# Patient Record
Sex: FEMALE | Race: BLACK OR AFRICAN AMERICAN | NOT HISPANIC OR LATINO | URBAN - METROPOLITAN AREA
[De-identification: names, ages, dates, MRNs, and addresses within clinical notes are randomized per-mention and may not be internally consistent; named-entity substitution may affect disease eponyms.]

---

## 2018-09-01 ENCOUNTER — OUTPATIENT (OUTPATIENT)
Dept: OUTPATIENT SERVICES | Facility: HOSPITAL | Age: 83
LOS: 1 days | End: 2018-09-01
Payer: MEDICARE

## 2018-09-01 DIAGNOSIS — S82.899A OTHER FRACTURE OF UNSPECIFIED LOWER LEG, INITIAL ENCOUNTER FOR CLOSED FRACTURE: Chronic | ICD-10-CM

## 2018-09-01 DIAGNOSIS — Z98.89 OTHER SPECIFIED POSTPROCEDURAL STATES: Chronic | ICD-10-CM

## 2018-09-01 PROCEDURE — G9001: CPT

## 2018-09-04 ENCOUNTER — EMERGENCY (EMERGENCY)
Facility: HOSPITAL | Age: 83
LOS: 1 days | Discharge: HOME | End: 2018-09-04
Attending: EMERGENCY MEDICINE
Payer: MEDICARE

## 2018-09-04 VITALS — WEIGHT: 179.9 LBS | HEIGHT: 62 IN

## 2018-09-04 VITALS
HEART RATE: 80 BPM | OXYGEN SATURATION: 98 % | RESPIRATION RATE: 20 BRPM | SYSTOLIC BLOOD PRESSURE: 114 MMHG | DIASTOLIC BLOOD PRESSURE: 70 MMHG

## 2018-09-04 DIAGNOSIS — S82.899A OTHER FRACTURE OF UNSPECIFIED LOWER LEG, INITIAL ENCOUNTER FOR CLOSED FRACTURE: Chronic | ICD-10-CM

## 2018-09-04 DIAGNOSIS — Z98.89 OTHER SPECIFIED POSTPROCEDURAL STATES: Chronic | ICD-10-CM

## 2018-09-04 LAB
APPEARANCE UR: CLEAR — SIGNIFICANT CHANGE UP
BACTERIA # UR AUTO: ABNORMAL
BILIRUB UR-MCNC: NEGATIVE — SIGNIFICANT CHANGE UP
COLOR SPEC: YELLOW — SIGNIFICANT CHANGE UP
DIFF PNL FLD: ABNORMAL
EPI CELLS # UR: ABNORMAL
GLUCOSE UR QL: NEGATIVE MG/DL — SIGNIFICANT CHANGE UP
KETONES UR-MCNC: NEGATIVE — SIGNIFICANT CHANGE UP
LEUKOCYTE ESTERASE UR-ACNC: ABNORMAL
NITRITE UR-MCNC: POSITIVE
PH UR: 6 — SIGNIFICANT CHANGE UP (ref 5–8)
PROT UR-MCNC: 30 MG/DL
RBC CASTS # UR COMP ASSIST: ABNORMAL /HPF (ref 0–4)
SP GR SPEC: 1.01 — SIGNIFICANT CHANGE UP (ref 1.01–1.02)
UROBILINOGEN FLD QL: 1 MG/DL
WBC UR QL: >50

## 2018-09-04 PROCEDURE — 87086 URINE CULTURE/COLONY COUNT: CPT

## 2018-09-04 PROCEDURE — 81001 URINALYSIS AUTO W/SCOPE: CPT

## 2018-09-04 PROCEDURE — 99283 EMERGENCY DEPT VISIT LOW MDM: CPT

## 2018-09-04 PROCEDURE — 87186 SC STD MICRODIL/AGAR DIL: CPT

## 2018-09-04 RX ORDER — NITROFURANTOIN MACROCRYSTAL 50 MG
1 CAPSULE ORAL
Qty: 10 | Refills: 0 | OUTPATIENT
Start: 2018-09-04 | End: 2018-09-08

## 2018-09-04 NOTE — ED ADULT NURSE NOTE - NSIMPLEMENTINTERV_GEN_ALL_ED
Implemented All Universal Safety Interventions:  [place] to call system. Call bell, personal items and telephone within reach. Instruct patient to call for assistance. Room bathroom lighting operational. Non-slip footwear when patient is off stretcher. Physically safe environment: no spills, clutter or unnecessary equipment. Stretcher in lowest position, wheels locked, appropriate side rails in place.

## 2018-09-04 NOTE — ED PROVIDER NOTE - OBJECTIVE STATEMENT
84  w/ PMH of CAD s/p stent, HTN, HLD, DM2, anemia & bladder prolapse presents w/ chronic urinary incontinence. Patient states that she had developed urinary prolase years ago. She was previously by an urologist (Dr. Mckinney) & had a procedure done in the past but was unable to provide any further information. 84  w/ PMH of CAD s/p stent, HTN, HLD, DM2, anemia & bladder prolapse presents w/ chronic urinary incontinence. Patient states that she had developed urinary prolase years ago. She was previously by an urologist (Dr. Mckinney) & had a procedure done in the past but was unable to provide any further information. Patient c/o of stress incontinence associated w/ dysuria but denies of any fever, chills, abdominal pain, hematuria, suprapubic pain, CVA tenderness.

## 2018-09-04 NOTE — ED ADULT TRIAGE NOTE - CHIEF COMPLAINT QUOTE
Pt brought in by family for possible prolapsed bladder pt c/o pain with urination and difficulty as per family members

## 2018-09-04 NOTE — ED PROVIDER NOTE - CARE PLAN
Principal Discharge DX:	Bladder prolapse, female, acquired Principal Discharge DX:	Bladder prolapse, female, acquired  Secondary Diagnosis:	UTI (urinary tract infection)

## 2018-09-04 NOTE — ED PROVIDER NOTE - ATTENDING CONTRIBUTION TO CARE
I personally saw the patient with theresident, and completed the key components of the history and physical exam. I then discussed the management plan with the resident. In brief Hx (urinary symptoms and fullnesds in the vagina)  Exam (bladder prolapse seen on pelvic examination) Plan (ot to get antibiotics for a uti and follow up with gyn/urology)

## 2018-09-05 DIAGNOSIS — Z71.89 OTHER SPECIFIED COUNSELING: ICD-10-CM

## 2018-09-12 ENCOUNTER — FORM ENCOUNTER (OUTPATIENT)
Age: 83
End: 2018-09-12

## 2018-09-12 ENCOUNTER — NON-APPOINTMENT (OUTPATIENT)
Age: 83
End: 2018-09-12

## 2018-09-12 ENCOUNTER — APPOINTMENT (OUTPATIENT)
Dept: FAMILY MEDICINE | Facility: CLINIC | Age: 83
End: 2018-09-12
Payer: MEDICARE

## 2018-09-12 VITALS
OXYGEN SATURATION: 90 % | HEIGHT: 60 IN | WEIGHT: 181 LBS | SYSTOLIC BLOOD PRESSURE: 128 MMHG | DIASTOLIC BLOOD PRESSURE: 76 MMHG | BODY MASS INDEX: 35.53 KG/M2 | HEART RATE: 77 BPM

## 2018-09-12 VITALS — OXYGEN SATURATION: 93 %

## 2018-09-12 DIAGNOSIS — Z87.891 PERSONAL HISTORY OF NICOTINE DEPENDENCE: ICD-10-CM

## 2018-09-12 DIAGNOSIS — Z13.21 ENCOUNTER FOR SCREENING FOR NUTRITIONAL DISORDER: ICD-10-CM

## 2018-09-12 DIAGNOSIS — Z13.820 ENCOUNTER FOR SCREENING FOR OSTEOPOROSIS: ICD-10-CM

## 2018-09-12 DIAGNOSIS — Z80.42 FAMILY HISTORY OF MALIGNANT NEOPLASM OF PROSTATE: ICD-10-CM

## 2018-09-12 DIAGNOSIS — Z13.220 ENCOUNTER FOR SCREENING FOR LIPOID DISORDERS: ICD-10-CM

## 2018-09-12 DIAGNOSIS — Z13.29 ENCOUNTER FOR SCREENING FOR OTHER SUSPECTED ENDOCRINE DISORDER: ICD-10-CM

## 2018-09-12 DIAGNOSIS — Z83.3 FAMILY HISTORY OF DIABETES MELLITUS: ICD-10-CM

## 2018-09-12 DIAGNOSIS — Z80.49 FAMILY HISTORY OF MALIGNANT NEOPLASM OF OTHER GENITAL ORGANS: ICD-10-CM

## 2018-09-12 DIAGNOSIS — Z00.00 ENCOUNTER FOR GENERAL ADULT MEDICAL EXAMINATION W/OUT ABNORMAL FINDINGS: ICD-10-CM

## 2018-09-12 DIAGNOSIS — Z13.1 ENCOUNTER FOR SCREENING FOR DIABETES MELLITUS: ICD-10-CM

## 2018-09-12 DIAGNOSIS — Z80.3 FAMILY HISTORY OF MALIGNANT NEOPLASM OF BREAST: ICD-10-CM

## 2018-09-12 DIAGNOSIS — Z82.49 FAMILY HISTORY OF ISCHEMIC HEART DISEASE AND OTHER DISEASES OF THE CIRCULATORY SYSTEM: ICD-10-CM

## 2018-09-12 DIAGNOSIS — Z80.0 FAMILY HISTORY OF MALIGNANT NEOPLASM OF DIGESTIVE ORGANS: ICD-10-CM

## 2018-09-12 PROCEDURE — 82270 OCCULT BLOOD FECES: CPT

## 2018-09-12 PROCEDURE — 36415 COLL VENOUS BLD VENIPUNCTURE: CPT

## 2018-09-12 PROCEDURE — 93000 ELECTROCARDIOGRAM COMPLETE: CPT

## 2018-09-12 PROCEDURE — G0439: CPT

## 2018-09-12 PROCEDURE — 99214 OFFICE O/P EST MOD 30 MIN: CPT | Mod: 25

## 2018-09-13 ENCOUNTER — APPOINTMENT (OUTPATIENT)
Dept: GASTROENTEROLOGY | Facility: CLINIC | Age: 83
End: 2018-09-13
Payer: MEDICARE

## 2018-09-13 ENCOUNTER — APPOINTMENT (OUTPATIENT)
Dept: RADIOLOGY | Facility: CLINIC | Age: 83
End: 2018-09-13

## 2018-09-13 ENCOUNTER — OUTPATIENT (OUTPATIENT)
Dept: OUTPATIENT SERVICES | Facility: HOSPITAL | Age: 83
LOS: 1 days | End: 2018-09-13
Payer: MEDICARE

## 2018-09-13 VITALS
HEIGHT: 60 IN | SYSTOLIC BLOOD PRESSURE: 139 MMHG | DIASTOLIC BLOOD PRESSURE: 75 MMHG | HEART RATE: 79 BPM | RESPIRATION RATE: 19 BRPM | WEIGHT: 188 LBS | OXYGEN SATURATION: 96 % | BODY MASS INDEX: 36.91 KG/M2

## 2018-09-13 DIAGNOSIS — R06.02 SHORTNESS OF BREATH: ICD-10-CM

## 2018-09-13 DIAGNOSIS — Z98.89 OTHER SPECIFIED POSTPROCEDURAL STATES: Chronic | ICD-10-CM

## 2018-09-13 DIAGNOSIS — Z13.820 ENCOUNTER FOR SCREENING FOR OSTEOPOROSIS: ICD-10-CM

## 2018-09-13 DIAGNOSIS — S82.899A OTHER FRACTURE OF UNSPECIFIED LOWER LEG, INITIAL ENCOUNTER FOR CLOSED FRACTURE: Chronic | ICD-10-CM

## 2018-09-13 LAB
25(OH)D3 SERPL-MCNC: 15.9 NG/ML
ALBUMIN SERPL ELPH-MCNC: 3.9 G/DL
ALP BLD-CCNC: 122 U/L
ALT SERPL-CCNC: 22 U/L
ANION GAP SERPL CALC-SCNC: 12 MMOL/L
APPEARANCE: CLEAR
AST SERPL-CCNC: 16 U/L
BACTERIA: NEGATIVE
BASOPHILS # BLD AUTO: 0.03 K/UL
BASOPHILS NFR BLD AUTO: 0.3 %
BILIRUB SERPL-MCNC: 0.2 MG/DL
BILIRUBIN URINE: NEGATIVE
BLOOD URINE: NEGATIVE
BUN SERPL-MCNC: 18 MG/DL
CALCIUM SERPL-MCNC: 9.4 MG/DL
CHLORIDE SERPL-SCNC: 104 MMOL/L
CHOLEST SERPL-MCNC: 167 MG/DL
CHOLEST/HDLC SERPL: 3.8 RATIO
CO2 SERPL-SCNC: 26 MMOL/L
COLOR: YELLOW
CREAT SERPL-MCNC: 0.84 MG/DL
EOSINOPHIL # BLD AUTO: 0.37 K/UL
EOSINOPHIL NFR BLD AUTO: 3.6 %
FERRITIN SERPL-MCNC: 41 NG/ML
FOLATE SERPL-MCNC: 11.5 NG/ML
GLUCOSE QUALITATIVE U: NEGATIVE MG/DL
GLUCOSE SERPL-MCNC: 80 MG/DL
HBA1C MFR BLD HPLC: 7.2 %
HCT VFR BLD CALC: 47.1 %
HDLC SERPL-MCNC: 44 MG/DL
HGB BLD-MCNC: 14.8 G/DL
HYALINE CASTS: 1 /LPF
IMM GRANULOCYTES NFR BLD AUTO: 0.1 %
IRON SATN MFR SERPL: 15 %
IRON SERPL-MCNC: 52 UG/DL
KETONES URINE: NEGATIVE
LDLC SERPL CALC-MCNC: 99 MG/DL
LEUKOCYTE ESTERASE URINE: NEGATIVE
LYMPHOCYTES # BLD AUTO: 3.28 K/UL
LYMPHOCYTES NFR BLD AUTO: 32.3 %
MAN DIFF?: NORMAL
MCHC RBC-ENTMCNC: 31 PG
MCHC RBC-ENTMCNC: 31.4 GM/DL
MCV RBC AUTO: 98.5 FL
MICROSCOPIC-UA: NORMAL
MONOCYTES # BLD AUTO: 0.69 K/UL
MONOCYTES NFR BLD AUTO: 6.8 %
NEUTROPHILS # BLD AUTO: 5.77 K/UL
NEUTROPHILS NFR BLD AUTO: 56.9 %
NITRITE URINE: NEGATIVE
PH URINE: 6.5
PLATELET # BLD AUTO: 381 K/UL
POTASSIUM SERPL-SCNC: 5.3 MMOL/L
PROT SERPL-MCNC: 7.6 G/DL
PROTEIN URINE: NEGATIVE MG/DL
RBC # BLD: 4.77 M/UL
RBC # BLD: 4.78 M/UL
RBC # FLD: 16 %
RED BLOOD CELLS URINE: 2 /HPF
RETICS # AUTO: 2.5 %
RETICS AGGREG/RBC NFR: 120.2 K/UL
SODIUM SERPL-SCNC: 143 MMOL/L
SPECIFIC GRAVITY URINE: 1.02
SQUAMOUS EPITHELIAL CELLS: 2 /HPF
TIBC SERPL-MCNC: 338 UG/DL
TRANSFERRIN SERPL-MCNC: 263 MG/DL
TRIGL SERPL-MCNC: 119 MG/DL
TSH SERPL-ACNC: 1.96 UIU/ML
UIBC SERPL-MCNC: 286 UG/DL
UROBILINOGEN URINE: NEGATIVE MG/DL
VIT B12 SERPL-MCNC: 371 PG/ML
WBC # FLD AUTO: 10.15 K/UL
WHITE BLOOD CELLS URINE: 24 /HPF

## 2018-09-13 PROCEDURE — 77080 DXA BONE DENSITY AXIAL: CPT

## 2018-09-13 PROCEDURE — 71046 X-RAY EXAM CHEST 2 VIEWS: CPT

## 2018-09-13 PROCEDURE — 71046 X-RAY EXAM CHEST 2 VIEWS: CPT | Mod: 26

## 2018-09-13 PROCEDURE — 77080 DXA BONE DENSITY AXIAL: CPT | Mod: 26

## 2018-09-13 PROCEDURE — 99204 OFFICE O/P NEW MOD 45 MIN: CPT

## 2018-09-13 PROCEDURE — 82272 OCCULT BLD FECES 1-3 TESTS: CPT

## 2018-09-14 ENCOUNTER — APPOINTMENT (OUTPATIENT)
Dept: FAMILY MEDICINE | Facility: CLINIC | Age: 83
End: 2018-09-14
Payer: MEDICARE

## 2018-09-14 VITALS
SYSTOLIC BLOOD PRESSURE: 128 MMHG | WEIGHT: 188 LBS | BODY MASS INDEX: 36.91 KG/M2 | DIASTOLIC BLOOD PRESSURE: 76 MMHG | HEIGHT: 60 IN | HEART RATE: 82 BPM

## 2018-09-14 VITALS — OXYGEN SATURATION: 93 %

## 2018-09-14 DIAGNOSIS — K62.5 HEMORRHAGE OF ANUS AND RECTUM: ICD-10-CM

## 2018-09-14 PROCEDURE — 99214 OFFICE O/P EST MOD 30 MIN: CPT

## 2018-09-17 ENCOUNTER — RX RENEWAL (OUTPATIENT)
Age: 83
End: 2018-09-17

## 2018-09-17 ENCOUNTER — APPOINTMENT (OUTPATIENT)
Dept: CARDIOLOGY | Facility: CLINIC | Age: 83
End: 2018-09-17
Payer: MEDICARE

## 2018-09-17 ENCOUNTER — OTHER (OUTPATIENT)
Age: 83
End: 2018-09-17

## 2018-09-17 VITALS
SYSTOLIC BLOOD PRESSURE: 136 MMHG | BODY MASS INDEX: 36.91 KG/M2 | HEART RATE: 82 BPM | WEIGHT: 188 LBS | HEIGHT: 60 IN | OXYGEN SATURATION: 93 % | DIASTOLIC BLOOD PRESSURE: 79 MMHG

## 2018-09-17 DIAGNOSIS — R94.31 ABNORMAL ELECTROCARDIOGRAM [ECG] [EKG]: ICD-10-CM

## 2018-09-17 LAB — BACTERIA UR CULT: ABNORMAL

## 2018-09-17 PROCEDURE — 99204 OFFICE O/P NEW MOD 45 MIN: CPT

## 2018-09-17 RX ORDER — ALBUTEROL SULFATE 90 UG/1
108 (90 BASE) AEROSOL, METERED RESPIRATORY (INHALATION) EVERY 6 HOURS
Qty: 1 | Refills: 0 | Status: DISCONTINUED | COMMUNITY
Start: 2018-09-12 | End: 2018-09-17

## 2018-09-18 ENCOUNTER — APPOINTMENT (OUTPATIENT)
Dept: PULMONOLOGY | Facility: CLINIC | Age: 83
End: 2018-09-18
Payer: MEDICARE

## 2018-09-18 ENCOUNTER — NON-APPOINTMENT (OUTPATIENT)
Age: 83
End: 2018-09-18

## 2018-09-18 VITALS
HEART RATE: 87 BPM | WEIGHT: 188 LBS | SYSTOLIC BLOOD PRESSURE: 122 MMHG | TEMPERATURE: 98.6 F | OXYGEN SATURATION: 95 % | RESPIRATION RATE: 20 BRPM | DIASTOLIC BLOOD PRESSURE: 70 MMHG | BODY MASS INDEX: 36.91 KG/M2 | HEIGHT: 60 IN

## 2018-09-18 PROCEDURE — 99205 OFFICE O/P NEW HI 60 MIN: CPT | Mod: 25

## 2018-09-18 PROCEDURE — 94010 BREATHING CAPACITY TEST: CPT

## 2018-09-19 ENCOUNTER — APPOINTMENT (OUTPATIENT)
Dept: GASTROENTEROLOGY | Facility: GI CENTER | Age: 83
End: 2018-09-19
Payer: MEDICARE

## 2018-09-19 ENCOUNTER — OUTPATIENT (OUTPATIENT)
Dept: OUTPATIENT SERVICES | Facility: HOSPITAL | Age: 83
LOS: 1 days | End: 2018-09-19
Payer: MEDICARE

## 2018-09-19 VITALS
TEMPERATURE: 98.6 F | HEIGHT: 60 IN | SYSTOLIC BLOOD PRESSURE: 124 MMHG | BODY MASS INDEX: 36.91 KG/M2 | DIASTOLIC BLOOD PRESSURE: 74 MMHG | RESPIRATION RATE: 12 BRPM | HEART RATE: 90 BPM | WEIGHT: 188 LBS

## 2018-09-19 DIAGNOSIS — K62.5 HEMORRHAGE OF ANUS AND RECTUM: ICD-10-CM

## 2018-09-19 DIAGNOSIS — F41.9 ANXIETY DISORDER, UNSPECIFIED: ICD-10-CM

## 2018-09-19 DIAGNOSIS — R06.2 WHEEZING: ICD-10-CM

## 2018-09-19 DIAGNOSIS — K64.8 OTHER HEMORRHOIDS: ICD-10-CM

## 2018-09-19 DIAGNOSIS — S82.899A OTHER FRACTURE OF UNSPECIFIED LOWER LEG, INITIAL ENCOUNTER FOR CLOSED FRACTURE: Chronic | ICD-10-CM

## 2018-09-19 DIAGNOSIS — Z98.89 OTHER SPECIFIED POSTPROCEDURAL STATES: Chronic | ICD-10-CM

## 2018-09-19 DIAGNOSIS — K57.30 DIVERTICULOSIS OF LARGE INTESTINE W/OUT PERFORATION OR ABSCESS W/OUT BLEEDING: ICD-10-CM

## 2018-09-19 DIAGNOSIS — K92.1 MELENA: ICD-10-CM

## 2018-09-19 DIAGNOSIS — M54.9 DORSALGIA, UNSPECIFIED: ICD-10-CM

## 2018-09-19 DIAGNOSIS — R12 HEARTBURN: ICD-10-CM

## 2018-09-19 DIAGNOSIS — F43.9 REACTION TO SEVERE STRESS, UNSPECIFIED: ICD-10-CM

## 2018-09-19 LAB — GLUCOSE BLDC GLUCOMTR-MCNC: 111 MG/DL — HIGH (ref 70–99)

## 2018-09-19 PROCEDURE — 82962 GLUCOSE BLOOD TEST: CPT

## 2018-09-19 PROCEDURE — 45378 DIAGNOSTIC COLONOSCOPY: CPT

## 2018-09-20 PROBLEM — R94.31 ABNORMAL EKG: Status: ACTIVE | Noted: 2018-09-12

## 2018-09-21 ENCOUNTER — APPOINTMENT (OUTPATIENT)
Dept: PULMONOLOGY | Facility: CLINIC | Age: 83
End: 2018-09-21
Payer: MEDICARE

## 2018-09-21 PROCEDURE — 85018 HEMOGLOBIN: CPT | Mod: QW

## 2018-09-21 PROCEDURE — 94664 DEMO&/EVAL PT USE INHALER: CPT | Mod: 59

## 2018-09-21 PROCEDURE — 94727 GAS DIL/WSHOT DETER LNG VOL: CPT

## 2018-09-21 PROCEDURE — 94729 DIFFUSING CAPACITY: CPT

## 2018-09-21 PROCEDURE — 94060 EVALUATION OF WHEEZING: CPT

## 2018-09-24 ENCOUNTER — APPOINTMENT (OUTPATIENT)
Dept: CARDIOLOGY | Facility: CLINIC | Age: 83
End: 2018-09-24
Payer: MEDICARE

## 2018-09-24 PROCEDURE — 93306 TTE W/DOPPLER COMPLETE: CPT

## 2018-09-25 ENCOUNTER — FORM ENCOUNTER (OUTPATIENT)
Age: 83
End: 2018-09-25

## 2018-09-26 ENCOUNTER — APPOINTMENT (OUTPATIENT)
Dept: CT IMAGING | Facility: CLINIC | Age: 83
End: 2018-09-26
Payer: MEDICARE

## 2018-09-26 ENCOUNTER — APPOINTMENT (OUTPATIENT)
Dept: CARDIOLOGY | Facility: CLINIC | Age: 83
End: 2018-09-26
Payer: MEDICARE

## 2018-09-26 ENCOUNTER — OUTPATIENT (OUTPATIENT)
Dept: OUTPATIENT SERVICES | Facility: HOSPITAL | Age: 83
LOS: 1 days | End: 2018-09-26
Payer: MEDICARE

## 2018-09-26 DIAGNOSIS — J44.9 CHRONIC OBSTRUCTIVE PULMONARY DISEASE, UNSPECIFIED: ICD-10-CM

## 2018-09-26 DIAGNOSIS — S82.899A OTHER FRACTURE OF UNSPECIFIED LOWER LEG, INITIAL ENCOUNTER FOR CLOSED FRACTURE: Chronic | ICD-10-CM

## 2018-09-26 DIAGNOSIS — Z98.89 OTHER SPECIFIED POSTPROCEDURAL STATES: Chronic | ICD-10-CM

## 2018-09-26 PROCEDURE — 71275 CT ANGIOGRAPHY CHEST: CPT | Mod: 26

## 2018-09-26 PROCEDURE — 71275 CT ANGIOGRAPHY CHEST: CPT

## 2018-09-28 ENCOUNTER — APPOINTMENT (OUTPATIENT)
Dept: FAMILY MEDICINE | Facility: CLINIC | Age: 83
End: 2018-09-28
Payer: MEDICARE

## 2018-09-28 VITALS
WEIGHT: 185 LBS | DIASTOLIC BLOOD PRESSURE: 66 MMHG | SYSTOLIC BLOOD PRESSURE: 130 MMHG | OXYGEN SATURATION: 88 % | HEART RATE: 73 BPM | HEIGHT: 60 IN | BODY MASS INDEX: 36.32 KG/M2

## 2018-09-28 VITALS — OXYGEN SATURATION: 93 %

## 2018-09-28 DIAGNOSIS — R06.02 SHORTNESS OF BREATH: ICD-10-CM

## 2018-09-28 DIAGNOSIS — R10.30 LOWER ABDOMINAL PAIN, UNSPECIFIED: ICD-10-CM

## 2018-09-28 LAB
BILIRUB UR QL STRIP: NEGATIVE
CLARITY UR: CLEAR
COLLECTION METHOD: NORMAL
GLUCOSE UR-MCNC: NEGATIVE
HCG UR QL: 0.2 EU/DL
HGB UR QL STRIP.AUTO: NORMAL
KETONES UR-MCNC: NEGATIVE
LEUKOCYTE ESTERASE UR QL STRIP: NORMAL
NITRITE UR QL STRIP: NORMAL
PH UR STRIP: 6
PROT UR STRIP-MCNC: NORMAL
SP GR UR STRIP: 1.02

## 2018-09-28 PROCEDURE — 99214 OFFICE O/P EST MOD 30 MIN: CPT | Mod: 25

## 2018-09-28 PROCEDURE — 36415 COLL VENOUS BLD VENIPUNCTURE: CPT

## 2018-09-28 PROCEDURE — 81003 URINALYSIS AUTO W/O SCOPE: CPT | Mod: QW

## 2018-10-01 ENCOUNTER — EMERGENCY (EMERGENCY)
Facility: HOSPITAL | Age: 83
LOS: 1 days | Discharge: HOME | End: 2018-10-01
Attending: EMERGENCY MEDICINE
Payer: MEDICARE

## 2018-10-01 VITALS
DIASTOLIC BLOOD PRESSURE: 66 MMHG | HEART RATE: 74 BPM | OXYGEN SATURATION: 90 % | RESPIRATION RATE: 20 BRPM | TEMPERATURE: 98 F | SYSTOLIC BLOOD PRESSURE: 106 MMHG

## 2018-10-01 VITALS — HEIGHT: 60 IN | WEIGHT: 184.97 LBS

## 2018-10-01 DIAGNOSIS — Z98.89 OTHER SPECIFIED POSTPROCEDURAL STATES: Chronic | ICD-10-CM

## 2018-10-01 DIAGNOSIS — S82.899A OTHER FRACTURE OF UNSPECIFIED LOWER LEG, INITIAL ENCOUNTER FOR CLOSED FRACTURE: Chronic | ICD-10-CM

## 2018-10-01 LAB
ANION GAP SERPL CALC-SCNC: 14 MMOL/L — SIGNIFICANT CHANGE UP (ref 5–17)
APPEARANCE UR: SIGNIFICANT CHANGE UP
APPEARANCE: CLEAR
BACTERIA UR CULT: NORMAL
BACTERIA: NEGATIVE
BASOPHILS # BLD AUTO: 0 K/UL — SIGNIFICANT CHANGE UP (ref 0–0.2)
BASOPHILS NFR BLD AUTO: 0.3 % — SIGNIFICANT CHANGE UP (ref 0–2)
BILIRUB UR-MCNC: NEGATIVE — SIGNIFICANT CHANGE UP
BILIRUBIN URINE: NEGATIVE
BLOOD URINE: ABNORMAL
BUN SERPL-MCNC: 11 MG/DL — SIGNIFICANT CHANGE UP (ref 8–20)
CALCIUM SERPL-MCNC: 9.3 MG/DL — SIGNIFICANT CHANGE UP (ref 8.6–10.2)
CHLORIDE SERPL-SCNC: 105 MMOL/L — SIGNIFICANT CHANGE UP (ref 98–107)
CO2 SERPL-SCNC: 26 MMOL/L — SIGNIFICANT CHANGE UP (ref 22–29)
COLOR SPEC: ABNORMAL
COLOR: YELLOW
CREAT SERPL-MCNC: 0.79 MG/DL — SIGNIFICANT CHANGE UP (ref 0.5–1.3)
DIFF PNL FLD: ABNORMAL
EOSINOPHIL # BLD AUTO: 0.3 K/UL — SIGNIFICANT CHANGE UP (ref 0–0.5)
EOSINOPHIL NFR BLD AUTO: 3.1 % — SIGNIFICANT CHANGE UP (ref 0–6)
GLUCOSE QUALITATIVE U: NEGATIVE MG/DL
GLUCOSE SERPL-MCNC: 93 MG/DL — SIGNIFICANT CHANGE UP (ref 70–115)
GLUCOSE UR QL: NEGATIVE — SIGNIFICANT CHANGE UP
HCT VFR BLD CALC: 48 % — HIGH (ref 37–47)
HGB BLD-MCNC: 15 G/DL — SIGNIFICANT CHANGE UP (ref 12–16)
HYALINE CASTS: 11 /LPF
KETONES UR-MCNC: ABNORMAL
KETONES URINE: NEGATIVE
LEUKOCYTE ESTERASE UR-ACNC: ABNORMAL
LEUKOCYTE ESTERASE URINE: ABNORMAL
LYMPHOCYTES # BLD AUTO: 2.6 K/UL — SIGNIFICANT CHANGE UP (ref 1–4.8)
LYMPHOCYTES # BLD AUTO: 23.1 % — SIGNIFICANT CHANGE UP (ref 20–55)
MCHC RBC-ENTMCNC: 30.5 PG — SIGNIFICANT CHANGE UP (ref 27–31)
MCHC RBC-ENTMCNC: 31.3 G/DL — LOW (ref 32–36)
MCV RBC AUTO: 97.6 FL — SIGNIFICANT CHANGE UP (ref 81–99)
MICROSCOPIC-UA: NORMAL
MONOCYTES # BLD AUTO: 0.7 K/UL — SIGNIFICANT CHANGE UP (ref 0–0.8)
MONOCYTES NFR BLD AUTO: 6.2 % — SIGNIFICANT CHANGE UP (ref 3–10)
NEUTROPHILS # BLD AUTO: 7.5 K/UL — SIGNIFICANT CHANGE UP (ref 1.8–8)
NEUTROPHILS NFR BLD AUTO: 67.1 % — SIGNIFICANT CHANGE UP (ref 37–73)
NITRITE UR-MCNC: NEGATIVE — SIGNIFICANT CHANGE UP
NITRITE URINE: NEGATIVE
PH UR: 6.5 — SIGNIFICANT CHANGE UP (ref 5–8)
PH URINE: 6
PLATELET # BLD AUTO: 306 K/UL — SIGNIFICANT CHANGE UP (ref 150–400)
POTASSIUM SERPL-MCNC: 4.3 MMOL/L — SIGNIFICANT CHANGE UP (ref 3.5–5.3)
POTASSIUM SERPL-SCNC: 4.3 MMOL/L — SIGNIFICANT CHANGE UP (ref 3.5–5.3)
PROT UR-MCNC: 500 MG/DL
PROTEIN URINE: ABNORMAL MG/DL
RBC # BLD: 4.92 M/UL — SIGNIFICANT CHANGE UP (ref 4.4–5.2)
RBC # FLD: 15.6 % — SIGNIFICANT CHANGE UP (ref 11–15.6)
RED BLOOD CELLS URINE: 14 /HPF
SODIUM SERPL-SCNC: 145 MMOL/L — SIGNIFICANT CHANGE UP (ref 135–145)
SP GR SPEC: 1.01 — SIGNIFICANT CHANGE UP (ref 1.01–1.02)
SPECIFIC GRAVITY URINE: 1.02
SQUAMOUS EPITHELIAL CELLS: 2 /HPF
UROBILINOGEN FLD QL: NEGATIVE — SIGNIFICANT CHANGE UP
UROBILINOGEN URINE: NEGATIVE MG/DL
WBC # BLD: 11.1 K/UL — HIGH (ref 4.8–10.8)
WBC # FLD AUTO: 11.1 K/UL — HIGH (ref 4.8–10.8)
WHITE BLOOD CELLS URINE: 60 /HPF

## 2018-10-01 PROCEDURE — 81001 URINALYSIS AUTO W/SCOPE: CPT

## 2018-10-01 PROCEDURE — 74176 CT ABD & PELVIS W/O CONTRAST: CPT | Mod: 26

## 2018-10-01 PROCEDURE — 80048 BASIC METABOLIC PNL TOTAL CA: CPT

## 2018-10-01 PROCEDURE — 74176 CT ABD & PELVIS W/O CONTRAST: CPT

## 2018-10-01 PROCEDURE — 36415 COLL VENOUS BLD VENIPUNCTURE: CPT

## 2018-10-01 PROCEDURE — 99284 EMERGENCY DEPT VISIT MOD MDM: CPT

## 2018-10-01 PROCEDURE — 87086 URINE CULTURE/COLONY COUNT: CPT

## 2018-10-01 PROCEDURE — 99284 EMERGENCY DEPT VISIT MOD MDM: CPT | Mod: 25

## 2018-10-01 PROCEDURE — 85027 COMPLETE CBC AUTOMATED: CPT

## 2018-10-01 NOTE — ED PROVIDER NOTE - OBJECTIVE STATEMENT
84 y.o F with PMH of CAD s/p stent, HTN, HLD, DM2, anemia & bladder prolapse (diagnosed 1 month ago) presents to [facility] ED for eval of hematuria. As per pt and family, pt was seen by PMD for c/o dysuria and post void lower abd pain last week in which she was started on cipro for UTI. Pt was called by PMD today and was told pt does not have a UTI and that pt should be sent to ED for rule out kidney stones with CT. Pt was recently seen in [facility] ED for proloapsed bladder 1 month ago which at the time was experiencing hematuria which later stopped.. on that ED visit pt was referred to uro gyn and has an appointment with them this thursday. Pt is with no active complaints in the ED.. Pt denies n/v, fever, chills, headache, dizziness, abdominal pain, back pain. VSS.

## 2018-10-01 NOTE — ED STATDOCS - OBJECTIVE STATEMENT
Telemedicine assessment was conducted (using real time 2 way audio-video technology) by Dr. ROBI Brown located at [address] [place] [address]  ++++++++++++++++++++++++  Pertinent patient history and initial plan: Pt presents with hematuria. on Cipro currently, which was started based on symptoms of dysuria with hematuria from the office. Once the office received the urinalysis that was neg for UTI the patient/family D/W Dr Caceres, who sent her for CT because she also is complaining of lower back pain. No fever or chills. They would like to r/o nephrolithiasis. Pt denies lower abd pain as indicated by the triage note. She said she has no post void pain or current dysuria. Her only complaint is hematuria.

## 2018-10-01 NOTE — ED PROVIDER NOTE - PLAN OF CARE
likely causing hematuria  follow up with uro gyn scheduled for thursday   asymptomatic at this time
(2) assistive person

## 2018-10-01 NOTE — ED PROVIDER NOTE - MEDICAL DECISION MAKING DETAILS
will get basic labs, cbc, UA/UCx to rule out UTI and renal CT to rule out nephrolithiasis.. Pt however is without severe abdominal pain, no unilateral flank pain/CVA tenderness or lower abdominal pain also no n/v or fever... nephrolithiasis does not seem likely. If all negative pt to follow up with uro gyn thursday.

## 2018-10-01 NOTE — ED ADULT NURSE NOTE - NSIMPLEMENTINTERV_GEN_ALL_ED
Implemented All Universal Safety Interventions:  [place] to call system. Call bell, personal items and telephone within reach. Instruct patient to call for assistance. Room bathroom lighting operational. Non-slip footwear when patient is off stretcher. Physically safe environment: no spills, clutter or unnecessary equipment. Stretcher in lowest position, wheels locked, appropriate side rails in place.

## 2018-10-01 NOTE — ED ADULT NURSE NOTE - OBJECTIVE STATEMENT
Assumed care at 1426.  A+Ox4, in no apparent distress. c/o hematuria x 1 day, and back pain.  at pmd last wk tested neg for uti.

## 2018-10-01 NOTE — ED PROVIDER NOTE - ATTENDING CONTRIBUTION TO CARE
I, Lily Butt, performed a face to face bedside interview with this patient regarding history of present illness, review of symptoms and relevant past medical, social and family history.  I completed an independent physical examination.  Follow-up on ordered tests (ie labs, radiologic studies) and re-evaluation of the patient's status has been communicated to the ACP.  Disposition of the patient will be based on test outcome and response to ED interventions.     Pt with recurrent gross hematuria, no clots, intermittent retention due to prolapsed bladder, when stands able to pee just fine. no fever/chills. sent in by PCP for r/o kidney stone. has urogyn apt thurs     NAD  mild suprapubic ttp no rebound/guarding  +gross hematuria    Pt with gross hematuria- will r/o nephrolithiasis. patient had recent Urine culture negative for infection and took cipro. no systemic symptoms of infection. will bladder scan if needed ceron

## 2018-10-01 NOTE — ED PROVIDER NOTE - CARE PLAN
Principal Discharge DX:	Bladder mass  Assessment and plan of treatment:	likely causing hematuria  follow up with uro gyn scheduled for thursday   asymptomatic at this time

## 2018-10-01 NOTE — ED PROVIDER NOTE - PHYSICAL EXAMINATION
PE: General: NAD, sitting up in bed. Eyes: PERRLA, EOM intact. Neck: Supple and symmetrical. CV: RRR, no m/r/g. Lungs: CTA b/l, no use of accessory muscles, no wheezes, rales, rhonchi. Skin: warm, dry, no rashes. Psych: normal mood/affect. Abdomen: Normal BS, soft, NT/ND. Extremities: no edema, cyanosis. Musculoskeletal: Good strength b/l UE/LE, normal ROM, no joint swelling, no CVA tenderness. Neuro: A & O X 3, CN 2-12 grossly intact, no sensory or motor deficit.

## 2018-10-01 NOTE — ED PROVIDER NOTE - PROGRESS NOTE DETAILS
labs reviewed, wnl. CT reviewed and findings discussed with pt and pts family. [facility]. Pt states has appointment with uro gyn for this Thursday. labs reviewed, wnl. CT reviewed and findings discussed with pt and pts family. Pt instructed to follow up with outpatient urologist. Pt states has appointment with uro gyn for this Thursday. Prior to discharge pt was scanned with ultrasound and found to be retaining roughly 200cc of urine. Ceron was recommended to be placed prior to discharge however pt refuses ceron as she states she urinates fine at home. Pt is ano x 3 and mentally able to refuse ceron. Pt instructed to return to ED if she notices increased abdominal/bladder distention and or notices she is not passing urine appropriately, pt verbalizes understanding and states she will be seeing a urologist this thursday.  Pts UA somewhat suggestive of UTI though concern for contamination as family reports pt urinated into basin and then poured the urine from the basin into urine cup.. also pt and family state she completed 3 days of cipro and was called today by her PMD and was told that her urine culture was negative. Will defer antibiotics for now as pt without dysuria / additional symptoms. To follow up with urology on thursday. Also pt will be notified if urine cultures come back positive that were collected here. patient with 150-200cc on bedside US bladder scan, explained need for ceron for retention. patient just post-void. patient refusing ceron. states able to urinate while standing and apt for GYN thursday. will return for worsening symptoms -Daquan DO

## 2018-10-01 NOTE — ED ADULT TRIAGE NOTE - CHIEF COMPLAINT QUOTE
Pt presents to ED for eval of hematuria this am. Pt seen by PMD for c/o dysuria and post void lower abd pain. Pt sent by PMD for CT. Is currently awaiting auth for transvaginal sono and bladder sono. Pt recently seen in ED for prolapsed bladder. Seen by GYN. Has appt on Thursday for Urology but seen for a more urgent eval. Currently on Cipro, denies fever or chills.

## 2018-10-02 LAB
CULTURE RESULTS: NO GROWTH — SIGNIFICANT CHANGE UP
SPECIMEN SOURCE: SIGNIFICANT CHANGE UP

## 2018-10-03 ENCOUNTER — APPOINTMENT (OUTPATIENT)
Dept: CARDIOLOGY | Facility: CLINIC | Age: 83
End: 2018-10-03
Payer: MEDICARE

## 2018-10-03 PROCEDURE — 93017 CV STRESS TEST TRACING ONLY: CPT

## 2018-10-03 PROCEDURE — A9500: CPT

## 2018-10-03 PROCEDURE — 78452 HT MUSCLE IMAGE SPECT MULT: CPT

## 2018-10-03 PROCEDURE — 93018 CV STRESS TEST I&R ONLY: CPT

## 2018-10-04 ENCOUNTER — APPOINTMENT (OUTPATIENT)
Dept: UROGYNECOLOGY | Facility: CLINIC | Age: 83
End: 2018-10-04
Payer: MEDICARE

## 2018-10-04 ENCOUNTER — RESULT CHARGE (OUTPATIENT)
Age: 83
End: 2018-10-04

## 2018-10-04 ENCOUNTER — APPOINTMENT (OUTPATIENT)
Dept: UROLOGY | Facility: CLINIC | Age: 83
End: 2018-10-04
Payer: MEDICARE

## 2018-10-04 VITALS
DIASTOLIC BLOOD PRESSURE: 64 MMHG | HEIGHT: 60 IN | SYSTOLIC BLOOD PRESSURE: 120 MMHG | WEIGHT: 185 LBS | BODY MASS INDEX: 36.32 KG/M2

## 2018-10-04 DIAGNOSIS — R35.0 FREQUENCY OF MICTURITION: ICD-10-CM

## 2018-10-04 DIAGNOSIS — I51.9 HEART DISEASE, UNSPECIFIED: ICD-10-CM

## 2018-10-04 DIAGNOSIS — J45.909 UNSPECIFIED ASTHMA, UNCOMPLICATED: ICD-10-CM

## 2018-10-04 DIAGNOSIS — F32.9 MAJOR DEPRESSIVE DISORDER, SINGLE EPISODE, UNSPECIFIED: ICD-10-CM

## 2018-10-04 DIAGNOSIS — I25.10 ATHEROSCLEROTIC HEART DISEASE OF NATIVE CORONARY ARTERY W/OUT ANGINA PECTORIS: ICD-10-CM

## 2018-10-04 DIAGNOSIS — Z60.2 PROBLEMS RELATED TO LIVING ALONE: ICD-10-CM

## 2018-10-04 DIAGNOSIS — K57.90 DIVERTICULOSIS OF INTESTINE, PART UNSPECIFIED, W/OUT PERFORATION OR ABSCESS W/OUT BLEEDING: ICD-10-CM

## 2018-10-04 DIAGNOSIS — N83.209 UNSPECIFIED OVARIAN CYST, UNSPECIFIED SIDE: ICD-10-CM

## 2018-10-04 DIAGNOSIS — Z63.4 DISAPPEARANCE AND DEATH OF FAMILY MEMBER: ICD-10-CM

## 2018-10-04 DIAGNOSIS — N39.3 STRESS INCONTINENCE (FEMALE) (MALE): ICD-10-CM

## 2018-10-04 DIAGNOSIS — N81.11 CYSTOCELE, MIDLINE: ICD-10-CM

## 2018-10-04 DIAGNOSIS — R31.0 GROSS HEMATURIA: ICD-10-CM

## 2018-10-04 DIAGNOSIS — R32 UNSPECIFIED URINARY INCONTINENCE: ICD-10-CM

## 2018-10-04 DIAGNOSIS — M53.9 DORSOPATHY, UNSPECIFIED: ICD-10-CM

## 2018-10-04 DIAGNOSIS — R35.1 NOCTURIA: ICD-10-CM

## 2018-10-04 LAB
BILIRUB UR QL STRIP: NORMAL
CLARITY UR: CLEAR
COLLECTION METHOD: NORMAL
GLUCOSE UR-MCNC: NORMAL
HCG UR QL: 0.2 EU/DL
HGB UR QL STRIP.AUTO: NORMAL
KETONES UR-MCNC: NORMAL
LEUKOCYTE ESTERASE UR QL STRIP: NORMAL
NITRITE UR QL STRIP: NORMAL
PH UR STRIP: 6
PROT UR STRIP-MCNC: NORMAL
SP GR UR STRIP: 1.01

## 2018-10-04 PROCEDURE — 99204 OFFICE O/P NEW MOD 45 MIN: CPT

## 2018-10-04 RX ORDER — CEPHALEXIN 500 MG/1
500 CAPSULE ORAL
Refills: 0 | Status: DISCONTINUED | COMMUNITY
End: 2018-10-04

## 2018-10-04 RX ORDER — CIPROFLOXACIN HYDROCHLORIDE 500 MG/1
500 TABLET, FILM COATED ORAL
Qty: 10 | Refills: 0 | Status: DISCONTINUED | COMMUNITY
Start: 2018-09-28 | End: 2018-10-04

## 2018-10-04 SDOH — SOCIAL STABILITY - SOCIAL INSECURITY: DISSAPEARANCE AND DEATH OF FAMILY MEMBER: Z63.4

## 2018-10-04 SDOH — SOCIAL STABILITY - SOCIAL INSECURITY: PROBLEMS RELATED TO LIVING ALONE: Z60.2

## 2018-10-04 NOTE — LETTER BODY
[Dear  ___] : Dear  [unfilled], [Consult Letter:] : I had the pleasure of evaluating your patient, [unfilled]. [Please see my note below.] : Please see my note below. [Sincerely,] : Sincerely, [DrAna  ___] : Dr. CHAMBERS [FreeTextEntry3] : Ed\par \par Ike Sanders MD\par [facility]\par  of Urology\par [employer] [facility]\par

## 2018-10-04 NOTE — HISTORY OF PRESENT ILLNESS
[FreeTextEntry1] : patient was like she was "passing stones" when she was urinating. She went to the ER and was told she had an infection. She was given an antibiotic. She was noted to have a prolapsed bladder. was seen by GYN Onc today. She was having intermittent hematuria as well.

## 2018-10-04 NOTE — REVIEW OF SYSTEMS
[Feeling Tired] : feeling tired [Shortness Of Breath] : shortness of breath [Wheezing] : wheezing [Abdominal Pain] : abdominal pain [Heartburn] : heartburn [Genital bacterial infection] : genital bacterial infection [Urine Infection (bladder/kidney)] : bladder/kidney infection [Pain during urination] : pain during urination [Blood in urine that you can see] : blood visible in urine [Told you have blood in urine on a urine test] : told blood was present in a urine test [Urine retention] : urine retention [Wake up at night to urinate  How many times?  ___] : wakes up to urinate [unfilled] times during the night [Strong urge to urinate] : strong urge to urinate [Bladder pressure] : experiences bladder pressure [Slow urine stream] : slow urine stream [Bladder fullness after urinating] : bladder fullness after urinating [Leakage of urine with urgency] : leakage of urine with urgency [Leakage of urine with straining, coughing, laughing] : leakage of urine with straining, coughing, laughing [Anxiety] : anxiety [Depression] : depression [Negative] : Heme/Lymph

## 2018-10-04 NOTE — ASSESSMENT
[FreeTextEntry1] : Impression\par \par CT reviewed. 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  \par \par Plan:\par office cysto\par office cytology

## 2018-10-04 NOTE — PHYSICAL EXAM

## 2018-10-05 ENCOUNTER — OTHER (OUTPATIENT)
Age: 83
End: 2018-10-05

## 2018-10-08 ENCOUNTER — APPOINTMENT (OUTPATIENT)
Dept: UROLOGY | Facility: CLINIC | Age: 83
End: 2018-10-08
Payer: MEDICARE

## 2018-10-08 VITALS
HEART RATE: 75 BPM | SYSTOLIC BLOOD PRESSURE: 126 MMHG | HEIGHT: 60 IN | TEMPERATURE: 98.4 F | BODY MASS INDEX: 36.32 KG/M2 | DIASTOLIC BLOOD PRESSURE: 72 MMHG | WEIGHT: 185 LBS

## 2018-10-08 LAB — CORE LAB FLUID CYTOLOGY: NORMAL

## 2018-10-08 PROCEDURE — 52000 CYSTOURETHROSCOPY: CPT

## 2018-10-10 ENCOUNTER — APPOINTMENT (OUTPATIENT)
Dept: PULMONOLOGY | Facility: CLINIC | Age: 83
End: 2018-10-10
Payer: MEDICARE

## 2018-10-10 VITALS
HEIGHT: 60 IN | SYSTOLIC BLOOD PRESSURE: 130 MMHG | RESPIRATION RATE: 18 BRPM | HEART RATE: 84 BPM | DIASTOLIC BLOOD PRESSURE: 60 MMHG | BODY MASS INDEX: 36.12 KG/M2 | OXYGEN SATURATION: 82 % | WEIGHT: 184 LBS

## 2018-10-10 PROCEDURE — 99215 OFFICE O/P EST HI 40 MIN: CPT

## 2018-10-10 RX ORDER — POLYETHYLENE GLYOCOL 3350, SODIUM CHLORIDE, SODIUM BICARBONATE AND POTASSIUM CHLORIDE 420; 11.2; 5.72; 1.48 G/4L; G/4L; G/4L; G/4L
420 POWDER, FOR SOLUTION NASOGASTRIC; ORAL
Qty: 1 | Refills: 0 | Status: DISCONTINUED | COMMUNITY
Start: 2018-09-13 | End: 2018-10-10

## 2018-10-16 ENCOUNTER — APPOINTMENT (OUTPATIENT)
Dept: FAMILY MEDICINE | Facility: CLINIC | Age: 83
End: 2018-10-16

## 2018-10-24 ENCOUNTER — INPATIENT (INPATIENT)
Facility: HOSPITAL | Age: 83
LOS: 4 days | Discharge: HOME | DRG: 669 | End: 2018-10-29
Attending: INTERNAL MEDICINE | Admitting: INTERNAL MEDICINE
Payer: MEDICARE

## 2018-10-24 VITALS — WEIGHT: 184.97 LBS

## 2018-10-24 DIAGNOSIS — Z98.89 OTHER SPECIFIED POSTPROCEDURAL STATES: Chronic | ICD-10-CM

## 2018-10-24 DIAGNOSIS — N81.10 CYSTOCELE, UNSPECIFIED: ICD-10-CM

## 2018-10-24 DIAGNOSIS — S82.899A OTHER FRACTURE OF UNSPECIFIED LOWER LEG, INITIAL ENCOUNTER FOR CLOSED FRACTURE: Chronic | ICD-10-CM

## 2018-10-24 LAB
ALBUMIN SERPL ELPH-MCNC: 3.7 G/DL — SIGNIFICANT CHANGE UP (ref 3.3–5.2)
ALP SERPL-CCNC: 100 U/L — SIGNIFICANT CHANGE UP (ref 40–120)
ALT FLD-CCNC: 11 U/L — SIGNIFICANT CHANGE UP
ANION GAP SERPL CALC-SCNC: 14 MMOL/L — SIGNIFICANT CHANGE UP (ref 5–17)
APPEARANCE UR: SIGNIFICANT CHANGE UP
APTT BLD: 32 SEC — SIGNIFICANT CHANGE UP (ref 27.5–37.4)
AST SERPL-CCNC: 19 U/L — SIGNIFICANT CHANGE UP
BACTERIA # UR AUTO: ABNORMAL
BASOPHILS # BLD AUTO: 0 K/UL — SIGNIFICANT CHANGE UP (ref 0–0.2)
BASOPHILS NFR BLD AUTO: 0.1 % — SIGNIFICANT CHANGE UP (ref 0–2)
BILIRUB SERPL-MCNC: 0.5 MG/DL — SIGNIFICANT CHANGE UP (ref 0.4–2)
BILIRUB UR-MCNC: NEGATIVE — SIGNIFICANT CHANGE UP
BUN SERPL-MCNC: 17 MG/DL — SIGNIFICANT CHANGE UP (ref 8–20)
CALCIUM SERPL-MCNC: 9.2 MG/DL — SIGNIFICANT CHANGE UP (ref 8.6–10.2)
CHLORIDE SERPL-SCNC: 104 MMOL/L — SIGNIFICANT CHANGE UP (ref 98–107)
CO2 SERPL-SCNC: 22 MMOL/L — SIGNIFICANT CHANGE UP (ref 22–29)
COLOR SPEC: ABNORMAL
CREAT SERPL-MCNC: 0.63 MG/DL — SIGNIFICANT CHANGE UP (ref 0.5–1.3)
DIFF PNL FLD: ABNORMAL
EOSINOPHIL # BLD AUTO: 0 K/UL — SIGNIFICANT CHANGE UP (ref 0–0.5)
EOSINOPHIL NFR BLD AUTO: 0.1 % — SIGNIFICANT CHANGE UP (ref 0–6)
GLUCOSE BLDC GLUCOMTR-MCNC: 112 MG/DL — HIGH (ref 70–99)
GLUCOSE BLDC GLUCOMTR-MCNC: 148 MG/DL — HIGH (ref 70–99)
GLUCOSE SERPL-MCNC: 157 MG/DL — HIGH (ref 70–115)
GLUCOSE UR QL: NEGATIVE — SIGNIFICANT CHANGE UP
HCT VFR BLD CALC: 43 % — SIGNIFICANT CHANGE UP (ref 37–47)
HGB BLD-MCNC: 13.9 G/DL — SIGNIFICANT CHANGE UP (ref 12–16)
INR BLD: 1.1 RATIO — SIGNIFICANT CHANGE UP (ref 0.88–1.16)
KETONES UR-MCNC: ABNORMAL
LEUKOCYTE ESTERASE UR-ACNC: NEGATIVE — SIGNIFICANT CHANGE UP
LIDOCAIN IGE QN: 23 U/L — SIGNIFICANT CHANGE UP (ref 22–51)
LYMPHOCYTES # BLD AUTO: 1.4 K/UL — SIGNIFICANT CHANGE UP (ref 1–4.8)
LYMPHOCYTES # BLD AUTO: 9.3 % — LOW (ref 20–55)
MCHC RBC-ENTMCNC: 30.3 PG — SIGNIFICANT CHANGE UP (ref 27–31)
MCHC RBC-ENTMCNC: 32.3 G/DL — SIGNIFICANT CHANGE UP (ref 32–36)
MCV RBC AUTO: 93.7 FL — SIGNIFICANT CHANGE UP (ref 81–99)
MONOCYTES # BLD AUTO: 0.4 K/UL — SIGNIFICANT CHANGE UP (ref 0–0.8)
MONOCYTES NFR BLD AUTO: 2.7 % — LOW (ref 3–10)
NEUTROPHILS # BLD AUTO: 13.3 K/UL — HIGH (ref 1.8–8)
NEUTROPHILS NFR BLD AUTO: 87.6 % — HIGH (ref 37–73)
NITRITE UR-MCNC: NEGATIVE — SIGNIFICANT CHANGE UP
PH UR: 7 — SIGNIFICANT CHANGE UP (ref 5–8)
PLATELET # BLD AUTO: 306 K/UL — SIGNIFICANT CHANGE UP (ref 150–400)
POTASSIUM SERPL-MCNC: 4.6 MMOL/L — SIGNIFICANT CHANGE UP (ref 3.5–5.3)
POTASSIUM SERPL-SCNC: 4.6 MMOL/L — SIGNIFICANT CHANGE UP (ref 3.5–5.3)
PROT SERPL-MCNC: 7.9 G/DL — SIGNIFICANT CHANGE UP (ref 6.6–8.7)
PROT UR-MCNC: 500 MG/DL
PROTHROM AB SERPL-ACNC: 12.1 SEC — SIGNIFICANT CHANGE UP (ref 9.8–12.7)
RBC # BLD: 4.59 M/UL — SIGNIFICANT CHANGE UP (ref 4.4–5.2)
RBC # FLD: 15.9 % — HIGH (ref 11–15.6)
RBC CASTS # UR COMP ASSIST: >50 /HPF (ref 0–4)
SODIUM SERPL-SCNC: 140 MMOL/L — SIGNIFICANT CHANGE UP (ref 135–145)
SP GR SPEC: 1 — LOW (ref 1.01–1.02)
TROPONIN T SERPL-MCNC: <0.01 NG/ML — SIGNIFICANT CHANGE UP (ref 0–0.06)
UROBILINOGEN FLD QL: NEGATIVE — SIGNIFICANT CHANGE UP
WBC # BLD: 15.2 K/UL — HIGH (ref 4.8–10.8)
WBC # FLD AUTO: 15.2 K/UL — HIGH (ref 4.8–10.8)
WBC UR QL: SIGNIFICANT CHANGE UP

## 2018-10-24 PROCEDURE — 99223 1ST HOSP IP/OBS HIGH 75: CPT

## 2018-10-24 PROCEDURE — 99222 1ST HOSP IP/OBS MODERATE 55: CPT | Mod: 25

## 2018-10-24 PROCEDURE — 71045 X-RAY EXAM CHEST 1 VIEW: CPT | Mod: 26

## 2018-10-24 PROCEDURE — 51700 IRRIGATION OF BLADDER: CPT

## 2018-10-24 PROCEDURE — 99285 EMERGENCY DEPT VISIT HI MDM: CPT

## 2018-10-24 RX ORDER — OXYCODONE HYDROCHLORIDE 5 MG/1
5 TABLET ORAL EVERY 4 HOURS
Qty: 0 | Refills: 0 | Status: DISCONTINUED | OUTPATIENT
Start: 2018-10-24 | End: 2018-10-29

## 2018-10-24 RX ORDER — GLUCAGON INJECTION, SOLUTION 0.5 MG/.1ML
1 INJECTION, SOLUTION SUBCUTANEOUS ONCE
Qty: 0 | Refills: 0 | Status: DISCONTINUED | OUTPATIENT
Start: 2018-10-24 | End: 2018-10-29

## 2018-10-24 RX ORDER — ATORVASTATIN CALCIUM 80 MG/1
0 TABLET, FILM COATED ORAL
Qty: 0 | Refills: 0 | COMMUNITY

## 2018-10-24 RX ORDER — SODIUM CHLORIDE 9 MG/ML
1000 INJECTION, SOLUTION INTRAVENOUS
Qty: 0 | Refills: 0 | Status: DISCONTINUED | OUTPATIENT
Start: 2018-10-24 | End: 2018-10-29

## 2018-10-24 RX ORDER — DEXTROSE 50 % IN WATER 50 %
12.5 SYRINGE (ML) INTRAVENOUS ONCE
Qty: 0 | Refills: 0 | Status: DISCONTINUED | OUTPATIENT
Start: 2018-10-24 | End: 2018-10-29

## 2018-10-24 RX ORDER — ONDANSETRON 8 MG/1
4 TABLET, FILM COATED ORAL ONCE
Qty: 0 | Refills: 0 | Status: COMPLETED | OUTPATIENT
Start: 2018-10-24 | End: 2018-10-24

## 2018-10-24 RX ORDER — PANTOPRAZOLE SODIUM 20 MG/1
40 TABLET, DELAYED RELEASE ORAL
Qty: 0 | Refills: 0 | Status: DISCONTINUED | OUTPATIENT
Start: 2018-10-24 | End: 2018-10-29

## 2018-10-24 RX ORDER — ATORVASTATIN CALCIUM 80 MG/1
40 TABLET, FILM COATED ORAL AT BEDTIME
Qty: 0 | Refills: 0 | Status: DISCONTINUED | OUTPATIENT
Start: 2018-10-24 | End: 2018-10-29

## 2018-10-24 RX ORDER — DONEPEZIL HYDROCHLORIDE 10 MG/1
1 TABLET, FILM COATED ORAL
Qty: 0 | Refills: 0 | COMMUNITY

## 2018-10-24 RX ORDER — METOPROLOL TARTRATE 50 MG
12.5 TABLET ORAL EVERY 12 HOURS
Qty: 0 | Refills: 0 | Status: DISCONTINUED | OUTPATIENT
Start: 2018-10-24 | End: 2018-10-29

## 2018-10-24 RX ORDER — INSULIN LISPRO 100/ML
VIAL (ML) SUBCUTANEOUS
Qty: 0 | Refills: 0 | Status: DISCONTINUED | OUTPATIENT
Start: 2018-10-24 | End: 2018-10-29

## 2018-10-24 RX ORDER — BUDESONIDE AND FORMOTEROL FUMARATE DIHYDRATE 160; 4.5 UG/1; UG/1
2 AEROSOL RESPIRATORY (INHALATION)
Qty: 0 | Refills: 0 | Status: DISCONTINUED | OUTPATIENT
Start: 2018-10-24 | End: 2018-10-29

## 2018-10-24 RX ORDER — SODIUM CHLORIDE 9 MG/ML
3 INJECTION INTRAMUSCULAR; INTRAVENOUS; SUBCUTANEOUS ONCE
Qty: 0 | Refills: 0 | Status: COMPLETED | OUTPATIENT
Start: 2018-10-24 | End: 2018-10-24

## 2018-10-24 RX ORDER — INSULIN LISPRO 100/ML
VIAL (ML) SUBCUTANEOUS AT BEDTIME
Qty: 0 | Refills: 0 | Status: DISCONTINUED | OUTPATIENT
Start: 2018-10-24 | End: 2018-10-29

## 2018-10-24 RX ORDER — MORPHINE SULFATE 50 MG/1
4 CAPSULE, EXTENDED RELEASE ORAL ONCE
Qty: 0 | Refills: 0 | Status: DISCONTINUED | OUTPATIENT
Start: 2018-10-24 | End: 2018-10-24

## 2018-10-24 RX ORDER — DEXTROSE 50 % IN WATER 50 %
25 SYRINGE (ML) INTRAVENOUS ONCE
Qty: 0 | Refills: 0 | Status: DISCONTINUED | OUTPATIENT
Start: 2018-10-24 | End: 2018-10-29

## 2018-10-24 RX ORDER — METFORMIN HYDROCHLORIDE 850 MG/1
500 TABLET ORAL
Qty: 0 | Refills: 0 | Status: DISCONTINUED | OUTPATIENT
Start: 2018-10-24 | End: 2018-10-25

## 2018-10-24 RX ORDER — CEFTRIAXONE 500 MG/1
1 INJECTION, POWDER, FOR SOLUTION INTRAMUSCULAR; INTRAVENOUS ONCE
Qty: 0 | Refills: 0 | Status: COMPLETED | OUTPATIENT
Start: 2018-10-24 | End: 2018-10-24

## 2018-10-24 RX ORDER — OXYCODONE HYDROCHLORIDE 5 MG/1
10 TABLET ORAL EVERY 4 HOURS
Qty: 0 | Refills: 0 | Status: DISCONTINUED | OUTPATIENT
Start: 2018-10-24 | End: 2018-10-29

## 2018-10-24 RX ORDER — DEXTROSE 50 % IN WATER 50 %
15 SYRINGE (ML) INTRAVENOUS ONCE
Qty: 0 | Refills: 0 | Status: DISCONTINUED | OUTPATIENT
Start: 2018-10-24 | End: 2018-10-29

## 2018-10-24 RX ORDER — TIOTROPIUM BROMIDE 18 UG/1
1 CAPSULE ORAL; RESPIRATORY (INHALATION) DAILY
Qty: 0 | Refills: 0 | Status: DISCONTINUED | OUTPATIENT
Start: 2018-10-24 | End: 2018-10-29

## 2018-10-24 RX ADMIN — ATORVASTATIN CALCIUM 40 MILLIGRAM(S): 80 TABLET, FILM COATED ORAL at 22:17

## 2018-10-24 RX ADMIN — MORPHINE SULFATE 4 MILLIGRAM(S): 50 CAPSULE, EXTENDED RELEASE ORAL at 13:47

## 2018-10-24 RX ADMIN — SODIUM CHLORIDE 3 MILLILITER(S): 9 INJECTION INTRAMUSCULAR; INTRAVENOUS; SUBCUTANEOUS at 13:47

## 2018-10-24 RX ADMIN — BUDESONIDE AND FORMOTEROL FUMARATE DIHYDRATE 2 PUFF(S): 160; 4.5 AEROSOL RESPIRATORY (INHALATION) at 19:57

## 2018-10-24 RX ADMIN — CEFTRIAXONE 100 GRAM(S): 500 INJECTION, POWDER, FOR SOLUTION INTRAMUSCULAR; INTRAVENOUS at 15:29

## 2018-10-24 RX ADMIN — ONDANSETRON 4 MILLIGRAM(S): 8 TABLET, FILM COATED ORAL at 12:50

## 2018-10-24 RX ADMIN — METFORMIN HYDROCHLORIDE 500 MILLIGRAM(S): 850 TABLET ORAL at 17:59

## 2018-10-24 RX ADMIN — Medication 12.5 MILLIGRAM(S): at 17:58

## 2018-10-24 RX ADMIN — PANTOPRAZOLE SODIUM 40 MILLIGRAM(S): 20 TABLET, DELAYED RELEASE ORAL at 20:45

## 2018-10-24 RX ADMIN — MORPHINE SULFATE 4 MILLIGRAM(S): 50 CAPSULE, EXTENDED RELEASE ORAL at 12:50

## 2018-10-24 NOTE — ED PROVIDER NOTE - CONSTITUTIONAL, MLM
normal... Well appearing, well nourished, awake, alert, oriented to person, place, time/situation and in acute distress

## 2018-10-24 NOTE — CONSULT NOTE ADULT - SUBJECTIVE AND OBJECTIVE BOX
Subjective:      STATUS POST:      POST OPERATIVE DAY #:     MEDICATIONS  (STANDING):  morphine  - Injectable 4 milliGRAM(s) IV Push Once  ondansetron Injectable 4 milliGRAM(s) IV Push once  sodium chloride 0.9% lock flush 3 milliLiter(s) IV Push once    MEDICATIONS  (PRN):      Vital Signs Last 24 Hrs  T(C): 36.7 (24 Oct 2018 10:42), Max: 36.7 (24 Oct 2018 10:42)  T(F): 98.1 (24 Oct 2018 10:42), Max: 98.1 (24 Oct 2018 10:42)  HR: 107 (24 Oct 2018 10:42) (107 - 107)  BP: 156/83 (24 Oct 2018 10:42) (156/83 - 156/83)  BP(mean): --  RR: 20 (24 Oct 2018 10:42) (20 - 20)  SpO2: 90% (24 Oct 2018 10:42) (90% - 90%)    Physical Exam:    Constitutional: NAD  HEENT: PERRL, EOMI  Neck: No JVD, FROM without pain  Respiratory: Breath Sounds equal & clear to auscultation, no accessory muscle use  Cardiovascular: Regular rate & rhythm, S1, S2  Gastrointestinal: Soft, non-tender  Extremities: No peripheral edema, No cyanosis  Neurological: A&O x 3; without gross deficit, GCS: 15  Musculoskeletal: No joint pain, swelling, deformity, or point tenderness; no limitation of movement      LABS: Subjective:  84 y.o F with PMH of CAD s/p stent, HTN, HLD, DM2, anemia & bladder prolapse, and bladder mass as seen on CT dx oct 01/18 presents to [facility] ED for eval of hematuria. As per pt and family, pt was seen by PMD for c/o dysuria and post void lower abd pain a couple weeks a go, pt was supposed to go for surgery w/ Dr. Sanders on Nov 9 for tumor resection. Pt was recently seen in [facility] ED for prolapsed bladder 1-2 months ago which at the time was experiencing hematuria which later stopped. Pt complaining of severe 10/10 pain in the suprapubic and groin region. States she has been unable to void.     Urology called for gross hematuria with possible bladder prolapse and abd pain. As per patient and family patient has not voided since yesterday. C/o pain to abd region due to retained urine       MEDICATIONS  (STANDING):  morphine  - Injectable 4 milliGRAM(s) IV Push Once  ondansetron Injectable 4 milliGRAM(s) IV Push once  sodium chloride 0.9% lock flush 3 milliLiter(s) IV Push once    MEDICATIONS  (PRN):      Vital Signs Last 24 Hrs  T(C): 36.7 (24 Oct 2018 10:42), Max: 36.7 (24 Oct 2018 10:42)  T(F): 98.1 (24 Oct 2018 10:42), Max: 98.1 (24 Oct 2018 10:42)  HR: 107 (24 Oct 2018 10:42) (107 - 107)  BP: 156/83 (24 Oct 2018 10:42) (156/83 - 156/83)  BP(mean): --  RR: 20 (24 Oct 2018 10:42) (20 - 20)  SpO2: 90% (24 Oct 2018 10:42) (90% - 90%)    Physical Exam:    Constitutional: NAD  HEENT: PERRL, EOMI  Neck: No JVD, FROM without pain  Respiratory: Breath Sounds equal & clear to auscultation, no accessory muscle use  Cardiovascular: Regular rate & rhythm, S1, S2  Gastrointestinal: Soft, non-tender  Extremities: No peripheral edema, No cyanosis  Neurological: A&O x 3; without gross deficit, GCS: 15  Musculoskeletal: No joint pain, swelling, deformity, or point tenderness; no limitation of movement  Gross clot and hematuria noted in diaper, no significant prolapse of bladder. Urethra visualized and ceron placed (see other note)_)      LABS:

## 2018-10-24 NOTE — H&P ADULT - NSHPLABSRESULTS_GEN_ALL_CORE
VITALS:  Vital Signs Last 24 Hrs  T(C): 36.7 (24 Oct 2018 16:02), Max: 36.9 (24 Oct 2018 12:53)  T(F): 98 (24 Oct 2018 16:02), Max: 98.4 (24 Oct 2018 12:53)  HR: 107 (24 Oct 2018 16:02) (104 - 110)  BP: 124/75 (24 Oct 2018 16:02) (124/75 - 182/79)  BP(mean): --  RR: 18 (24 Oct 2018 16:02) (18 - 20)  SpO2: 85% (24 Oct 2018 16:02) (85% - 94%) Daily     Daily   CAPILLARY BLOOD GLUCOSE        I&O's Summary    24 Oct 2018 07:01  -  24 Oct 2018 17:23  --------------------------------------------------------  IN: 1800 mL / OUT: 1800 mL / NET: 0 mL        LABS:                        13.9   15.2  )-----------( 306      ( 24 Oct 2018 13:08 )             43.0     10-24    140  |  104  |  17.0  ----------------------------<  157<H>  4.6   |  22.0  |  0.63    Ca    9.2      24 Oct 2018 13:08    TPro  7.9  /  Alb  3.7  /  TBili  0.5  /  DBili  x   /  AST  19  /  ALT  11  /  AlkPhos  100  10-24    PT/INR - ( 24 Oct 2018 13:08 )   PT: 12.1 sec;   INR: 1.10 ratio         PTT - ( 24 Oct 2018 13:08 )  PTT:32.0 sec  LIVER FUNCTIONS - ( 24 Oct 2018 13:08 )  Alb: 3.7 g/dL / Pro: 7.9 g/dL / ALK PHOS: 100 U/L / ALT: 11 U/L / AST: 19 U/L / GGT: x           Urinalysis Basic - ( 24 Oct 2018 16:10 )    Color: Red / Appearance: bloody / S.005 / pH: x  Gluc: x / Ketone: Small  / Bili: Negative / Urobili: Negative   Blood: x / Protein: 500 mg/dL / Nitrite: Negative   Leuk Esterase: Negative / RBC: >50 /HPF / WBC 3-5   Sq Epi: x / Non Sq Epi: x / Bacteria: Occasional      CARDIAC MARKERS ( 24 Oct 2018 13:08 )  x     / <0.01 ng/mL / x     / x     / x            MEDICATIONS:  atorvastatin 40 milliGRAM(s) Oral at bedtime  buDESOnide 160 MICROgram(s)/formoterol 4.5 MICROgram(s) Inhaler 2 Puff(s) Inhalation two times a day  dextrose 40% Gel 15 Gram(s) Oral once PRN  dextrose 5%. 1000 milliLiter(s) IV Continuous <Continuous>  dextrose 50% Injectable 12.5 Gram(s) IV Push once  dextrose 50% Injectable 25 Gram(s) IV Push once  dextrose 50% Injectable 25 Gram(s) IV Push once  glucagon  Injectable 1 milliGRAM(s) IntraMuscular once PRN  insulin lispro (HumaLOG) corrective regimen sliding scale   SubCutaneous three times a day before meals  insulin lispro (HumaLOG) corrective regimen sliding scale   SubCutaneous at bedtime  metFORMIN 500 milliGRAM(s) Oral two times a day  pantoprazole    Tablet 40 milliGRAM(s) Oral before breakfast  tiotropium 18 MICROgram(s) Capsule 1 Capsule(s) Inhalation daily

## 2018-10-24 NOTE — ED ADULT TRIAGE NOTE - CHIEF COMPLAINT QUOTE
Patient arrived to the ED from home, pt states that she is scheduled to have a tumor scraped off of her bladder next week. Patient states that last night she started to have pain in her lower abdomen that became worse this morning. Patient states that she is urinating blood and passing clots. Patient denies any N/V. Patient appears to be in pain Patient arrived to the ED from home, pt states that she is scheduled to have a tumor scraped off of her bladder next week. Patient states that last night she started to have pain in her lower abdomen that became worse this morning. Patient states that she is bleeding and passing clots. Patient denies any N/V. Patient appears to be in pain. Patient states that she feels like she has to urinate but is unable to

## 2018-10-24 NOTE — H&P ADULT - HISTORY OF PRESENT ILLNESS
JOCELIN Tommy    84 y.o F with PMH of CAD s/p stent, HTN, HLD, DM2, COPD, Anemia, Bladder prolapse, Bladder mass awaiting  workup, presents c/o gross hematuria since last night.  Associated with clots and suprapubic pain.  No fever / chills.  No chest pain / palpitations. Seen by urology, started on CBI in ED with significant improvement in symptoms.  Tamayo continues to show bright red blood.  No additional complaints.    FAMILY HISTORY: no history of bladder cancer.   SOCIAL HISTORY: - alcohol, - IVDA, + smoking quit 2014   REVIEW OF SYSTEMS: General: - fatigue, - weight loss, - fevers, - chills.  HEENT: - headache, - hearing disturbances.  EYES: - visual disturbances, - diplopia.  CARDIOVASCULAR: - chest pain, - palpitations.  PULMONARY: - SOB, - cough, - hemoptysis.  GI: - abdominal pain, - nausea, - vomiting, - diarrhea, - constipation.  : + hematuria.  - urinary urgency, - frequency, - dysuria.  MUSCULOSKELETAL: - arthralgias, - myalgias.  NEURO:  - weakness, - numbness.  PSYCH: - depression, - anxiety, - suicidal thoughts. SKIN: - rashes, - lesions.  All remaining ROS are negative

## 2018-10-24 NOTE — ED STATDOCS - PROGRESS NOTE DETAILS
85 y/o F pt with hx of prolapse bladder tumor  presents to ED with family c/o severe abdominal pain since last night. Per family states  is bleeding and passing clots when on the toilet unknown where from. She was able to urinate last night, however not today.

## 2018-10-24 NOTE — ED ADULT NURSE NOTE - PMH
Bladder prolapse, female, acquired    Bladder tumor    CAD (coronary artery disease)    Diabetes    High cholesterol    Hypertension    Neuropathy    Stented coronary artery  Mi in 2014, s/p stent of right coronary artery

## 2018-10-24 NOTE — CONSULT NOTE ADULT - ATTENDING COMMENTS
Very nice lady with large bladder tumor and significant prolapse. Not on OR schedule until 2 weeks from now.   Has gross hematuria/clot requiring CBI.  Will need OR for clot evacuation and TURBT in the next few days  will try to arrange with OR.

## 2018-10-24 NOTE — H&P ADULT - ASSESSMENT
> Gross Hematuria, Bladder Mass - on CBI.  Hgb stable.  Will monitor H/h q8h.  Consent obtained for transfusion if Hgb drops.  Urology input appreciated.    > CAD - continue BBLocker, Statin.  Hold ASA.  > COPD - stable.  On Tiotropium.  Nebulizers prn.  Advair.   > Diabetes Type II on longterm Insulin Therapy - monitor fingersticks.  Insulin coverage for hyperglycemia.  Continue Metformin.  > DVT Prophylaxis - Lower extremity intermittent compression devices.  D/w daughters at bedside, in agreement.

## 2018-10-24 NOTE — ED ADULT NURSE NOTE - NSIMPLEMENTINTERV_GEN_ALL_ED
Implemented All Universal Safety Interventions:  [place] to call system. Call bell, personal items and telephone within reach. Instruct patient to call for assistance. Room bathroom lighting operational. Non-slip footwear when patient is off stretcher. Physically safe environment: no spills, clutter or unnecessary equipment. Stretcher in lowest position, wheels locked, appropriate side rails in place.

## 2018-10-24 NOTE — ED PROVIDER NOTE - ATTENDING CONTRIBUTION TO CARE
The patient seen and examined.  The patient has lower abd pain with gross hematuria.  The patient has bladder prolapse.    I, Toy Martines, performed the initial face to face bedside interview with this patient regarding history of present illness, review of symptoms and relevant past medical, social and family history.  I completed an independent physical examination.  I was the initial provider who evaluated this patient. I have signed out the follow up of any pending tests (i.e. labs, radiological studies) to the resident.  I have communicated the patient’s plan of care and disposition with the resident.

## 2018-10-24 NOTE — ED PROVIDER NOTE - CARE PLAN
Principal Discharge DX:	Bladder prolapse, female, acquired  Secondary Diagnosis:	CAD (coronary artery disease)  Secondary Diagnosis:	Diabetes

## 2018-10-24 NOTE — H&P ADULT - NSHPPHYSICALEXAM_GEN_ALL_CORE
PHYSICAL EXAMINATION:  GENERAL: NAD, Alert and Oriented x 3     HEENT:  Normocephalic and atraumatic.  Extraocular muscles are intact.  NECK: Supple.  - JVD.  CARDIOVASCULAR: RRR S1, S2.  LUNGS: CTAB, - rales, - wheezing, - rhonchi.  BACK: - CVA tenderness.  ABDOMEN: Soft, - tenderness, - distension, + BS.  EXTREMITIES: - cyanosis, - clubbing, - edema.  NEUROLOGIC: strength is symmetric, sensation intact, speech fluent.  PSYCHIATRIC: Calm.  - agitation.    SKIN: - rashes, - lesions.

## 2018-10-24 NOTE — ED ADULT NURSE NOTE - CHIEF COMPLAINT QUOTE
Patient arrived to the ED from home, pt states that she is scheduled to have a tumor scraped off of her bladder next week. Patient states that last night she started to have pain in her lower abdomen that became worse this morning. Patient states that she is bleeding and passing clots. Patient denies any N/V. Patient appears to be in pain. Patient states that she feels like she has to urinate but is unable to

## 2018-10-25 DIAGNOSIS — E11.9 TYPE 2 DIABETES MELLITUS WITHOUT COMPLICATIONS: ICD-10-CM

## 2018-10-25 DIAGNOSIS — J44.9 CHRONIC OBSTRUCTIVE PULMONARY DISEASE, UNSPECIFIED: ICD-10-CM

## 2018-10-25 DIAGNOSIS — I25.10 ATHEROSCLEROTIC HEART DISEASE OF NATIVE CORONARY ARTERY WITHOUT ANGINA PECTORIS: ICD-10-CM

## 2018-10-25 DIAGNOSIS — R31.9 HEMATURIA, UNSPECIFIED: ICD-10-CM

## 2018-10-25 LAB
ANION GAP SERPL CALC-SCNC: 11 MMOL/L — SIGNIFICANT CHANGE UP (ref 5–17)
BASE EXCESS BLDA CALC-SCNC: 1.5 MMOL/L — SIGNIFICANT CHANGE UP (ref -2–2)
BLOOD GAS COMMENTS ARTERIAL: SIGNIFICANT CHANGE UP
BUN SERPL-MCNC: 14 MG/DL — SIGNIFICANT CHANGE UP (ref 8–20)
CALCIUM SERPL-MCNC: 8.6 MG/DL — SIGNIFICANT CHANGE UP (ref 8.6–10.2)
CHLORIDE SERPL-SCNC: 105 MMOL/L — SIGNIFICANT CHANGE UP (ref 98–107)
CO2 SERPL-SCNC: 25 MMOL/L — SIGNIFICANT CHANGE UP (ref 22–29)
CREAT SERPL-MCNC: 0.68 MG/DL — SIGNIFICANT CHANGE UP (ref 0.5–1.3)
GAS PNL BLDA: SIGNIFICANT CHANGE UP
GLUCOSE BLDC GLUCOMTR-MCNC: 114 MG/DL — HIGH (ref 70–99)
GLUCOSE BLDC GLUCOMTR-MCNC: 121 MG/DL — HIGH (ref 70–99)
GLUCOSE BLDC GLUCOMTR-MCNC: 125 MG/DL — HIGH (ref 70–99)
GLUCOSE BLDC GLUCOMTR-MCNC: 132 MG/DL — HIGH (ref 70–99)
GLUCOSE SERPL-MCNC: 132 MG/DL — HIGH (ref 70–115)
HBA1C BLD-MCNC: 6.6 % — HIGH (ref 4–5.6)
HCO3 BLDA-SCNC: 26 MMOL/L — SIGNIFICANT CHANGE UP (ref 20–26)
HCT VFR BLD CALC: 37.8 % — SIGNIFICANT CHANGE UP (ref 37–47)
HGB BLD-MCNC: 11.5 G/DL — LOW (ref 12–16)
HOROWITZ INDEX BLDA+IHG-RTO: SIGNIFICANT CHANGE UP
MCHC RBC-ENTMCNC: 29.5 PG — SIGNIFICANT CHANGE UP (ref 27–31)
MCHC RBC-ENTMCNC: 30.4 G/DL — LOW (ref 32–36)
MCV RBC AUTO: 96.9 FL — SIGNIFICANT CHANGE UP (ref 81–99)
PCO2 BLDA: 50 MMHG — HIGH (ref 35–45)
PH BLDA: 7.36 — SIGNIFICANT CHANGE UP (ref 7.35–7.45)
PLATELET # BLD AUTO: 291 K/UL — SIGNIFICANT CHANGE UP (ref 150–400)
PO2 BLDA: 68 MMHG — LOW (ref 83–108)
POTASSIUM SERPL-MCNC: 4.2 MMOL/L — SIGNIFICANT CHANGE UP (ref 3.5–5.3)
POTASSIUM SERPL-SCNC: 4.2 MMOL/L — SIGNIFICANT CHANGE UP (ref 3.5–5.3)
RBC # BLD: 3.9 M/UL — LOW (ref 4.4–5.2)
RBC # FLD: 15.9 % — HIGH (ref 11–15.6)
SAO2 % BLDA: 93 % — LOW (ref 95–99)
SODIUM SERPL-SCNC: 141 MMOL/L — SIGNIFICANT CHANGE UP (ref 135–145)
WBC # BLD: 13.3 K/UL — HIGH (ref 4.8–10.8)
WBC # FLD AUTO: 13.3 K/UL — HIGH (ref 4.8–10.8)

## 2018-10-25 PROCEDURE — 93010 ELECTROCARDIOGRAM REPORT: CPT

## 2018-10-25 PROCEDURE — 99232 SBSQ HOSP IP/OBS MODERATE 35: CPT

## 2018-10-25 PROCEDURE — 99222 1ST HOSP IP/OBS MODERATE 55: CPT

## 2018-10-25 RX ORDER — TIOTROPIUM BROMIDE 18 UG/1
1 CAPSULE ORAL; RESPIRATORY (INHALATION) DAILY
Qty: 0 | Refills: 0 | Status: DISCONTINUED | OUTPATIENT
Start: 2018-10-25 | End: 2018-10-25

## 2018-10-25 RX ORDER — ALBUTEROL 90 UG/1
1 AEROSOL, METERED ORAL EVERY 4 HOURS
Qty: 0 | Refills: 0 | Status: DISCONTINUED | OUTPATIENT
Start: 2018-10-25 | End: 2018-10-25

## 2018-10-25 RX ORDER — CALCIUM CARBONATE 500(1250)
1 TABLET ORAL EVERY 4 HOURS
Qty: 0 | Refills: 0 | Status: DISCONTINUED | OUTPATIENT
Start: 2018-10-25 | End: 2018-10-29

## 2018-10-25 RX ORDER — ALBUTEROL 90 UG/1
1 AEROSOL, METERED ORAL EVERY 6 HOURS
Qty: 0 | Refills: 0 | Status: DISCONTINUED | OUTPATIENT
Start: 2018-10-25 | End: 2018-10-29

## 2018-10-25 RX ORDER — IPRATROPIUM/ALBUTEROL SULFATE 18-103MCG
3 AEROSOL WITH ADAPTER (GRAM) INHALATION EVERY 6 HOURS
Qty: 0 | Refills: 0 | Status: DISCONTINUED | OUTPATIENT
Start: 2018-10-25 | End: 2018-10-25

## 2018-10-25 RX ORDER — ALBUTEROL 90 UG/1
2.5 AEROSOL, METERED ORAL EVERY 6 HOURS
Qty: 0 | Refills: 0 | Status: DISCONTINUED | OUTPATIENT
Start: 2018-10-25 | End: 2018-10-29

## 2018-10-25 RX ORDER — METOPROLOL TARTRATE 50 MG
0 TABLET ORAL
Qty: 0 | Refills: 0 | COMMUNITY

## 2018-10-25 RX ADMIN — Medication 12.5 MILLIGRAM(S): at 06:00

## 2018-10-25 RX ADMIN — Medication 1 TABLET(S): at 06:00

## 2018-10-25 RX ADMIN — BUDESONIDE AND FORMOTEROL FUMARATE DIHYDRATE 2 PUFF(S): 160; 4.5 AEROSOL RESPIRATORY (INHALATION) at 09:33

## 2018-10-25 RX ADMIN — TIOTROPIUM BROMIDE 1 CAPSULE(S): 18 CAPSULE ORAL; RESPIRATORY (INHALATION) at 09:31

## 2018-10-25 RX ADMIN — PANTOPRAZOLE SODIUM 40 MILLIGRAM(S): 20 TABLET, DELAYED RELEASE ORAL at 06:00

## 2018-10-25 RX ADMIN — OXYCODONE HYDROCHLORIDE 10 MILLIGRAM(S): 5 TABLET ORAL at 17:21

## 2018-10-25 RX ADMIN — METFORMIN HYDROCHLORIDE 500 MILLIGRAM(S): 850 TABLET ORAL at 06:00

## 2018-10-25 RX ADMIN — Medication 12.5 MILLIGRAM(S): at 17:23

## 2018-10-25 RX ADMIN — ALBUTEROL 2.5 MILLIGRAM(S): 90 AEROSOL, METERED ORAL at 20:46

## 2018-10-25 RX ADMIN — BUDESONIDE AND FORMOTEROL FUMARATE DIHYDRATE 2 PUFF(S): 160; 4.5 AEROSOL RESPIRATORY (INHALATION) at 20:47

## 2018-10-25 RX ADMIN — Medication 3 MILLILITER(S): at 15:15

## 2018-10-25 RX ADMIN — ATORVASTATIN CALCIUM 40 MILLIGRAM(S): 80 TABLET, FILM COATED ORAL at 22:26

## 2018-10-25 NOTE — PROGRESS NOTE ADULT - ASSESSMENT
> Gross Hematuria, Bladder Mass - on CBI.  Hgb stable.  CBC stat.  Will monitor H/h q8h.  Urology input appreciated.    > CAD - continue BBLocker, Statin.  Hold ASA. Cardiology consulted.  > COPD - with hypoxia currently on 4Liters.  Continuous pulse ox.  Pulmonary consulted.  On Tiotropium.  Nebulizers prn.  Advair.   > Diabetes Type II on longterm Insulin Therapy - monitor fingersticks.  Insulin coverage for hyperglycemia.  Continue Metformin.  > DVT Prophylaxis - Lower extremity intermittent compression devices.  D/w daughters at bedside, in agreement. > Gross Hematuria, Bladder Mass - on CBI.  Hgb stable.  CBC stat.  Will monitor H/h q8h.  Urology input appreciated.    > CAD - continue BBLocker, Statin.  Hold ASA. Cardiology consulted.  > COPD - with hypoxia currently on 4Liters.  Will check ABG.  Continuous pulse ox.  Pulmonary consulted.  On Tiotropium.  Nebulizers prn.  Advair.   > Diabetes Type II on longterm Insulin Therapy - monitor fingersticks.  Insulin coverage for hyperglycemia.  Will hold Metformin for now.  > DVT Prophylaxis - Lower extremity intermittent compression devices.

## 2018-10-25 NOTE — CONSULT NOTE ADULT - ASSESSMENT
Pleasant 83 yo lady known to me with gross hematuria that was in process of w/u  She has significant baseline COPD on drug nebs and newly started on oxygen  On ADVAIR AND SPIRIVA at home    Would check her ABG now back on nasal on 2 LPM    She does not appear to be in CHF at this time.    COPD will make her management more difficult but should be able to proceed with w/u and treatment.
84F with known bladder tumor with subsequent bladder prolapse and hematuria found to be in clot retention. Hemodynamically well and stable     -three way 20F catheter placed with approx 550cc with gross hematuria   -cont with ceron for drainage of urine. initiate CBI with frequent irrigation as needed.   -no other urgent urologic intervention needed, cont CBI until urine clear.   -no need for repeat CT as cause of hematuria is known   -case and plan d/w Dr [place]  -would optimize for surgery in case anything changes and need emergent/urgent surgery.  Otherwise will plan for next week as scheduled   -recommend oxybutin for bladder spasm pain
1. Elderly F admitted with pelvic pain and gross hematuria-w/u to date suggests a malignant bladder mass.  2. copd-former heavy smoker  3. cad-2014 RCA stent-no further ischemic sx's.  4. hx of hpt  5. hx of aodm  No cardiac contraindications to planned  evaluation.

## 2018-10-25 NOTE — PROGRESS NOTE ADULT - SUBJECTIVE AND OBJECTIVE BOX
CC: Hematuria     HPI:  84 y.o F with PMH of CAD s/p stent, HTN, HLD, DM2, COPD, Anemia, Bladder prolapse, Bladder mass awaiting  workup, presents c/o gross hematuria with clots and suprapubic pain.  Seen by urology, started on CBI in ED with significant improvement in symptoms.  Ceron continues to show bright red blood.       INTERVAL HPI/OVERNIGHT EVENTS: Patient seen and examined lying in bed.  Patient hypoxic on 2Liters O2 at 88%, increased to 4Liters.  Patient complaining of suprapubic pressure  especially when passing clots.  Patient denies any headache, dizziness, CP, nausea, vomiting.  (+) hematuria in ceron with clots.  Other ROS reviewed and are negative.    Vital Signs Last 24 Hrs  T(C): 37.2 (25 Oct 2018 07:54), Max: 37.2 (25 Oct 2018 07:54)  T(F): 99 (25 Oct 2018 07:54), Max: 99 (25 Oct 2018 07:54)  HR: 76 (25 Oct 2018 09:31) (60 - 110)  BP: 99/56 (25 Oct 2018 07:54) (99/56 - 182/79)  BP(mean): --  RR: 18 (25 Oct 2018 07:54) (18 - 20)  SpO2: 90% (25 Oct 2018 09:31) (85% - 96%)  I&O's Detail    24 Oct 2018 07:01  -  25 Oct 2018 07:00  --------------------------------------------------------  IN:    Continuous Bladder Irrigation: 66481 mL  Total IN: 56274 mL    OUT:    Continuous Bladder Irrigation: 72628 mL  Total OUT: 63777 mL    Total NET: -3100 mL    PHYSICAL EXAM:  GENERAL: NAD, well-groomed, well-developed  HEAD:  Atraumatic, Normocephalic  NECK: Supple, No JVD, Normal thyroid  NERVOUS SYSTEM:  Alert & Oriented X3, Good concentration; generalized weakness  CHEST/LUNG: Coarse BS bilaterally  HEART: Regular rate and rhythm; No murmurs, rubs, or gallops  ABDOMEN: Soft, suprapubic tenderness on palpation, Nondistended; Bowel sounds present, (+) ceron with hematuria and clots  EXTREMITIES:  2+ Peripheral Pulses, No clubbing, cyanosis, or edema        CARDIAC MARKERS ( 24 Oct 2018 13:08 )  x     / <0.01 ng/mL / x     / x     / x                                13.9   15.2  )-----------( 306      ( 24 Oct 2018 13:08 )             43.0     25 Oct 2018 06:51    141    |  105    |  14.0   ----------------------------<  132    4.2     |  25.0   |  0.68     Ca    8.6        25 Oct 2018 06:51    TPro  7.9    /  Alb  3.7    /  TBili  0.5    /  DBili  x      /  AST  19     /  ALT  11     /  AlkPhos  100    24 Oct 2018 13:08    PT/INR - ( 24 Oct 2018 13:08 )   PT: 12.1 sec;   INR: 1.10 ratio         PTT - ( 24 Oct 2018 13:08 )  PTT:32.0 sec    CAPILLARY BLOOD GLUCOSE  POCT Blood Glucose.: 132 mg/dL (25 Oct 2018 11:29)  POCT Blood Glucose.: 125 mg/dL (25 Oct 2018 07:39)  POCT Blood Glucose.: 148 mg/dL (24 Oct 2018 22:20)  POCT Blood Glucose.: 112 mg/dL (24 Oct 2018 17:57)    LIVER FUNCTIONS - ( 24 Oct 2018 13:08 )  Alb: 3.7 g/dL / Pro: 7.9 g/dL / ALK PHOS: 100 U/L / ALT: 11 U/L / AST: 19 U/L / GGT: x           Urinalysis Basic - ( 24 Oct 2018 16:10 )    Color: Red / Appearance: bloody / S.005 / pH: x  Gluc: x / Ketone: Small  / Bili: Negative / Urobili: Negative   Blood: x / Protein: 500 mg/dL / Nitrite: Negative   Leuk Esterase: Negative / RBC: >50 /HPF / WBC 3-5   Sq Epi: x / Non Sq Epi: x / Bacteria: Occasional    Hemoglobin A1C, Whole Blood: 6.6 % (10-25-18 @ 06:51)    MEDICATIONS  (STANDING):  atorvastatin 40 milliGRAM(s) Oral at bedtime  buDESOnide 160 MICROgram(s)/formoterol 4.5 MICROgram(s) Inhaler 2 Puff(s) Inhalation two times a day  dextrose 5%. 1000 milliLiter(s) (50 mL/Hr) IV Continuous <Continuous>  dextrose 50% Injectable 12.5 Gram(s) IV Push once  dextrose 50% Injectable 25 Gram(s) IV Push once  dextrose 50% Injectable 25 Gram(s) IV Push once  insulin lispro (HumaLOG) corrective regimen sliding scale   SubCutaneous three times a day before meals  insulin lispro (HumaLOG) corrective regimen sliding scale   SubCutaneous at bedtime  metFORMIN 500 milliGRAM(s) Oral two times a day  metoprolol tartrate 12.5 milliGRAM(s) Oral every 12 hours  pantoprazole    Tablet 40 milliGRAM(s) Oral before breakfast  tiotropium 18 MICROgram(s) Capsule 1 Capsule(s) Inhalation daily    MEDICATIONS  (PRN):  calcium carbonate    500 mG (Tums) Chewable 1 Tablet(s) Chew every 4 hours PRN Heartburn  dextrose 40% Gel 15 Gram(s) Oral once PRN Blood Glucose LESS THAN 70 milliGRAM(s)/deciliter  glucagon  Injectable 1 milliGRAM(s) IntraMuscular once PRN Glucose LESS THAN 70 milligrams/deciliter  oxyCODONE    IR 5 milliGRAM(s) Oral every 4 hours PRN As needed mild to moderate pain  oxyCODONE    IR 10 milliGRAM(s) Oral every 4 hours PRN Severe Pain (7 - 10)      RADIOLOGY & ADDITIONAL TESTS:  < from: Xray Chest 1 View AP/PA. (10.24.18 @ 15:40) >   EXAM:  XR CHEST AP OR PA 1V                          PROCEDURE DATE:  10/24/2018          INTERPRETATION:  EXAM: PORTABLE CHEST.     CLINICAL INDICATION: Cough.     TECHNIQUE: AP view.     PRIOR EXAM: 2018.     FINDINGS:      There is relatively limited inspiration in comparison to the prior study   with crowded bibasilar lung markings. Linear atelectasis/scarring again   projects over the left lower lung zone. No pulmonary consolidation or any   sizable pleural effusion is identified. Interstitial lung disease may be   present. Cardiac and mediastinal silhouette is magnified. No significant   bony abnormality is seen.      IMPRESSION:     Cannot exclude interstitial lung disease.    Additional findings as above.                  GIN VAZ M.D., ATTENDING RADIOLOGIST  This document has been electronically signed. Oct 24 2018  3:55PM              < end of copied text > CC: Hematuria     HPI:  84 y.o F with PMH of CAD s/p stent, HTN, HLD, DM2, COPD had been awaiting home O2, Anemia, Bladder prolapse, Bladder mass awaiting  workup, presents c/o gross hematuria with clots and suprapubic pain.  Seen by urology, started on CBI in ED with significant improvement in symptoms.  Ceron continues to show bright red blood.       INTERVAL HPI/OVERNIGHT EVENTS: Patient seen and examined lying in bed.  Patient hypoxic on 2Liters O2 at 88%, increased to 4Liters.  Patient complaining of suprapubic pressure  especially when passing clots.  Patient denies any headache, dizziness, CP, nausea, vomiting.  (+) hematuria in ceron with clots.  Other ROS reviewed and are negative.    Vital Signs Last 24 Hrs  T(C): 37.2 (25 Oct 2018 07:54), Max: 37.2 (25 Oct 2018 07:54)  T(F): 99 (25 Oct 2018 07:54), Max: 99 (25 Oct 2018 07:54)  HR: 76 (25 Oct 2018 09:31) (60 - 110)  BP: 99/56 (25 Oct 2018 07:54) (99/56 - 182/79)  BP(mean): --  RR: 18 (25 Oct 2018 07:54) (18 - 20)  SpO2: 90% (25 Oct 2018 09:31) (85% - 96%)  I&O's Detail    24 Oct 2018 07:01  -  25 Oct 2018 07:00  --------------------------------------------------------  IN:    Continuous Bladder Irrigation: 58072 mL  Total IN: 55372 mL    OUT:    Continuous Bladder Irrigation: 67703 mL  Total OUT: 88839 mL    Total NET: -3100 mL    PHYSICAL EXAM:  GENERAL: NAD, well-groomed, well-developed  HEAD:  Atraumatic, Normocephalic  NECK: Supple, No JVD, Normal thyroid  NERVOUS SYSTEM:  Alert & Oriented X3, Good concentration; generalized weakness  CHEST/LUNG: Coarse BS bilaterally  HEART: Regular rate and rhythm; No murmurs, rubs, or gallops  ABDOMEN: Soft, suprapubic tenderness on palpation, Nondistended; Bowel sounds present, (+) ceron with hematuria and clots  EXTREMITIES:  2+ Peripheral Pulses, No clubbing, cyanosis, or edema        CARDIAC MARKERS ( 24 Oct 2018 13:08 )  x     / <0.01 ng/mL / x     / x     / x                                13.9   15.2  )-----------( 306      ( 24 Oct 2018 13:08 )             43.0     25 Oct 2018 06:51    141    |  105    |  14.0   ----------------------------<  132    4.2     |  25.0   |  0.68     Ca    8.6        25 Oct 2018 06:51    TPro  7.9    /  Alb  3.7    /  TBili  0.5    /  DBili  x      /  AST  19     /  ALT  11     /  AlkPhos  100    24 Oct 2018 13:08    PT/INR - ( 24 Oct 2018 13:08 )   PT: 12.1 sec;   INR: 1.10 ratio         PTT - ( 24 Oct 2018 13:08 )  PTT:32.0 sec    CAPILLARY BLOOD GLUCOSE  POCT Blood Glucose.: 132 mg/dL (25 Oct 2018 11:29)  POCT Blood Glucose.: 125 mg/dL (25 Oct 2018 07:39)  POCT Blood Glucose.: 148 mg/dL (24 Oct 2018 22:20)  POCT Blood Glucose.: 112 mg/dL (24 Oct 2018 17:57)    LIVER FUNCTIONS - ( 24 Oct 2018 13:08 )  Alb: 3.7 g/dL / Pro: 7.9 g/dL / ALK PHOS: 100 U/L / ALT: 11 U/L / AST: 19 U/L / GGT: x           Urinalysis Basic - ( 24 Oct 2018 16:10 )    Color: Red / Appearance: bloody / S.005 / pH: x  Gluc: x / Ketone: Small  / Bili: Negative / Urobili: Negative   Blood: x / Protein: 500 mg/dL / Nitrite: Negative   Leuk Esterase: Negative / RBC: >50 /HPF / WBC 3-5   Sq Epi: x / Non Sq Epi: x / Bacteria: Occasional    Hemoglobin A1C, Whole Blood: 6.6 % (10-25-18 @ 06:51)    MEDICATIONS  (STANDING):  atorvastatin 40 milliGRAM(s) Oral at bedtime  buDESOnide 160 MICROgram(s)/formoterol 4.5 MICROgram(s) Inhaler 2 Puff(s) Inhalation two times a day  dextrose 5%. 1000 milliLiter(s) (50 mL/Hr) IV Continuous <Continuous>  dextrose 50% Injectable 12.5 Gram(s) IV Push once  dextrose 50% Injectable 25 Gram(s) IV Push once  dextrose 50% Injectable 25 Gram(s) IV Push once  insulin lispro (HumaLOG) corrective regimen sliding scale   SubCutaneous three times a day before meals  insulin lispro (HumaLOG) corrective regimen sliding scale   SubCutaneous at bedtime  metFORMIN 500 milliGRAM(s) Oral two times a day  metoprolol tartrate 12.5 milliGRAM(s) Oral every 12 hours  pantoprazole    Tablet 40 milliGRAM(s) Oral before breakfast  tiotropium 18 MICROgram(s) Capsule 1 Capsule(s) Inhalation daily    MEDICATIONS  (PRN):  calcium carbonate    500 mG (Tums) Chewable 1 Tablet(s) Chew every 4 hours PRN Heartburn  dextrose 40% Gel 15 Gram(s) Oral once PRN Blood Glucose LESS THAN 70 milliGRAM(s)/deciliter  glucagon  Injectable 1 milliGRAM(s) IntraMuscular once PRN Glucose LESS THAN 70 milligrams/deciliter  oxyCODONE    IR 5 milliGRAM(s) Oral every 4 hours PRN As needed mild to moderate pain  oxyCODONE    IR 10 milliGRAM(s) Oral every 4 hours PRN Severe Pain (7 - 10)      RADIOLOGY & ADDITIONAL TESTS:  < from: Xray Chest 1 View AP/PA. (10..18 @ 15:40) >   EXAM:  XR CHEST AP OR PA 1V                          PROCEDURE DATE:  10/24/2018          INTERPRETATION:  EXAM: PORTABLE CHEST.     CLINICAL INDICATION: Cough.     TECHNIQUE: AP view.     PRIOR EXAM: 2018.     FINDINGS:      There is relatively limited inspiration in comparison to the prior study   with crowded bibasilar lung markings. Linear atelectasis/scarring again   projects over the left lower lung zone. No pulmonary consolidation or any   sizable pleural effusion is identified. Interstitial lung disease may be   present. Cardiac and mediastinal silhouette is magnified. No significant   bony abnormality is seen.      IMPRESSION:     Cannot exclude interstitial lung disease.    Additional findings as above.                  GIN VAZ M.D., ATTENDING RADIOLOGIST  This document has been electronically signed. Oct 24 2018  3:55PM              < end of copied text >

## 2018-10-25 NOTE — PROGRESS NOTE ADULT - SUBJECTIVE AND OBJECTIVE BOX
***UROLOGY FOLLOW UP***      Hospital Day # 2 bladder mass/hematuria/urinary retention    POD #    IV:    I&O's Summary    24 Oct 2018 07:01  -  25 Oct 2018 07:00  --------------------------------------------------------  IN: 62487 mL / OUT: 80699 mL / NET: -3100 mL        diet:  DASH/TLC, sodium, cholesterol restrictions/ consistent carbs.     Patient: afebrile VSS awake and alert responsive resting in bed feeling better since bladder decompression. Relatives at bedside.    T(C): 37.2 (10-25-18 @ 07:54), Max: 37.2 (10-25-18 @ 07:54)  HR: 76 (10-25-18 @ 09:31) (60 - 107)  BP: 99/56 (10-25-18 @ 07:54) (99/56 - 127/78)  RR: 18 (10-25-18 @ 07:54) (18 - 20)  SpO2: 90% (10-25-18 @ 09:31) (85% - 96%)  Wt(kg): --    chest:  Abdomen:  output: ceron catheter in place - CBI running and urine noted reddish, noted slowly clearing hematuria. has bladder lesion and to have urologic procedure with her private urologist. ( patient and relatives aware that if does not clear sufficiently may require urological procedure while here in hospital.)   Extremities:                          11.5   13.3  )-----------( 291      ( 25 Oct 2018 12:05 )             37.8     10-25    141  |  105  |  14.0  ----------------------------<  132<H>  4.2   |  25.0  |  0.68    Ca    8.6      25 Oct 2018 06:51    TPro  7.9  /  Alb  3.7  /  TBili  0.5  /  DBili  x   /  AST  19  /  ALT  11  /  AlkPhos  100  10-24    PT/INR - ( 24 Oct 2018 13:08 )   PT: 12.1 sec;   INR: 1.10 ratio         PTT - ( 24 Oct 2018 13:08 )  PTT:32.0 sec    xrays:    PAST MEDICAL & SURGICAL HISTORY:  Stented coronary artery: Mi in 2014, s/p stent of right coronary artery  Bladder prolapse, female, acquired  Bladder tumor  CAD (coronary artery disease)  Neuropathy  High cholesterol  Hypertension  Diabetes  Status post cardiac catheterization  Ankle fracture: Rt          Impression: urinary retention/ hematuria slowly resolving with CBI in place and functioning.  Bladder mass unchanged.   Discuss with Surgeon present situation and continued care.     Plan: continue present care and management and follow urine , definitive care as per Dr. Sanders.

## 2018-10-25 NOTE — CONSULT NOTE ADULT - SUBJECTIVE AND OBJECTIVE BOX
PULMONARY CONSULT NOTE      TAMIKO KELLYVALENTIN-[id number]    Patient is a 84y old  Female who presents with a chief complaint of Hematuria (25 Oct 2018 10:21)  Patient with known COPD, newly oxygen requiring presents with gross hematuria  Known DM, HTN, and CAD s/p stenting    INTERVAL HPI/OVERNIGHT EVENTS:    MEDICATIONS  (STANDING):  atorvastatin 40 milliGRAM(s) Oral at bedtime  buDESOnide 160 MICROgram(s)/formoterol 4.5 MICROgram(s) Inhaler 2 Puff(s) Inhalation two times a day  dextrose 5%. 1000 milliLiter(s) (50 mL/Hr) IV Continuous <Continuous>  dextrose 50% Injectable 12.5 Gram(s) IV Push once  dextrose 50% Injectable 25 Gram(s) IV Push once  dextrose 50% Injectable 25 Gram(s) IV Push once  insulin lispro (HumaLOG) corrective regimen sliding scale   SubCutaneous three times a day before meals  insulin lispro (HumaLOG) corrective regimen sliding scale   SubCutaneous at bedtime  metFORMIN 500 milliGRAM(s) Oral two times a day  metoprolol tartrate 12.5 milliGRAM(s) Oral every 12 hours  pantoprazole    Tablet 40 milliGRAM(s) Oral before breakfast  tiotropium 18 MICROgram(s) Capsule 1 Capsule(s) Inhalation daily      MEDICATIONS  (PRN):  calcium carbonate    500 mG (Tums) Chewable 1 Tablet(s) Chew every 4 hours PRN Heartburn  dextrose 40% Gel 15 Gram(s) Oral once PRN Blood Glucose LESS THAN 70 milliGRAM(s)/deciliter  glucagon  Injectable 1 milliGRAM(s) IntraMuscular once PRN Glucose LESS THAN 70 milligrams/deciliter  oxyCODONE    IR 5 milliGRAM(s) Oral every 4 hours PRN As needed mild to moderate pain  oxyCODONE    IR 10 milliGRAM(s) Oral every 4 hours PRN Severe Pain (7 - 10)      Allergies    No Known Allergies    Intolerances        PAST MEDICAL & SURGICAL HISTORY:  Stented coronary artery: Mi in , s/p stent of right coronary artery  Bladder prolapse, female, acquired  Bladder tumor  CAD (coronary artery disease)  Neuropathy  High cholesterol  Hypertension  Diabetes  Status post cardiac catheterization  Ankle fracture: Rt      FAMILY HISTORY:  No pertinent family history in first degree relatives      SOCIAL HISTORY  Smoking History:     REVIEW OF SYSTEMS:    CONSTITUTIONAL:  As per HPI.    HEENT:  Eyes:  No diplopia or blurred vision. ENT:  No earache, sore throat or runny nose.    CARDIOVASCULAR:  No pressure, squeezing, tightness, heaviness or aching about the chest; no palpitations.    RESPIRATORY:  No cough, shortness of breath, PND or orthopnea. Mild SOBOE    GASTROINTESTINAL:  No nausea, vomiting or diarrhea.    GENITOURINARY:  No dysuria, frequency or urgency.    MUSCULOSKELETAL:  No joint pains    SKIN:  No new lesions.    NEUROLOGIC:  No paresthesias, fasciculations, seizures or weakness.    PSYCHIATRIC:  No disorder of thought or mood.    ENDOCRINE:  No heat or cold intolerance, polyuria or polydipsia.    HEMATOLOGICAL:  No easy bruising or bleeding.     Vital Signs Last 24 Hrs  T(C): 37.2 (25 Oct 2018 07:54), Max: 37.2 (25 Oct 2018 07:54)  T(F): 99 (25 Oct 2018 07:54), Max: 99 (25 Oct 2018 07:54)  HR: 76 (25 Oct 2018 09:31) (60 - 110)  BP: 99/56 (25 Oct 2018 07:54) (99/56 - 182/79)  BP(mean): --  RR: 18 (25 Oct 2018 07:54) (18 - 20)  SpO2: 90% (25 Oct 2018 09:31) (85% - 96%)    PHYSICAL EXAMINATION:    GENERAL: The patient is a well-developed, well-nourished __lady ___in no apparent distress.     HEENT: Head is normocephalic and atraumatic. Extraocular muscles are intact. Mucous membranes are moist.     NECK: Supple.     LUNGS: Clear to auscultation without wheezing, rales, or rhonchi. Respirations unlabored    HEART: Regular rate and rhythm without murmur.    ABDOMEN: Soft, nontender, and nondistended.  No hepatosplenomegaly is noted.    EXTREMITIES: Without any cyanosis, clubbing, rash, lesions or edema.    NEUROLOGIC: Grossly intact.    SKIN: No ulceration or induration present.      LABS:                        13.9   15.2  )-----------( 306      ( 24 Oct 2018 13:08 )             43.0     10-    141  |  105  |  14.0  ----------------------------<  132<H>  4.2   |  25.0  |  0.68    Ca    8.6      25 Oct 2018 06:51    TPro  7.9  /  Alb  3.7  /  TBili  0.5  /  DBili  x   /  AST  19  /  ALT  11  /  AlkPhos  100  10-24    PT/INR - ( 24 Oct 2018 13:08 )   PT: 12.1 sec;   INR: 1.10 ratio         PTT - ( 24 Oct 2018 13:08 )  PTT:32.0 sec  Urinalysis Basic - ( 24 Oct 2018 16:10 )    Color: Red / Appearance: bloody / S.005 / pH: x  Gluc: x / Ketone: Small  / Bili: Negative / Urobili: Negative   Blood: x / Protein: 500 mg/dL / Nitrite: Negative   Leuk Esterase: Negative / RBC: >50 /HPF / WBC 3-5   Sq Epi: x / Non Sq Epi: x / Bacteria: Occasional        CARDIAC MARKERS ( 24 Oct 2018 13:08 )  x     / <0.01 ng/mL / x     / x     / x                    MICROBIOLOGY:    RADIOLOGY & ADDITIONAL STUDIES:< from: Xray Chest 1 View AP/PA. (10.24.18 @ 15:40) >      INTERPRETATION:  EXAM: PORTABLE CHEST.     CLINICAL INDICATION: Cough.     TECHNIQUE: AP view.     PRIOR EXAM: 2018.     FINDINGS:      There is relatively limited inspiration in comparison to the prior study   with crowded bibasilar lung markings. Linear atelectasis/scarring again   projects over the left lower lung zone. No pulmonary consolidation or any   sizable pleural effusion is identified. Interstitial lung disease may be   present. Cardiac and mediastinal silhouette is magnified. No significant   bony abnormality is seen.      IMPRESSION:     Cannot exclude interstitial lung disease.    Additional findings as above.            < end of copied text >

## 2018-10-25 NOTE — CONSULT NOTE ADULT - SUBJECTIVE AND OBJECTIVE BOX
Chief Complaint:    HPI:    PAST MEDICAL & SURGICAL HISTORY:  Stented coronary artery: Mi in 2014, s/p stent of right coronary artery  Bladder prolapse, female, acquired  Bladder tumor  CAD (coronary artery disease)  Neuropathy  High cholesterol  Hypertension  Diabetes  Status post cardiac catheterization  Ankle fracture: Rt      PREVIOUS DIAGNOSTIC TESTING:      ECHO  FINDINGS:    STRESS  FINDINGS:    CATHETERIZATION  FINDINGS:    MEDICATIONS  (STANDING):  atorvastatin 40 milliGRAM(s) Oral at bedtime  buDESOnide 160 MICROgram(s)/formoterol 4.5 MICROgram(s) Inhaler 2 Puff(s) Inhalation two times a day  dextrose 5%. 1000 milliLiter(s) (50 mL/Hr) IV Continuous <Continuous>  dextrose 50% Injectable 12.5 Gram(s) IV Push once  dextrose 50% Injectable 25 Gram(s) IV Push once  dextrose 50% Injectable 25 Gram(s) IV Push once  insulin lispro (HumaLOG) corrective regimen sliding scale   SubCutaneous three times a day before meals  insulin lispro (HumaLOG) corrective regimen sliding scale   SubCutaneous at bedtime  metFORMIN 500 milliGRAM(s) Oral two times a day  metoprolol tartrate 12.5 milliGRAM(s) Oral every 12 hours  pantoprazole    Tablet 40 milliGRAM(s) Oral before breakfast  tiotropium 18 MICROgram(s) Capsule 1 Capsule(s) Inhalation daily    MEDICATIONS  (PRN):  calcium carbonate    500 mG (Tums) Chewable 1 Tablet(s) Chew every 4 hours PRN Heartburn  dextrose 40% Gel 15 Gram(s) Oral once PRN Blood Glucose LESS THAN 70 milliGRAM(s)/deciliter  glucagon  Injectable 1 milliGRAM(s) IntraMuscular once PRN Glucose LESS THAN 70 milligrams/deciliter  oxyCODONE    IR 5 milliGRAM(s) Oral every 4 hours PRN As needed mild to moderate pain  oxyCODONE    IR 10 milliGRAM(s) Oral every 4 hours PRN Severe Pain (7 - 10)      FAMILY HISTORY:  No pertinent family history in first degree relatives      SOCIAL HISTORY:    CIGARETTES:    ALCOHOL:    ROS: Negative other than as mentioned in HPI.    Vital Signs Last 24 Hrs  T(C): 37.2 (25 Oct 2018 07:54), Max: 37.2 (25 Oct 2018 07:54)  T(F): 99 (25 Oct 2018 07:54), Max: 99 (25 Oct 2018 07:54)  HR: 76 (25 Oct 2018 09:31) (60 - 110)  BP: 99/56 (25 Oct 2018 07:54) (99/56 - 182/79)  BP(mean): --  RR: 18 (25 Oct 2018 07:54) (18 - 20)  SpO2: 90% (25 Oct 2018 09:31) (85% - 96%)    PHYSICAL EXAM:  General: Appears well developed, well nourished alert and cooperative.  HEENT: Head; normocephalic, atraumatic.  Eyes;   Pupils reactive, cornea wnl.  Neck; Supple, no nodes adenopathy, no NVD or carotid bruit or thyromegaly.  CARDIOVASCULAR; No murmur, rub, gallop or lift. Normal S1 and S2.  LUNGS; No rales, rhonchi or wheeze. Normal breath sounds bilaterally.  ABDOMEN ; Soft, nontender without mass or organomegaly. bowel sounds normoactive.  EXTREMITIES; No clubbing, cyanosis or edema. Distal pulses wnl. ROM normal.  SKIN; warm and dry with normal turgor.  NEURO; Alert/oriented x 3/normal motor exam. No pathologic reflexes.    PSYCH; normal affect.            INTERPRETATION OF TELEMETRY:    ECG:    I&O's Detail    24 Oct 2018 07:01  -  25 Oct 2018 07:00  --------------------------------------------------------  IN:    Continuous Bladder Irrigation: 66283 mL  Total IN: 06194 mL    OUT:    Continuous Bladder Irrigation: 19552 mL  Total OUT: 33971 mL    Total NET: -3100 mL          LABS:                        13.9   15.2  )-----------( 306      ( 24 Oct 2018 13:08 )             43.0     10-25    141  |  105  |  14.0  ----------------------------<  132<H>  4.2   |  25.0  |  0.68    Ca    8.6      25 Oct 2018 06:51    TPro  7.9  /  Alb  3.7  /  TBili  0.5  /  DBili  x   /  AST  19  /  ALT  11  /  AlkPhos  100  10-24    CARDIAC MARKERS ( 24 Oct 2018 13:08 )  x     / <0.01 ng/mL / x     / x     / x          PT/INR - ( 24 Oct 2018 13:08 )   PT: 12.1 sec;   INR: 1.10 ratio         PTT - ( 24 Oct 2018 13:08 )  PTT:32.0 sec  Urinalysis Basic - ( 24 Oct 2018 16:10 )    Color: Red / Appearance: bloody / S.005 / pH: x  Gluc: x / Ketone: Small  / Bili: Negative / Urobili: Negative   Blood: x / Protein: 500 mg/dL / Nitrite: Negative   Leuk Esterase: Negative / RBC: >50 /HPF / WBC 3-5   Sq Epi: x / Non Sq Epi: x / Bacteria: Occasional      I&O's Summary    24 Oct 2018 07:01  -  25 Oct 2018 07:00  --------------------------------------------------------  IN: 11529 mL / OUT: 12275 mL / NET: -3100 mL        RADIOLOGY & ADDITIONAL STUDIES: Chief Complaint: 85 yo F admitted with painful hematuria.    HPI: Old records reviewed. Elderly F with 2 days of gross hematuria and pelvic pain. Pt says she has had milder sx's in the past treated with oral antibiotics. Current w/u suggests bladder mass/malignancy. PMH + for cad with rca stent in -? MI. (EF  55%). No recurrent ischemic sx's. Hx + for copd-1 ppd smoker until 3 yrs ago. Has had home 02. Hx + for aodm/hpt. No hx of cva/syncope/hepatic disease. Prior anemia requiring transfusions. No allergy. Social etoh. ROS neg for edema or orthopnea or chest pain. OP meds: advair/albuterol/lipitor 40/metformin 500 bid/toprol 25/protonix/spiriva.     PAST MEDICAL & SURGICAL HISTORY:  Stented coronary artery: Mi in , s/p stent of right coronary artery  Bladder prolapse, female, acquired  Bladder tumor  CAD (coronary artery disease)  Neuropathy  High cholesterol  Hypertension  Diabetes  Status post cardiac catheterization  Ankle fracture: Rt      PREVIOUS DIAGNOSTIC TESTING:      ECHO  FINDINGS:    STRESS  FINDINGS:    CATHETERIZATION  FINDINGS: see above    MEDICATIONS  (STANDING):  atorvastatin 40 milliGRAM(s) Oral at bedtime  buDESOnide 160 MICROgram(s)/formoterol 4.5 MICROgram(s) Inhaler 2 Puff(s) Inhalation two times a day  dextrose 5%. 1000 milliLiter(s) (50 mL/Hr) IV Continuous <Continuous>  dextrose 50% Injectable 12.5 Gram(s) IV Push once  dextrose 50% Injectable 25 Gram(s) IV Push once  dextrose 50% Injectable 25 Gram(s) IV Push once  insulin lispro (HumaLOG) corrective regimen sliding scale   SubCutaneous three times a day before meals  insulin lispro (HumaLOG) corrective regimen sliding scale   SubCutaneous at bedtime  metFORMIN 500 milliGRAM(s) Oral two times a day  metoprolol tartrate 12.5 milliGRAM(s) Oral every 12 hours  pantoprazole    Tablet 40 milliGRAM(s) Oral before breakfast  tiotropium 18 MICROgram(s) Capsule 1 Capsule(s) Inhalation daily    MEDICATIONS  (PRN):  calcium carbonate    500 mG (Tums) Chewable 1 Tablet(s) Chew every 4 hours PRN Heartburn  dextrose 40% Gel 15 Gram(s) Oral once PRN Blood Glucose LESS THAN 70 milliGRAM(s)/deciliter  glucagon  Injectable 1 milliGRAM(s) IntraMuscular once PRN Glucose LESS THAN 70 milligrams/deciliter  oxyCODONE    IR 5 milliGRAM(s) Oral every 4 hours PRN As needed mild to moderate pain  oxyCODONE    IR 10 milliGRAM(s) Oral every 4 hours PRN Severe Pain (7 - 10)      FAMILY HISTORY:  No pertinent family history in first degree relatives      SOCIAL HISTORY:    CIGARETTES: former smoker    ALCOHOL: social    ROS: Negative other than as mentioned in HPI.    Vital Signs Last 24 Hrs  T(C): 37.2 (25 Oct 2018 07:54), Max: 37.2 (25 Oct 2018 07:54)  T(F): 99 (25 Oct 2018 07:54), Max: 99 (25 Oct 2018 07:54)  HR: 75 reg (25 Oct 2018 09:31) (60 - 110)  BP: 99/56 no manual cuff available (25 Oct 2018 07:54) (99/56 - 182/79)  BP(mean): --  RR: 18 on nasal 02. (25 Oct 2018 07:54) (18 - 20)  SpO2: 90% (25 Oct 2018 09:31) (85% - 96%)    PHYSICAL EXAM:  General: Appears well developed, well nourished alert and cooperative.Afebrile elderly F writhing with pelvic pain.  HEENT: Head; normocephalic, atraumatic.  Eyes;   Pupils reactive, cornea wnl.  Neck; Supple, no nodes adenopathy, no NVD or carotid bruit or thyromegaly.  CARDIOVASCULAR; No murmur, rub, gallop or lift. Normal S1 and S2.  LUNGS; good BS bilat  ABDOMEN ; Soft, + pelvic tenderness. without mass or organomegaly. bowel sounds normoactive. Tamayo with gross blood.  EXTREMITIES; No clubbing, cyanosis or edema. Distal pulses not felt. ROM normal.  SKIN; warm and dry with normal turgor.  NEURO; Alert/oriented x 3/normal motor exam. No pathologic reflexes.    PSYCH; normal affect.            INTERPRETATION OF TELEMETRY:    ECG: SR T inversions 1/l/v4-6  cxr-diffuse interstital markings.  I&O's Detail    24 Oct 2018 07:01  -  25 Oct 2018 07:00  --------------------------------------------------------  IN:    Continuous Bladder Irrigation: 36202 mL  Total IN: 77130 mL    OUT:    Continuous Bladder Irrigation: 06891 mL  Total OUT: 12127 mL    Total NET: -3100 mL          LABS:                        13.9   15.2  )-----------( 306      ( 24 Oct 2018 13:08 )             43.0     10-25    141  |  105  |  14.0  ----------------------------<  132<H>  4.2   |  25.0  |  0.68    Ca    8.6      25 Oct 2018 06:51    TPro  7.9  /  Alb  3.7  /  TBili  0.5  /  DBili  x   /  AST  19  /  ALT  11  /  AlkPhos  100  10-24    CARDIAC MARKERS ( 24 Oct 2018 13:08 )  x     / <0.01 ng/mL / x     / x     / x          PT/INR - ( 24 Oct 2018 13:08 )   PT: 12.1 sec;   INR: 1.10 ratio         PTT - ( 24 Oct 2018 13:08 )  PTT:32.0 sec  Urinalysis Basic - ( 24 Oct 2018 16:10 )    Color: Red / Appearance: bloody / S.005 / pH: x  Gluc: x / Ketone: Small  / Bili: Negative / Urobili: Negative   Blood: x / Protein: 500 mg/dL / Nitrite: Negative   Leuk Esterase: Negative / RBC: >50 /HPF / WBC 3-5   Sq Epi: x / Non Sq Epi: x / Bacteria: Occasional      I&O's Summary    24 Oct 2018 07:01  -  25 Oct 2018 07:00  --------------------------------------------------------  IN: 43449 mL / OUT: 68449 mL / NET: -3100 mL        RADIOLOGY & ADDITIONAL STUDIES:

## 2018-10-26 LAB
ANION GAP SERPL CALC-SCNC: 10 MMOL/L — SIGNIFICANT CHANGE UP (ref 5–17)
BLD GP AB SCN SERPL QL: SIGNIFICANT CHANGE UP
BUN SERPL-MCNC: 13 MG/DL — SIGNIFICANT CHANGE UP (ref 8–20)
CALCIUM SERPL-MCNC: 8.7 MG/DL — SIGNIFICANT CHANGE UP (ref 8.6–10.2)
CHLORIDE SERPL-SCNC: 106 MMOL/L — SIGNIFICANT CHANGE UP (ref 98–107)
CO2 SERPL-SCNC: 27 MMOL/L — SIGNIFICANT CHANGE UP (ref 22–29)
CREAT SERPL-MCNC: 0.66 MG/DL — SIGNIFICANT CHANGE UP (ref 0.5–1.3)
GLUCOSE BLDC GLUCOMTR-MCNC: 104 MG/DL — HIGH (ref 70–99)
GLUCOSE BLDC GLUCOMTR-MCNC: 106 MG/DL — HIGH (ref 70–99)
GLUCOSE BLDC GLUCOMTR-MCNC: 128 MG/DL — HIGH (ref 70–99)
GLUCOSE BLDC GLUCOMTR-MCNC: 95 MG/DL — SIGNIFICANT CHANGE UP (ref 70–99)
GLUCOSE SERPL-MCNC: 120 MG/DL — HIGH (ref 70–115)
HCT VFR BLD CALC: 34.9 % — LOW (ref 37–47)
HGB BLD-MCNC: 10.9 G/DL — LOW (ref 12–16)
MCHC RBC-ENTMCNC: 30.4 PG — SIGNIFICANT CHANGE UP (ref 27–31)
MCHC RBC-ENTMCNC: 31.2 G/DL — LOW (ref 32–36)
MCV RBC AUTO: 97.2 FL — SIGNIFICANT CHANGE UP (ref 81–99)
PLATELET # BLD AUTO: 265 K/UL — SIGNIFICANT CHANGE UP (ref 150–400)
POTASSIUM SERPL-MCNC: 4.1 MMOL/L — SIGNIFICANT CHANGE UP (ref 3.5–5.3)
POTASSIUM SERPL-SCNC: 4.1 MMOL/L — SIGNIFICANT CHANGE UP (ref 3.5–5.3)
RBC # BLD: 3.59 M/UL — LOW (ref 4.4–5.2)
RBC # FLD: 15.8 % — HIGH (ref 11–15.6)
SODIUM SERPL-SCNC: 143 MMOL/L — SIGNIFICANT CHANGE UP (ref 135–145)
WBC # BLD: 11.4 K/UL — HIGH (ref 4.8–10.8)
WBC # FLD AUTO: 11.4 K/UL — HIGH (ref 4.8–10.8)

## 2018-10-26 PROCEDURE — 99232 SBSQ HOSP IP/OBS MODERATE 35: CPT

## 2018-10-26 RX ORDER — MORPHINE SULFATE 50 MG/1
4 CAPSULE, EXTENDED RELEASE ORAL EVERY 4 HOURS
Qty: 0 | Refills: 0 | Status: DISCONTINUED | OUTPATIENT
Start: 2018-10-26 | End: 2018-10-29

## 2018-10-26 RX ADMIN — TIOTROPIUM BROMIDE 1 CAPSULE(S): 18 CAPSULE ORAL; RESPIRATORY (INHALATION) at 08:44

## 2018-10-26 RX ADMIN — PANTOPRAZOLE SODIUM 40 MILLIGRAM(S): 20 TABLET, DELAYED RELEASE ORAL at 06:05

## 2018-10-26 RX ADMIN — ALBUTEROL 2.5 MILLIGRAM(S): 90 AEROSOL, METERED ORAL at 21:55

## 2018-10-26 RX ADMIN — ATORVASTATIN CALCIUM 40 MILLIGRAM(S): 80 TABLET, FILM COATED ORAL at 22:52

## 2018-10-26 RX ADMIN — Medication 12.5 MILLIGRAM(S): at 17:48

## 2018-10-26 RX ADMIN — BUDESONIDE AND FORMOTEROL FUMARATE DIHYDRATE 2 PUFF(S): 160; 4.5 AEROSOL RESPIRATORY (INHALATION) at 08:53

## 2018-10-26 RX ADMIN — BUDESONIDE AND FORMOTEROL FUMARATE DIHYDRATE 2 PUFF(S): 160; 4.5 AEROSOL RESPIRATORY (INHALATION) at 21:56

## 2018-10-26 RX ADMIN — Medication 12.5 MILLIGRAM(S): at 06:04

## 2018-10-26 RX ADMIN — ALBUTEROL 2.5 MILLIGRAM(S): 90 AEROSOL, METERED ORAL at 14:58

## 2018-10-26 RX ADMIN — ALBUTEROL 2.5 MILLIGRAM(S): 90 AEROSOL, METERED ORAL at 08:44

## 2018-10-26 NOTE — PROGRESS NOTE ADULT - SUBJECTIVE AND OBJECTIVE BOX
[facility]                                   [address] [place] [address]                                                      PHONE: [phone]                                                         FAX: [phone]  ----------------------------------------------------------------------------------------------------    Pt seen and examined. FU for CAD    Overnight events/Complaints: Pt without complains. Feels well. Family at bedside. Getting CBI.    Vital Signs Last 24 Hrs  T(C): 37.4 (25 Oct 2018 23:49), Max: 37.4 (25 Oct 2018 23:49)  T(F): 99.3 (25 Oct 2018 23:49), Max: 99.3 (25 Oct 2018 23:49)  HR: 85 (26 Oct 2018 08:50) (66 - 87)  BP: 106/69 (26 Oct 2018 08:14) (104/56 - 132/62)  BP(mean): --  RR: 18 (26 Oct 2018 08:14) (18 - 18)  SpO2: 95% (26 Oct 2018 08:50) (92% - 98%)  I&O's Summary    25 Oct 2018 07:01  -  26 Oct 2018 07:00  --------------------------------------------------------  IN: 63501 mL / OUT: 62331 mL / NET: 2675 mL    26 Oct 2018 07:01  -  26 Oct 2018 12:18  --------------------------------------------------------  IN: 9000 mL / OUT: 41555 mL / NET: -3150 mL        PHYSICAL EXAM:  Constitutional: Appears well developed, well nourished; alert and co-operative  HEENT:     Head: Normocephalic and atraumatic  Eyes: Conjunctiva normal, No scleral icterus  Neck: supple. No JVD  Mouth/Throat: Mucous membranes are normal. Mucosa are not pale or dry.  Cardiovascular: Normal S1 S2, No murmurs  Respiratory: Lungs clear to auscultation; no crepitations, no wheeze  Gastrointestinal:  Soft, Non-tender, + BS	  Musculoskeletal: Normal range of motion. No edema  Skin: Warm and dry. No cyanosis . No diaphoresis.  Neurologic: Alert oriented to time place and person. Normal strength and no tremor.  Psychiatric: Normal mood and affect, Speech is normal and behavior is normal.        LABS:                        10.9   11.4  )-----------( 265      ( 26 Oct 2018 07:23 )             34.9     10-26    143  |  106  |  13.0  ----------------------------<  120<H>  4.1   |  27.0  |  0.66    Ca    8.7      26 Oct 2018 07:23    TPro  7.9  /  Alb  3.7  /  TBili  0.5  /  DBili  x   /  AST  19  /  ALT  11  /  AlkPhos  100  10-24    CARDIAC MARKERS ( 24 Oct 2018 13:08 )  x     / <0.01 ng/mL / x     / x     / x          PT/INR - ( 24 Oct 2018 13:08 )   PT: 12.1 sec;   INR: 1.10 ratio         PTT - ( 24 Oct 2018 13:08 )  PTT:32.0 sec    RADIOLOGY & ADDITIONAL STUDIES: (reviewed)    CARDIOLOGY TESTING:(reviewed)     TELEMETRY (Independent visualization) : No arrhythmias    MEDICATIONS:(reviewed)  MEDICATIONS  (STANDING):  ALBUTerol    0.083% 2.5 milliGRAM(s) Nebulizer every 6 hours  ALBUTerol    90 MICROgram(s) HFA Inhaler 1 Puff(s) Inhalation every 6 hours  atorvastatin 40 milliGRAM(s) Oral at bedtime  buDESOnide 160 MICROgram(s)/formoterol 4.5 MICROgram(s) Inhaler 2 Puff(s) Inhalation two times a day  dextrose 5%. 1000 milliLiter(s) (50 mL/Hr) IV Continuous <Continuous>  dextrose 50% Injectable 12.5 Gram(s) IV Push once  dextrose 50% Injectable 25 Gram(s) IV Push once  dextrose 50% Injectable 25 Gram(s) IV Push once  insulin lispro (HumaLOG) corrective regimen sliding scale   SubCutaneous three times a day before meals  insulin lispro (HumaLOG) corrective regimen sliding scale   SubCutaneous at bedtime  metoprolol tartrate 12.5 milliGRAM(s) Oral every 12 hours  pantoprazole    Tablet 40 milliGRAM(s) Oral before breakfast  tiotropium 18 MICROgram(s) Capsule 1 Capsule(s) Inhalation daily    MEDICATIONS  (PRN):  calcium carbonate    500 mG (Tums) Chewable 1 Tablet(s) Chew every 4 hours PRN Heartburn  dextrose 40% Gel 15 Gram(s) Oral once PRN Blood Glucose LESS THAN 70 milliGRAM(s)/deciliter  glucagon  Injectable 1 milliGRAM(s) IntraMuscular once PRN Glucose LESS THAN 70 milligrams/deciliter  morphine  - Injectable 4 milliGRAM(s) IV Push every 4 hours PRN Breakthrough pain  oxyCODONE    IR 5 milliGRAM(s) Oral every 4 hours PRN As needed mild to moderate pain  oxyCODONE    IR 10 milliGRAM(s) Oral every 4 hours PRN Severe Pain (7 - 10)      ASSESSMENT AND PLAN:    84y Female with prior history of  cad with RCA stent in 2014-? MI. (EF 2014 55%) with 2 days of gross hematuria and pelvic pain. Pt says she has had milder sx's in the past treated with oral antibiotics. Current w/u suggests bladder mass/malignancy.  Reportedly had recent cardiac work-up including Echo in 9/2018 and stress test in 10/2018 that did not show any significant abnormalities    -  Can proceed for planned surgery with moderate perioperative risk without cardiac contraindications  -  ASA may be held at this time and to be restarted post procedure when safe from surgical standpoint  -  Post op ECG  -  Close monitoring of BP and volume status  -  Continue remainder of cardiac medications  -  Pls recall if any qns/concerns.

## 2018-10-26 NOTE — PROGRESS NOTE ADULT - ASSESSMENT
Assess    Advanced COPD  Bladder mass with hematuria and urinary retention  CAD  DMII    Rec    Urology Rx  Breo and spiriva  Albuterol rescue  02  Incentive spirometry Assess    Advanced COPD  Bladder mass with hematuria and urinary retention  CAD  DMII  Increased risk but no absolute pulmonary contraindication to cystoscopy under conscious sedation    Rec    Urology Rx  Breo and spiriva  Albuterol rescue  02  Incentive spirometry

## 2018-10-26 NOTE — PROGRESS NOTE ADULT - SUBJECTIVE AND OBJECTIVE BOX
CC: Hematuria    HPI:  84 y.o F with PMH of CAD s/p stent, HTN, HLD, DM2, COPD had been awaiting home O2, Anemia, Bladder prolapse, Bladder mass awaiting  workup, presents c/o gross hematuria with clots and suprapubic pain.  Seen by urology, started on CBI in ED with significant improvement in symptoms.  Ceron continues to show bright red blood.     INTERVAL HPI/OVERNIGHT EVENTS: Patient seen and examined lying in bed.  (+) Hematuria with clots.  Patient complaining of discomfort in lower abdomen when passing clots through ceron.  Patient denies any headache, dizziness, SOB, CP, nausea, vomiting.  Other ROS reviewed and are negative.    Vital Signs Last 24 Hrs  T(C): 36.8 (26 Oct 2018 16:16), Max: 37.4 (25 Oct 2018 23:49)  T(F): 98.2 (26 Oct 2018 16:16), Max: 99.3 (25 Oct 2018 23:49)  HR: 94 (26 Oct 2018 16:16) (66 - 94)  BP: 108/65 (26 Oct 2018 16:16) (104/65 - 132/62)  BP(mean): --  RR: 18 (26 Oct 2018 16:16) (18 - 18)  SpO2: 91% (26 Oct 2018 16:16) (91% - 98%)  I&O's Detail    25 Oct 2018 07:01  -  26 Oct 2018 07:00  --------------------------------------------------------  IN:    Continuous Bladder Irrigation: 79140 mL  Total IN: 29783 mL    OUT:    Continuous Bladder Irrigation: 91728 mL  Total OUT: 91755 mL    Total NET: 2675 mL      26 Oct 2018 07:01  -  26 Oct 2018 16:32  --------------------------------------------------------  IN:    Continuous Bladder Irrigation: 19444 mL  Total IN: 00348 mL    OUT:    Continuous Bladder Irrigation: 76646 mL  Total OUT: 06346 mL    Total NET: -7450 mL      PHYSICAL EXAM:  GENERAL: NAD  HEAD:  Atraumatic, Normocephalic  NECK: Supple, No JVD, Normal thyroid  NERVOUS SYSTEM:  Alert & Oriented X3, Good concentration; Motor Strength 5/5 B/L upper and lower extremities  CHEST/LUNG: Decreased BS bilaterally  HEART: Regular rate and rhythm; No murmurs, rubs, or gallops  ABDOMEN: Soft, Nontender, Nondistended; Bowel sounds present; (+) ceron with hematuria with clots  EXTREMITIES:  2+ Peripheral Pulses, No clubbing, cyanosis, or edema                                10.9   11.4  )-----------( 265      ( 26 Oct 2018 07:23 )             34.9     26 Oct 2018 07:23    143    |  106    |  13.0   ----------------------------<  120    4.1     |  27.0   |  0.66     Ca    8.7        26 Oct 2018 07:23        CAPILLARY BLOOD GLUCOSE  POCT Blood Glucose.: 104 mg/dL (26 Oct 2018 12:12)  POCT Blood Glucose.: 106 mg/dL (26 Oct 2018 08:16)  POCT Blood Glucose.: 114 mg/dL (25 Oct 2018 22:23)  POCT Blood Glucose.: 121 mg/dL (25 Oct 2018 16:36)        Hemoglobin A1C, Whole Blood: 6.6 % (10-25-18 @ 06:51)    MEDICATIONS  (STANDING):  ALBUTerol    0.083% 2.5 milliGRAM(s) Nebulizer every 6 hours  ALBUTerol    90 MICROgram(s) HFA Inhaler 1 Puff(s) Inhalation every 6 hours  atorvastatin 40 milliGRAM(s) Oral at bedtime  buDESOnide 160 MICROgram(s)/formoterol 4.5 MICROgram(s) Inhaler 2 Puff(s) Inhalation two times a day  dextrose 5%. 1000 milliLiter(s) (50 mL/Hr) IV Continuous <Continuous>  dextrose 50% Injectable 12.5 Gram(s) IV Push once  dextrose 50% Injectable 25 Gram(s) IV Push once  dextrose 50% Injectable 25 Gram(s) IV Push once  insulin lispro (HumaLOG) corrective regimen sliding scale   SubCutaneous three times a day before meals  insulin lispro (HumaLOG) corrective regimen sliding scale   SubCutaneous at bedtime  metoprolol tartrate 12.5 milliGRAM(s) Oral every 12 hours  pantoprazole    Tablet 40 milliGRAM(s) Oral before breakfast  tiotropium 18 MICROgram(s) Capsule 1 Capsule(s) Inhalation daily    MEDICATIONS  (PRN):  calcium carbonate    500 mG (Tums) Chewable 1 Tablet(s) Chew every 4 hours PRN Heartburn  dextrose 40% Gel 15 Gram(s) Oral once PRN Blood Glucose LESS THAN 70 milliGRAM(s)/deciliter  glucagon  Injectable 1 milliGRAM(s) IntraMuscular once PRN Glucose LESS THAN 70 milligrams/deciliter  morphine  - Injectable 4 milliGRAM(s) IV Push every 4 hours PRN Breakthrough pain  oxyCODONE    IR 5 milliGRAM(s) Oral every 4 hours PRN As needed mild to moderate pain  oxyCODONE    IR 10 milliGRAM(s) Oral every 4 hours PRN Severe Pain (7 - 10)

## 2018-10-26 NOTE — PROGRESS NOTE ADULT - ASSESSMENT
> Gross Hematuria, Bladder Mass - on CBI.  Hgb stable.  Will monitor H/h q8h.  Urology input appreciated - Or scheduled for 10/27/18.    > CAD - continue BBLocker, Statin.  Hold ASA. Cardiology consulted.  > COPD - with hypoxia, now currently on 4Liters.  Continuous pulse ox.  Pulmonary consulted.  On Tiotropium.  Nebulizers prn.  Advair.   > Diabetes Type II on longterm Insulin Therapy - monitor fingersticks.  Insulin coverage for hyperglycemia. Metformin held for now.  > DVT Prophylaxis - Lower extremity intermittent compression devices.

## 2018-10-26 NOTE — PROGRESS NOTE ADULT - SUBJECTIVE AND OBJECTIVE BOX
PULMONARY PROGRESS NOTE      TAMIKO KELLYVALENTIN-[id number]    Patient is a 84y old  Female who presents with a chief complaint of Hematuria (25 Oct 2018 13:29)  Patient is a 84y old  Female who presents with a chief complaint of Hematuria (25 Oct 2018 10:21)  Patient with known COPD, newly oxygen requiring presents with gross hematuria  Known DM, HTN, and CAD s/p stenting  Bladder masss-hematuria/urinary retention    INTERVAL HPI/OVERNIGHT EVENTS:  Little interval change - up to 4LPM nasal 02  PARTIDA  Bladder irrigation    MEDICATIONS  (STANDING):  ALBUTerol    0.083% 2.5 milliGRAM(s) Nebulizer every 6 hours  ALBUTerol    90 MICROgram(s) HFA Inhaler 1 Puff(s) Inhalation every 6 hours  atorvastatin 40 milliGRAM(s) Oral at bedtime  buDESOnide 160 MICROgram(s)/formoterol 4.5 MICROgram(s) Inhaler 2 Puff(s) Inhalation two times a day  dextrose 5%. 1000 milliLiter(s) (50 mL/Hr) IV Continuous <Continuous>  dextrose 50% Injectable 12.5 Gram(s) IV Push once  dextrose 50% Injectable 25 Gram(s) IV Push once  dextrose 50% Injectable 25 Gram(s) IV Push once  insulin lispro (HumaLOG) corrective regimen sliding scale   SubCutaneous three times a day before meals  insulin lispro (HumaLOG) corrective regimen sliding scale   SubCutaneous at bedtime  metoprolol tartrate 12.5 milliGRAM(s) Oral every 12 hours  pantoprazole    Tablet 40 milliGRAM(s) Oral before breakfast  tiotropium 18 MICROgram(s) Capsule 1 Capsule(s) Inhalation daily      MEDICATIONS  (PRN):  calcium carbonate    500 mG (Tums) Chewable 1 Tablet(s) Chew every 4 hours PRN Heartburn  dextrose 40% Gel 15 Gram(s) Oral once PRN Blood Glucose LESS THAN 70 milliGRAM(s)/deciliter  glucagon  Injectable 1 milliGRAM(s) IntraMuscular once PRN Glucose LESS THAN 70 milligrams/deciliter  oxyCODONE    IR 5 milliGRAM(s) Oral every 4 hours PRN As needed mild to moderate pain  oxyCODONE    IR 10 milliGRAM(s) Oral every 4 hours PRN Severe Pain (7 - 10)      Allergies    No Known Allergies    Intolerances        PAST MEDICAL & SURGICAL HISTORY:  Stented coronary artery: Mi in , s/p stent of right coronary artery  Bladder prolapse, female, acquired  Bladder tumor  CAD (coronary artery disease)  Neuropathy  High cholesterol  Hypertension  Diabetes  Status post cardiac catheterization  Ankle fracture: Rt      SOCIAL HISTORY  Smoking History:       REVIEW OF SYSTEMS:    CONSTITUTIONAL:  No distress    HEENT:  Eyes:  No diplopia or blurred vision. ENT:  No earache, sore throat or runny nose.    CARDIOVASCULAR:  No pressure, squeezing, tightness, heaviness or aching about the chest; no palpitations.    RESPIRATORY:  No cough,  PND or orthopnea. Mod SOBOE    GASTROINTESTINAL:  No nausea, vomiting or diarrhea.      NEUROLOGIC:  No paresthesias, fasciculations, seizures or weakness.    PSYCHIATRIC:  No disorder of thought or mood.    Vital Signs Last 24 Hrs  T(C): 37.4 (25 Oct 2018 23:49), Max: 37.4 (25 Oct 2018 23:49)  T(F): 99.3 (25 Oct 2018 23:49), Max: 99.3 (25 Oct 2018 23:49)  HR: 75 (26 Oct 2018 08:14) (66 - 76)  BP: 106/69 (26 Oct 2018 08:14) (104/56 - 132/62)  BP(mean): --  RR: 18 (26 Oct 2018 08:14) (18 - 18)  SpO2: 98% (25 Oct 2018 23:49) (90% - 98%)    PHYSICAL EXAMINATION:    GENERAL: The patient is awake and alert in no apparent distress.     HEENT: Head is normocephalic and atraumatic. Extraocular muscles are intact. Mucous membranes are moist.    NECK: Supple.    LUNGS: Clear to auscultation without wheezing, rales or rhonchi; respirations unlabored    HEART: Regular rate and rhythm without murmur.    ABDOMEN: Soft, nontender, and nondistended.      EXTREMITIES: Without any cyanosis, clubbing, rash, lesions or edema.    NEUROLOGIC: Grossly intact.    LABS:                        10.9   11.4  )-----------( 265      ( 26 Oct 2018 07:23 )             34.9     10-26    143  |  106  |  13.0  ----------------------------<  120<H>  4.1   |  27.0  |  0.66    Ca    8.7      26 Oct 2018 07:23    TPro  7.9  /  Alb  3.7  /  TBili  0.5  /  DBili  x   /  AST  19  /  ALT  11  /  AlkPhos  100  10-24    PT/INR - ( 24 Oct 2018 13:08 )   PT: 12.1 sec;   INR: 1.10 ratio         PTT - ( 24 Oct 2018 13:08 )  PTT:32.0 sec  Urinalysis Basic - ( 24 Oct 2018 16:10 )    Color: Red / Appearance: bloody / S.005 / pH: x  Gluc: x / Ketone: Small  / Bili: Negative / Urobili: Negative   Blood: x / Protein: 500 mg/dL / Nitrite: Negative   Leuk Esterase: Negative / RBC: >50 /HPF / WBC 3-5   Sq Epi: x / Non Sq Epi: x / Bacteria: Occasional      ABG - ( 25 Oct 2018 13:57 )  pH, Arterial: 7.36  pH, Blood: x     /  pCO2: 50    /  pO2: 68    / HCO3: 26    / Base Excess: 1.5   /  SaO2: 93                CARDIAC MARKERS ( 24 Oct 2018 13:08 )  x     / <0.01 ng/mL / x     / x     / x              RADIOLOGY & ADDITIONAL STUDIES:  CXR with poor inspiration and crowding

## 2018-10-27 ENCOUNTER — RESULT REVIEW (OUTPATIENT)
Age: 83
End: 2018-10-27

## 2018-10-27 LAB
GLUCOSE BLDC GLUCOMTR-MCNC: 138 MG/DL — HIGH (ref 70–99)
GLUCOSE BLDC GLUCOMTR-MCNC: 140 MG/DL — HIGH (ref 70–99)
GLUCOSE BLDC GLUCOMTR-MCNC: 155 MG/DL — HIGH (ref 70–99)
GLUCOSE BLDC GLUCOMTR-MCNC: 155 MG/DL — HIGH (ref 70–99)
GLUCOSE BLDC GLUCOMTR-MCNC: 158 MG/DL — HIGH (ref 70–99)

## 2018-10-27 PROCEDURE — 99232 SBSQ HOSP IP/OBS MODERATE 35: CPT

## 2018-10-27 PROCEDURE — 52235 CYSTOSCOPY AND TREATMENT: CPT

## 2018-10-27 PROCEDURE — 88307 TISSUE EXAM BY PATHOLOGIST: CPT | Mod: 26

## 2018-10-27 PROCEDURE — 88305 TISSUE EXAM BY PATHOLOGIST: CPT | Mod: 26

## 2018-10-27 RX ORDER — DEXTROSE MONOHYDRATE, SODIUM CHLORIDE, AND POTASSIUM CHLORIDE 50; .745; 4.5 G/1000ML; G/1000ML; G/1000ML
1000 INJECTION, SOLUTION INTRAVENOUS
Qty: 0 | Refills: 0 | Status: DISCONTINUED | OUTPATIENT
Start: 2018-10-27 | End: 2018-10-28

## 2018-10-27 RX ORDER — IPRATROPIUM/ALBUTEROL SULFATE 18-103MCG
3 AEROSOL WITH ADAPTER (GRAM) INHALATION ONCE
Qty: 0 | Refills: 0 | Status: COMPLETED | OUTPATIENT
Start: 2018-10-27 | End: 2018-10-27

## 2018-10-27 RX ORDER — CEPHALEXIN 500 MG
500 CAPSULE ORAL EVERY 12 HOURS
Qty: 0 | Refills: 0 | Status: DISCONTINUED | OUTPATIENT
Start: 2018-10-27 | End: 2018-10-29

## 2018-10-27 RX ADMIN — PANTOPRAZOLE SODIUM 40 MILLIGRAM(S): 20 TABLET, DELAYED RELEASE ORAL at 05:22

## 2018-10-27 RX ADMIN — BUDESONIDE AND FORMOTEROL FUMARATE DIHYDRATE 2 PUFF(S): 160; 4.5 AEROSOL RESPIRATORY (INHALATION) at 20:18

## 2018-10-27 RX ADMIN — Medication 12.5 MILLIGRAM(S): at 05:22

## 2018-10-27 RX ADMIN — ATORVASTATIN CALCIUM 40 MILLIGRAM(S): 80 TABLET, FILM COATED ORAL at 21:11

## 2018-10-27 RX ADMIN — ALBUTEROL 2.5 MILLIGRAM(S): 90 AEROSOL, METERED ORAL at 20:17

## 2018-10-27 RX ADMIN — Medication 12.5 MILLIGRAM(S): at 18:13

## 2018-10-27 RX ADMIN — Medication 500 MILLIGRAM(S): at 21:11

## 2018-10-27 RX ADMIN — ALBUTEROL 2.5 MILLIGRAM(S): 90 AEROSOL, METERED ORAL at 03:19

## 2018-10-27 RX ADMIN — Medication 1: at 17:47

## 2018-10-27 RX ADMIN — DEXTROSE MONOHYDRATE, SODIUM CHLORIDE, AND POTASSIUM CHLORIDE 60 MILLILITER(S): 50; .745; 4.5 INJECTION, SOLUTION INTRAVENOUS at 12:51

## 2018-10-27 RX ADMIN — Medication 3 MILLILITER(S): at 09:15

## 2018-10-27 RX ADMIN — Medication 1: at 12:51

## 2018-10-27 RX ADMIN — ALBUTEROL 2.5 MILLIGRAM(S): 90 AEROSOL, METERED ORAL at 15:45

## 2018-10-27 NOTE — BRIEF OPERATIVE NOTE - PROCEDURE
<<-----Click on this checkbox to enter Procedure TURBT (transurethral resection of bladder tumor)  10/27/2018    Active  [id number]

## 2018-10-27 NOTE — PROGRESS NOTE ADULT - SUBJECTIVE AND OBJECTIVE BOX
POST-OPERATIVE CHECK:    Procedure: s/p cysto TURBT  Patient: afebrile VSS awake and alert resting comfortable, family members present at bedside.  states mild discomfort. denies nausea, vomiting at present.   IV/Diet:  T(C): 36.8 (10-27-18 @ 17:14), Max: 37 (10-26-18 @ 23:51)  HR: 95 (10-27-18 @ 17:14) (69 - 95)  BP: 106/57 (10-27-18 @ 17:14) (104/54 - 164/75)  RR: 18 (10-27-18 @ 17:14) (14 - 21)  SpO2: 92% (10-27-18 @ 17:14) (92% - 95%)  Wt(kg): --    Neck:  Chest: good air exchange, denies SOB or chest pain  Abdomen: soft benign   Output: ceron catheter in place CBI active , noted urine clear, doing well , denies any urinary discomfort.  Extremities:  Drains:          Impression: stable Post op, tolerated procedure well, ceron with CBI in progress running clear.  discussed with Surgeon present situation and continued care.   Plan: d/c CBI follow urine if remains clear output possible d/c ceron am, if notice any hematuria resume CBI start slow rate.

## 2018-10-27 NOTE — PROGRESS NOTE ADULT - SUBJECTIVE AND OBJECTIVE BOX
The patient is a 84y old female who presents with a chief complaint of hematuria secondary   to a 4 cm bladder tumor. (26 Oct 2018 16:32)      PAST MEDICAL HISTORY:  Stented coronary artery  Bladder prolapse, female, acquired  Bladder tumor  CAD (coronary artery disease)  Neuropathy  High cholesterol  Hypertension  Diabetes x 5 years   BMI = 35      PAST SURGICAL HISTORY:  Status post cardiac catheterization  Ankle fracture        MEDICATIONS  (STANDING):  ALBUTerol    0.083% 2.5 milliGRAM(s) Nebulizer every 6 hours  ALBUTerol    90 MICROgram(s) HFA Inhaler 1 Puff(s) Inhalation every 6 hours  atorvastatin 40 milliGRAM(s) Oral at bedtime  buDESOnide 160 MICROgram(s)/formoterol 4.5 MICROgram(s) Inhaler 2 Puff(s) Inhalation two times a day  dextrose 5%. 1000 milliLiter(s) (50 mL/Hr) IV Continuous <Continuous>  dextrose 50% Injectable 12.5 Gram(s) IV Push once  dextrose 50% Injectable 25 Gram(s) IV Push once  dextrose 50% Injectable 25 Gram(s) IV Push once  insulin lispro (HumaLOG) corrective regimen sliding scale   SubCutaneous three times a day before meals  insulin lispro (HumaLOG) corrective regimen sliding scale   SubCutaneous at bedtime  metoprolol tartrate 12.5 milliGRAM(s) Oral every 12 hours  pantoprazole    Tablet 40 milliGRAM(s) Oral before breakfast  tiotropium 18 MICROgram(s) Capsule 1 Capsule(s) Inhalation daily    MEDICATIONS  (PRN):  calcium carbonate    500 mG (Tums) Chewable 1 Tablet(s) Chew every 4 hours PRN Heartburn  dextrose 40% Gel 15 Gram(s) Oral once PRN Blood Glucose LESS THAN 70 milliGRAM(s)/deciliter  glucagon  Injectable 1 milliGRAM(s) IntraMuscular once PRN Glucose LESS THAN 70 milligrams/deciliter  morphine  - Injectable 4 milliGRAM(s) IV Push every 4 hours PRN Breakthrough pain  oxyCODONE    IR 5 milliGRAM(s) Oral every 4 hours PRN As needed mild to moderate pain  oxyCODONE    IR 10 milliGRAM(s) Oral every 4 hours PRN Severe Pain (7 - 10)      Allergies:     No Known Drug Allergies      SOCIAL HISTORY:  She likes to drink Gin occasionally.  She quit smoking in 2014 after smoking                             1 ppd x 66 years.  She never used illicit drugs.                            10.9   11.4  )-----------( 265      ( 26 Oct 2018 07:23 )             34.9       PT/INR - ( 24 Oct 2018 13:08 )   PT: 12.1 sec;   INR: 1.10 ratio         PTT - ( 24 Oct 2018 13:08 )  PTT:32.0 sec    10-26    143  |  106  |  13.0  ----------------------------<  120<H>  4.1   |  27.0  |  0.66    Ca    8.7      26 Oct 2018 07:23    EKG:  -  10/25/2018  Normal sinus rhythm  T wave abnormality, consider lateral ischemia  Abnormal ECG    TT ECHO:  -  12/5/2014   3. Left ventricular ejection fraction, by visual estimation, is 65 to        70%.   4. Mildly increased LV wall thickness.   5. Spectral Doppler shows impaired relaxation pattern of left        ventricular myocardial filling (Grade I diastolic dysfunction).   6. There is mild concentric left ventricular hypertrophy.   7. Mild mitral valve regurgitation.    ASA # = 3 Mallampati # = 2 The patient is a 84y old female who presents with a chief complaint of hematuria secondary   to a 4 cm bladder tumor. She is scheduled to have a TRANSURETHRAL RESECTION OF A  BLADDER TUMOR.   (26 Oct 2018 16:32)      PAST MEDICAL HISTORY:  Stented coronary artery  Bladder prolapse, female, acquired  Bladder tumor  CAD (coronary artery disease)  Neuropathy  High cholesterol  Hypertension  Diabetes x 5 years   BMI = 35      PAST SURGICAL HISTORY:  Status post cardiac catheterization  Ankle fracture        MEDICATIONS  (STANDING):  ALBUTerol    0.083% 2.5 milliGRAM(s) Nebulizer every 6 hours  ALBUTerol    90 MICROgram(s) HFA Inhaler 1 Puff(s) Inhalation every 6 hours  atorvastatin 40 milliGRAM(s) Oral at bedtime  buDESOnide 160 MICROgram(s)/formoterol 4.5 MICROgram(s) Inhaler 2 Puff(s) Inhalation two times a day  dextrose 5%. 1000 milliLiter(s) (50 mL/Hr) IV Continuous <Continuous>  dextrose 50% Injectable 12.5 Gram(s) IV Push once  dextrose 50% Injectable 25 Gram(s) IV Push once  dextrose 50% Injectable 25 Gram(s) IV Push once  insulin lispro (HumaLOG) corrective regimen sliding scale   SubCutaneous three times a day before meals  insulin lispro (HumaLOG) corrective regimen sliding scale   SubCutaneous at bedtime  metoprolol tartrate 12.5 milliGRAM(s) Oral every 12 hours  pantoprazole    Tablet 40 milliGRAM(s) Oral before breakfast  tiotropium 18 MICROgram(s) Capsule 1 Capsule(s) Inhalation daily    MEDICATIONS  (PRN):  calcium carbonate    500 mG (Tums) Chewable 1 Tablet(s) Chew every 4 hours PRN Heartburn  dextrose 40% Gel 15 Gram(s) Oral once PRN Blood Glucose LESS THAN 70 milliGRAM(s)/deciliter  glucagon  Injectable 1 milliGRAM(s) IntraMuscular once PRN Glucose LESS THAN 70 milligrams/deciliter  morphine  - Injectable 4 milliGRAM(s) IV Push every 4 hours PRN Breakthrough pain  oxyCODONE    IR 5 milliGRAM(s) Oral every 4 hours PRN As needed mild to moderate pain  oxyCODONE    IR 10 milliGRAM(s) Oral every 4 hours PRN Severe Pain (7 - 10)      Allergies:     No Known Drug Allergies      SOCIAL HISTORY:  She likes to drink Gin occasionally.  She quit smoking in 2014 after smoking                             1 ppd x 66 years.  She never used illicit drugs.                            10.9   11.4  )-----------( 265      ( 26 Oct 2018 07:23 )             34.9       PT/INR - ( 24 Oct 2018 13:08 )   PT: 12.1 sec;   INR: 1.10 ratio         PTT - ( 24 Oct 2018 13:08 )  PTT:32.0 sec    10-26    143  |  106  |  13.0  ----------------------------<  120<H>  4.1   |  27.0  |  0.66    Ca    8.7      26 Oct 2018 07:23    EKG:  -  10/25/2018  Normal sinus rhythm  T wave abnormality, consider lateral ischemia  Abnormal ECG    TT ECHO:  -  12/5/2014   3. Left ventricular ejection fraction, by visual estimation, is 65 to        70%.   4. Mildly increased LV wall thickness.   5. Spectral Doppler shows impaired relaxation pattern of left        ventricular myocardial filling (Grade I diastolic dysfunction).   6. There is mild concentric left ventricular hypertrophy.   7. Mild mitral valve regurgitation.    ASA # = 3 Mallampati # = 2

## 2018-10-27 NOTE — PROGRESS NOTE ADULT - ASSESSMENT
> Gross Hematuria, Bladder Mass - S/p cystoscopy.  Bleeding appears to have resolved.  H/H stable.  Awaiting biopsy rsults.  OOB with PT.    > CAD - continue BBLocker, Statin.  Hold ASA. Seen by cardiology.    > COPD - with hypoxia, now currently on 4Liters.  Continuous pulse ox.  Pulmonary consulted.  On Tiotropium.  Nebulizers prn.  Advair.   > Diabetes Type II on longterm Insulin Therapy - monitor fingersticks.  Insulin coverage for hyperglycemia. Metformin held for now.  FS appear well controlled.   > DVT Prophylaxis - Lower extremity intermittent compression devices.  D/w family in agreement.

## 2018-10-28 ENCOUNTER — MOBILE ON CALL (OUTPATIENT)
Age: 83
End: 2018-10-28

## 2018-10-28 LAB
GLUCOSE BLDC GLUCOMTR-MCNC: 107 MG/DL — HIGH (ref 70–99)
GLUCOSE BLDC GLUCOMTR-MCNC: 117 MG/DL — HIGH (ref 70–99)
GLUCOSE BLDC GLUCOMTR-MCNC: 120 MG/DL — HIGH (ref 70–99)
GLUCOSE BLDC GLUCOMTR-MCNC: 152 MG/DL — HIGH (ref 70–99)
HCT VFR BLD CALC: 33.2 % — LOW (ref 37–47)
HGB BLD-MCNC: 10.4 G/DL — LOW (ref 12–16)
MCHC RBC-ENTMCNC: 30.6 PG — SIGNIFICANT CHANGE UP (ref 27–31)
MCHC RBC-ENTMCNC: 31.3 G/DL — LOW (ref 32–36)
MCV RBC AUTO: 97.6 FL — SIGNIFICANT CHANGE UP (ref 81–99)
PLATELET # BLD AUTO: 298 K/UL — SIGNIFICANT CHANGE UP (ref 150–400)
RBC # BLD: 3.4 M/UL — LOW (ref 4.4–5.2)
RBC # FLD: 15.6 % — SIGNIFICANT CHANGE UP (ref 11–15.6)
WBC # BLD: 14.6 K/UL — HIGH (ref 4.8–10.8)
WBC # FLD AUTO: 14.6 K/UL — HIGH (ref 4.8–10.8)

## 2018-10-28 PROCEDURE — 99232 SBSQ HOSP IP/OBS MODERATE 35: CPT

## 2018-10-28 RX ADMIN — BUDESONIDE AND FORMOTEROL FUMARATE DIHYDRATE 2 PUFF(S): 160; 4.5 AEROSOL RESPIRATORY (INHALATION) at 20:29

## 2018-10-28 RX ADMIN — DEXTROSE MONOHYDRATE, SODIUM CHLORIDE, AND POTASSIUM CHLORIDE 60 MILLILITER(S): 50; .745; 4.5 INJECTION, SOLUTION INTRAVENOUS at 07:56

## 2018-10-28 RX ADMIN — Medication 500 MILLIGRAM(S): at 21:23

## 2018-10-28 RX ADMIN — TIOTROPIUM BROMIDE 1 CAPSULE(S): 18 CAPSULE ORAL; RESPIRATORY (INHALATION) at 09:11

## 2018-10-28 RX ADMIN — ALBUTEROL 2.5 MILLIGRAM(S): 90 AEROSOL, METERED ORAL at 03:15

## 2018-10-28 RX ADMIN — ATORVASTATIN CALCIUM 40 MILLIGRAM(S): 80 TABLET, FILM COATED ORAL at 21:23

## 2018-10-28 RX ADMIN — Medication 12.5 MILLIGRAM(S): at 18:30

## 2018-10-28 RX ADMIN — ALBUTEROL 2.5 MILLIGRAM(S): 90 AEROSOL, METERED ORAL at 09:10

## 2018-10-28 RX ADMIN — BUDESONIDE AND FORMOTEROL FUMARATE DIHYDRATE 2 PUFF(S): 160; 4.5 AEROSOL RESPIRATORY (INHALATION) at 09:11

## 2018-10-28 RX ADMIN — Medication 12.5 MILLIGRAM(S): at 05:01

## 2018-10-28 RX ADMIN — Medication 500 MILLIGRAM(S): at 07:56

## 2018-10-28 RX ADMIN — ALBUTEROL 2.5 MILLIGRAM(S): 90 AEROSOL, METERED ORAL at 20:29

## 2018-10-28 RX ADMIN — PANTOPRAZOLE SODIUM 40 MILLIGRAM(S): 20 TABLET, DELAYED RELEASE ORAL at 05:03

## 2018-10-28 RX ADMIN — ALBUTEROL 2.5 MILLIGRAM(S): 90 AEROSOL, METERED ORAL at 15:14

## 2018-10-28 NOTE — PROGRESS NOTE ADULT - SUBJECTIVE AND OBJECTIVE BOX
Patient: TAMIKO KELLY [id number] 84y Female                           Internal Medicine Hospitalist Progress Note  CC: Hematuria    HPI:  84 y.o F with PMH of CAD s/p stent, HTN, HLD, DM2, COPD had been awaiting home O2, Anemia, Bladder prolapse, Bladder mass awaiting  workup, presents c/o gross hematuria with clots and suprapubic pain.  Seen by urology, started on CBI in ED with significant improvement in symptoms.  S/p Cystoscopy showing small flattened bladder mass.  Hematuria has resolved.  Tamayo discontinued 10/27.     Seen today with daughter, granddaughters at bedside.  No hematuria.  Denies SOB.  No chest pain / palpitations. No additional complaints.     ____________________PHYSICAL EXAM:  Vitals reviewed as indicated below  GENERAL:  NAD Alert and Oriented x 3   HEENT: NCAT  CARDIOVASCULAR:  S1, S2  LUNGS: CTAB  ABDOMEN:  soft, (-) tenderness, (-) distension, (+) bowel sounds, (-) guarding, (-) rebound (-) rigidity  EXTREMITIES:  no cyanosis / clubbing / edema.   ____________________  VITALS:  Vital Signs Last 24 Hrs  T(C): 36.8 (28 Oct 2018 08:00), Max: 36.8 (27 Oct 2018 17:14)  T(F): 98.2 (28 Oct 2018 08:00), Max: 98.2 (27 Oct 2018 17:14)  HR: 71 (28 Oct 2018 09:33) (67 - 982)  BP: 117/66 (28 Oct 2018 08:00) (106/57 - 117/66)  BP(mean): --  RR: 18 (28 Oct 2018 08:00) (18 - 18)  SpO2: 96% (28 Oct 2018 09:33) (92% - 96%) Daily     Daily   CAPILLARY BLOOD GLUCOSE      POCT Blood Glucose.: 117 mg/dL (28 Oct 2018 12:44)  POCT Blood Glucose.: 120 mg/dL (28 Oct 2018 07:55)  POCT Blood Glucose.: 138 mg/dL (27 Oct 2018 21:08)  POCT Blood Glucose.: 158 mg/dL (27 Oct 2018 17:45)    I&O's Summary    27 Oct 2018 07:01  -  28 Oct 2018 07:00  --------------------------------------------------------  IN: 4020 mL / OUT: 7400 mL / NET: -3380 mL    28 Oct 2018 07:01  -  28 Oct 2018 14:06  --------------------------------------------------------  IN: 0 mL / OUT: 550 mL / NET: -550 mL        LABS:                        10.4   14.6  )-----------( 298      ( 28 Oct 2018 07:31 )             33.2                       MEDICATIONS:  ALBUTerol    0.083% 2.5 milliGRAM(s) Nebulizer every 6 hours  ALBUTerol    90 MICROgram(s) HFA Inhaler 1 Puff(s) Inhalation every 6 hours  atorvastatin 40 milliGRAM(s) Oral at bedtime  buDESOnide 160 MICROgram(s)/formoterol 4.5 MICROgram(s) Inhaler 2 Puff(s) Inhalation two times a day  calcium carbonate    500 mG (Tums) Chewable 1 Tablet(s) Chew every 4 hours PRN  cephalexin 500 milliGRAM(s) Oral every 12 hours  dextrose 40% Gel 15 Gram(s) Oral once PRN  dextrose 5%. 1000 milliLiter(s) IV Continuous <Continuous>  dextrose 50% Injectable 12.5 Gram(s) IV Push once  dextrose 50% Injectable 25 Gram(s) IV Push once  dextrose 50% Injectable 25 Gram(s) IV Push once  glucagon  Injectable 1 milliGRAM(s) IntraMuscular once PRN  insulin lispro (HumaLOG) corrective regimen sliding scale   SubCutaneous three times a day before meals  insulin lispro (HumaLOG) corrective regimen sliding scale   SubCutaneous at bedtime  metoprolol tartrate 12.5 milliGRAM(s) Oral every 12 hours  morphine  - Injectable 4 milliGRAM(s) IV Push every 4 hours PRN  oxyCODONE    IR 5 milliGRAM(s) Oral every 4 hours PRN  oxyCODONE    IR 10 milliGRAM(s) Oral every 4 hours PRN  pantoprazole    Tablet 40 milliGRAM(s) Oral before breakfast  sodium chloride 0.45% with potassium chloride 20 mEq/L 1000 milliLiter(s) IV Continuous <Continuous>  tiotropium 18 MICROgram(s) Capsule 1 Capsule(s) Inhalation daily

## 2018-10-28 NOTE — PROGRESS NOTE ADULT - SUBJECTIVE AND OBJECTIVE BOX
Anesthesia Postop Note:    T(C): 36.8 (10-28-18 @ 08:00), Max: 36.8 (10-27-18 @ 17:14)  HR: 71 (10-28-18 @ 09:33) (67 - 982)  BP: 117/66 (10-28-18 @ 08:00) (106/57 - 117/66)  RR: 18 (10-28-18 @ 08:00) (18 - 18)  SpO2: 96% (10-28-18 @ 09:33)     Pt seen, doing well, no anesthesia complications or complaints noted or reported.   No Nausea  Pain well controlled

## 2018-10-28 NOTE — PROGRESS NOTE ADULT - SUBJECTIVE AND OBJECTIVE BOX
PULMONARY PROGRESS NOTE      TAMIKO KELLYVALENTIN-[id number]    Patient is a 84y old  Female who presents with a chief complaint of Hematuria (27 Oct 2018 17:19)  COPD on home 02  Hematuria/ bladder mass  DM, HTN, CAD post stenting   Now post cysto TURBT with clear urine    INTERVAL HPI/OVERNIGHT EVENTS:  No respiratory distress  Comfortable on nasal 02    MEDICATIONS  (STANDING):  ALBUTerol    0.083% 2.5 milliGRAM(s) Nebulizer every 6 hours  ALBUTerol    90 MICROgram(s) HFA Inhaler 1 Puff(s) Inhalation every 6 hours  atorvastatin 40 milliGRAM(s) Oral at bedtime  buDESOnide 160 MICROgram(s)/formoterol 4.5 MICROgram(s) Inhaler 2 Puff(s) Inhalation two times a day  cephalexin 500 milliGRAM(s) Oral every 12 hours  dextrose 5%. 1000 milliLiter(s) (50 mL/Hr) IV Continuous <Continuous>  dextrose 50% Injectable 12.5 Gram(s) IV Push once  dextrose 50% Injectable 25 Gram(s) IV Push once  dextrose 50% Injectable 25 Gram(s) IV Push once  insulin lispro (HumaLOG) corrective regimen sliding scale   SubCutaneous three times a day before meals  insulin lispro (HumaLOG) corrective regimen sliding scale   SubCutaneous at bedtime  metoprolol tartrate 12.5 milliGRAM(s) Oral every 12 hours  pantoprazole    Tablet 40 milliGRAM(s) Oral before breakfast  sodium chloride 0.45% with potassium chloride 20 mEq/L 1000 milliLiter(s) (60 mL/Hr) IV Continuous <Continuous>  tiotropium 18 MICROgram(s) Capsule 1 Capsule(s) Inhalation daily      MEDICATIONS  (PRN):  calcium carbonate    500 mG (Tums) Chewable 1 Tablet(s) Chew every 4 hours PRN Heartburn  dextrose 40% Gel 15 Gram(s) Oral once PRN Blood Glucose LESS THAN 70 milliGRAM(s)/deciliter  glucagon  Injectable 1 milliGRAM(s) IntraMuscular once PRN Glucose LESS THAN 70 milligrams/deciliter  morphine  - Injectable 4 milliGRAM(s) IV Push every 4 hours PRN Breakthrough pain  oxyCODONE    IR 5 milliGRAM(s) Oral every 4 hours PRN As needed mild to moderate pain  oxyCODONE    IR 10 milliGRAM(s) Oral every 4 hours PRN Severe Pain (7 - 10)      Allergies    No Known Allergies    Intolerances        PAST MEDICAL & SURGICAL HISTORY:  Stented coronary artery: Mi in 2014, s/p stent of right coronary artery  Bladder prolapse, female, acquired  Bladder tumor  CAD (coronary artery disease)  Neuropathy  High cholesterol  Hypertension  Diabetes  Status post cardiac catheterization  Ankle fracture: Rt      SOCIAL HISTORY  Smoking History:       REVIEW OF SYSTEMS:    CONSTITUTIONAL:  No distress    HEENT:  Eyes:  No diplopia or blurred vision. ENT:  No earache, sore throat or runny nose.    CARDIOVASCULAR:  No pressure, squeezing, tightness, heaviness or aching about the chest; no palpitations.    RESPIRATORY:  No cough, shortness of breath, PND or orthopnea. Mild SOBOE    GASTROINTESTINAL:  No nausea, vomiting or diarrhea.    GENITOURINARY:  No dysuria, frequency or urgency.    NEUROLOGIC:  No paresthesias, fasciculations, seizures or weakness.    PSYCHIATRIC:  No disorder of thought or mood.    Vital Signs Last 24 Hrs  T(C): 36.8 (28 Oct 2018 08:00), Max: 37 (27 Oct 2018 09:15)  T(F): 98.2 (28 Oct 2018 08:00), Max: 98.6 (27 Oct 2018 09:15)  HR: 982 (28 Oct 2018 08:00) (67 - 982)  BP: 117/66 (28 Oct 2018 08:00) (106/57 - 164/75)  BP(mean): --  RR: 18 (28 Oct 2018 08:00) (14 - 21)  SpO2: 92% (27 Oct 2018 23:54) (92% - 96%)    PHYSICAL EXAMINATION:    GENERAL: The patient is awake and alert in no apparent distress.     HEENT: Head is normocephalic and atraumatic. Extraocular muscles are intact. Mucous membranes are moist.    NECK: Supple.    LUNGS: Clear to auscultation without wheezing, rales or rhonchi; respirations unlabored    HEART: Regular rate and rhythm without murmur.    ABDOMEN: Soft, nontender, and nondistended.      EXTREMITIES: Without any cyanosis, clubbing, rash, lesions or edema.    NEUROLOGIC: Grossly intact.    LABS:                        10.4   14.6  )-----------( 298      ( 28 Oct 2018 07:31 )             33.2           RADIOLOGY & ADDITIONAL STUDIES:  CXR on 10/24  Poor inspiratory effort with vascular crowding

## 2018-10-28 NOTE — PROGRESS NOTE ADULT - ASSESSMENT
Assess    Hematuria/ Bladder mass - doing well with Urology Rx  COPD nearing her baseline    Plan    Concur with venous compression  Toward Advair/ Spiriva (if OK with Urology) - just LABA/ICS if urology prefers to avoid LAMA  Little to add  FU with Dr Brewer as OP

## 2018-10-28 NOTE — PROGRESS NOTE ADULT - ASSESSMENT
> Gross Hematuria, Bladder Mass - S/p cystoscopy.  Bleeding appears to have resolved.  H/H stable.  Awaiting biopsy rsults.  OOB with PT.    > Leukocytosis - No s/s of active infection.  Monitor WBC.   > CAD - continue BBLocker, Statin.  Hold ASA. Seen by cardiology.    > COPD - with hypoxia, now currently on 4Liters.  Continuous pulse ox.  Pulmonary consulted.  On Tiotropium.  Nebulizers prn.  Advair.   > Diabetes Type II on longterm Insulin Therapy - monitor fingersticks.  Insulin coverage for hyperglycemia. Metformin held for now.  FS appear well controlled.   > DVT Prophylaxis - Lower extremity intermittent compression devices.  Plan for d/c in next 24-48h.  D/w family in agreement.

## 2018-10-29 ENCOUNTER — TRANSCRIPTION ENCOUNTER (OUTPATIENT)
Age: 83
End: 2018-10-29

## 2018-10-29 VITALS
TEMPERATURE: 98 F | HEART RATE: 79 BPM | SYSTOLIC BLOOD PRESSURE: 109 MMHG | DIASTOLIC BLOOD PRESSURE: 62 MMHG | OXYGEN SATURATION: 96 %

## 2018-10-29 LAB
GLUCOSE BLDC GLUCOMTR-MCNC: 122 MG/DL — HIGH (ref 70–99)
GLUCOSE BLDC GLUCOMTR-MCNC: 152 MG/DL — HIGH (ref 70–99)
GLUCOSE BLDC GLUCOMTR-MCNC: 174 MG/DL — HIGH (ref 70–99)
HCT VFR BLD CALC: 34 % — LOW (ref 37–47)
HGB BLD-MCNC: 10.8 G/DL — LOW (ref 12–16)
MCHC RBC-ENTMCNC: 30.9 PG — SIGNIFICANT CHANGE UP (ref 27–31)
MCHC RBC-ENTMCNC: 31.8 G/DL — LOW (ref 32–36)
MCV RBC AUTO: 97.4 FL — SIGNIFICANT CHANGE UP (ref 81–99)
PLATELET # BLD AUTO: 323 K/UL — SIGNIFICANT CHANGE UP (ref 150–400)
RBC # BLD: 3.49 M/UL — LOW (ref 4.4–5.2)
RBC # FLD: 15.6 % — SIGNIFICANT CHANGE UP (ref 11–15.6)
WBC # BLD: 11.3 K/UL — HIGH (ref 4.8–10.8)
WBC # FLD AUTO: 11.3 K/UL — HIGH (ref 4.8–10.8)

## 2018-10-29 PROCEDURE — 99239 HOSP IP/OBS DSCHRG MGMT >30: CPT

## 2018-10-29 RX ORDER — CEPHALEXIN 500 MG
1 CAPSULE ORAL
Qty: 4 | Refills: 0
Start: 2018-10-29 | End: 2018-10-30

## 2018-10-29 RX ORDER — SACCHAROMYCES BOULARDII 250 MG
250 POWDER IN PACKET (EA) ORAL
Qty: 0 | Refills: 0 | Status: DISCONTINUED | OUTPATIENT
Start: 2018-10-29 | End: 2018-10-29

## 2018-10-29 RX ORDER — METFORMIN HYDROCHLORIDE 850 MG/1
1 TABLET ORAL
Qty: 0 | Refills: 0 | COMMUNITY

## 2018-10-29 RX ORDER — SACCHAROMYCES BOULARDII 250 MG
1 POWDER IN PACKET (EA) ORAL
Qty: 4 | Refills: 0
Start: 2018-10-29 | End: 2018-10-30

## 2018-10-29 RX ADMIN — PANTOPRAZOLE SODIUM 40 MILLIGRAM(S): 20 TABLET, DELAYED RELEASE ORAL at 05:45

## 2018-10-29 RX ADMIN — Medication 250 MILLIGRAM(S): at 17:51

## 2018-10-29 RX ADMIN — ALBUTEROL 2.5 MILLIGRAM(S): 90 AEROSOL, METERED ORAL at 15:28

## 2018-10-29 RX ADMIN — Medication 500 MILLIGRAM(S): at 08:07

## 2018-10-29 RX ADMIN — Medication 12.5 MILLIGRAM(S): at 05:45

## 2018-10-29 RX ADMIN — Medication 12.5 MILLIGRAM(S): at 17:51

## 2018-10-29 RX ADMIN — ALBUTEROL 2.5 MILLIGRAM(S): 90 AEROSOL, METERED ORAL at 09:03

## 2018-10-29 RX ADMIN — Medication 1: at 17:46

## 2018-10-29 RX ADMIN — BUDESONIDE AND FORMOTEROL FUMARATE DIHYDRATE 2 PUFF(S): 160; 4.5 AEROSOL RESPIRATORY (INHALATION) at 09:03

## 2018-10-29 RX ADMIN — TIOTROPIUM BROMIDE 1 CAPSULE(S): 18 CAPSULE ORAL; RESPIRATORY (INHALATION) at 09:03

## 2018-10-29 RX ADMIN — Medication 1: at 11:06

## 2018-10-29 NOTE — PROGRESS NOTE ADULT - PROVIDER SPECIALTY LIST ADULT
Anesthesia
Cardiology
Hospitalist
Pulmonology
Pulmonology
Urology
Anesthesia

## 2018-10-29 NOTE — DISCHARGE NOTE ADULT - MEDICATION SUMMARY - MEDICATIONS TO STOP TAKING
I will STOP taking the medications listed below when I get home from the hospital:    donepezil 10 mg oral tablet  -- 1 tab(s) by mouth once a day (at bedtime)    Macrobid 100 mg oral capsule  -- 1 cap(s) by mouth 2 times a day   -- Finish all this medication unless otherwise directed by prescriber.  May discolor urine or feces.  Take with food or milk.    metoprolol succinate 25 mg oral tablet, extended release

## 2018-10-29 NOTE — DISCHARGE NOTE ADULT - CARE PROVIDER_API CALL
Ike Sanders), Urology  [address]  [place] [address]  Phone: [phone]  Fax: [phone]    Guevara Brewer), Critical Care Medicine; Internal Medicine; Pulmonary Disease  [address] [place] [address]  Phone: [phone]  Fax: [phone]    Kevin Torres; MPH), Cardiology; Internal Medicine  [address] [place] [address]  Phone: [phone]  Fax: [phone]

## 2018-10-29 NOTE — DISCHARGE NOTE ADULT - ADDITIONAL INSTRUCTIONS
It is important to see your primary physician as well as the physicians noted below within the next week to perform a comprehensive medical review.  Call their offices for an appointment as soon as you leave the hospital.  If you do not have a primary physician, contact the [facility] [place] [phone] located on [address] [place] [address].  Your medical issues appear to be stable at this time, but if your symptoms recur or worsen, contact your physicians and/or return to the hospital if necessary.  If you encounter any issues or questions with your medication, call your physicians before stopping the medication.  Do not drive.  Limit your diet to 2 grams of sodium daily.

## 2018-10-29 NOTE — PHYSICAL THERAPY INITIAL EVALUATION ADULT - CRITERIA FOR SKILLED THERAPEUTIC INTERVENTIONS
functional limitations in following categories/impairments found/rehab potential/therapy frequency/predicted duration of therapy intervention/anticipated equipment needs at discharge/anticipated discharge recommendation

## 2018-10-29 NOTE — DISCHARGE NOTE ADULT - MEDICATION SUMMARY - MEDICATIONS TO TAKE
I will START or STAY ON the medications listed below when I get home from the hospital:    metFORMIN 500 mg oral tablet  -- 1 tab(s) by mouth 2 times a day  -- Indication: For Diabetes    Lipitor  -- 40 milligram(s) by mouth once a day (at bedtime)  -- Indication: For CAD (coronary artery disease)    metoprolol succinate 25 mg oral tablet, extended release  -- 1 tab(s) by mouth once a day  -- Indication: For CAD (coronary artery disease)    Prolia 60 mg/mL subcutaneous solution  -- Indication: For Home med    albuterol 90 mcg/inh inhalation aerosol  -- 2 puff(s) inhaled 4 times a day, As Needed  -- Indication: For COPD (chronic obstructive pulmonary disease)    Advair Diskus 250 mcg-50 mcg inhalation powder  -- 1 puff(s) inhaled 2 times a day  -- Indication: For COPD (chronic obstructive pulmonary disease)    Spiriva 18 mcg inhalation capsule  -- 1 cap(s) inhaled once a day  -- Indication: For COPD (chronic obstructive pulmonary disease)    cephalexin 500 mg oral capsule  -- 1 cap(s) by mouth every 12 hours  -- Indication: For Bladder tumor    saccharomyces boulardii lyo 250 mg oral capsule  -- 1 cap(s) by mouth 2 times a day  -- Indication: For GI protection    Protonix 40 mg oral delayed release tablet  -- 1 tab(s) by mouth once a day  -- Indication: For GERD

## 2018-10-29 NOTE — PHYSICAL THERAPY INITIAL EVALUATION ADULT - GAIT PATTERN USED, PT EVAL
decreased gait velocity and activity tolerance, intermittent standing rest breaks due to fatigue, verbal cues for energy conservation techniques, O2 sat on 6L of O2 p ambulation 96%

## 2018-10-29 NOTE — DISCHARGE NOTE ADULT - PLAN OF CARE
S/p cystoscopy Follow up with urology in 1 week for pathology results. Follow up with urology in 1 week for pathology results.  Complete antibiotic course. Continue current medications as prescribed.  Follow up with pulmonary in 2 weeks. Continue current medications as prescribed. ASA held for hematuria.  Resume when ok with urology and cardiology.  Follow up with cardiology in 1 week. Continue current medications as prescribed.  Follow up with primary doctor.

## 2018-10-29 NOTE — DISCHARGE NOTE ADULT - SECONDARY DIAGNOSIS.
Hematuria Chronic obstructive pulmonary disease, unspecified COPD type CAD (coronary artery disease) Diabetes Essential hypertension High cholesterol

## 2018-10-29 NOTE — PROGRESS NOTE ADULT - ASSESSMENT
> Gross Hematuria, Bladder Mass - S/p cystoscopy.  Bleeding resolved.  H/H stable.  Awaiting biopsy rsults.  OOB with PT.    > Leukocytosis - No s/s of active infection.  Monitor WBC.   > CAD - continue BBLocker, Statin.  Hold ASA. Seen by cardiology.    > COPD - with hypoxia, now currently on 4Liters.  Continuous pulse ox.  Pulmonary consulted.  On Tiotropium.  Nebulizers prn.  Advair. Home O2 being arranged.  > Diabetes Type II on longterm Insulin Therapy - monitor fingersticks.  Insulin coverage for hyperglycemia. Will restart Metformin on discharge.  FS appear well controlled.   > DVT Prophylaxis - Lower extremity intermittent compression devices.

## 2018-10-29 NOTE — DISCHARGE NOTE ADULT - PATIENT PORTAL LINK FT
You can access the [email or web address], offered by [facility], by registering with the following website: [email or web address]

## 2018-10-29 NOTE — DISCHARGE NOTE ADULT - CARE PLAN
Principal Discharge DX:	Bladder tumor  Goal:	S/p cystoscopy  Assessment and plan of treatment:	Follow up with urology in 1 week for pathology results.  Secondary Diagnosis:	Hematuria  Assessment and plan of treatment:	Follow up with urology in 1 week for pathology results.  Complete antibiotic course.  Secondary Diagnosis:	Chronic obstructive pulmonary disease, unspecified COPD type  Assessment and plan of treatment:	Continue current medications as prescribed.  Follow up with pulmonary in 2 weeks.  Secondary Diagnosis:	CAD (coronary artery disease)  Assessment and plan of treatment:	Continue current medications as prescribed. ASA held for hematuria.  Resume when ok with urology and cardiology.  Follow up with cardiology in 1 week.  Secondary Diagnosis:	Diabetes  Assessment and plan of treatment:	Continue current medications as prescribed.  Follow up with primary doctor.  Secondary Diagnosis:	Essential hypertension  Assessment and plan of treatment:	Continue current medications as prescribed.  Follow up with primary doctor.  Secondary Diagnosis:	High cholesterol  Assessment and plan of treatment:	Continue current medications as prescribed.  Follow up with primary doctor.

## 2018-10-29 NOTE — DISCHARGE NOTE ADULT - HOSPITAL COURSE
83 y/o F with PMH of CAD s/p stent, HTN, HLD, DM2, COPD had been awaiting home O2 to be arranged, Anemia, Bladder prolapse, Bladder mass awaiting  workup, presents with c/o gross hematuria with clots and suprapubic pain.  Patient was seen by urology, started on CBI in ED with significant improvement in symptoms.  Patient also seen by pulmonary and cardiology for clearance.  Patient underwent Cystoscopy showing small flattened bladder mass, awaiting biopsy results.  Hematuria has resolved.  Tamayo discontinued 10/27 and currently voiding without difficulty. Patient evaluated by PT. 83 y/o F with PMH of CAD s/p stent, HTN, HLD, DM2, COPD had been awaiting home O2 to be arranged, Anemia, Bladder prolapse, Bladder mass awaiting  workup, presents with c/o gross hematuria with clots and suprapubic pain.  Patient was seen by urology, started on CBI in ED with significant improvement in symptoms.  Patient also seen by pulmonary and cardiology for clearance.  Patient underwent Cystoscopy showing small flattened bladder mass, awaiting biopsy results.  Hematuria has resolved.  Tamayo discontinued 10/27 and currently voiding without difficulty. Patient evaluated by PT.         Disposition: stable for discharge.  Outpatient followup as above.  Patient was advised to return if they experience any recurrence or worsening of symptoms.    Total time spent on discharge was 55 minutes.  -Wilfredo Velázquez D.O.  Hospitalist, [facility]

## 2018-10-29 NOTE — PROGRESS NOTE ADULT - ATTENDING COMMENTS
I had a discussion with the patient and her family regarding surgery  Currently on the OR schedule for 0730 Saturday AM.  Please make sure she has clearance, EKG etc.    I discussed the surgery at length with them. I explained the risks, benefits and alternatives with them. They understand and agree to proceed.
I have personally seen and examined patient.  Above note reviewed and discussed with NP, modified where appropriate.  Denies complaints.  Hematuria improving.    PHYSICAL EXAMINATION:  GENERAL: NAD, Alert CARDIOVASCULAR: RRR S1, S2.  LUNGS: CTAB, - rales, - wheezing, - rhonchi.  BACK: - CVA tenderness.  ABDOMEN: Soft, - tenderness, - distension, + BS.  EXTREMITIES: - cyanosis, - clubbing, - edema.  : Tamayo in place with light pink urine, no clots.  A/P  Gross Hematuria, bladder mass, CAD, COPD on O2, DM  H/H appears to be stable, despite bleeding.  Cystoscopy per .  Pt with baseline hypoxia requiring O2, risk of need for intubation and mechanical ventilation d/w patient.  Cardiology input noted.    Pt otherwise medically optimized for the proposed  procedure.
I have personally seen and examined patient.  Above note reviewed and discussed with NP, modified where appropriate.  No complaints.  Denies complaints.  She was seen with daughter at bedside.  PHYSICAL EXAMINATION:  GENERAL: NAD, Alert CARDIOVASCULAR: RRR S1, S2.  LUNGS: CTAB, - rales, - wheezing, - rhonchi.  BACK: - CVA tenderness.  ABDOMEN: Soft, - tenderness, - distension, + BS.  EXTREMITIES: - cyanosis, - clubbing, - edema.  A/P  See above  Hematuria resolved.  Emphasized biopsy results.  S/p Acute blood loss - h/h now stable.    PT input noted.  D/c home with homecare.  Home O2 per Pulmonary recommendations.   Daughter and pt in agreement.
I have personally seen and examined patient.  Above note reviewed and discussed with NP, modified where appropriate.  Pt seen with Pulmonary, daughter and "granddaughter" at bedside.  Offers no complaints.  Had dysuria with clots earlier.  Noted hypoxic requiring VM at 40% FIO2.    PHYSICAL EXAMINATION:  GENERAL: NAD, Alert CARDIOVASCULAR: RRR S1, S2.  LUNGS: CTAB, - rales, - wheezing, - rhonchi.  BACK: - CVA tenderness.  ABDOMEN: Soft, - tenderness, - distension, + BS.  EXTREMITIES: - cyanosis, - clubbing, - edema.  A/P  Gross Hematuria, Bladder Mass, COPD with chronic hypoxia.  Continue CBI.  drop in Hgb noted, still stable, no indication for transfusion.  Pulmonary input appreciated - pt had been awaiting home O2.  Attempt to wean to NC.  D/w patient and daughters, in agreement.  see above.

## 2018-10-29 NOTE — PROGRESS NOTE ADULT - REASON FOR ADMISSION
Hematuria

## 2018-10-29 NOTE — DISCHARGE NOTE ADULT - CARE PROVIDERS DIRECT ADDRESSES
[email or web address]

## 2018-10-30 PROBLEM — N81.10 CYSTOCELE, UNSPECIFIED: Chronic | Status: ACTIVE | Noted: 2018-10-24

## 2018-10-30 PROBLEM — D49.4 NEOPLASM OF UNSPECIFIED BEHAVIOR OF BLADDER: Chronic | Status: ACTIVE | Noted: 2018-10-24

## 2018-11-01 LAB — SURGICAL PATHOLOGY FINAL REPORT - CH: SIGNIFICANT CHANGE UP

## 2018-11-02 ENCOUNTER — RX RENEWAL (OUTPATIENT)
Age: 83
End: 2018-11-02

## 2018-11-02 ENCOUNTER — LABORATORY RESULT (OUTPATIENT)
Age: 83
End: 2018-11-02

## 2018-11-02 ENCOUNTER — APPOINTMENT (OUTPATIENT)
Dept: FAMILY MEDICINE | Facility: CLINIC | Age: 83
End: 2018-11-02
Payer: MEDICARE

## 2018-11-02 VITALS
DIASTOLIC BLOOD PRESSURE: 60 MMHG | SYSTOLIC BLOOD PRESSURE: 128 MMHG | HEIGHT: 60 IN | OXYGEN SATURATION: 91 % | HEART RATE: 84 BPM | BODY MASS INDEX: 36.12 KG/M2 | WEIGHT: 184 LBS

## 2018-11-02 DIAGNOSIS — K21.9 GASTRO-ESOPHAGEAL REFLUX DISEASE W/OUT ESOPHAGITIS: ICD-10-CM

## 2018-11-02 DIAGNOSIS — M81.0 AGE-RELATED OSTEOPOROSIS W/OUT CURRENT PATHOLOGICAL FRACTURE: ICD-10-CM

## 2018-11-02 PROCEDURE — 36415 COLL VENOUS BLD VENIPUNCTURE: CPT

## 2018-11-02 PROCEDURE — 99496 TRANSJ CARE MGMT HIGH F2F 7D: CPT | Mod: 25

## 2018-11-05 ENCOUNTER — APPOINTMENT (OUTPATIENT)
Dept: FAMILY MEDICINE | Facility: CLINIC | Age: 83
End: 2018-11-05

## 2018-11-05 ENCOUNTER — APPOINTMENT (OUTPATIENT)
Dept: UROLOGY | Facility: CLINIC | Age: 83
End: 2018-11-05
Payer: MEDICARE

## 2018-11-05 VITALS
HEIGHT: 60 IN | DIASTOLIC BLOOD PRESSURE: 64 MMHG | SYSTOLIC BLOOD PRESSURE: 132 MMHG | WEIGHT: 184 LBS | BODY MASS INDEX: 36.12 KG/M2 | HEART RATE: 75 BPM | TEMPERATURE: 98.2 F

## 2018-11-05 DIAGNOSIS — D72.829 ELEVATED WHITE BLOOD CELL COUNT, UNSPECIFIED: ICD-10-CM

## 2018-11-05 LAB
ALBUMIN SERPL ELPH-MCNC: 3.7 G/DL
ALP BLD-CCNC: 102 U/L
ALT SERPL-CCNC: 12 U/L
ANION GAP SERPL CALC-SCNC: 16 MMOL/L
AST SERPL-CCNC: 17 U/L
BASOPHILS # BLD AUTO: 0.03 K/UL
BASOPHILS NFR BLD AUTO: 0.2 %
BILIRUB SERPL-MCNC: 0.4 MG/DL
BUN SERPL-MCNC: 14 MG/DL
CALCIUM SERPL-MCNC: 9.2 MG/DL
CHLORIDE SERPL-SCNC: 104 MMOL/L
CO2 SERPL-SCNC: 22 MMOL/L
CREAT SERPL-MCNC: 0.78 MG/DL
EOSINOPHIL # BLD AUTO: 0.36 K/UL
EOSINOPHIL NFR BLD AUTO: 2.6 %
FERRITIN SERPL-MCNC: 26 NG/ML
FOLATE SERPL-MCNC: 7.5 NG/ML
GLUCOSE SERPL-MCNC: 80 MG/DL
HCT VFR BLD CALC: 38.1 %
HGB BLD-MCNC: 11.8 G/DL
IMM GRANULOCYTES NFR BLD AUTO: 0.3 %
IRON SATN MFR SERPL: 9 %
IRON SERPL-MCNC: 28 UG/DL
LYMPHOCYTES # BLD AUTO: 3.25 K/UL
LYMPHOCYTES NFR BLD AUTO: 23.8 %
MAN DIFF?: NORMAL
MCHC RBC-ENTMCNC: 30.1 PG
MCHC RBC-ENTMCNC: 31 GM/DL
MCV RBC AUTO: 97.2 FL
MONOCYTES # BLD AUTO: 0.76 K/UL
MONOCYTES NFR BLD AUTO: 5.6 %
NEUTROPHILS # BLD AUTO: 9.23 K/UL
NEUTROPHILS NFR BLD AUTO: 67.5 %
PLATELET # BLD AUTO: 407 K/UL
POTASSIUM SERPL-SCNC: 5.3 MMOL/L
PROT SERPL-MCNC: 7 G/DL
RBC # BLD: 3.92 M/UL
RBC # FLD: 16.1 %
SODIUM SERPL-SCNC: 142 MMOL/L
TIBC SERPL-MCNC: 329 UG/DL
UIBC SERPL-MCNC: 301 UG/DL
VIT B12 SERPL-MCNC: 379 PG/ML
WBC # FLD AUTO: 13.67 K/UL

## 2018-11-05 PROCEDURE — 99214 OFFICE O/P EST MOD 30 MIN: CPT

## 2018-11-05 NOTE — HISTORY OF PRESENT ILLNESS
[FreeTextEntry1] : feels good. Had a TURBT and noted a little bit of hematuria. has resolved. no fevers, chills or dysuria.

## 2018-11-05 NOTE — PHYSICAL EXAM
[General Appearance - Well Developed] : well developed [General Appearance - Well Nourished] : well nourished [Normal Appearance] : normal appearance [Well Groomed] : well groomed [General Appearance - In No Acute Distress] : no acute distress [Edema] : no peripheral edema [] : no respiratory distress [Respiration, Rhythm And Depth] : normal respiratory rhythm and effort [Exaggerated Use Of Accessory Muscles For Inspiration] : no accessory muscle use [Oriented To Time, Place, And Person] : oriented to person, place, and time [Affect] : the affect was normal [Mood] : the mood was normal [Not Anxious] : not anxious [No Focal Deficits] : no focal deficits

## 2018-11-05 NOTE — ASSESSMENT
[FreeTextEntry1] : Impression":\par \par bladder cancer\par prolapse\par \par Plan:\par \par BCG weekly for 6 weeks.\par Can have pessary to facilitate emptying--See Dr. Tomlinson

## 2018-11-05 NOTE — LETTER BODY
[Dear  ___] : Dear  [unfilled], [Courtesy Letter:] : I had the pleasure of seeing your patient, [unfilled], in my office today. [Please see my note below.] : Please see my note below. [Sincerely,] : Sincerely, [DrAna  ___] : Dr. CHAMBERS [FreeTextEntry1] : E [FreeTextEntry3] : Ed\par \par Ike Sanders MD\par [facility]\par  of Urology\par [employer] [facility]\par

## 2018-11-07 ENCOUNTER — NON-APPOINTMENT (OUTPATIENT)
Age: 83
End: 2018-11-07

## 2018-11-07 ENCOUNTER — APPOINTMENT (OUTPATIENT)
Dept: PULMONOLOGY | Facility: CLINIC | Age: 83
End: 2018-11-07
Payer: MEDICARE

## 2018-11-07 ENCOUNTER — APPOINTMENT (OUTPATIENT)
Dept: CARDIOLOGY | Facility: CLINIC | Age: 83
End: 2018-11-07
Payer: MEDICARE

## 2018-11-07 VITALS
HEIGHT: 62 IN | HEART RATE: 90 BPM | SYSTOLIC BLOOD PRESSURE: 119 MMHG | OXYGEN SATURATION: 87 % | DIASTOLIC BLOOD PRESSURE: 68 MMHG | WEIGHT: 178 LBS | BODY MASS INDEX: 32.76 KG/M2

## 2018-11-07 VITALS
OXYGEN SATURATION: 90 % | DIASTOLIC BLOOD PRESSURE: 64 MMHG | RESPIRATION RATE: 18 BRPM | SYSTOLIC BLOOD PRESSURE: 100 MMHG | HEART RATE: 88 BPM | TEMPERATURE: 98.5 F | BODY MASS INDEX: 32.76 KG/M2 | HEIGHT: 62 IN | WEIGHT: 178 LBS

## 2018-11-07 DIAGNOSIS — R31.9 HEMATURIA, UNSPECIFIED: ICD-10-CM

## 2018-11-07 PROCEDURE — 93000 ELECTROCARDIOGRAM COMPLETE: CPT

## 2018-11-07 PROCEDURE — 94618 PULMONARY STRESS TESTING: CPT

## 2018-11-07 PROCEDURE — 99215 OFFICE O/P EST HI 40 MIN: CPT | Mod: 25

## 2018-11-07 PROCEDURE — 99214 OFFICE O/P EST MOD 30 MIN: CPT | Mod: 25

## 2018-11-07 RX ORDER — ATORVASTATIN CALCIUM 40 MG/1
40 TABLET, FILM COATED ORAL
Refills: 0 | Status: DISCONTINUED | COMMUNITY
End: 2018-11-07

## 2018-11-09 ENCOUNTER — APPOINTMENT (OUTPATIENT)
Dept: UROLOGY | Facility: HOSPITAL | Age: 83
End: 2018-11-09

## 2018-11-09 ENCOUNTER — OTHER (OUTPATIENT)
Age: 83
End: 2018-11-09

## 2018-11-11 PROCEDURE — 94640 AIRWAY INHALATION TREATMENT: CPT

## 2018-11-11 PROCEDURE — 36415 COLL VENOUS BLD VENIPUNCTURE: CPT

## 2018-11-11 PROCEDURE — 83036 HEMOGLOBIN GLYCOSYLATED A1C: CPT

## 2018-11-11 PROCEDURE — 99285 EMERGENCY DEPT VISIT HI MDM: CPT | Mod: 25

## 2018-11-11 PROCEDURE — 85730 THROMBOPLASTIN TIME PARTIAL: CPT

## 2018-11-11 PROCEDURE — 85027 COMPLETE CBC AUTOMATED: CPT

## 2018-11-11 PROCEDURE — 93005 ELECTROCARDIOGRAM TRACING: CPT

## 2018-11-11 PROCEDURE — 86850 RBC ANTIBODY SCREEN: CPT

## 2018-11-11 PROCEDURE — 83690 ASSAY OF LIPASE: CPT

## 2018-11-11 PROCEDURE — 80048 BASIC METABOLIC PNL TOTAL CA: CPT

## 2018-11-11 PROCEDURE — 96375 TX/PRO/DX INJ NEW DRUG ADDON: CPT

## 2018-11-11 PROCEDURE — 84484 ASSAY OF TROPONIN QUANT: CPT

## 2018-11-11 PROCEDURE — 97163 PT EVAL HIGH COMPLEX 45 MIN: CPT

## 2018-11-11 PROCEDURE — 86901 BLOOD TYPING SEROLOGIC RH(D): CPT

## 2018-11-11 PROCEDURE — 82803 BLOOD GASES ANY COMBINATION: CPT

## 2018-11-11 PROCEDURE — 88307 TISSUE EXAM BY PATHOLOGIST: CPT

## 2018-11-11 PROCEDURE — 85610 PROTHROMBIN TIME: CPT

## 2018-11-11 PROCEDURE — 96374 THER/PROPH/DIAG INJ IV PUSH: CPT

## 2018-11-11 PROCEDURE — 88305 TISSUE EXAM BY PATHOLOGIST: CPT

## 2018-11-11 PROCEDURE — 80053 COMPREHEN METABOLIC PANEL: CPT

## 2018-11-11 PROCEDURE — 86900 BLOOD TYPING SEROLOGIC ABO: CPT

## 2018-11-11 PROCEDURE — 71045 X-RAY EXAM CHEST 1 VIEW: CPT

## 2018-11-11 PROCEDURE — 82962 GLUCOSE BLOOD TEST: CPT

## 2018-11-11 PROCEDURE — 81001 URINALYSIS AUTO W/SCOPE: CPT

## 2018-11-13 ENCOUNTER — APPOINTMENT (OUTPATIENT)
Dept: UROLOGY | Facility: CLINIC | Age: 83
End: 2018-11-13
Payer: MEDICARE

## 2018-11-13 VITALS
SYSTOLIC BLOOD PRESSURE: 129 MMHG | DIASTOLIC BLOOD PRESSURE: 73 MMHG | HEIGHT: 62 IN | BODY MASS INDEX: 32.76 KG/M2 | SYSTOLIC BLOOD PRESSURE: 129 MMHG | HEART RATE: 98 BPM | RESPIRATION RATE: 18 BRPM | WEIGHT: 178 LBS | DIASTOLIC BLOOD PRESSURE: 73 MMHG | HEART RATE: 98 BPM

## 2018-11-13 LAB
BILIRUB UR QL STRIP: NORMAL
CLARITY UR: CLEAR
COLLECTION METHOD: NORMAL
GLUCOSE UR-MCNC: NORMAL
HCG UR QL: 0.2 EU/DL
HGB UR QL STRIP.AUTO: NORMAL
KETONES UR-MCNC: NORMAL
LEUKOCYTE ESTERASE UR QL STRIP: NORMAL
NITRITE UR QL STRIP: NORMAL
PH UR STRIP: 5.5
PROT UR STRIP-MCNC: NORMAL
SP GR UR STRIP: 1.02

## 2018-11-13 PROCEDURE — 51720 TREATMENT OF BLADDER LESION: CPT

## 2018-11-13 PROCEDURE — 81003 URINALYSIS AUTO W/O SCOPE: CPT | Mod: QW

## 2018-11-13 RX ORDER — BACILLUS CALMETTE-GUERIN 50 MG/50ML
50 POWDER, FOR SUSPENSION INTRAVESICAL
Qty: 1 | Refills: 0 | Status: COMPLETED | COMMUNITY
Start: 2018-11-13 | End: 2018-11-13

## 2018-11-13 RX ORDER — BACILLUS CALMETTE-GUERIN 50 MG/50ML
50 POWDER, FOR SUSPENSION INTRAVESICAL
Qty: 0 | Refills: 0 | Status: COMPLETED | OUTPATIENT
Start: 2018-11-13

## 2018-11-13 RX ADMIN — BACILLUS CALMETTE-GUERIN 0 MG: 50 POWDER, FOR SUSPENSION INTRAVESICAL at 00:00

## 2018-11-14 ENCOUNTER — OTHER (OUTPATIENT)
Age: 83
End: 2018-11-14

## 2018-11-19 ENCOUNTER — APPOINTMENT (OUTPATIENT)
Dept: UROLOGY | Facility: CLINIC | Age: 83
End: 2018-11-19

## 2018-11-20 ENCOUNTER — APPOINTMENT (OUTPATIENT)
Dept: UROLOGY | Facility: CLINIC | Age: 83
End: 2018-11-20
Payer: MEDICARE

## 2018-11-20 LAB
BILIRUB UR QL STRIP: NORMAL
CLARITY UR: CLEAR
COLLECTION METHOD: NORMAL
GLUCOSE UR-MCNC: NORMAL
HCG UR QL: 0.2 EU/DL
HGB UR QL STRIP.AUTO: NORMAL
KETONES UR-MCNC: NORMAL
LEUKOCYTE ESTERASE UR QL STRIP: NORMAL
NITRITE UR QL STRIP: NORMAL
PH UR STRIP: 5.5
PROT UR STRIP-MCNC: 100
SP GR UR STRIP: 1.02

## 2018-11-20 PROCEDURE — 51720 TREATMENT OF BLADDER LESION: CPT

## 2018-11-20 PROCEDURE — 81003 URINALYSIS AUTO W/O SCOPE: CPT | Mod: QW

## 2018-11-20 RX ORDER — BACILLUS CALMETTE-GUERIN 50 MG/50ML
50 POWDER, FOR SUSPENSION INTRAVESICAL
Qty: 0 | Refills: 0 | Status: COMPLETED | OUTPATIENT
Start: 2018-11-20

## 2018-11-20 RX ORDER — BACILLUS CALMETTE-GUERIN 50 MG/50ML
50 POWDER, FOR SUSPENSION INTRAVESICAL
Qty: 1 | Refills: 0 | Status: COMPLETED | COMMUNITY
Start: 2018-11-20 | End: 2018-11-20

## 2018-11-20 RX ADMIN — BACILLUS CALMETTE-GUERIN 0 MG: 50 POWDER, FOR SUSPENSION INTRAVESICAL at 00:00

## 2018-11-27 ENCOUNTER — APPOINTMENT (OUTPATIENT)
Dept: UROLOGY | Facility: CLINIC | Age: 83
End: 2018-11-27
Payer: MEDICARE

## 2018-11-27 VITALS
HEIGHT: 62 IN | BODY MASS INDEX: 32.76 KG/M2 | SYSTOLIC BLOOD PRESSURE: 135 MMHG | WEIGHT: 178 LBS | HEART RATE: 76 BPM | DIASTOLIC BLOOD PRESSURE: 70 MMHG

## 2018-11-27 LAB
BILIRUB UR QL STRIP: NORMAL
CLARITY UR: CLEAR
COLLECTION METHOD: NORMAL
GLUCOSE UR-MCNC: NORMAL
HCG UR QL: 0.2 EU/DL
HGB UR QL STRIP.AUTO: NORMAL
KETONES UR-MCNC: NORMAL
LEUKOCYTE ESTERASE UR QL STRIP: NORMAL
NITRITE UR QL STRIP: NORMAL
PH UR STRIP: 6.5
PROT UR STRIP-MCNC: NORMAL
SP GR UR STRIP: 1.01

## 2018-11-27 PROCEDURE — 51720 TREATMENT OF BLADDER LESION: CPT

## 2018-11-27 PROCEDURE — 81003 URINALYSIS AUTO W/O SCOPE: CPT | Mod: QW

## 2018-11-27 RX ORDER — BACILLUS CALMETTE-GUERIN 50 MG/50ML
50 POWDER, FOR SUSPENSION INTRAVESICAL
Qty: 0 | Refills: 0 | Status: COMPLETED | OUTPATIENT
Start: 2018-11-27

## 2018-11-27 RX ADMIN — BACILLUS CALMETTE-GUERIN 0 MG: 50 POWDER, FOR SUSPENSION INTRAVESICAL at 00:00

## 2018-11-28 RX ORDER — BACILLUS CALMETTE-GUERIN 50 MG/50ML
50 POWDER, FOR SUSPENSION INTRAVESICAL
Qty: 1 | Refills: 0 | Status: COMPLETED | COMMUNITY
Start: 2018-11-27 | End: 2018-11-27

## 2018-11-29 ENCOUNTER — NON-APPOINTMENT (OUTPATIENT)
Age: 83
End: 2018-11-29

## 2018-11-29 PROBLEM — R31.9 HEMATURIA: Status: ACTIVE | Noted: 2018-10-04

## 2018-11-29 NOTE — HISTORY OF PRESENT ILLNESS
[FreeTextEntry1] : Pt is an 85 y/o F who presents today for f/u.   She has PMH CAD NSTEMI 12/2014 s/p ALLISON to RCA while hospitalized at [place] for ankle injury, normal LV function, DM, former smoker, COPD.  She tells me that she was recently diagnosed with bladder cancer and is scheduled for BCG treatments to begin next tues.  She notes that she has been experiencing hematuria on/off.  She otherwise feels well and has no cardiac complaints.\par She is accompanied by her daughter and granddaughter today\par \par TTE 09/2018 shows normal LV function min MR/TR\par \par PMH: CAD NSTEMI s/p ALLISON to RCA 12/2014, HLD, HTN, DM, COPD, obesity\par Smoking status: quit 2014\par Current exercise: none\par Family hx: both parents with MI - unknown age\par Previous cardiac testing: stress test 10 yrs ago "normal"\par Previous hospitalizations: ankle surgery 2014

## 2018-11-29 NOTE — PHYSICAL EXAM
[General Appearance - Well Developed] : well developed [Normal Appearance] : normal appearance [Well Groomed] : well groomed [General Appearance - Well Nourished] : well nourished [No Deformities] : no deformities [General Appearance - In No Acute Distress] : no acute distress [Normal Conjunctiva] : the conjunctiva exhibited no abnormalities [Eyelids - No Xanthelasma] : the eyelids demonstrated no xanthelasmas [Normal Oral Mucosa] : normal oral mucosa [No Oral Pallor] : no oral pallor [No Oral Cyanosis] : no oral cyanosis [Respiration, Rhythm And Depth] : normal respiratory rhythm and effort [Exaggerated Use Of Accessory Muscles For Inspiration] : no accessory muscle use [Auscultation Breath Sounds / Voice Sounds] : lungs were clear to auscultation bilaterally [Heart Rate And Rhythm] : heart rate and rhythm were normal [Heart Sounds] : normal S1 and S2 [Murmurs] : no murmurs present [Arterial Pulses Normal] : the arterial pulses were normal [Edema] : no peripheral edema present [Abdomen Soft] : soft [Abdomen Tenderness] : non-tender [Abdomen Mass (___ Cm)] : no abdominal mass palpated [FreeTextEntry1] : ambulates with walker [Nail Clubbing] : no clubbing of the fingernails [Cyanosis, Localized] : no localized cyanosis [Petechial Hemorrhages (___cm)] : no petechial hemorrhages [] : no ischemic changes [Oriented To Time, Place, And Person] : oriented to person, place, and time [Impaired Insight] : insight and judgment were intact [Affect] : the affect was normal [Mood] : the mood was normal [No Anxiety] : not feeling anxious

## 2018-11-29 NOTE — REVIEW OF SYSTEMS
[Feeling Fatigued] : feeling fatigued [see HPI] : see HPI [Shortness Of Breath] : no shortness of breath [Dyspnea on exertion] : not dyspnea during exertion [Chest  Pressure] : no chest pressure [Chest Pain] : no chest pain [Lower Ext Edema] : no extremity edema [Leg Claudication] : no intermittent leg claudication [Palpitations] : no palpitations [Negative] : Heme/Lymph

## 2018-11-29 NOTE — DISCUSSION/SUMMARY
[FreeTextEntry1] : Pt is an 85 y/o F with PMH CAD s/p NSTEMI 12/2014 ALLISON to RCA, normal LV function, HTN, HLD, DM, COPD, obesity who presents today for f/u.  She has been diagnosed with bladder cancer and is scheduled to start BCG treatments nest week\par TTE 9/2018 shows normal LV function with min MR/TR\par Nuclear stress test 10/2018 shows normal myocardial perfusion\par CAD s/p PCI: c/w ASA, lipitor 40mg and metoprolol for prior NSTEMI\par DM: follows with PCP. Advised lifestyle modifications\par The described plan was discussed with the pt.  All questions and concerns were addressed to the best of my knowledge. \par \par Of note prior to leaving her OV today, she used the restroom and had gross hematuria - urologist was called.  He is aware, told her to stay well hydrated and if she should see any clots or had difficulty urinating she should go to the ED.  CBC sent off

## 2018-12-04 ENCOUNTER — APPOINTMENT (OUTPATIENT)
Dept: UROLOGY | Facility: CLINIC | Age: 83
End: 2018-12-04
Payer: MEDICARE

## 2018-12-04 VITALS
HEIGHT: 62 IN | WEIGHT: 178 LBS | SYSTOLIC BLOOD PRESSURE: 146 MMHG | BODY MASS INDEX: 32.76 KG/M2 | DIASTOLIC BLOOD PRESSURE: 78 MMHG | HEART RATE: 103 BPM

## 2018-12-04 PROCEDURE — 81003 URINALYSIS AUTO W/O SCOPE: CPT | Mod: QW

## 2018-12-04 PROCEDURE — 51720 TREATMENT OF BLADDER LESION: CPT

## 2018-12-04 RX ORDER — BACILLUS CALMETTE-GUERIN 50 MG/50ML
50 POWDER, FOR SUSPENSION INTRAVESICAL
Qty: 1 | Refills: 0 | Status: COMPLETED | COMMUNITY
Start: 2018-12-04 | End: 2018-12-04

## 2018-12-04 RX ORDER — BACILLUS CALMETTE-GUERIN 50 MG/50ML
50 POWDER, FOR SUSPENSION INTRAVESICAL
Qty: 0 | Refills: 0 | Status: COMPLETED | OUTPATIENT
Start: 2018-12-04

## 2018-12-04 RX ADMIN — BACILLUS CALMETTE-GUERIN 0 MG: 50 POWDER, FOR SUSPENSION INTRAVESICAL at 00:00

## 2018-12-11 ENCOUNTER — APPOINTMENT (OUTPATIENT)
Dept: UROLOGY | Facility: CLINIC | Age: 83
End: 2018-12-11
Payer: MEDICARE

## 2018-12-11 VITALS
HEIGHT: 62 IN | DIASTOLIC BLOOD PRESSURE: 75 MMHG | RESPIRATION RATE: 16 BRPM | TEMPERATURE: 97.9 F | HEART RATE: 81 BPM | SYSTOLIC BLOOD PRESSURE: 148 MMHG | WEIGHT: 177 LBS | BODY MASS INDEX: 32.57 KG/M2

## 2018-12-11 LAB
BILIRUB UR QL STRIP: NORMAL
CLARITY UR: ABNORMAL
COLLECTION METHOD: NORMAL
GLUCOSE UR-MCNC: NORMAL
HCG UR QL: 0.2 EU/DL
HGB UR QL STRIP.AUTO: ABNORMAL
KETONES UR-MCNC: NORMAL
LEUKOCYTE ESTERASE UR QL STRIP: ABNORMAL
NITRITE UR QL STRIP: NORMAL
PH UR STRIP: 5.5
PROT UR STRIP-MCNC: NORMAL
SP GR UR STRIP: 1.01

## 2018-12-11 PROCEDURE — 81003 URINALYSIS AUTO W/O SCOPE: CPT | Mod: QW

## 2018-12-11 PROCEDURE — 51720 TREATMENT OF BLADDER LESION: CPT

## 2018-12-11 RX ORDER — BACILLUS CALMETTE-GUERIN 50 MG/50ML
50 POWDER, FOR SUSPENSION INTRAVESICAL
Qty: 0 | Refills: 0 | Status: COMPLETED | OUTPATIENT
Start: 2018-12-11

## 2018-12-11 RX ORDER — BACILLUS CALMETTE-GUERIN 50 MG/50ML
50 POWDER, FOR SUSPENSION INTRAVESICAL
Qty: 1 | Refills: 0 | Status: COMPLETED | COMMUNITY
Start: 2018-12-11 | End: 2018-12-11

## 2018-12-11 RX ADMIN — BACILLUS CALMETTE-GUERIN 0 MG: 50 POWDER, FOR SUSPENSION INTRAVESICAL at 00:00

## 2018-12-18 ENCOUNTER — APPOINTMENT (OUTPATIENT)
Dept: UROLOGY | Facility: CLINIC | Age: 83
End: 2018-12-18
Payer: MEDICARE

## 2018-12-18 DIAGNOSIS — Z87.440 PERSONAL HISTORY OF URINARY (TRACT) INFECTIONS: ICD-10-CM

## 2018-12-18 LAB
BILIRUB UR QL STRIP: NORMAL
CLARITY UR: NORMAL
COLLECTION METHOD: NORMAL
GLUCOSE UR-MCNC: NORMAL
HCG UR QL: 0.2 EU/DL
HGB UR QL STRIP.AUTO: ABNORMAL
KETONES UR-MCNC: NORMAL
LEUKOCYTE ESTERASE UR QL STRIP: ABNORMAL
NITRITE UR QL STRIP: POSITIVE
PH UR STRIP: 5.5
PROT UR STRIP-MCNC: ABNORMAL
SP GR UR STRIP: 1.02

## 2018-12-18 PROCEDURE — 81003 URINALYSIS AUTO W/O SCOPE: CPT | Mod: QW

## 2018-12-18 PROCEDURE — 51720 TREATMENT OF BLADDER LESION: CPT

## 2018-12-24 ENCOUNTER — OTHER (OUTPATIENT)
Age: 83
End: 2018-12-24

## 2018-12-24 LAB — BACTERIA UR CULT: ABNORMAL

## 2018-12-26 ENCOUNTER — APPOINTMENT (OUTPATIENT)
Dept: UROLOGY | Facility: CLINIC | Age: 83
End: 2018-12-26
Payer: MEDICARE

## 2018-12-26 ENCOUNTER — RESULT CHARGE (OUTPATIENT)
Age: 83
End: 2018-12-26

## 2018-12-26 VITALS — HEART RATE: 84 BPM | SYSTOLIC BLOOD PRESSURE: 134 MMHG | DIASTOLIC BLOOD PRESSURE: 75 MMHG

## 2018-12-26 LAB
BILIRUB UR QL STRIP: NORMAL
CLARITY UR: NORMAL
COLLECTION METHOD: NORMAL
GLUCOSE UR-MCNC: NORMAL
HCG UR QL: 0.2 EU/DL
HGB UR QL STRIP.AUTO: ABNORMAL
KETONES UR-MCNC: NORMAL
LEUKOCYTE ESTERASE UR QL STRIP: ABNORMAL
NITRITE UR QL STRIP: NORMAL
PH UR STRIP: 6.5
PROT UR STRIP-MCNC: NORMAL
SP GR UR STRIP: 1.01

## 2018-12-26 PROCEDURE — 51720 TREATMENT OF BLADDER LESION: CPT

## 2018-12-26 RX ORDER — BACILLUS CALMETTE-GUERIN 50 MG/50ML
50 POWDER, FOR SUSPENSION INTRAVESICAL
Qty: 1 | Refills: 0 | Status: COMPLETED | COMMUNITY
Start: 2018-12-26 | End: 2018-12-26

## 2018-12-26 RX ORDER — BACILLUS CALMETTE-GUERIN 50 MG/50ML
50 POWDER, FOR SUSPENSION INTRAVESICAL
Qty: 0 | Refills: 0 | Status: COMPLETED | OUTPATIENT
Start: 2018-12-26

## 2018-12-26 RX ORDER — CEPHALEXIN 500 MG/1
500 CAPSULE ORAL
Qty: 15 | Refills: 0 | Status: DISCONTINUED | COMMUNITY
Start: 2018-12-18 | End: 2018-12-26

## 2018-12-26 RX ADMIN — BACILLUS CALMETTE-GUERIN 0 MG: 50 POWDER, FOR SUSPENSION INTRAVESICAL at 00:00

## 2019-01-09 ENCOUNTER — APPOINTMENT (OUTPATIENT)
Dept: PULMONOLOGY | Facility: CLINIC | Age: 84
End: 2019-01-09
Payer: MEDICARE

## 2019-01-09 VITALS
HEIGHT: 62 IN | DIASTOLIC BLOOD PRESSURE: 60 MMHG | HEART RATE: 111 BPM | WEIGHT: 177 LBS | SYSTOLIC BLOOD PRESSURE: 120 MMHG | RESPIRATION RATE: 20 BRPM | BODY MASS INDEX: 32.57 KG/M2 | OXYGEN SATURATION: 89 %

## 2019-01-09 VITALS — OXYGEN SATURATION: 96 %

## 2019-01-09 PROCEDURE — 99215 OFFICE O/P EST HI 40 MIN: CPT

## 2019-01-09 NOTE — ASSESSMENT
[FreeTextEntry1] : 84-year-old lady with significant COPD who nevertheless is doing exceptionally well\par \par I am recommending continued Advair and Spiriva and p.r.n. use of albuterol\par I also recommend nocturnal use of oxygen at 2 L, use of oxygen with significant ambulation, and I have encouraged her to monitor her own pulse oximetry periodically\par \par She has completed her treatment for bladder cancer\par \par I am asked to see her again in 3 months time

## 2019-01-09 NOTE — HISTORY OF PRESENT ILLNESS
[FreeTextEntry1] : The patient is a very pleasant 84-year-old lady with significant COPD that is oxygen requiring\par \par She is currently on a regimen of Advair Spiriva and albuterol. I suspect she doesn't take the albuterol often\par \par The patient has oxygen at home, she has oxygen for exercise, and she is supposed to use it at night at 2 L. She was recently furnished with humidification which has helped\par \par Her respiratory status is quite good at this time\par \par She has completed her treatment for transitional cell cancer of the bladder with BCG and she will followup with Dr. Sanders soon\par \par \par \par

## 2019-01-29 ENCOUNTER — APPOINTMENT (OUTPATIENT)
Dept: UROLOGY | Facility: CLINIC | Age: 84
End: 2019-01-29
Payer: MEDICARE

## 2019-01-29 VITALS
HEART RATE: 89 BPM | RESPIRATION RATE: 16 BRPM | BODY MASS INDEX: 32.57 KG/M2 | SYSTOLIC BLOOD PRESSURE: 140 MMHG | WEIGHT: 177 LBS | DIASTOLIC BLOOD PRESSURE: 83 MMHG | HEIGHT: 62 IN

## 2019-01-29 DIAGNOSIS — N39.0 URINARY TRACT INFECTION, SITE NOT SPECIFIED: ICD-10-CM

## 2019-01-29 LAB
BILIRUB UR QL STRIP: NORMAL
CLARITY UR: NORMAL
COLLECTION METHOD: NORMAL
GLUCOSE UR-MCNC: NORMAL
HCG UR QL: 0.2 EU/DL
HGB UR QL STRIP.AUTO: ABNORMAL
KETONES UR-MCNC: NORMAL
LEUKOCYTE ESTERASE UR QL STRIP: ABNORMAL
NITRITE UR QL STRIP: POSITIVE
PH UR STRIP: 6
PROT UR STRIP-MCNC: ABNORMAL
SP GR UR STRIP: 1.02

## 2019-01-29 PROCEDURE — 81003 URINALYSIS AUTO W/O SCOPE: CPT | Mod: QW

## 2019-01-29 PROCEDURE — 99214 OFFICE O/P EST MOD 30 MIN: CPT | Mod: 25

## 2019-01-29 NOTE — HISTORY OF PRESENT ILLNESS
[FreeTextEntry1] : has noted perineal discomfort.  dysuria for about a week. no gross hematuria.  no fevers or chills. has undergone BCG and presents for cysto.

## 2019-01-29 NOTE — LETTER BODY
[Dear  ___] : Dear  [unfilled], [Courtesy Letter:] : I had the pleasure of seeing your patient, [unfilled], in my office today. [Please see my note below.] : Please see my note below. [Sincerely,] : Sincerely, [FreeTextEntry3] : Ed\par \par Ike Sanders MD\par [facility]\par  of Urology\par [employer] at [facility]\par

## 2019-01-29 NOTE — ASSESSMENT
[FreeTextEntry1] : UTI\par history of bladder cancer\par \par Plan:\par \par cephalexin\par follow up in one week for cysto.\par urine culture\par \par

## 2019-02-01 ENCOUNTER — MESSAGE (OUTPATIENT)
Age: 84
End: 2019-02-01

## 2019-02-01 LAB — BACTERIA UR CULT: ABNORMAL

## 2019-02-05 ENCOUNTER — APPOINTMENT (OUTPATIENT)
Dept: UROLOGY | Facility: CLINIC | Age: 84
End: 2019-02-05
Payer: MEDICARE

## 2019-02-05 VITALS — SYSTOLIC BLOOD PRESSURE: 135 MMHG | HEART RATE: 78 BPM | DIASTOLIC BLOOD PRESSURE: 75 MMHG | RESPIRATION RATE: 16 BRPM

## 2019-02-05 PROCEDURE — 52000 CYSTOURETHROSCOPY: CPT

## 2019-02-13 RX ORDER — CEPHALEXIN 500 MG/1
500 CAPSULE ORAL
Qty: 15 | Refills: 0 | Status: DISCONTINUED | COMMUNITY
Start: 2019-01-29 | End: 2019-02-13

## 2019-02-13 RX ORDER — SULFAMETHOXAZOLE AND TRIMETHOPRIM 800; 160 MG/1; MG/1
800-160 TABLET ORAL TWICE DAILY
Qty: 14 | Refills: 0 | Status: DISCONTINUED | COMMUNITY
Start: 2018-12-24 | End: 2019-02-13

## 2019-02-16 NOTE — PROGRESS NOTE ADULT - SUBJECTIVE AND OBJECTIVE BOX
CC: Hematuria     HPI:  84 y.o F with PMH of CAD s/p stent, HTN, HLD, DM2, COPD had been awaiting home O2, Anemia, Bladder prolapse, Bladder mass awaiting  workup, presents c/o gross hematuria with clots and suprapubic pain.  Seen by urology, started on CBI in ED with significant improvement in symptoms.  S/p Cystoscopy showing small flattened bladder mass.  Hematuria has resolved.  Ceron discontinued 10/27 and currently voiding without difficulty.     INTERVAL HPI/OVERNIGHT EVENTS: Patient seen and examined with family at bedside.  Patient voiding without difficulty after ceron removed.  No hematuria or clots.  SOB improved.  Patient denies any headache, dizziness, CP, abdominal pain, nausea, vomiting.  Other ROS reviewed and are negative.     Vital Signs Last 24 Hrs  T(C): 36.6 (29 Oct 2018 07:30), Max: 37.3 (28 Oct 2018 17:12)  T(F): 97.8 (29 Oct 2018 07:30), Max: 99.1 (28 Oct 2018 17:12)  HR: 74 (29 Oct 2018 09:07) (59 - 91)  BP: 113/61 (29 Oct 2018 07:30) (93/53 - 128/63)  BP(mean): --  RR: 18 (29 Oct 2018 07:30) (12 - 24)  SpO2: 93% (29 Oct 2018 09:07) (92% - 96%)  I&O's Detail    28 Oct 2018 07:01  -  29 Oct 2018 07:00  --------------------------------------------------------  IN:    sodium chloride 0.45% with potassium chloride 20 mEq/L: 240 mL  Total IN: 240 mL    OUT:    Indwelling Catheter - Urethral: 550 mL    Stool: 1 mL    Voided: 900 mL  Total OUT: 1451 mL    Total NET: -1211 mL      29 Oct 2018 07:01  -  29 Oct 2018 12:58  --------------------------------------------------------  IN:  Total IN: 0 mL    OUT:    Stool: 1 mL  Total OUT: 1 mL    Total NET: -1 mL        PHYSICAL EXAM:  GENERAL: NAD, well-groomed, well-developed  HEAD:  Atraumatic, Normocephalic  NECK: Supple, No JVD, Normal thyroid  NERVOUS SYSTEM:  Alert & Oriented X3, Good concentration; Motor Strength 5/5 B/L upper and lower extremities  CHEST/LUNG: Clear to auscultation bilaterally  HEART: Regular rate and rhythm; No murmurs, rubs, or gallops  ABDOMEN: Soft, Nontender, Nondistended; Bowel sounds present  EXTREMITIES:  2+ Peripheral Pulses, No clubbing, cyanosis, or edema                              10.4   14.6  )-----------( 298      ( 28 Oct 2018 07:31 )             33.2             CAPILLARY BLOOD GLUCOSE  POCT Blood Glucose.: 152 mg/dL (29 Oct 2018 10:55)  POCT Blood Glucose.: 122 mg/dL (29 Oct 2018 07:50)  POCT Blood Glucose.: 152 mg/dL (28 Oct 2018 21:26)  POCT Blood Glucose.: 107 mg/dL (28 Oct 2018 17:21)        Hemoglobin A1C, Whole Blood: 6.6 % (10-25-18 @ 06:51)    MEDICATIONS  (STANDING):  ALBUTerol    0.083% 2.5 milliGRAM(s) Nebulizer every 6 hours  ALBUTerol    90 MICROgram(s) HFA Inhaler 1 Puff(s) Inhalation every 6 hours  atorvastatin 40 milliGRAM(s) Oral at bedtime  buDESOnide 160 MICROgram(s)/formoterol 4.5 MICROgram(s) Inhaler 2 Puff(s) Inhalation two times a day  cephalexin 500 milliGRAM(s) Oral every 12 hours  dextrose 5%. 1000 milliLiter(s) (50 mL/Hr) IV Continuous <Continuous>  dextrose 50% Injectable 12.5 Gram(s) IV Push once  dextrose 50% Injectable 25 Gram(s) IV Push once  dextrose 50% Injectable 25 Gram(s) IV Push once  insulin lispro (HumaLOG) corrective regimen sliding scale   SubCutaneous three times a day before meals  insulin lispro (HumaLOG) corrective regimen sliding scale   SubCutaneous at bedtime  metoprolol tartrate 12.5 milliGRAM(s) Oral every 12 hours  pantoprazole    Tablet 40 milliGRAM(s) Oral before breakfast  tiotropium 18 MICROgram(s) Capsule 1 Capsule(s) Inhalation daily    MEDICATIONS  (PRN):  calcium carbonate    500 mG (Tums) Chewable 1 Tablet(s) Chew every 4 hours PRN Heartburn  dextrose 40% Gel 15 Gram(s) Oral once PRN Blood Glucose LESS THAN 70 milliGRAM(s)/deciliter  glucagon  Injectable 1 milliGRAM(s) IntraMuscular once PRN Glucose LESS THAN 70 milligrams/deciliter  morphine  - Injectable 4 milliGRAM(s) IV Push every 4 hours PRN Breakthrough pain  oxyCODONE    IR 5 milliGRAM(s) Oral every 4 hours PRN As needed mild to moderate pain  oxyCODONE    IR 10 milliGRAM(s) Oral every 4 hours PRN Severe Pain (7 - 10) Back Pain    Back pain is very common in adults. The cause of back pain is rarely dangerous and the pain often gets better over time. The cause of your back pain may not be known and may include strain of muscles or ligaments, degeneration of the spinal disks, or arthritis. Occasionally the pain may radiate down your leg(s). Over-the-counter medicines to reduce pain and inflammation are often the most helpful. Stretching and remaining active frequently helps the healing process.     SEEK IMMEDIATE MEDICAL CARE IF YOU HAVE ANY OF THE FOLLOWING SYMPTOMS: bowel or bladder control problems, unusual weakness or numbness in your arms or legs, nausea or vomiting, abdominal pain, fever, dizziness/lightheadedness.

## 2019-03-12 ENCOUNTER — APPOINTMENT (OUTPATIENT)
Dept: CARDIOLOGY | Facility: CLINIC | Age: 84
End: 2019-03-12
Payer: MEDICARE

## 2019-03-12 ENCOUNTER — NON-APPOINTMENT (OUTPATIENT)
Age: 84
End: 2019-03-12

## 2019-03-12 VITALS
SYSTOLIC BLOOD PRESSURE: 165 MMHG | BODY MASS INDEX: 32.57 KG/M2 | DIASTOLIC BLOOD PRESSURE: 96 MMHG | HEART RATE: 85 BPM | OXYGEN SATURATION: 87 % | HEIGHT: 62 IN | WEIGHT: 177 LBS

## 2019-03-12 PROCEDURE — 93000 ELECTROCARDIOGRAM COMPLETE: CPT

## 2019-03-12 PROCEDURE — 99213 OFFICE O/P EST LOW 20 MIN: CPT | Mod: 25

## 2019-03-12 NOTE — PHYSICAL EXAM
[General Appearance - Well Developed] : well developed [Normal Appearance] : normal appearance [Well Groomed] : well groomed [General Appearance - Well Nourished] : well nourished [No Deformities] : no deformities [General Appearance - In No Acute Distress] : no acute distress [Normal Conjunctiva] : the conjunctiva exhibited no abnormalities [Eyelids - No Xanthelasma] : the eyelids demonstrated no xanthelasmas [Normal Oral Mucosa] : normal oral mucosa [No Oral Pallor] : no oral pallor [No Oral Cyanosis] : no oral cyanosis [Respiration, Rhythm And Depth] : normal respiratory rhythm and effort [Exaggerated Use Of Accessory Muscles For Inspiration] : no accessory muscle use [Heart Rate And Rhythm] : heart rate and rhythm were normal [Heart Sounds] : normal S1 and S2 [Murmurs] : no murmurs present [Arterial Pulses Normal] : the arterial pulses were normal [Edema] : no peripheral edema present [Abdomen Soft] : soft [Abdomen Tenderness] : non-tender [Abdomen Mass (___ Cm)] : no abdominal mass palpated [Nail Clubbing] : no clubbing of the fingernails [Cyanosis, Localized] : no localized cyanosis [Petechial Hemorrhages (___cm)] : no petechial hemorrhages [] : no ischemic changes [Oriented To Time, Place, And Person] : oriented to person, place, and time [Impaired Insight] : insight and judgment were intact [Affect] : the affect was normal [Mood] : the mood was normal [No Anxiety] : not feeling anxious [FreeTextEntry1] : ambulates with walker

## 2019-03-12 NOTE — REASON FOR VISIT
[Follow-Up - Clinic] : a clinic follow-up of [Dyspnea] : dyspnea [Coronary Artery Disease] : coronary artery disease [Hyperlipidemia] : hyperlipidemia [Hypertension] : hypertension

## 2019-03-12 NOTE — HISTORY OF PRESENT ILLNESS
[FreeTextEntry1] : Pt is an 85 y/o F who presents today for f/u.  She has PMH CAD NSTEMI 12/2014 s/p ALLISON to RCA while hospitalized at [facility] for ankle injury, normal LV function, DM, former smoker, COPD.  Recently diagnosed with bladder cancer and is now s/p BCG treatments.  No recent hematuria.  She otherwise feels well and has no cardiac complaints.\par She is accompanied by her daughter today\par \par TTE 09/2018 shows normal LV function min MR/TR\par \par PMH: CAD NSTEMI s/p ALLISON to RCA 12/2014, HLD, HTN, DM, COPD, obesity\par Smoking status: quit 2014\par Current exercise: none\par Family hx: both parents with MI - unknown age\par Previous cardiac testing: stress test 10 yrs ago "normal"\par Previous hospitalizations: ankle surgery 2014

## 2019-03-12 NOTE — REVIEW OF SYSTEMS
[see HPI] : see HPI [Negative] : Heme/Lymph [Dyspnea on exertion] : dyspnea during exertion [Feeling Fatigued] : not feeling fatigued [Shortness Of Breath] : no shortness of breath [Chest  Pressure] : no chest pressure [Chest Pain] : no chest pain [Lower Ext Edema] : no extremity edema [Leg Claudication] : no intermittent leg claudication [Palpitations] : no palpitations

## 2019-03-12 NOTE — DISCUSSION/SUMMARY
[FreeTextEntry1] : Pt is an 85 y/o F with PMH CAD s/p NSTEMI 12/2014 ALLISON to RCA, normal LV function, HTN, HLD, DM, COPD, obesity who presents today for f/u.  She has been diagnosed with bladder cancer and is now BCG treatments.  Plan for cysto 05/2019\par TTE 9/2018 shows normal LV function with min MR/TR\par Nuclear stress test 10/2018 shows normal myocardial perfusion\par CAD s/p PCI: c/w ASA, lipitor 40mg and metoprolol for prior NSTEMI\par BP elevated today - repeat 146/78 - usually better - will monitor\par DM: follows with PCP. Advised lifestyle modifications\par f/u 6 mnths or sooner PRN\par The described plan was discussed with the pt.  All questions and concerns were addressed to the best of my knowledge. \par \par Of note prior to leaving her OV today, she used the restroom and had gross hematuria - urologist was called.  He is aware, told her to stay well hydrated and if she should see any clots or had difficulty urinating she should go to the ED.  CBC sent off

## 2019-04-10 ENCOUNTER — APPOINTMENT (OUTPATIENT)
Dept: PULMONOLOGY | Facility: CLINIC | Age: 84
End: 2019-04-10
Payer: MEDICARE

## 2019-04-10 VITALS
OXYGEN SATURATION: 90 % | HEIGHT: 62 IN | SYSTOLIC BLOOD PRESSURE: 122 MMHG | RESPIRATION RATE: 16 BRPM | HEART RATE: 85 BPM | BODY MASS INDEX: 32.57 KG/M2 | DIASTOLIC BLOOD PRESSURE: 80 MMHG | WEIGHT: 177 LBS

## 2019-04-10 PROCEDURE — 99214 OFFICE O/P EST MOD 30 MIN: CPT

## 2019-04-10 NOTE — PHYSICAL EXAM
[General Appearance - Well Developed] : well developed [Normal Appearance] : normal appearance [Well Groomed] : well groomed [General Appearance - Well Nourished] : well nourished [No Deformities] : no deformities [General Appearance - In No Acute Distress] : no acute distress [Normal Conjunctiva] : the conjunctiva exhibited no abnormalities [Eyelids - No Xanthelasma] : the eyelids demonstrated no xanthelasmas [Normal Oropharynx] : normal oropharynx [Neck Appearance] : the appearance of the neck was normal [Neck Cervical Mass (___cm)] : no neck mass was observed [Jugular Venous Distention Increased] : there was no jugular-venous distention [Thyroid Diffuse Enlargement] : the thyroid was not enlarged [Thyroid Nodule] : there were no palpable thyroid nodules [Heart Rate And Rhythm] : heart rate and rhythm were normal [Murmurs] : no murmurs present [Heart Sounds] : normal S1 and S2 [Respiration, Rhythm And Depth] : normal respiratory rhythm and effort [Exaggerated Use Of Accessory Muscles For Inspiration] : no accessory muscle use [Auscultation Breath Sounds / Voice Sounds] : lungs were clear to auscultation bilaterally [Abdomen Soft] : soft [Abdomen Tenderness] : non-tender [Abdomen Mass (___ Cm)] : no abdominal mass palpated [Abnormal Walk] : normal gait [Nail Clubbing] : no clubbing of the fingernails [Gait - Sufficient For Exercise Testing] : the gait was sufficient for exercise testing [Cyanosis, Localized] : no localized cyanosis [Petechial Hemorrhages (___cm)] : no petechial hemorrhages [Skin Color & Pigmentation] : normal skin color and pigmentation [] : no rash [No Venous Stasis] : no venous stasis [Skin Lesions] : no skin lesions [No Skin Ulcers] : no skin ulcer [No Xanthoma] : no  xanthoma was observed [Deep Tendon Reflexes (DTR)] : deep tendon reflexes were 2+ and symmetric [Sensation] : the sensory exam was normal to light touch and pinprick [No Focal Deficits] : no focal deficits [Oriented To Time, Place, And Person] : oriented to person, place, and time [Impaired Insight] : insight and judgment were intact [Affect] : the affect was normal

## 2019-04-19 NOTE — ASSESSMENT
[FreeTextEntry1] : Very pleasant 84-year-old lady with COPD\par Her COPD is stable and would recommend treatment with Advair Spiriva albuterol. She should continue utilization of nocturnal oxygen and supplemental during the day when she is noted to be hypoxemic by oximetry\par \par She has treated bladder cancer\par The patient also has hypertension and diabetes which are stable at this time\par \par I have asked patient to return here in 3 months for an interim evaluation\par \par Addendum: I will be ordering pulse oximetry at night on room air to document the need for continuous oxygen at night

## 2019-04-19 NOTE — HISTORY OF PRESENT ILLNESS
[FreeTextEntry1] : The patient is an 84-year-old lady with significant COPD\par \par She is on a regular regimen of Advair Spiriva albuterol p.r.n.\par She has oxygen at home and she uses it frequently at night but not every night\par She has no cough chest pain or sputum production\par The patient goes out of the house and walks about without any oxygen\par \par She has her on pulse oximetry which frequently goes to 90% or more on room air. It is not clear whether she is actually hypoxemic at any time

## 2019-05-07 ENCOUNTER — APPOINTMENT (OUTPATIENT)
Dept: UROLOGY | Facility: CLINIC | Age: 84
End: 2019-05-07
Payer: MEDICARE

## 2019-05-07 VITALS — RESPIRATION RATE: 14 BRPM | SYSTOLIC BLOOD PRESSURE: 149 MMHG | DIASTOLIC BLOOD PRESSURE: 79 MMHG | HEART RATE: 87 BPM

## 2019-05-07 LAB
BILIRUB UR QL STRIP: NORMAL
CLARITY UR: CLEAR
COLLECTION METHOD: NORMAL
GLUCOSE UR-MCNC: NORMAL
HCG UR QL: 1 EU/DL
HGB UR QL STRIP.AUTO: NORMAL
KETONES UR-MCNC: NORMAL
LEUKOCYTE ESTERASE UR QL STRIP: NORMAL
NITRITE UR QL STRIP: NORMAL
PH UR STRIP: 7
PROT UR STRIP-MCNC: NORMAL
SP GR UR STRIP: 1.02

## 2019-05-07 PROCEDURE — 52000 CYSTOURETHROSCOPY: CPT

## 2019-05-07 PROCEDURE — 81003 URINALYSIS AUTO W/O SCOPE: CPT | Mod: QW

## 2019-05-14 ENCOUNTER — APPOINTMENT (OUTPATIENT)
Dept: UROLOGY | Facility: CLINIC | Age: 84
End: 2019-05-14
Payer: MEDICARE

## 2019-05-14 VITALS — SYSTOLIC BLOOD PRESSURE: 146 MMHG | HEART RATE: 98 BPM | DIASTOLIC BLOOD PRESSURE: 78 MMHG

## 2019-05-14 LAB
BILIRUB UR QL STRIP: NORMAL
CLARITY UR: CLEAR
COLLECTION METHOD: NORMAL
GLUCOSE UR-MCNC: NORMAL
HCG UR QL: 0.2 EU/DL
HGB UR QL STRIP.AUTO: NORMAL
KETONES UR-MCNC: NORMAL
LEUKOCYTE ESTERASE UR QL STRIP: NORMAL
NITRITE UR QL STRIP: NORMAL
PH UR STRIP: 6
PROT UR STRIP-MCNC: NORMAL
SP GR UR STRIP: 1.02

## 2019-05-14 PROCEDURE — 51720 TREATMENT OF BLADDER LESION: CPT

## 2019-05-14 RX ORDER — MITOMYCIN 40 MG/80ML
40 INJECTION, POWDER, LYOPHILIZED, FOR SOLUTION INTRAVENOUS
Qty: 1 | Refills: 0 | Status: COMPLETED | OUTPATIENT
Start: 2019-05-14

## 2019-05-14 RX ADMIN — Medication 0 MG: at 00:00

## 2019-05-20 ENCOUNTER — RX RENEWAL (OUTPATIENT)
Age: 84
End: 2019-05-20

## 2019-05-21 ENCOUNTER — APPOINTMENT (OUTPATIENT)
Dept: UROLOGY | Facility: CLINIC | Age: 84
End: 2019-05-21
Payer: MEDICARE

## 2019-05-21 VITALS
HEIGHT: 62 IN | SYSTOLIC BLOOD PRESSURE: 165 MMHG | HEART RATE: 83 BPM | WEIGHT: 177 LBS | DIASTOLIC BLOOD PRESSURE: 85 MMHG | BODY MASS INDEX: 32.57 KG/M2

## 2019-05-21 LAB
BILIRUB UR QL STRIP: NORMAL
CLARITY UR: CLEAR
COLLECTION METHOD: NORMAL
GLUCOSE UR-MCNC: NORMAL
HCG UR QL: 0.2 EU/DL
HGB UR QL STRIP.AUTO: NORMAL
KETONES UR-MCNC: NORMAL
LEUKOCYTE ESTERASE UR QL STRIP: NORMAL
NITRITE UR QL STRIP: NORMAL
PH UR STRIP: 5.5
PROT UR STRIP-MCNC: NORMAL
SP GR UR STRIP: >=1.03

## 2019-05-21 PROCEDURE — 81003 URINALYSIS AUTO W/O SCOPE: CPT | Mod: QW

## 2019-05-21 PROCEDURE — 51720 TREATMENT OF BLADDER LESION: CPT

## 2019-05-21 RX ORDER — MITOMYCIN 40 MG/80ML
40 INJECTION, POWDER, LYOPHILIZED, FOR SOLUTION INTRAVENOUS
Qty: 1 | Refills: 0 | Status: COMPLETED | COMMUNITY
Start: 2019-05-14 | End: 2019-05-14

## 2019-05-21 RX ORDER — MITOMYCIN 40 MG/80ML
40 INJECTION, POWDER, LYOPHILIZED, FOR SOLUTION INTRAVENOUS
Qty: 1 | Refills: 0 | Status: COMPLETED | OUTPATIENT
Start: 2019-05-21

## 2019-05-21 RX ADMIN — Medication 0 MG: at 00:00

## 2019-05-28 ENCOUNTER — APPOINTMENT (OUTPATIENT)
Dept: UROLOGY | Facility: CLINIC | Age: 84
End: 2019-05-28
Payer: MEDICARE

## 2019-05-28 VITALS — HEART RATE: 97 BPM | SYSTOLIC BLOOD PRESSURE: 142 MMHG | DIASTOLIC BLOOD PRESSURE: 75 MMHG

## 2019-05-28 LAB
BILIRUB UR QL STRIP: NORMAL
GLUCOSE UR-MCNC: NORMAL
HCG UR QL: 0.2 EU/DL
HGB UR QL STRIP.AUTO: NORMAL
KETONES UR-MCNC: NORMAL
LEUKOCYTE ESTERASE UR QL STRIP: NORMAL
NITRITE UR QL STRIP: NORMAL
PH UR STRIP: 5.5
PROT UR STRIP-MCNC: NORMAL
SP GR UR STRIP: 1.01

## 2019-05-28 PROCEDURE — 51720 TREATMENT OF BLADDER LESION: CPT

## 2019-05-28 PROCEDURE — 81003 URINALYSIS AUTO W/O SCOPE: CPT | Mod: QW

## 2019-05-28 RX ORDER — MITOMYCIN 40 MG/80ML
40 INJECTION, POWDER, LYOPHILIZED, FOR SOLUTION INTRAVENOUS
Qty: 1 | Refills: 0 | Status: COMPLETED | OUTPATIENT
Start: 2019-05-28

## 2019-05-28 RX ADMIN — Medication 0 MG: at 00:00

## 2019-07-01 ENCOUNTER — OUTPATIENT (OUTPATIENT)
Dept: OUTPATIENT SERVICES | Facility: HOSPITAL | Age: 84
LOS: 1 days | End: 2019-07-01
Payer: MEDICARE

## 2019-07-01 DIAGNOSIS — S82.899A OTHER FRACTURE OF UNSPECIFIED LOWER LEG, INITIAL ENCOUNTER FOR CLOSED FRACTURE: Chronic | ICD-10-CM

## 2019-07-01 DIAGNOSIS — Z98.89 OTHER SPECIFIED POSTPROCEDURAL STATES: Chronic | ICD-10-CM

## 2019-07-02 ENCOUNTER — INPATIENT (INPATIENT)
Facility: HOSPITAL | Age: 84
LOS: 3 days | Discharge: HOME | DRG: 291 | End: 2019-07-06
Attending: FAMILY MEDICINE | Admitting: FAMILY MEDICINE
Payer: MEDICARE

## 2019-07-02 ENCOUNTER — APPOINTMENT (OUTPATIENT)
Dept: FAMILY MEDICINE | Facility: CLINIC | Age: 84
End: 2019-07-02
Payer: MEDICARE

## 2019-07-02 VITALS — OXYGEN SATURATION: 94 %

## 2019-07-02 VITALS — SYSTOLIC BLOOD PRESSURE: 136 MMHG | DIASTOLIC BLOOD PRESSURE: 70 MMHG | TEMPERATURE: 97.7 F | HEIGHT: 62 IN

## 2019-07-02 VITALS
SYSTOLIC BLOOD PRESSURE: 164 MMHG | TEMPERATURE: 98 F | OXYGEN SATURATION: 95 % | DIASTOLIC BLOOD PRESSURE: 87 MMHG | RESPIRATION RATE: 20 BRPM | HEART RATE: 84 BPM

## 2019-07-02 VITALS — RESPIRATION RATE: 18 BRPM | OXYGEN SATURATION: 71 % | HEART RATE: 80 BPM

## 2019-07-02 DIAGNOSIS — E78.49 OTHER HYPERLIPIDEMIA: ICD-10-CM

## 2019-07-02 DIAGNOSIS — Z98.89 OTHER SPECIFIED POSTPROCEDURAL STATES: Chronic | ICD-10-CM

## 2019-07-02 DIAGNOSIS — S82.899A OTHER FRACTURE OF UNSPECIFIED LOWER LEG, INITIAL ENCOUNTER FOR CLOSED FRACTURE: Chronic | ICD-10-CM

## 2019-07-02 DIAGNOSIS — R60.0 LOCALIZED EDEMA: ICD-10-CM

## 2019-07-02 DIAGNOSIS — M79.89 OTHER SPECIFIED SOFT TISSUE DISORDERS: ICD-10-CM

## 2019-07-02 DIAGNOSIS — J44.1 CHRONIC OBSTRUCTIVE PULMONARY DISEASE WITH (ACUTE) EXACERBATION: ICD-10-CM

## 2019-07-02 LAB
ALBUMIN SERPL ELPH-MCNC: 3.2 G/DL — LOW (ref 3.3–5.2)
ALP SERPL-CCNC: 112 U/L — SIGNIFICANT CHANGE UP (ref 40–120)
ALT FLD-CCNC: 14 U/L — SIGNIFICANT CHANGE UP
ANION GAP SERPL CALC-SCNC: 12 MMOL/L — SIGNIFICANT CHANGE UP (ref 5–17)
APTT BLD: 33.1 SEC — SIGNIFICANT CHANGE UP (ref 27.5–36.3)
AST SERPL-CCNC: 23 U/L — SIGNIFICANT CHANGE UP
BILIRUB SERPL-MCNC: 0.6 MG/DL — SIGNIFICANT CHANGE UP (ref 0.4–2)
BUN SERPL-MCNC: 9 MG/DL — SIGNIFICANT CHANGE UP (ref 8–20)
CALCIUM SERPL-MCNC: 9 MG/DL — SIGNIFICANT CHANGE UP (ref 8.6–10.2)
CHLORIDE SERPL-SCNC: 106 MMOL/L — SIGNIFICANT CHANGE UP (ref 98–107)
CO2 SERPL-SCNC: 23 MMOL/L — SIGNIFICANT CHANGE UP (ref 22–29)
CREAT SERPL-MCNC: 0.58 MG/DL — SIGNIFICANT CHANGE UP (ref 0.5–1.3)
GAS PNL BLDA: SIGNIFICANT CHANGE UP
GLUCOSE SERPL-MCNC: 85 MG/DL — SIGNIFICANT CHANGE UP (ref 70–115)
HCT VFR BLD CALC: 38.3 % — SIGNIFICANT CHANGE UP (ref 34.5–45)
HGB BLD-MCNC: 11.3 G/DL — LOW (ref 11.5–15.5)
INR BLD: 1.14 RATIO — SIGNIFICANT CHANGE UP (ref 0.88–1.16)
MAGNESIUM SERPL-MCNC: 2.1 MG/DL — SIGNIFICANT CHANGE UP (ref 1.6–2.6)
MCHC RBC-ENTMCNC: 25.1 PG — LOW (ref 27–34)
MCHC RBC-ENTMCNC: 29.5 GM/DL — LOW (ref 32–36)
MCV RBC AUTO: 84.9 FL — SIGNIFICANT CHANGE UP (ref 80–100)
NRBC # BLD: 2 /100 WBCS — HIGH (ref 0–0)
NT-PROBNP SERPL-SCNC: 579 PG/ML — HIGH (ref 0–300)
PLATELET # BLD AUTO: 331 K/UL — SIGNIFICANT CHANGE UP (ref 150–400)
POTASSIUM SERPL-MCNC: 4.3 MMOL/L — SIGNIFICANT CHANGE UP (ref 3.5–5.3)
POTASSIUM SERPL-SCNC: 4.3 MMOL/L — SIGNIFICANT CHANGE UP (ref 3.5–5.3)
PROT SERPL-MCNC: 7.3 G/DL — SIGNIFICANT CHANGE UP (ref 6.6–8.7)
PROTHROM AB SERPL-ACNC: 13.2 SEC — HIGH (ref 10–12.9)
RBC # BLD: 4.51 M/UL — SIGNIFICANT CHANGE UP (ref 3.8–5.2)
RBC # FLD: 19.2 % — HIGH (ref 10.3–14.5)
SODIUM SERPL-SCNC: 141 MMOL/L — SIGNIFICANT CHANGE UP (ref 135–145)
TROPONIN T SERPL-MCNC: 0.04 NG/ML — SIGNIFICANT CHANGE UP (ref 0–0.06)
WBC # BLD: 8.06 K/UL — SIGNIFICANT CHANGE UP (ref 3.8–10.5)
WBC # FLD AUTO: 8.06 K/UL — SIGNIFICANT CHANGE UP (ref 3.8–10.5)

## 2019-07-02 PROCEDURE — 99214 OFFICE O/P EST MOD 30 MIN: CPT

## 2019-07-02 PROCEDURE — 71045 X-RAY EXAM CHEST 1 VIEW: CPT | Mod: 26

## 2019-07-02 PROCEDURE — 99285 EMERGENCY DEPT VISIT HI MDM: CPT

## 2019-07-02 PROCEDURE — 71250 CT THORAX DX C-: CPT | Mod: 26

## 2019-07-02 RX ORDER — ENOXAPARIN SODIUM 100 MG/ML
40 INJECTION SUBCUTANEOUS DAILY
Refills: 0 | Status: DISCONTINUED | OUTPATIENT
Start: 2019-07-02 | End: 2019-07-06

## 2019-07-02 RX ORDER — FUROSEMIDE 40 MG
20 TABLET ORAL ONCE
Refills: 0 | Status: COMPLETED | OUTPATIENT
Start: 2019-07-02 | End: 2019-07-02

## 2019-07-02 RX ORDER — IPRATROPIUM/ALBUTEROL SULFATE 18-103MCG
3 AEROSOL WITH ADAPTER (GRAM) INHALATION EVERY 6 HOURS
Refills: 0 | Status: DISCONTINUED | OUTPATIENT
Start: 2019-07-02 | End: 2019-07-06

## 2019-07-02 RX ORDER — INSULIN LISPRO 100/ML
VIAL (ML) SUBCUTANEOUS
Refills: 0 | Status: DISCONTINUED | OUTPATIENT
Start: 2019-07-02 | End: 2019-07-06

## 2019-07-02 RX ORDER — ATORVASTATIN CALCIUM 80 MG/1
20 TABLET, FILM COATED ORAL AT BEDTIME
Refills: 0 | Status: DISCONTINUED | OUTPATIENT
Start: 2019-07-02 | End: 2019-07-06

## 2019-07-02 RX ORDER — DEXTROSE 50 % IN WATER 50 %
12.5 SYRINGE (ML) INTRAVENOUS ONCE
Refills: 0 | Status: DISCONTINUED | OUTPATIENT
Start: 2019-07-02 | End: 2019-07-06

## 2019-07-02 RX ORDER — SODIUM CHLORIDE 9 MG/ML
1000 INJECTION, SOLUTION INTRAVENOUS
Refills: 0 | Status: DISCONTINUED | OUTPATIENT
Start: 2019-07-02 | End: 2019-07-06

## 2019-07-02 RX ORDER — METOPROLOL TARTRATE 50 MG
25 TABLET ORAL DAILY
Refills: 0 | Status: DISCONTINUED | OUTPATIENT
Start: 2019-07-02 | End: 2019-07-06

## 2019-07-02 RX ORDER — ALBUTEROL 90 UG/1
2.5 AEROSOL, METERED ORAL
Refills: 0 | Status: DISCONTINUED | OUTPATIENT
Start: 2019-07-02 | End: 2019-07-06

## 2019-07-02 RX ORDER — LACTOBACILLUS ACIDOPHILUS 100MM CELL
1 CAPSULE ORAL
Refills: 0 | Status: DISCONTINUED | OUTPATIENT
Start: 2019-07-02 | End: 2019-07-03

## 2019-07-02 RX ORDER — DEXTROSE 50 % IN WATER 50 %
25 SYRINGE (ML) INTRAVENOUS ONCE
Refills: 0 | Status: DISCONTINUED | OUTPATIENT
Start: 2019-07-02 | End: 2019-07-06

## 2019-07-02 RX ORDER — CEFTRIAXONE 500 MG/1
1000 INJECTION, POWDER, FOR SOLUTION INTRAMUSCULAR; INTRAVENOUS ONCE
Refills: 0 | Status: COMPLETED | OUTPATIENT
Start: 2019-07-02 | End: 2019-07-02

## 2019-07-02 RX ORDER — FUROSEMIDE 40 MG
40 TABLET ORAL DAILY
Refills: 0 | Status: DISCONTINUED | OUTPATIENT
Start: 2019-07-03 | End: 2019-07-06

## 2019-07-02 RX ORDER — SACCHAROMYCES BOULARDII 250 MG
250 POWDER IN PACKET (EA) ORAL
Refills: 0 | Status: DISCONTINUED | OUTPATIENT
Start: 2019-07-02 | End: 2019-07-06

## 2019-07-02 RX ORDER — INSULIN LISPRO 100/ML
VIAL (ML) SUBCUTANEOUS AT BEDTIME
Refills: 0 | Status: DISCONTINUED | OUTPATIENT
Start: 2019-07-02 | End: 2019-07-06

## 2019-07-02 RX ORDER — ALBUTEROL 90 UG/1
1 AEROSOL, METERED ORAL EVERY 4 HOURS
Refills: 0 | Status: DISCONTINUED | OUTPATIENT
Start: 2019-07-02 | End: 2019-07-06

## 2019-07-02 RX ORDER — PANTOPRAZOLE SODIUM 20 MG/1
40 TABLET, DELAYED RELEASE ORAL
Refills: 0 | Status: DISCONTINUED | OUTPATIENT
Start: 2019-07-02 | End: 2019-07-06

## 2019-07-02 RX ORDER — CIPROFLOXACIN HYDROCHLORIDE 250 MG/1
250 TABLET, FILM COATED ORAL
Qty: 14 | Refills: 0 | Status: DISCONTINUED | COMMUNITY
Start: 2019-02-01 | End: 2019-07-02

## 2019-07-02 RX ORDER — AZITHROMYCIN 500 MG/1
500 TABLET, FILM COATED ORAL ONCE
Refills: 0 | Status: COMPLETED | OUTPATIENT
Start: 2019-07-02 | End: 2019-07-02

## 2019-07-02 RX ORDER — GLUCAGON INJECTION, SOLUTION 0.5 MG/.1ML
1 INJECTION, SOLUTION SUBCUTANEOUS ONCE
Refills: 0 | Status: DISCONTINUED | OUTPATIENT
Start: 2019-07-02 | End: 2019-07-06

## 2019-07-02 RX ORDER — IPRATROPIUM/ALBUTEROL SULFATE 18-103MCG
3 AEROSOL WITH ADAPTER (GRAM) INHALATION ONCE
Refills: 0 | Status: COMPLETED | OUTPATIENT
Start: 2019-07-02 | End: 2019-07-02

## 2019-07-02 RX ORDER — TIOTROPIUM BROMIDE 18 UG/1
1 CAPSULE ORAL; RESPIRATORY (INHALATION) DAILY
Refills: 0 | Status: DISCONTINUED | OUTPATIENT
Start: 2019-07-02 | End: 2019-07-06

## 2019-07-02 RX ORDER — DEXTROSE 50 % IN WATER 50 %
15 SYRINGE (ML) INTRAVENOUS ONCE
Refills: 0 | Status: DISCONTINUED | OUTPATIENT
Start: 2019-07-02 | End: 2019-07-06

## 2019-07-02 RX ADMIN — AZITHROMYCIN 255 MILLIGRAM(S): 500 TABLET, FILM COATED ORAL at 19:01

## 2019-07-02 RX ADMIN — Medication 20 MILLIGRAM(S): at 15:17

## 2019-07-02 RX ADMIN — AZITHROMYCIN 500 MILLIGRAM(S): 500 TABLET, FILM COATED ORAL at 21:28

## 2019-07-02 RX ADMIN — Medication 3 MILLILITER(S): at 13:53

## 2019-07-02 RX ADMIN — Medication 125 MILLIGRAM(S): at 13:31

## 2019-07-02 RX ADMIN — CEFTRIAXONE 100 MILLIGRAM(S): 500 INJECTION, POWDER, FOR SOLUTION INTRAMUSCULAR; INTRAVENOUS at 21:28

## 2019-07-02 RX ADMIN — ATORVASTATIN CALCIUM 20 MILLIGRAM(S): 80 TABLET, FILM COATED ORAL at 23:08

## 2019-07-02 RX ADMIN — Medication 3 MILLILITER(S): at 14:30

## 2019-07-02 RX ADMIN — Medication 20 MILLIGRAM(S): at 23:08

## 2019-07-02 RX ADMIN — Medication 3 MILLILITER(S): at 13:31

## 2019-07-02 NOTE — H&P ADULT - ASSESSMENT
pt. is admitted for     PARTIDA, with leg edema and cxr with congestion, likely chf, will get echo for EF and LV function. cardio consult [facility].     copd unspecified does not appear in exacerbation, will keep on duoneb, o2 support, possible bronchitis,  no obvious pneumonia, no elevated wbc, no fever. will use po levaquin.     Essential htn, will keep on lopressor    CAD, native artery native heart s/p stent 2014, no cp. continue b.blk. statin and asa.     DM, humalog scale. pt. is admitted for     PARTIDA, with leg edema and cxr with congestion, likely chf, will get echo for EF and LV function.  lasix 40 mg iv daily for now and to be switched to po per response and clinical status, cardio consult [facility].     copd unspecified does not appear in exacerbation, will keep on duoneb, o2 support, possible bronchitis,  no obvious pneumonia, no elevated wbc, no fever. will use po levaquin.     Essential htn, will keep on lopressor    CAD, native artery native heart s/p stent 2014, no cp. continue b.blk. statin and asa.     DM, humalog scale. pt. is admitted for     PARTIDA, with leg edema and cxr with congestion, likely chf, will get echo for EF and LV function.  lasix 40 mg iv daily for now and to be switched to po per response and clinical status, cardio consult [facility].     copd unspecified does not appear in exacerbation, will keep on duoneb, o2 support, possible bronchitis,  no obvious pneumonia, no elevated wbc, no fever. will use po levaquin.     Essential htn, will keep on lopressor    CAD, native artery native heart s/p stent 2014, no cp. continue b.blk. statin and asa.     thyroid nodule, tft, will get thyroid sono, will request day team to follow that.     DM, humalog scale.

## 2019-07-02 NOTE — H&P ADULT - HISTORY OF PRESENT ILLNESS
85 y/o female was seen at her pcp office for increasing leg edema for past 1 month with exetertional dyspnea better at rest, some orthopnea, mild cough and some phlegm, no fever, no chills. no cp. no sick contact. no recent travel. pt. has home o2 but appears to be non complaint with that. As per pt. she was using her inhaler but did not get better. no abd. pain. no n/v/d. no dizziness, no LOC reported. pt. got oxygen, neb tx, iv lasix, steroids and feela better at the time of admission.  As per pt. she uses only metformin and no other meds.

## 2019-07-02 NOTE — H&P ADULT - NSHPPHYSICALEXAM_GEN_ALL_CORE
General: Well developed AA female lying in bed not in distress.   HEENT: AT, NC. PERRL. intact EOM. no throat erythema or exudate.   Neck: supple. no JVD.   Chest: basal crackles bilaterally with scattered rhonchi. no inter costal retractions.  Heart: S1,S2. RRR. no heart murmur. 2 + edema of B/L LE.   Abdomen: soft. non-tender. non-distended. + BS.   Ext: no calf tenderness, moves all ext. with restrictions. 2 + dp b/L.   Neuro: AAO x3. no focal weakness. no speech disorder. CNs intact.  Skin: no rash noted, no pallor. no diaphoresis.   Psych : normal affect.

## 2019-07-02 NOTE — ED PROVIDER NOTE - PHYSICAL EXAMINATION
Gen: mild tachypnea, speaking in full sentences, AOx3  Head: NCAT  HEENT: PERRL, EOMI, oral mucosa moist, normal conjunctiva, neck supple  Lung: diffuse wheeze faint crackles, tachypnea, trachea midline  CV: rrr, no murmur, Normal perfusion  Abd: soft, NTND  MSK: +3 b/l LE pitting edema, no visible deformities  Neuro: No focal neurologic deficits  Skin: No rash   Psych: normal affect

## 2019-07-02 NOTE — ED PROVIDER NOTE - NS ED ROS FT
ROS: no CP +SOB. +cough. no fever. no n/v/d/c. no abd pain. no rash. no bleeding. no urinary complaints. no weakness. no vision changes. no HA. no neck/back pain. +extremity swelling. No change in mental status.

## 2019-07-02 NOTE — ED PROVIDER NOTE - OBJECTIVE STATEMENT
85yo F poor historian, sent in by PCP for SOB, admits to SOB for unknown amoutn of time but worsening LE edema for last 2 weeks. worse on exertion. no orthopnea. no fever. +dry cough but feels like something needs to come out. per daughter supposed to be on O2 24/7 but does not use routinely. patient noncompliant with medications. does have cardiac stent. unknown if on A/C. no trauma. 83yo F poor historian, sent in by PCP for SOB, admits to SOB for unknown amoutn of time but worsening LE edema for last 2 weeks. worse on exertion. no orthopnea. no fever. +dry cough but feels like something needs to come out. per daughter supposed to be on O2 24/7 but does not use routinely. patient noncompliant with medications. does have cardiac stent. unknown if on A/C. no trauma.    pcp- Nessa Wright

## 2019-07-02 NOTE — H&P ADULT - NSICDXPASTMEDICALHX_GEN_ALL_CORE_FT
PAST MEDICAL HISTORY:  Bladder prolapse, female, acquired     Bladder tumor     CAD (coronary artery disease)     Diabetes     High cholesterol     Hypertension     Neuropathy     Stented coronary artery Mi in 2014, s/p stent of right coronary artery

## 2019-07-02 NOTE — ED ADULT NURSE NOTE - INTERVENTIONS DEFINITIONS
Room bathroom lighting operational/[place] to call system/Instruct patient to call for assistance/Non-slip footwear when patient is off stretcher/Call bell, personal items and telephone within reach

## 2019-07-02 NOTE — ED PROVIDER NOTE - PROGRESS NOTE DETAILS
only mild elevation of BNP, infiltrates on CXR possible PNA vs CHF, will get CT. lasix given. TBA -[place] DO feeling better after tx and lasix. still with significant PARTIDA and hypoxia to low 80s after ambulation -Daquan FREEMAN

## 2019-07-02 NOTE — ED PROVIDER NOTE - CLINICAL SUMMARY MEDICAL DECISION MAKING FREE TEXT BOX
patient with h/o COPD on home O2 with worsening SOB, +3 LE edema no orthopnea. posisble rt sided HF from COPD. no h/o HF in past. will give breathing tx, steroids, r/o PNA, cultures. low dose lasix. TBA

## 2019-07-02 NOTE — ED ADULT TRIAGE NOTE - CHIEF COMPLAINT QUOTE
comes to ed from doctors office for eval of shortness of breath, orthopnea and lower extremity swelling worsening over the last 2 months. denies any diuretics at home. no acute resp distress. on 4l nasal cannula.

## 2019-07-02 NOTE — ED ADULT NURSE REASSESSMENT NOTE - NSIMPLEMENTINTERV_GEN_ALL_ED
Implemented All Fall with Harm Risk Interventions:  [place] to call system. Call bell, personal items and telephone within reach. Instruct patient to call for assistance. Room bathroom lighting operational. Non-slip footwear when patient is off stretcher. Physically safe environment: no spills, clutter or unnecessary equipment. Stretcher in lowest position, wheels locked, appropriate side rails in place. Provide visual cue, wrist band, yellow gown, etc. Monitor gait and stability. Monitor for mental status changes and reorient to person, place, and time. Review medications for side effects contributing to fall risk. Reinforce activity limits and safety measures with patient and family. Provide visual clues: red socks.

## 2019-07-03 DIAGNOSIS — Z71.89 OTHER SPECIFIED COUNSELING: ICD-10-CM

## 2019-07-03 LAB
ANION GAP SERPL CALC-SCNC: 11 MMOL/L — SIGNIFICANT CHANGE UP (ref 5–17)
APPEARANCE UR: CLEAR — SIGNIFICANT CHANGE UP
BILIRUB UR-MCNC: NEGATIVE — SIGNIFICANT CHANGE UP
BUN SERPL-MCNC: 10 MG/DL — SIGNIFICANT CHANGE UP (ref 8–20)
CALCIUM SERPL-MCNC: 8.4 MG/DL — LOW (ref 8.6–10.2)
CHLORIDE SERPL-SCNC: 102 MMOL/L — SIGNIFICANT CHANGE UP (ref 98–107)
CK SERPL-CCNC: 66 U/L — SIGNIFICANT CHANGE UP (ref 25–170)
CK SERPL-CCNC: 68 U/L — SIGNIFICANT CHANGE UP (ref 25–170)
CO2 SERPL-SCNC: 29 MMOL/L — SIGNIFICANT CHANGE UP (ref 22–29)
COLOR SPEC: YELLOW — SIGNIFICANT CHANGE UP
CREAT SERPL-MCNC: 0.62 MG/DL — SIGNIFICANT CHANGE UP (ref 0.5–1.3)
DIFF PNL FLD: NEGATIVE — SIGNIFICANT CHANGE UP
GLUCOSE BLDC GLUCOMTR-MCNC: 106 MG/DL — HIGH (ref 70–99)
GLUCOSE BLDC GLUCOMTR-MCNC: 108 MG/DL — HIGH (ref 70–99)
GLUCOSE BLDC GLUCOMTR-MCNC: 125 MG/DL — HIGH (ref 70–99)
GLUCOSE BLDC GLUCOMTR-MCNC: 97 MG/DL — SIGNIFICANT CHANGE UP (ref 70–99)
GLUCOSE SERPL-MCNC: 126 MG/DL — HIGH (ref 70–115)
GLUCOSE UR QL: NEGATIVE MG/DL — SIGNIFICANT CHANGE UP
HBA1C BLD-MCNC: 7 % — HIGH (ref 4–5.6)
HCT VFR BLD CALC: 35.8 % — SIGNIFICANT CHANGE UP (ref 34.5–45)
HGB BLD-MCNC: 10.3 G/DL — LOW (ref 11.5–15.5)
KETONES UR-MCNC: NEGATIVE — SIGNIFICANT CHANGE UP
LEUKOCYTE ESTERASE UR-ACNC: NEGATIVE — SIGNIFICANT CHANGE UP
MCHC RBC-ENTMCNC: 24.6 PG — LOW (ref 27–34)
MCHC RBC-ENTMCNC: 28.8 GM/DL — LOW (ref 32–36)
MCV RBC AUTO: 85.6 FL — SIGNIFICANT CHANGE UP (ref 80–100)
NITRITE UR-MCNC: NEGATIVE — SIGNIFICANT CHANGE UP
NRBC # BLD: 2 /100 WBCS — HIGH (ref 0–0)
PH UR: 6 — SIGNIFICANT CHANGE UP (ref 5–8)
PLATELET # BLD AUTO: 270 K/UL — SIGNIFICANT CHANGE UP (ref 150–400)
POTASSIUM SERPL-MCNC: 4 MMOL/L — SIGNIFICANT CHANGE UP (ref 3.5–5.3)
POTASSIUM SERPL-SCNC: 4 MMOL/L — SIGNIFICANT CHANGE UP (ref 3.5–5.3)
PROT UR-MCNC: 15 MG/DL
RBC # BLD: 4.18 M/UL — SIGNIFICANT CHANGE UP (ref 3.8–5.2)
RBC # FLD: 19.4 % — HIGH (ref 10.3–14.5)
SODIUM SERPL-SCNC: 142 MMOL/L — SIGNIFICANT CHANGE UP (ref 135–145)
SP GR SPEC: 1.01 — SIGNIFICANT CHANGE UP (ref 1.01–1.02)
T3 SERPL-MCNC: 82 NG/DL — SIGNIFICANT CHANGE UP (ref 80–200)
T4 AB SER-ACNC: 6.5 UG/DL — SIGNIFICANT CHANGE UP (ref 4.5–12)
TROPONIN T SERPL-MCNC: 0.04 NG/ML — SIGNIFICANT CHANGE UP (ref 0–0.06)
TROPONIN T SERPL-MCNC: 0.06 NG/ML — SIGNIFICANT CHANGE UP (ref 0–0.06)
TSH SERPL-MCNC: 1.24 UIU/ML — SIGNIFICANT CHANGE UP (ref 0.27–4.2)
UROBILINOGEN FLD QL: NEGATIVE MG/DL — SIGNIFICANT CHANGE UP
WBC # BLD: 7.8 K/UL — SIGNIFICANT CHANGE UP (ref 3.8–10.5)
WBC # FLD AUTO: 7.8 K/UL — SIGNIFICANT CHANGE UP (ref 3.8–10.5)

## 2019-07-03 PROCEDURE — 76536 US EXAM OF HEAD AND NECK: CPT | Mod: 26

## 2019-07-03 PROCEDURE — 99233 SBSQ HOSP IP/OBS HIGH 50: CPT

## 2019-07-03 RX ORDER — LOSARTAN POTASSIUM 100 MG/1
25 TABLET, FILM COATED ORAL DAILY
Refills: 0 | Status: DISCONTINUED | OUTPATIENT
Start: 2019-07-03 | End: 2019-07-06

## 2019-07-03 RX ADMIN — Medication 3 MILLILITER(S): at 21:22

## 2019-07-03 RX ADMIN — ENOXAPARIN SODIUM 40 MILLIGRAM(S): 100 INJECTION SUBCUTANEOUS at 13:20

## 2019-07-03 RX ADMIN — Medication 3 MILLILITER(S): at 16:26

## 2019-07-03 RX ADMIN — Medication 3 MILLILITER(S): at 03:17

## 2019-07-03 RX ADMIN — Medication 250 MILLIGRAM(S): at 05:04

## 2019-07-03 RX ADMIN — Medication 30 MILLILITER(S): at 02:42

## 2019-07-03 RX ADMIN — Medication 250 MILLIGRAM(S): at 17:35

## 2019-07-03 RX ADMIN — ATORVASTATIN CALCIUM 20 MILLIGRAM(S): 80 TABLET, FILM COATED ORAL at 21:34

## 2019-07-03 RX ADMIN — Medication 0: at 21:33

## 2019-07-03 RX ADMIN — PANTOPRAZOLE SODIUM 40 MILLIGRAM(S): 20 TABLET, DELAYED RELEASE ORAL at 05:04

## 2019-07-03 NOTE — CONSULT NOTE ADULT - SUBJECTIVE AND OBJECTIVE BOX
Chief Complaint: 85 yo F presents w edema and dyspnea.    HPI: Old records reviewed. Pt is a poor historian. She notes a several mos hx of leg edema with chronic dyspnea. She denies a cardiac hx but had a RCA stent in 2014 with low grade disease elsewhere. Echo eval showed nl LVEF and valve study. She denies cp or hx of MI. PMH+ bladder tumor surgery last October/hpt/aodm and copd-former 1 ppd smoker who quit 3 yrs ago. She has home 02. Her compliance with meds and diet is questionable. She eats a "lot of cheese doodles". PMH neg for cva/syncope/seizure/renal/hepatic/heme or thyroid disease. Denies allergy. Rare etoh. Inactive at home. OP meds include: toprol 25 qd/prolia/protonix/spiriva/metformin. Denies recent fever/cough/sputum or hemoptysis.    PAST MEDICAL & SURGICAL HISTORY:  Stented coronary artery: Mi in 2014, s/p stent of right coronary artery  Bladder prolapse, female, acquired  Bladder tumor  CAD (coronary artery disease)  Neuropathy  High cholesterol  Hypertension  Diabetes  Status post cardiac catheterization  Ankle fracture: Rt      PREVIOUS DIAGNOSTIC TESTING:      ECHO  FINDINGS: see above    STRESS  FINDINGS:    CATHETERIZATION  FINDINGS: see above.    MEDICATIONS  (STANDING):  ALBUTerol    90 MICROgram(s) HFA Inhaler 1 Puff(s) Inhalation every 4 hours  ALBUTerol/ipratropium for Nebulization 3 milliLiter(s) Nebulizer every 6 hours  atorvastatin 20 milliGRAM(s) Oral at bedtime  dextrose 5%. 1000 milliLiter(s) (50 mL/Hr) IV Continuous <Continuous>  dextrose 50% Injectable 12.5 Gram(s) IV Push once  dextrose 50% Injectable 25 Gram(s) IV Push once  dextrose 50% Injectable 25 Gram(s) IV Push once  enoxaparin Injectable 40 milliGRAM(s) SubCutaneous daily  furosemide   Injectable 40 milliGRAM(s) IV Push daily  insulin lispro (HumaLOG) corrective regimen sliding scale   SubCutaneous three times a day before meals  insulin lispro (HumaLOG) corrective regimen sliding scale   SubCutaneous at bedtime  lactobacillus acidophilus 1 Tablet(s) Oral two times a day with meals  levoFLOXacin  Tablet 500 milliGRAM(s) Oral every 24 hours  metoprolol succinate ER 25 milliGRAM(s) Oral daily  pantoprazole    Tablet 40 milliGRAM(s) Oral before breakfast  saccharomyces boulardii 250 milliGRAM(s) Oral two times a day  tiotropium 18 MICROgram(s) Capsule 1 Capsule(s) Inhalation daily    MEDICATIONS  (PRN):  ALBUTerol    0.083% 2.5 milliGRAM(s) Nebulizer every 2 hours PRN Shortness of Breath and/or Wheezing  aluminum hydroxide/magnesium hydroxide/simethicone Suspension 30 milliLiter(s) Oral every 6 hours PRN Dyspepsia  dextrose 40% Gel 15 Gram(s) Oral once PRN Blood Glucose LESS THAN 70 milliGRAM(s)/deciliter  glucagon  Injectable 1 milliGRAM(s) IntraMuscular once PRN Glucose LESS THAN 70 milligrams/deciliter      FAMILY HISTORY:  No pertinent family history in first degree relatives      SOCIAL HISTORY: lives w dtr.    CIGARETTES: quit    ALCOHOL: no    ROS: Negative other than as mentioned in HPI.    Vital Signs Last 24 Hrs  T(C): 36.6 (03 Jul 2019 07:20), Max: 36.7 (03 Jul 2019 04:42)  T(F): 97.9 (03 Jul 2019 07:20), Max: 98.1 (03 Jul 2019 04:42)  HR: 80 reg (03 Jul 2019 07:20) (80 - 104)  BP: 120/80 ra manually (03 Jul 2019 07:20) (101/57 - 164/87)  BP(mean): --  RR: 14 flat on 02 by mask (03 Jul 2019 07:20) (18 - 30)  SpO2: 99% (03 Jul 2019 07:20) (90% - 99%)    PHYSICAL EXAM:  General: Appears well developed, well nourished alert and cooperative. obese elderly afebrile F nad.  HEENT: Head; normocephalic, atraumatic.  Eyes;   Pupils reactive, cornea wnl.  Neck; Supple, no nodes adenopathy, no NVD or carotid bruit or thyromegaly.  CARDIOVASCULAR; No murmur, rub, gallop or lift. Normal S1 and S2.  LUNGS; bilateral crackles.  ABDOMEN ; Soft, nontender without mass or organomegaly. bowel sounds normoactive.  EXTREMITIES; No clubbing, cyanosis. 2+ bilat pretib and ankle   edema. Distal pulses ? ROM normal.  SKIN; warm and dry with normal turgor.  NEURO; Alert/oriented x 3/normal motor exam. No pathologic reflexes.    PSYCH; normal affect.            INTERPRETATION OF TELEMETRY:    ECG: SR wnl  cxr; +PVH ? small left pl effusion.  ct chest: no PE/mild copd/increased thyroid.    I&O's Detail      LABS: pro bnp 579                        11.3   8.06  )-----------( 331      ( 02 Jul 2019 13:22 )             38.3     07-03    142  |  102  |  10.0  ----------------------------<  126<H>  4.0   |  29.0  |  0.62    Ca    8.4<L>      03 Jul 2019 06:23  Mg     2.1     07-02    TPro  7.3  /  Alb  3.2<L>  /  TBili  0.6  /  DBili  x   /  AST  23  /  ALT  14  /  AlkPhos  112  07-02    CARDIAC MARKERS ( 03 Jul 2019 06:23 )  x     / 0.04 ng/mL / 68 U/L / x     / x      CARDIAC MARKERS ( 02 Jul 2019 13:22 )  x     / 0.04 ng/mL / x     / x     / x          PT/INR - ( 02 Jul 2019 13:22 )   PT: 13.2 sec;   INR: 1.14 ratio         PTT - ( 02 Jul 2019 13:22 )  PTT:33.1 sec    I&O's Summary      RADIOLOGY & ADDITIONAL STUDIES:

## 2019-07-03 NOTE — ED ADULT NURSE REASSESSMENT NOTE - NS ED NURSE REASSESS COMMENT FT1
assumed care of pt pt resting in stretcher unable to speak full sentences, waiting to be admitted to hospital, family at side pt states "my breathing is better."
spoke to MD Kohler regarding patients oxygen saturation, as per MD Kohler to keep patients oxygen saturations 89% and above.
pt not maintaining o2 saturation on NC. PA called and made aware. order for venti mask placed. 91%. monitoring ongoing.

## 2019-07-03 NOTE — CONSULT NOTE ADULT - ASSESSMENT
1. 85 yo F presents with increasing edema and dyspnea. Likely a combination of diastolic chf and copd. Echo to re-eval LV etc. Agree with empiric lasix. Add low dose ARB. Dietary compliance  2. CAD with remote rca stent.  3. aodm  4. hx of bladder tumor surgery.

## 2019-07-03 NOTE — PROGRESS NOTE ADULT - SUBJECTIVE AND OBJECTIVE BOX
TAMIKO KELLY    [id number]    84y      Female    CC: SOB    INTERVAL HPI/OVERNIGHT EVENTS:  85 y/o female was seen at her pcp office for increasing leg edema for past 1 month with exetertional dyspnea better at rest, some orthopnea, mild cough and some phlegm, no fever, no chills. no cp. no sick contact. no recent travel. pt. has home o2 but appears to be non complaint with that. As per pt. she was using her inhaler but did not get better. no abd. pain. no n/v/d. no dizziness, no LOC reported. pt. got oxygen, neb tx, iv lasix, steroids and feela better at the time of admission.  As per pt. she uses only metformin and no other meds.      today pt states breathing is a little better, no cough no chest pain. + LE swelling    REVIEW OF SYSTEMS:    CONSTITUTIONAL: No fever, +fatigue  RESPIRATORY: No cough, wheezing, + shortness of breath  CARDIOVASCULAR: No chest pain, palpitations  GASTROINTESTINAL: No abdominal or epigastric pain. No nausea, vomiting      Vital Signs Last 24 Hrs  T(C): 36.4 (03 Jul 2019 11:38), Max: 36.7 (03 Jul 2019 04:42)  T(F): 97.5 (03 Jul 2019 11:38), Max: 98.1 (03 Jul 2019 04:42)  HR: 76 (03 Jul 2019 11:38) (76 - 104)  BP: 112/62 (03 Jul 2019 11:38) (101/57 - 145/68)  BP(mean): --  RR: 18 (03 Jul 2019 11:38) (18 - 30)  SpO2: 92% (03 Jul 2019 11:38) (90% - 99%)    PHYSICAL EXAM:    GENERAL: NAD, well-groomed  HEENT: PERRL, +EOMI  NECK: soft, Supple, No JVD,   CHEST/LUNG: + base crackles auscultation bilaterally; No wheezing  HEART: S1S2+, Regular rate and rhythm; No murmurs  ABDOMEN: Soft, Nontender, Nondistended; Bowel sounds present  EXTREMITIES:  2+ Peripheral Pulses, No clubbing, cyanosis, +LE edema  SKIN: No rashes or lesions      LABS:                        10.3   7.80  )-----------( 270      ( 03 Jul 2019 06:23 )             35.8     07-03    142  |  102  |  10.0  ----------------------------<  126<H>  4.0   |  29.0  |  0.62    Ca    8.4<L>      03 Jul 2019 06:23  Mg     2.1     07-02    TPro  7.3  /  Alb  3.2<L>  /  TBili  0.6  /  DBili  x   /  AST  23  /  ALT  14  /  AlkPhos  112  07-02    PT/INR - ( 02 Jul 2019 13:22 )   PT: 13.2 sec;   INR: 1.14 ratio         PTT - ( 02 Jul 2019 13:22 )  PTT:33.1 sec        MEDICATIONS  (STANDING):  ALBUTerol    90 MICROgram(s) HFA Inhaler 1 Puff(s) Inhalation every 4 hours  ALBUTerol/ipratropium for Nebulization 3 milliLiter(s) Nebulizer every 6 hours  atorvastatin 20 milliGRAM(s) Oral at bedtime  dextrose 5%. 1000 milliLiter(s) (50 mL/Hr) IV Continuous <Continuous>  dextrose 50% Injectable 12.5 Gram(s) IV Push once  dextrose 50% Injectable 25 Gram(s) IV Push once  dextrose 50% Injectable 25 Gram(s) IV Push once  enoxaparin Injectable 40 milliGRAM(s) SubCutaneous daily  furosemide   Injectable 40 milliGRAM(s) IV Push daily  insulin lispro (HumaLOG) corrective regimen sliding scale   SubCutaneous three times a day before meals  insulin lispro (HumaLOG) corrective regimen sliding scale   SubCutaneous at bedtime  levoFLOXacin  Tablet 500 milliGRAM(s) Oral every 24 hours  losartan 25 milliGRAM(s) Oral daily  metoprolol succinate ER 25 milliGRAM(s) Oral daily  pantoprazole    Tablet 40 milliGRAM(s) Oral before breakfast  saccharomyces boulardii 250 milliGRAM(s) Oral two times a day  tiotropium 18 MICROgram(s) Capsule 1 Capsule(s) Inhalation daily    MEDICATIONS  (PRN):  ALBUTerol    0.083% 2.5 milliGRAM(s) Nebulizer every 2 hours PRN Shortness of Breath and/or Wheezing  aluminum hydroxide/magnesium hydroxide/simethicone Suspension 30 milliLiter(s) Oral every 6 hours PRN Dyspepsia  dextrose 40% Gel 15 Gram(s) Oral once PRN Blood Glucose LESS THAN 70 milliGRAM(s)/deciliter  glucagon  Injectable 1 milliGRAM(s) IntraMuscular once PRN Glucose LESS THAN 70 milligrams/deciliter      RADIOLOGY & ADDITIONAL TESTS:  reviewed

## 2019-07-04 LAB
ANION GAP SERPL CALC-SCNC: 7 MMOL/L — SIGNIFICANT CHANGE UP (ref 5–17)
BUN SERPL-MCNC: 12 MG/DL — SIGNIFICANT CHANGE UP (ref 8–20)
CALCIUM SERPL-MCNC: 8.6 MG/DL — SIGNIFICANT CHANGE UP (ref 8.6–10.2)
CHLORIDE SERPL-SCNC: 100 MMOL/L — SIGNIFICANT CHANGE UP (ref 98–107)
CO2 SERPL-SCNC: 35 MMOL/L — HIGH (ref 22–29)
CREAT SERPL-MCNC: 0.75 MG/DL — SIGNIFICANT CHANGE UP (ref 0.5–1.3)
GLUCOSE BLDC GLUCOMTR-MCNC: 115 MG/DL — HIGH (ref 70–99)
GLUCOSE BLDC GLUCOMTR-MCNC: 116 MG/DL — HIGH (ref 70–99)
GLUCOSE BLDC GLUCOMTR-MCNC: 117 MG/DL — HIGH (ref 70–99)
GLUCOSE BLDC GLUCOMTR-MCNC: 147 MG/DL — HIGH (ref 70–99)
GLUCOSE SERPL-MCNC: 123 MG/DL — HIGH (ref 70–115)
MAGNESIUM SERPL-MCNC: 1.9 MG/DL — SIGNIFICANT CHANGE UP (ref 1.6–2.6)
POTASSIUM SERPL-MCNC: 4.3 MMOL/L — SIGNIFICANT CHANGE UP (ref 3.5–5.3)
POTASSIUM SERPL-SCNC: 4.3 MMOL/L — SIGNIFICANT CHANGE UP (ref 3.5–5.3)
SODIUM SERPL-SCNC: 142 MMOL/L — SIGNIFICANT CHANGE UP (ref 135–145)

## 2019-07-04 PROCEDURE — 99232 SBSQ HOSP IP/OBS MODERATE 35: CPT

## 2019-07-04 RX ORDER — FUROSEMIDE 40 MG
20 TABLET ORAL ONCE
Refills: 0 | Status: COMPLETED | OUTPATIENT
Start: 2019-07-04 | End: 2019-07-04

## 2019-07-04 RX ORDER — ASPIRIN/CALCIUM CARB/MAGNESIUM 324 MG
81 TABLET ORAL DAILY
Refills: 0 | Status: DISCONTINUED | OUTPATIENT
Start: 2019-07-04 | End: 2019-07-06

## 2019-07-04 RX ADMIN — Medication 0: at 21:07

## 2019-07-04 RX ADMIN — Medication 250 MILLIGRAM(S): at 17:44

## 2019-07-04 RX ADMIN — Medication 81 MILLIGRAM(S): at 14:50

## 2019-07-04 RX ADMIN — ATORVASTATIN CALCIUM 20 MILLIGRAM(S): 80 TABLET, FILM COATED ORAL at 21:07

## 2019-07-04 RX ADMIN — Medication 3 MILLILITER(S): at 21:55

## 2019-07-04 RX ADMIN — LOSARTAN POTASSIUM 25 MILLIGRAM(S): 100 TABLET, FILM COATED ORAL at 05:21

## 2019-07-04 RX ADMIN — Medication 20 MILLIGRAM(S): at 02:57

## 2019-07-04 RX ADMIN — Medication 3 MILLILITER(S): at 09:51

## 2019-07-04 RX ADMIN — Medication 250 MILLIGRAM(S): at 05:21

## 2019-07-04 RX ADMIN — PANTOPRAZOLE SODIUM 40 MILLIGRAM(S): 20 TABLET, DELAYED RELEASE ORAL at 05:21

## 2019-07-04 RX ADMIN — ENOXAPARIN SODIUM 40 MILLIGRAM(S): 100 INJECTION SUBCUTANEOUS at 11:34

## 2019-07-04 RX ADMIN — Medication 25 MILLIGRAM(S): at 05:21

## 2019-07-04 RX ADMIN — Medication 40 MILLIGRAM(S): at 11:34

## 2019-07-04 RX ADMIN — Medication 3 MILLILITER(S): at 15:28

## 2019-07-04 NOTE — PROGRESS NOTE ADULT - SUBJECTIVE AND OBJECTIVE BOX
[facility]                                   [address] [place] [address]                                                      PHONE: [phone]                                                         FAX: [phone]  [email or web address]  ---------------------------------------------------------------------------------------------------------------------------------    Overnight events/patient complaints:  NAD feeling better today     No Known Allergies    MEDICATIONS  (STANDING):  ALBUTerol    90 MICROgram(s) HFA Inhaler 1 Puff(s) Inhalation every 4 hours  ALBUTerol/ipratropium for Nebulization 3 milliLiter(s) Nebulizer every 6 hours  aspirin  chewable 81 milliGRAM(s) Oral daily  atorvastatin 20 milliGRAM(s) Oral at bedtime  dextrose 5%. 1000 milliLiter(s) (50 mL/Hr) IV Continuous <Continuous>  dextrose 50% Injectable 12.5 Gram(s) IV Push once  dextrose 50% Injectable 25 Gram(s) IV Push once  dextrose 50% Injectable 25 Gram(s) IV Push once  enoxaparin Injectable 40 milliGRAM(s) SubCutaneous daily  furosemide   Injectable 40 milliGRAM(s) IV Push daily  insulin lispro (HumaLOG) corrective regimen sliding scale   SubCutaneous three times a day before meals  insulin lispro (HumaLOG) corrective regimen sliding scale   SubCutaneous at bedtime  levoFLOXacin  Tablet 500 milliGRAM(s) Oral every 24 hours  losartan 25 milliGRAM(s) Oral daily  metoprolol succinate ER 25 milliGRAM(s) Oral daily  pantoprazole    Tablet 40 milliGRAM(s) Oral before breakfast  saccharomyces boulardii 250 milliGRAM(s) Oral two times a day  tiotropium 18 MICROgram(s) Capsule 1 Capsule(s) Inhalation daily    MEDICATIONS  (PRN):  ALBUTerol    0.083% 2.5 milliGRAM(s) Nebulizer every 2 hours PRN Shortness of Breath and/or Wheezing  aluminum hydroxide/magnesium hydroxide/simethicone Suspension 30 milliLiter(s) Oral every 6 hours PRN Dyspepsia  dextrose 40% Gel 15 Gram(s) Oral once PRN Blood Glucose LESS THAN 70 milliGRAM(s)/deciliter  glucagon  Injectable 1 milliGRAM(s) IntraMuscular once PRN Glucose LESS THAN 70 milligrams/deciliter      Vital Signs Last 24 Hrs  T(C): 36.8 (2019 07:19), Max: 36.9 (2019 20:40)  T(F): 98.3 (2019 07:19), Max: 98.5 (2019 20:40)  HR: 70 (2019 09:53) (65 - 95)  BP: 102/56 (2019 07:19) (102/56 - 144/75)  BP(mean): --  RR: 18 (2019 07:19) (18 - 19)  SpO2: 93% (2019 09:53) (90% - 96%)  ICU Vital Signs Last 24 Hrs  TAMIKO ROBIN  I&O's Detail    2019 07:  -  2019 07:00  --------------------------------------------------------  IN:  Total IN: 0 mL    OUT:    Voided: 950 mL  Total OUT: 950 mL    Total NET: -950 mL        I&O's Summary    2019 07:01  -  2019 07:00  --------------------------------------------------------  IN: 0 mL / OUT: 950 mL / NET: -950 mL      Drug Dosing Weight  TAMIKO KELLY      PHYSICAL EXAM:  General: Appears well developed, well nourished alert and cooperative.  HEENT: Head; normocephalic, atraumatic.  Eyes: Pupils reactive, cornea wnl.  Neck: Supple, no nodes adenopathy, no NVD or carotid bruit or thyromegaly.  CARDIOVASCULAR: Normal S1 and S2, 1/6 murmur, rub, gallop or lift.   LUNGS: Decrease BS B/L   ABDOMEN: Soft, nontender without mass or organomegaly. bowel sounds normoactive.  EXTREMITIES: No clubbing, cyanosis + edema. Distal pulses wnl.   SKIN: warm and dry with normal turgor.  NEURO: Alert/oriented x 3/normal motor exam. No pathologic reflexes.    PSYCH: normal affect.        LABS:                        10.3   7.80  )-----------( 270      ( 2019 06:23 )             35.8     07-    142  |  102  |  10.0  ----------------------------<  126<H>  4.0   |  29.0  |  0.62    Ca    8.4<L>      2019 06:23  Mg     2.1     07-02    TPro  7.3  /  Alb  3.2<L>  /  TBili  0.6  /  DBili  x   /  AST  23  /  ALT  14  /  AlkPhos  112  07-02    TAMIKO KELLY  CARDIAC MARKERS ( 2019 12:32 )  x     / 0.06 ng/mL / 66 U/L / x     / x      CARDIAC MARKERS ( 2019 06:23 )  x     / 0.04 ng/mL / 68 U/L / x     / x      CARDIAC MARKERS ( 2019 13:22 )  x     / 0.04 ng/mL / x     / x     / x          PT/INR - ( 2019 13:22 )   PT: 13.2 sec;   INR: 1.14 ratio         PTT - ( 2019 13:22 )  PTT:33.1 sec  Urinalysis Basic - ( 2019 20:05 )    Color: Yellow / Appearance: Clear / S.015 / pH: x  Gluc: x / Ketone: Negative  / Bili: Negative / Urobili: Negative mg/dL   Blood: x / Protein: 15 mg/dL / Nitrite: Negative   Leuk Esterase: Negative / RBC: x / WBC x   Sq Epi: x / Non Sq Epi: x / Bacteria: x        RADIOLOGY & ADDITIONAL STUDIES:    INTERPRETATION OF TELEMETRY (personally reviewed):        ECHO:  Summary:   1. Left ventricular ejection fraction, byvisual estimation, is 60 to   65%.   2. Normal global left ventricular systolic function.   3. Mildly increased LV wall thickness.   4. Spectral Doppler shows impaired relaxation pattern of left   ventricular myocardial filling (Grade I diastolic dysfunction).   5. Thickening of the anterior and posterior mitral valve leaflets.   6. Mild-moderate tricuspid regurgitation.   7. Estimated pulmonary artery systolic pressure is 45.8 mmHg assuming a   right atrial pressure of 3 mmHg, which is consistent with mild pulmonary   hypertension.      ASSESSMENT AND PLAN:  In summary, TAMIKO KELLY is an 84y Female with past medical history significant for CAD remote PCI HTN HLD HFpEF COPD who presents with COPD exacerbations acute on chronic HFpEF; negative for AMI and TTE stable see above     Plan  1.  Continue with IV Lasix today   2.  ASA and statin therapy   3.  COPD management

## 2019-07-04 NOTE — CHART NOTE - NSCHARTNOTEFT_GEN_A_CORE
Called to see patient because 02 sat occasionally falls below 88% on 3L nasal cannula. The PA and MD has been called by RN on other occasions for same complaint. I visited patient at this time to exam her for any acute problems. Patient was found asleep in her bed, observed to be breathing very comfortably and in NAD, respiratory or otherwise. I asked patient if she was having any difficulty breathing, shortness of breath, chest pain, or uncomfortable in any way. patient denied all of the above. She said she has to take the nasal 02 out of her nose every now and then to remove the "boggers" that occurs due to the oxygen drying out her nasal passages.   Focused exam:  W/N W/D female lying supine in bed in NAD  HEENT: nasal 02 in place. no nasal flaring, polyps or nasal obstruction.  Neck: supple without masses or deformities  Chest: symmetrical, and equal on expansion  Lungs: Occasional wheeze at right base, without rales or rhonchi.   Abdomen: soft, no guarding, nontender, no masses appreciated  Extremities: pulses 2+ bilaterally upper and lowers. no pitting edema    Plan:   1) Most recent Chest X-ray and CT scan reviewed    2)Previous clinician notes reviewed.   3)RN to observe Patient tonight for acute breathing problems/any other problems   4)Cardiology eval reviewed in chart   5)Call PA/MD whenever necessary for patient follow-up exam

## 2019-07-04 NOTE — PROGRESS NOTE ADULT - SUBJECTIVE AND OBJECTIVE BOX
TAMIKO KELLY    [id number]    84y      Female    CC: SOB    INTERVAL HPI/OVERNIGHT EVENTS:  83 y/o female was seen at her pcp office for increasing leg edema for past 1 month with exetertional dyspnea better at rest, some orthopnea, mild cough and some phlegm, no fever, no chills. no cp. no sick contact. no recent travel. pt. has home o2 but appears to be non complaint with that. As per pt. she was using her inhaler but did not get better. no abd. pain. no n/v/d. no dizziness, no LOC reported. pt. got oxygen, neb tx, iv lasix, steroids and feela better at the time of admission.  As per pt. she uses only metformin and no other meds.      today pt states breathing is a better, no cough no chest pain. + LE swelling however is improving    REVIEW OF SYSTEMS:    CONSTITUTIONAL: No fever, +fatigue  RESPIRATORY: No cough, wheezing, + min shortness of breath  CARDIOVASCULAR: No chest pain, palpitations  GASTROINTESTINAL: No abdominal or epigastric pain. No nausea, vomiting          Vital Signs Last 24 Hrs  T(C): 36.8 (2019 07:19), Max: 36.9 (2019 20:40)  T(F): 98.3 (2019 07:19), Max: 98.5 (2019 20:40)  HR: 65 (2019 07:19) (65 - 95)  BP: 102/56 (2019 07:19) (102/56 - 144/75)  BP(mean): --  RR: 18 (2019 07:19) (18 - 19)  SpO2: 90% (2019 07:19) (90% - 96%)    PHYSICAL EXAM:    GENERAL: NAD, well-groomed  HEENT: PERRL, +EOMI  NECK: soft, Supple, No JVD,   CHEST/LUNG: + base crackles auscultation bilaterally; No wheezing  HEART: S1S2+, Regular rate and rhythm; No murmurs  ABDOMEN: Soft, Nontender, Nondistended; Bowel sounds present  EXTREMITIES:  2+ Peripheral Pulses, No clubbing, cyanosis, +LE edema  SKIN: No rashes or lesions      LABS:                        10.3   7.80  )-----------( 270      ( 2019 06:23 )             35.8     07-03    142  |  102  |  10.0  ----------------------------<  126<H>  4.0   |  29.0  |  0.62    Ca    8.4<L>      2019 06:23  Mg     2.1         TPro  7.3  /  Alb  3.2<L>  /  TBili  0.6  /  DBili  x   /  AST  23  /  ALT  14  /  AlkPhos  112      PT/INR - ( 2019 13:22 )   PT: 13.2 sec;   INR: 1.14 ratio         PTT - ( 2019 13:22 )  PTT:33.1 sec  Urinalysis Basic - ( 2019 20:05 )    Color: Yellow / Appearance: Clear / S.015 / pH: x  Gluc: x / Ketone: Negative  / Bili: Negative / Urobili: Negative mg/dL   Blood: x / Protein: 15 mg/dL / Nitrite: Negative   Leuk Esterase: Negative / RBC: x / WBC x   Sq Epi: x / Non Sq Epi: x / Bacteria: x          MEDICATIONS  (STANDING):  ALBUTerol    90 MICROgram(s) HFA Inhaler 1 Puff(s) Inhalation every 4 hours  ALBUTerol/ipratropium for Nebulization 3 milliLiter(s) Nebulizer every 6 hours  atorvastatin 20 milliGRAM(s) Oral at bedtime  dextrose 5%. 1000 milliLiter(s) (50 mL/Hr) IV Continuous <Continuous>  dextrose 50% Injectable 12.5 Gram(s) IV Push once  dextrose 50% Injectable 25 Gram(s) IV Push once  dextrose 50% Injectable 25 Gram(s) IV Push once  enoxaparin Injectable 40 milliGRAM(s) SubCutaneous daily  furosemide   Injectable 40 milliGRAM(s) IV Push daily  insulin lispro (HumaLOG) corrective regimen sliding scale   SubCutaneous three times a day before meals  insulin lispro (HumaLOG) corrective regimen sliding scale   SubCutaneous at bedtime  levoFLOXacin  Tablet 500 milliGRAM(s) Oral every 24 hours  losartan 25 milliGRAM(s) Oral daily  metoprolol succinate ER 25 milliGRAM(s) Oral daily  pantoprazole    Tablet 40 milliGRAM(s) Oral before breakfast  saccharomyces boulardii 250 milliGRAM(s) Oral two times a day  tiotropium 18 MICROgram(s) Capsule 1 Capsule(s) Inhalation daily    MEDICATIONS  (PRN):  ALBUTerol    0.083% 2.5 milliGRAM(s) Nebulizer every 2 hours PRN Shortness of Breath and/or Wheezing  aluminum hydroxide/magnesium hydroxide/simethicone Suspension 30 milliLiter(s) Oral every 6 hours PRN Dyspepsia  dextrose 40% Gel 15 Gram(s) Oral once PRN Blood Glucose LESS THAN 70 milliGRAM(s)/deciliter  glucagon  Injectable 1 milliGRAM(s) IntraMuscular once PRN Glucose LESS THAN 70 milligrams/deciliter      RADIOLOGY & ADDITIONAL TESTS:  2Decho:  PHYSICIAN INTERPRETATION:  Left Ventricle: The left ventricular internal cavity size is normal. Left   ventricular wall thickness is mildly increased.  Global LV systolic function was normal. Left ventricular ejection   fraction, by visual estimation, is 60 to 65%. Spectral Doppler shows   impaired relaxation pattern of left ventricular myocardial filling (Grade   I diastolic dysfunction).  Right Ventricle: The right ventricular size is mildly enlarged.  Left Atrium: Mildly enlarged left atrium.  Right Atrium: The right atrium is normal in size.  Pericardium: There is no evidence of pericardial effusion.  Mitral Valve: Thickening of the anterior and posterior mitral valve   leaflets. Trace mitral valve regurgitation is seen.  Tricuspid Valve: Structurally normal tricuspid valve, with normal leaflet   excursion. Mild-moderate tricuspid regurgitation is visualized. Estimated   pulmonary artery systolic pressure is 45.8 mmHg assuming a right atrial   pressure of 3 mmHg, which is consistent with mild pulmonary hypertension.  Aortic Valve: The aortic valve is trileaflet. No evidence of aortic valve   regurgitation is seen.  Pulmonic Valve: Structurally normal pulmonic valve, with normal leaflet   excursion. Trace pulmonic valve regurgitation.  Aorta: The aortic root is normal in size and structure.  Pulmonary Artery: The main pulmonary artery is normal in size.  Venous: A normal flow pattern is recorded from the right upper pulmonary   vein. The inferior vena cava is normal. The inferior vena cava was normal   sized, with respiratory size variation greater than 50%. The inferior   vena cava and the hepatic vein show a normal flow pattern.       Summary:   1. Left ventricular ejection fraction, by visual estimation, is 60 to   65%.   2. Normal global left ventricular systolic function.   3. Mildly increased LV wall thickness.   4. Spectral Doppler shows impaired relaxation pattern of left   ventricular myocardial filling (Grade I diastolic dysfunction).   5. Thickening of the anterior and posterior mitral valve leaflets.   6. Mild-moderate tricuspid regurgitation.   7. Estimated pulmonary artery systolic pressure is 45.8 mmHg assuming a   right atrial pressure of 3 mmHg, which is consistent with mild pulmonary   hypertension.    MD Reece Electronically signed on 7/3/2019 at 6:40:47 PM

## 2019-07-04 NOTE — CHART NOTE - NSCHARTNOTEFT_GEN_A_CORE
Called by RN after pt sating between 88-90% on 4L O2 via nc.  Pt with hx of COPD, xray findings of pulmonary congestion, Grade 1 diastolic dysfunction, didn't receive lasix 40mg IVP ordered by MD alves Called by RN after pt sating between 88-90% on 4L O2 via nc.  Pt with hx of COPD, xray findings of pulmonary congestion, Grade 1 diastolic dysfunction, didn't receive lasix 40mg IVP ordered by MD earlier.  Pt is sleeping, wants to be left alone, denying any complaints    115/53  76  97.8   18   88% on 4L O2    General   Pt awoken from sleep Ox3 NAD  Lungs      occasional crackles b/l  Heart       s1/s2  Ext           +edema    COPD/ CHF    Lasix 20mg IVPx1 stat  RN may titrate O2 as needed to keep pulse ox at 90%  Continue to monitor and reassess prn

## 2019-07-04 NOTE — PROVIDER CONTACT NOTE (OTHER) - BACKGROUND
pt admitted for COPD exacerbation. history of stented coronary artery, bladder prolapse, neuropathy, hypertension, and diabetes.

## 2019-07-04 NOTE — PROVIDER CONTACT NOTE (OTHER) - SITUATION
pt on  and monitor. alarms for saturations of 88-92% on 4L NC. pt on  and monitor. alarms for saturations of 88-92% on 3L NC.

## 2019-07-05 ENCOUNTER — TRANSCRIPTION ENCOUNTER (OUTPATIENT)
Age: 84
End: 2019-07-05

## 2019-07-05 LAB
ANION GAP SERPL CALC-SCNC: 14 MMOL/L — SIGNIFICANT CHANGE UP (ref 5–17)
BUN SERPL-MCNC: 16 MG/DL — SIGNIFICANT CHANGE UP (ref 8–20)
CALCIUM SERPL-MCNC: 8.8 MG/DL — SIGNIFICANT CHANGE UP (ref 8.6–10.2)
CHLORIDE SERPL-SCNC: 92 MMOL/L — LOW (ref 98–107)
CO2 SERPL-SCNC: 37 MMOL/L — HIGH (ref 22–29)
CREAT SERPL-MCNC: 0.8 MG/DL — SIGNIFICANT CHANGE UP (ref 0.5–1.3)
GLUCOSE BLDC GLUCOMTR-MCNC: 107 MG/DL — HIGH (ref 70–99)
GLUCOSE BLDC GLUCOMTR-MCNC: 112 MG/DL — HIGH (ref 70–99)
GLUCOSE BLDC GLUCOMTR-MCNC: 138 MG/DL — HIGH (ref 70–99)
GLUCOSE BLDC GLUCOMTR-MCNC: 155 MG/DL — HIGH (ref 70–99)
GLUCOSE SERPL-MCNC: 102 MG/DL — SIGNIFICANT CHANGE UP (ref 70–115)
HCT VFR BLD CALC: 39.6 % — SIGNIFICANT CHANGE UP (ref 34.5–45)
HGB BLD-MCNC: 11.1 G/DL — LOW (ref 11.5–15.5)
MAGNESIUM SERPL-MCNC: 1.9 MG/DL — SIGNIFICANT CHANGE UP (ref 1.6–2.6)
MCHC RBC-ENTMCNC: 24.3 PG — LOW (ref 27–34)
MCHC RBC-ENTMCNC: 28 GM/DL — LOW (ref 32–36)
MCV RBC AUTO: 86.8 FL — SIGNIFICANT CHANGE UP (ref 80–100)
PLATELET # BLD AUTO: 389 K/UL — SIGNIFICANT CHANGE UP (ref 150–400)
POTASSIUM SERPL-MCNC: 3.8 MMOL/L — SIGNIFICANT CHANGE UP (ref 3.5–5.3)
POTASSIUM SERPL-SCNC: 3.8 MMOL/L — SIGNIFICANT CHANGE UP (ref 3.5–5.3)
RBC # BLD: 4.56 M/UL — SIGNIFICANT CHANGE UP (ref 3.8–5.2)
RBC # FLD: 19.4 % — HIGH (ref 10.3–14.5)
SODIUM SERPL-SCNC: 143 MMOL/L — SIGNIFICANT CHANGE UP (ref 135–145)
WBC # BLD: 8.61 K/UL — SIGNIFICANT CHANGE UP (ref 3.8–10.5)
WBC # FLD AUTO: 8.61 K/UL — SIGNIFICANT CHANGE UP (ref 3.8–10.5)

## 2019-07-05 PROCEDURE — 99233 SBSQ HOSP IP/OBS HIGH 50: CPT

## 2019-07-05 RX ORDER — ASPIRIN/CALCIUM CARB/MAGNESIUM 324 MG
1 TABLET ORAL
Qty: 0 | Refills: 0 | DISCHARGE
Start: 2019-07-05

## 2019-07-05 RX ORDER — LOSARTAN POTASSIUM 100 MG/1
1 TABLET, FILM COATED ORAL
Qty: 0 | Refills: 0 | DISCHARGE
Start: 2019-07-05

## 2019-07-05 RX ORDER — SPIRONOLACTONE 25 MG/1
1 TABLET, FILM COATED ORAL
Qty: 0 | Refills: 0 | DISCHARGE
Start: 2019-07-05

## 2019-07-05 RX ORDER — SPIRONOLACTONE 25 MG/1
25 TABLET, FILM COATED ORAL DAILY
Refills: 0 | Status: DISCONTINUED | OUTPATIENT
Start: 2019-07-05 | End: 2019-07-06

## 2019-07-05 RX ADMIN — Medication 3 MILLILITER(S): at 15:29

## 2019-07-05 RX ADMIN — Medication 3 MILLILITER(S): at 08:40

## 2019-07-05 RX ADMIN — PANTOPRAZOLE SODIUM 40 MILLIGRAM(S): 20 TABLET, DELAYED RELEASE ORAL at 05:13

## 2019-07-05 RX ADMIN — ENOXAPARIN SODIUM 40 MILLIGRAM(S): 100 INJECTION SUBCUTANEOUS at 11:53

## 2019-07-05 RX ADMIN — Medication 40 MILLIGRAM(S): at 05:13

## 2019-07-05 RX ADMIN — Medication 250 MILLIGRAM(S): at 05:13

## 2019-07-05 RX ADMIN — Medication 25 MILLIGRAM(S): at 05:13

## 2019-07-05 RX ADMIN — Medication 81 MILLIGRAM(S): at 11:53

## 2019-07-05 RX ADMIN — ATORVASTATIN CALCIUM 20 MILLIGRAM(S): 80 TABLET, FILM COATED ORAL at 21:10

## 2019-07-05 RX ADMIN — Medication 250 MILLIGRAM(S): at 17:25

## 2019-07-05 RX ADMIN — LOSARTAN POTASSIUM 25 MILLIGRAM(S): 100 TABLET, FILM COATED ORAL at 05:13

## 2019-07-05 NOTE — DISCHARGE NOTE PROVIDER - HOSPITAL COURSE
85 y/o female was seen at her pcp office for increasing leg edema for past 1 month with exetertional dyspnea better at rest, some orthopnea, mild cough and some phlegm, no fever, no chills. no cp. no sick contact. no recent travel. pt. has home o2 but appears to be non complaint with that. As per pt. she was using her inhaler but did not get better. no abd. pain. no n/v/d. no dizziness, no LOC reported. pt. got oxygen, neb tx, iv lasix, steroids and feela better at the time of admission.  As per pt. she uses only metformin and no other meds.          1) Acute Diastolic CHF with EF 60-65%    - continue lasix 40 mg iv daily for now and to be switched to po in am as per cardio    - cardio following    - continue metoprolol and losartan        2) copd with possible bronchitis    - on duoneb, Spiriva    - o2 support    - on Levaquin day #4        3) Essential htn    - lopressor        4) CAD, native artery native heart s/p stent 2014    - continue b.blk. statin and asa.         5) thyroid nodule    - out pt follow up with PCP    - TSH/FT4 WNL Patient was admitted with Acute on Chronic Diastolic Heart Failure. She was diuresed with IV Lasix and was monitored closely. Seen and followed by Cardiology during hospitalization. Her symptoms have improved and she is feeling much better. Seen by PT and was recommended Home with Assistance. She lives with daughter and    her family will help for assistance.     She was also treated for COPD with possible bronchitis.     She has suspected 2 cm nodule in lower pole of right thyroid lobe - she is recommended to follow up with PMD for further work up.     She is stable for discharge home. Has home oxygen.         PHYSICAL EXAM:    Vital Signs     T(C): 36.6 (06 Jul 2019 08:00), Max: 36.8 (06 Jul 2019 00:52)    T(F): 97.9 (06 Jul 2019 08:00), Max: 98.2 (06 Jul 2019 00:52)    HR: 80 (06 Jul 2019 11:03) (71 - 91)    BP: 120/70 (06 Jul 2019 11:03) (96/57 - 123/59)    RR: 18 (06 Jul 2019 10:09) (18 - 19)    SpO2: 94% (06 Jul 2019 10:09) (89% - 99%)    General: Elderly female sitting in bed comfortably. No acute distress    HEENT: PERRLA. EOMI. Clear conjunctivae. Moist mucus membrane    Neck: Supple.      Chest: CTA bilaterally - no wheezing, rales or rhonchi.     Heart: Normal S1 & S2 with RRR.     Abdomen: Soft. Non-tender. Non-distended. + BS    Ext: No pedal edema. No calf tenderness     Neuro: AAO x 3. No focal deficit. No speech disorder.    Skin: Warm and Dry    Psychiatry: Normal mood and affect        Time spent: 48 minutes.

## 2019-07-05 NOTE — DISCHARGE NOTE PROVIDER - NSDCFUADDAPPT_GEN_ALL_CORE_FT
Follow up with PMD in 1 week.  Follow up with Cardiology in 1 week.  Follow up with Pulmonary in 1 week.

## 2019-07-05 NOTE — PROGRESS NOTE ADULT - ASSESSMENT
85 y/o female was seen at her pcp office for increasing leg edema for past 1 month with exetertional dyspnea better at rest, some orthopnea, mild cough and some phlegm, no fever, no chills. no cp. no sick contact. no recent travel. pt. has home o2 but appears to be non complaint with that. As per pt. she was using her inhaler but did not get better. no abd. pain. no n/v/d. no dizziness, no LOC reported. pt. got oxygen, neb tx, iv lasix, steroids and feela better at the time of admission.  As per pt. she uses only metformin and no other meds.      1) Acute CHF  - echo for EF and LV function pending  - lasix 40 mg iv daily for now and to be switched to po per response and clinical status  - cardio following  - continue metoprolol and losartan    2) copd with possible bronchitis  - on duoneb, spirivia  - o2 support  - on levaquin day #2    3) Essential htn  - lopressor    4) CAD, native artery native heart s/p stent 2014  - continue b.blk. statin and asa.     5) thyroid nodule  - out pt follow up with PCP  - TSH/FT4 WNL    6) DM type 2  - humalog scale.     7) Prophylactic measure  - DVT ppx Lovenox
83 y/o female was seen at her pcp office for increasing leg edema for past 1 month with exetertional dyspnea better at rest, some orthopnea, mild cough and some phlegm, no fever, no chills. no cp. no sick contact. no recent travel. pt. has home o2 but appears to be non complaint with that. As per pt. she was using her inhaler but did not get better. no abd. pain. no n/v/d. no dizziness, no LOC reported. pt. got oxygen, neb tx, iv lasix, steroids and feela better at the time of admission.  As per pt. she uses only metformin and no other meds.      1) Acute Diastolic CHF with EF 60-65%  - continue lasix 40 mg iv daily for now and to be switched to po likely in the next 48 hours   - cardio following  - continue metoprolol and losartan    2) copd with possible bronchitis  - on duoneb, Spiriva  - o2 support  - on Levaquin day #3    3) Essential htn  - lopressor    4) CAD, native artery native heart s/p stent 2014  - continue b.blk. statin and asa.     5) thyroid nodule  - out pt follow up with PCP  - TSH/FT4 WNL    6) DM type 2  - humalog scale.     7) Prophylactic measure  - DVT ppx Lovenox    Dispo: likely home in the next 24 hours if stable  PT eval ordered
85 y/o female was seen at her pcp office for increasing leg edema for past 1 month with exetertional dyspnea better at rest, some orthopnea, mild cough and some phlegm, no fever, no chills. no cp. no sick contact. no recent travel. pt. has home o2 but appears to be non complaint with that. As per pt. she was using her inhaler but did not get better. no abd. pain. no n/v/d. no dizziness, no LOC reported. pt. got oxygen, neb tx, iv lasix, steroids and feela better at the time of admission.  As per pt. she uses only metformin and no other meds.      1) Acute Diastolic CHF with EF 60-65%  - continue lasix 40 mg iv daily for now and to be switched to po in am as per cardio  - cardio following  - continue metoprolol and losartan    2) copd with possible bronchitis  - on duoneb, Spiriva  - o2 support  - on Levaquin day #4    3) Essential htn  - lopressor    4) CAD, native artery native heart s/p stent 2014  - continue b.blk. statin and asa.     5) thyroid nodule  - out pt follow up with PCP  - TSH/FT4 WNL    6) DM type 2  - humalog scale.     7) Prophylactic measure  - DVT ppx Lovenox    Dispo: likely home in the next 24 hours if stable  PT eval ordered- home w/ assist; Pt has family that can assist

## 2019-07-05 NOTE — PROGRESS NOTE ADULT - SUBJECTIVE AND OBJECTIVE BOX
TAMIKO KELLY    [id number]    84y      Female    CC: SOB    INTERVAL HPI/OVERNIGHT EVENTS:  83 y/o female was seen at her pcp office for increasing leg edema for past 1 month with exetertional dyspnea better at rest, some orthopnea, mild cough and some phlegm, no fever, no chills. no cp. no sick contact. no recent travel. pt. has home o2 but appears to be non complaint with that. As per pt. she was using her inhaler but did not get better. no abd. pain. no n/v/d. no dizziness, no LOC reported. pt. got oxygen, neb tx, iv lasix, steroids and feela better at the time of admission.  As per pt. she uses only metformin and no other meds.      today pt states breathing is a better. LE swelling is improving. OOB to chair. No cough or chest pain    REVIEW OF SYSTEMS:    CONSTITUTIONAL: No fever, +fatigue  RESPIRATORY: No cough, wheezing, + min shortness of breath  CARDIOVASCULAR: No chest pain, palpitations  GASTROINTESTINAL: No abdominal or epigastric pain. No nausea, vomiting          Vital Signs Last 24 Hrs  T(C): 36.8 (2019 07:19), Max: 36.9 (2019 20:40)  T(F): 98.3 (2019 07:19), Max: 98.5 (2019 20:40)  HR: 65 (2019 07:19) (65 - 95)  BP: 102/56 (2019 07:19) (102/56 - 144/75)  BP(mean): --  RR: 18 (2019 07:19) (18 - 19)  SpO2: 90% (2019 07:19) (90% - 96%)    PHYSICAL EXAM:    GENERAL: NAD, well-groomed  HEENT: PERRL, +EOMI  NECK: soft, Supple, No JVD,   CHEST/LUNG: + base crackles auscultation bilaterally; No wheezing  HEART: S1S2+, Regular rate and rhythm; No murmurs  ABDOMEN: Soft, Nontender, Nondistended; Bowel sounds present  EXTREMITIES:  2+ Peripheral Pulses, No clubbing, cyanosis, +LE edema b/l  SKIN: No rashes or lesions      LABS:                        10.3   7.80  )-----------( 270      ( 2019 06:23 )             35.8     07-03    142  |  102  |  10.0  ----------------------------<  126<H>  4.0   |  29.0  |  0.62    Ca    8.4<L>      2019 06:23  Mg     2.1     -    TPro  7.3  /  Alb  3.2<L>  /  TBili  0.6  /  DBili  x   /  AST  23  /  ALT  14  /  AlkPhos  112  07-02    PT/INR - ( 2019 13:22 )   PT: 13.2 sec;   INR: 1.14 ratio         PTT - ( 2019 13:22 )  PTT:33.1 sec  Urinalysis Basic - ( 2019 20:05 )    Color: Yellow / Appearance: Clear / S.015 / pH: x  Gluc: x / Ketone: Negative  / Bili: Negative / Urobili: Negative mg/dL   Blood: x / Protein: 15 mg/dL / Nitrite: Negative   Leuk Esterase: Negative / RBC: x / WBC x   Sq Epi: x / Non Sq Epi: x / Bacteria: x          MEDICATIONS  (STANDING):  ALBUTerol    90 MICROgram(s) HFA Inhaler 1 Puff(s) Inhalation every 4 hours  ALBUTerol/ipratropium for Nebulization 3 milliLiter(s) Nebulizer every 6 hours  atorvastatin 20 milliGRAM(s) Oral at bedtime  dextrose 5%. 1000 milliLiter(s) (50 mL/Hr) IV Continuous <Continuous>  dextrose 50% Injectable 12.5 Gram(s) IV Push once  dextrose 50% Injectable 25 Gram(s) IV Push once  dextrose 50% Injectable 25 Gram(s) IV Push once  enoxaparin Injectable 40 milliGRAM(s) SubCutaneous daily  furosemide   Injectable 40 milliGRAM(s) IV Push daily  insulin lispro (HumaLOG) corrective regimen sliding scale   SubCutaneous three times a day before meals  insulin lispro (HumaLOG) corrective regimen sliding scale   SubCutaneous at bedtime  levoFLOXacin  Tablet 500 milliGRAM(s) Oral every 24 hours  losartan 25 milliGRAM(s) Oral daily  metoprolol succinate ER 25 milliGRAM(s) Oral daily  pantoprazole    Tablet 40 milliGRAM(s) Oral before breakfast  saccharomyces boulardii 250 milliGRAM(s) Oral two times a day  tiotropium 18 MICROgram(s) Capsule 1 Capsule(s) Inhalation daily    MEDICATIONS  (PRN):  ALBUTerol    0.083% 2.5 milliGRAM(s) Nebulizer every 2 hours PRN Shortness of Breath and/or Wheezing  aluminum hydroxide/magnesium hydroxide/simethicone Suspension 30 milliLiter(s) Oral every 6 hours PRN Dyspepsia  dextrose 40% Gel 15 Gram(s) Oral once PRN Blood Glucose LESS THAN 70 milliGRAM(s)/deciliter  glucagon  Injectable 1 milliGRAM(s) IntraMuscular once PRN Glucose LESS THAN 70 milligrams/deciliter      RADIOLOGY & ADDITIONAL TESTS:  2Decho:  PHYSICIAN INTERPRETATION:  Left Ventricle: The left ventricular internal cavity size is normal. Left   ventricular wall thickness is mildly increased.  Global LV systolic function was normal. Left ventricular ejection   fraction, by visual estimation, is 60 to 65%. Spectral Doppler shows   impaired relaxation pattern of left ventricular myocardial filling (Grade   I diastolic dysfunction).  Right Ventricle: The right ventricular size is mildly enlarged.  Left Atrium: Mildly enlarged left atrium.  Right Atrium: The right atrium is normal in size.  Pericardium: There is no evidence of pericardial effusion.  Mitral Valve: Thickening of the anterior and posterior mitral valve   leaflets. Trace mitral valve regurgitation is seen.  Tricuspid Valve: Structurally normal tricuspid valve, with normal leaflet   excursion. Mild-moderate tricuspid regurgitation is visualized. Estimated   pulmonary artery systolic pressure is 45.8 mmHg assuming a right atrial   pressure of 3 mmHg, which is consistent with mild pulmonary hypertension.  Aortic Valve: The aortic valve is trileaflet. No evidence of aortic valve   regurgitation is seen.  Pulmonic Valve: Structurally normal pulmonic valve, with normal leaflet   excursion. Trace pulmonic valve regurgitation.  Aorta: The aortic root is normal in size and structure.  Pulmonary Artery: The main pulmonary artery is normal in size.  Venous: A normal flow pattern is recorded from the right upper pulmonary   vein. The inferior vena cava is normal. The inferior vena cava was normal   sized, with respiratory size variation greater than 50%. The inferior   vena cava and the hepatic vein show a normal flow pattern.       Summary:   1. Left ventricular ejection fraction, by visual estimation, is 60 to   65%.   2. Normal global left ventricular systolic function.   3. Mildly increased LV wall thickness.   4. Spectral Doppler shows impaired relaxation pattern of left   ventricular myocardial filling (Grade I diastolic dysfunction).   5. Thickening of the anterior and posterior mitral valve leaflets.   6. Mild-moderate tricuspid regurgitation.   7. Estimated pulmonary artery systolic pressure is 45.8 mmHg assuming a   right atrial pressure of 3 mmHg, which is consistent with mild pulmonary   hypertension.    MD Reece Electronically signed on 7/3/2019 at 6:40:47 PM

## 2019-07-05 NOTE — PHYSICAL THERAPY INITIAL EVALUATION ADULT - ASSISTIVE DEVICE FOR STAIR TRANSFER, REHAB EVAL
left rail up/right rail down/2 hands on one rail down, states family helps her at home when she needs to do stairs

## 2019-07-05 NOTE — PHYSICAL THERAPY INITIAL EVALUATION ADULT - ADDITIONAL COMMENTS
Pt lives in a house with 3 steps to enter with 1 rails and  0 stairs inside.  Pt owns medical equipment: RW, SAC, Commode, Shower Chair  Pt lives with: Daughter: They are available to provide 24hr care

## 2019-07-05 NOTE — DISCHARGE NOTE PROVIDER - CARE PROVIDER_API CALL
Kevin Torres (MD; MPH)  Cardiology; Internal Medicine  [address] [place] [address]  Phone: [phone]  Fax: [phone]  Follow Up Time: Doni Potts (MD)  Cardiovascular Disease; Critical Care Medicine; Internal Medicine  [address] [place] [address]  Phone: [phone]  Fax: [phone]  Follow Up Time:

## 2019-07-05 NOTE — PROGRESS NOTE ADULT - SUBJECTIVE AND OBJECTIVE BOX
[facility]                              [address] [place] [address]                                                 PHONE: [phone]                                                 FAX: [phone]  -----------------------------------------------------------------------------------------------  Pt seen and examined. FU for  shortness of breath     Overnight events/Complaints: Pt with low O2 sats this AM that has improved. Pt denies any significant edema.     Vital Signs Last 24 Hrs  T(C): 36.6 (05 Jul 2019 07:29), Max: 36.8 (04 Jul 2019 15:30)  T(F): 97.9 (05 Jul 2019 07:29), Max: 98.3 (04 Jul 2019 15:30)  HR: 68 (05 Jul 2019 08:40) (68 - 91)  BP: 115/60 (05 Jul 2019 07:29) (112/51 - 132/74)  BP(mean): --  RR: 18 (05 Jul 2019 07:29) (18 - 19)  SpO2: 88% (05 Jul 2019 08:40) (88% - 93%)  I&O's Summary    04 Jul 2019 07:01  -  05 Jul 2019 07:00  --------------------------------------------------------  IN: 0 mL / OUT: 2100 mL / NET: -2100 mL    05 Jul 2019 07:01  -  05 Jul 2019 08:47  --------------------------------------------------------  IN: 0 mL / OUT: 800 mL / NET: -800 mL        PHYSICAL EXAM:  Constitutional: Appears well developed, well nourished; alert and co-operative  HEENT:     Head: Normocephalic and atraumatic  Neck: supple. No JVD  Cardiovascular: regular S1 S2  Respiratory: Lungs clear to auscultation; no crepitations, no wheeze  Gastrointestinal:  Soft, Non-tender, + BS	  Musculoskeletal: Normal range of motion. No edema  Skin: Warm and dry. No cyanosis . No diaphoresis.  Neurologic: Alert oriented to time place and person. Normal strength and no tremor.        LABS:                        11.1   8.61  )-----------( x        ( 05 Jul 2019 07:54 )             39.6     07-05    143  |  92<L>  |  16.0  ----------------------------<  102  3.8   |  37.0<H>  |  0.80    Ca    8.8      05 Jul 2019 07:54  Mg     1.9     07-05      CARDIAC MARKERS ( 03 Jul 2019 12:32 )  x     / 0.06 ng/mL / 66 U/L / x     / x              RADIOLOGY & ADDITIONAL STUDIES: (reviewed)    CARDIOLOGY TESTING:(reviewed)     TELEMETRY (Independent visualization) : No arrhythmias    ECHO:  Summary:   1. Left ventricular ejection fraction, byvisual estimation, is 60 to   65%.   2. Normal global left ventricular systolic function.   3. Mildly increased LV wall thickness.   4. Spectral Doppler shows impaired relaxation pattern of left   ventricular myocardial filling (Grade I diastolic dysfunction).   5. Thickening of the anterior and posterior mitral valve leaflets.   6. Mild-moderate tricuspid regurgitation.   7. Estimated pulmonary artery systolic pressure is 45.8 mmHg assuming a   right atrial pressure of 3 mmHg, which is consistent with mild pulmonary   hypertension.      MEDICATIONS:(reviewed)  MEDICATIONS  (STANDING):  ALBUTerol    90 MICROgram(s) HFA Inhaler 1 Puff(s) Inhalation every 4 hours  ALBUTerol/ipratropium for Nebulization 3 milliLiter(s) Nebulizer every 6 hours  aspirin  chewable 81 milliGRAM(s) Oral daily  atorvastatin 20 milliGRAM(s) Oral at bedtime  dextrose 5%. 1000 milliLiter(s) (50 mL/Hr) IV Continuous <Continuous>  dextrose 50% Injectable 12.5 Gram(s) IV Push once  dextrose 50% Injectable 25 Gram(s) IV Push once  dextrose 50% Injectable 25 Gram(s) IV Push once  enoxaparin Injectable 40 milliGRAM(s) SubCutaneous daily  furosemide   Injectable 40 milliGRAM(s) IV Push daily  insulin lispro (HumaLOG) corrective regimen sliding scale   SubCutaneous three times a day before meals  insulin lispro (HumaLOG) corrective regimen sliding scale   SubCutaneous at bedtime  levoFLOXacin  Tablet 500 milliGRAM(s) Oral every 24 hours  losartan 25 milliGRAM(s) Oral daily  metoprolol succinate ER 25 milliGRAM(s) Oral daily  pantoprazole    Tablet 40 milliGRAM(s) Oral before breakfast  saccharomyces boulardii 250 milliGRAM(s) Oral two times a day  tiotropium 18 MICROgram(s) Capsule 1 Capsule(s) Inhalation daily      ASSESSMENT AND PLAN:    84y Female with past medical history significant for CAD remote PCI HTN HLD HFpEF COPD who presents with COPD exacerbations acute on chronic HFpEF; negative for AMI and TTE stable    Plan  1.  Change to po lasix in AM  2.  ASA and statin therapy   3.  COPD management  4.  Add aldactone for HFpEF  5.  Volume status improving

## 2019-07-05 NOTE — DISCHARGE NOTE PROVIDER - NSDCCPCAREPLAN_GEN_ALL_CORE_FT
PRINCIPAL DISCHARGE DIAGNOSIS  Diagnosis: COPD exacerbation  Assessment and Plan of Treatment: Continue with  meds as directed. Follow up with your primary doctor within 1 week of discharge & pulmonology      SECONDARY DISCHARGE DIAGNOSES  Diagnosis: Fluid overload  Assessment and Plan of Treatment: Continue with  meds as directed. Follow up with your primary doctor within 1 week of discharge & cardiology PRINCIPAL DISCHARGE DIAGNOSIS  Diagnosis: Acute on chronic diastolic heart failure  Assessment and Plan of Treatment: Continue medications as prescribed.   Follow up with Cardiology in 1 week.      SECONDARY DISCHARGE DIAGNOSES  Diagnosis: Type 2 diabetes mellitus  Assessment and Plan of Treatment: Continue home medications.   Follow up with PMD in 1 week.    Diagnosis: HTN (hypertension)  Assessment and Plan of Treatment: Continue home medications.   Follow up with PMD in 1 week.    Diagnosis: COPD with acute bronchitis  Assessment and Plan of Treatment: Finished antibiotics.   Continue home medications.   Follow up with PMD in 1 week. PRINCIPAL DISCHARGE DIAGNOSIS  Diagnosis: Acute on chronic diastolic heart failure  Assessment and Plan of Treatment: Continue medications as prescribed.   Follow up with Cardiology in 1 week.      SECONDARY DISCHARGE DIAGNOSES  Diagnosis: Thyroid nodule  Assessment and Plan of Treatment: Follow up with PMD for further work up.    Diagnosis: Type 2 diabetes mellitus  Assessment and Plan of Treatment: Continue home medications.   Follow up with PMD in 1 week.    Diagnosis: HTN (hypertension)  Assessment and Plan of Treatment: Continue home medications.   Follow up with PMD in 1 week.    Diagnosis: COPD with acute bronchitis  Assessment and Plan of Treatment: Finished antibiotics.   Continue home medications.   Follow up with PMD in 1 week.

## 2019-07-06 ENCOUNTER — TRANSCRIPTION ENCOUNTER (OUTPATIENT)
Age: 84
End: 2019-07-06

## 2019-07-06 VITALS
SYSTOLIC BLOOD PRESSURE: 119 MMHG | RESPIRATION RATE: 18 BRPM | HEART RATE: 64 BPM | OXYGEN SATURATION: 97 % | DIASTOLIC BLOOD PRESSURE: 76 MMHG

## 2019-07-06 LAB
GLUCOSE BLDC GLUCOMTR-MCNC: 109 MG/DL — HIGH (ref 70–99)
GLUCOSE BLDC GLUCOMTR-MCNC: 118 MG/DL — HIGH (ref 70–99)

## 2019-07-06 PROCEDURE — 82962 GLUCOSE BLOOD TEST: CPT

## 2019-07-06 PROCEDURE — 84132 ASSAY OF SERUM POTASSIUM: CPT

## 2019-07-06 PROCEDURE — 93306 TTE W/DOPPLER COMPLETE: CPT

## 2019-07-06 PROCEDURE — 83735 ASSAY OF MAGNESIUM: CPT

## 2019-07-06 PROCEDURE — 82947 ASSAY GLUCOSE BLOOD QUANT: CPT

## 2019-07-06 PROCEDURE — 82330 ASSAY OF CALCIUM: CPT

## 2019-07-06 PROCEDURE — 71250 CT THORAX DX C-: CPT

## 2019-07-06 PROCEDURE — 83880 ASSAY OF NATRIURETIC PEPTIDE: CPT

## 2019-07-06 PROCEDURE — 85730 THROMBOPLASTIN TIME PARTIAL: CPT

## 2019-07-06 PROCEDURE — 71045 X-RAY EXAM CHEST 1 VIEW: CPT

## 2019-07-06 PROCEDURE — 83605 ASSAY OF LACTIC ACID: CPT

## 2019-07-06 PROCEDURE — 80048 BASIC METABOLIC PNL TOTAL CA: CPT

## 2019-07-06 PROCEDURE — 99239 HOSP IP/OBS DSCHRG MGMT >30: CPT

## 2019-07-06 PROCEDURE — 85610 PROTHROMBIN TIME: CPT

## 2019-07-06 PROCEDURE — 80053 COMPREHEN METABOLIC PANEL: CPT

## 2019-07-06 PROCEDURE — 84484 ASSAY OF TROPONIN QUANT: CPT

## 2019-07-06 PROCEDURE — 81001 URINALYSIS AUTO W/SCOPE: CPT

## 2019-07-06 PROCEDURE — 76536 US EXAM OF HEAD AND NECK: CPT

## 2019-07-06 PROCEDURE — 82803 BLOOD GASES ANY COMBINATION: CPT

## 2019-07-06 PROCEDURE — 85027 COMPLETE CBC AUTOMATED: CPT

## 2019-07-06 PROCEDURE — 99285 EMERGENCY DEPT VISIT HI MDM: CPT | Mod: 25

## 2019-07-06 PROCEDURE — 84443 ASSAY THYROID STIM HORMONE: CPT

## 2019-07-06 PROCEDURE — 84295 ASSAY OF SERUM SODIUM: CPT

## 2019-07-06 PROCEDURE — 85014 HEMATOCRIT: CPT

## 2019-07-06 PROCEDURE — 96374 THER/PROPH/DIAG INJ IV PUSH: CPT

## 2019-07-06 PROCEDURE — 96375 TX/PRO/DX INJ NEW DRUG ADDON: CPT

## 2019-07-06 PROCEDURE — 84436 ASSAY OF TOTAL THYROXINE: CPT

## 2019-07-06 PROCEDURE — 97163 PT EVAL HIGH COMPLEX 45 MIN: CPT

## 2019-07-06 PROCEDURE — 93005 ELECTROCARDIOGRAM TRACING: CPT

## 2019-07-06 PROCEDURE — 94760 N-INVAS EAR/PLS OXIMETRY 1: CPT

## 2019-07-06 PROCEDURE — 87040 BLOOD CULTURE FOR BACTERIA: CPT

## 2019-07-06 PROCEDURE — 82550 ASSAY OF CK (CPK): CPT

## 2019-07-06 PROCEDURE — 83036 HEMOGLOBIN GLYCOSYLATED A1C: CPT

## 2019-07-06 PROCEDURE — 82435 ASSAY OF BLOOD CHLORIDE: CPT

## 2019-07-06 PROCEDURE — 36415 COLL VENOUS BLD VENIPUNCTURE: CPT

## 2019-07-06 PROCEDURE — 84480 ASSAY TRIIODOTHYRONINE (T3): CPT

## 2019-07-06 PROCEDURE — 94640 AIRWAY INHALATION TREATMENT: CPT

## 2019-07-06 RX ORDER — FLUTICASONE PROPIONATE AND SALMETEROL 50; 250 UG/1; UG/1
1 POWDER ORAL; RESPIRATORY (INHALATION)
Qty: 1 | Refills: 0
Start: 2019-07-06 | End: 2019-08-04

## 2019-07-06 RX ORDER — SPIRONOLACTONE 25 MG/1
1 TABLET, FILM COATED ORAL
Qty: 30 | Refills: 0
Start: 2019-07-06 | End: 2019-08-04

## 2019-07-06 RX ORDER — FUROSEMIDE 40 MG
1 TABLET ORAL
Qty: 30 | Refills: 0
Start: 2019-07-06 | End: 2019-08-04

## 2019-07-06 RX ORDER — FLUTICASONE PROPIONATE AND SALMETEROL 50; 250 UG/1; UG/1
1 POWDER ORAL; RESPIRATORY (INHALATION)
Qty: 0 | Refills: 0 | DISCHARGE

## 2019-07-06 RX ORDER — METOPROLOL TARTRATE 50 MG
1 TABLET ORAL
Qty: 30 | Refills: 0
Start: 2019-07-06 | End: 2019-08-04

## 2019-07-06 RX ORDER — LOSARTAN POTASSIUM 100 MG/1
1 TABLET, FILM COATED ORAL
Qty: 30 | Refills: 0
Start: 2019-07-06 | End: 2019-08-04

## 2019-07-06 RX ORDER — METOPROLOL TARTRATE 50 MG
1 TABLET ORAL
Qty: 0 | Refills: 0 | DISCHARGE

## 2019-07-06 RX ORDER — ASPIRIN/CALCIUM CARB/MAGNESIUM 324 MG
1 TABLET ORAL
Qty: 30 | Refills: 0
Start: 2019-07-06 | End: 2019-08-04

## 2019-07-06 RX ORDER — FUROSEMIDE 40 MG
20 TABLET ORAL DAILY
Refills: 0 | Status: DISCONTINUED | OUTPATIENT
Start: 2019-07-06 | End: 2019-07-06

## 2019-07-06 RX ADMIN — Medication 20 MILLIGRAM(S): at 11:06

## 2019-07-06 RX ADMIN — ENOXAPARIN SODIUM 40 MILLIGRAM(S): 100 INJECTION SUBCUTANEOUS at 11:06

## 2019-07-06 RX ADMIN — SPIRONOLACTONE 25 MILLIGRAM(S): 25 TABLET, FILM COATED ORAL at 05:15

## 2019-07-06 RX ADMIN — PANTOPRAZOLE SODIUM 40 MILLIGRAM(S): 20 TABLET, DELAYED RELEASE ORAL at 05:15

## 2019-07-06 RX ADMIN — Medication 250 MILLIGRAM(S): at 05:15

## 2019-07-06 RX ADMIN — Medication 81 MILLIGRAM(S): at 11:06

## 2019-07-06 NOTE — DISCHARGE NOTE NURSING/CASE MANAGEMENT/SOCIAL WORK - NSDCDPATPORTLINK_GEN_ALL_CORE
You can access the [employer] Patient Portal, offered by [facility], by registering with the following website: [email or web address]

## 2019-07-06 NOTE — DISCHARGE NOTE NURSING/CASE MANAGEMENT/SOCIAL WORK - NSDCFUADDAPPT_GEN_ALL_CORE_FT
Follow up with PMD in 1 week.  Follow up with Cardiology in 1 week.  Follow up with Pulmonary in 1 week. Follow up with PMD in 1 week.  Follow up with Cardiology in 1 week.  Follow up with Pulmonary in 1 week.  Pt on home oxygen prior to admission 2-3 L via [place]. Maybe [employer], unable to recall name of company.   Pt lives with lots of family, grandson at bedside stated lots of people to supervise and take on her.  no issue with meds, meds sent to both [facility] and [facility].

## 2019-07-06 NOTE — PROVIDER CONTACT NOTE (MEDICATION) - ACTION/TREATMENT ORDERED:
Reschedule lasix and Bradley till 8am and retake Pt B/P at that time. Hold lopressor now as per perpita

## 2019-07-06 NOTE — PROGRESS NOTE ADULT - SUBJECTIVE AND OBJECTIVE BOX
[place] [facility] - [facility], THE HEART CENTER                                   [address] [place] [address]                                                      PHONE: [phone]                                                         FAX: [phone]  [email or web address]  ---------------------------------------------------------------------------------------------------------------------------------    Overnight events/patient complaints:  sob/edema improving     PAST MEDICAL & SURGICAL HISTORY:  Stented coronary artery: Mi in 2014, s/p stent of right coronary artery  Bladder prolapse, female, acquired  Bladder tumor  CAD (coronary artery disease)  Neuropathy  High cholesterol  Hypertension  Diabetes  Status post cardiac catheterization  Ankle fracture: Rt      No Known Allergies    MEDICATIONS  (STANDING):  ALBUTerol    90 MICROgram(s) HFA Inhaler 1 Puff(s) Inhalation every 4 hours  ALBUTerol/ipratropium for Nebulization 3 milliLiter(s) Nebulizer every 6 hours  aspirin  chewable 81 milliGRAM(s) Oral daily  atorvastatin 20 milliGRAM(s) Oral at bedtime  dextrose 5%. 1000 milliLiter(s) (50 mL/Hr) IV Continuous <Continuous>  dextrose 50% Injectable 12.5 Gram(s) IV Push once  dextrose 50% Injectable 25 Gram(s) IV Push once  dextrose 50% Injectable 25 Gram(s) IV Push once  enoxaparin Injectable 40 milliGRAM(s) SubCutaneous daily  furosemide   Injectable 40 milliGRAM(s) IV Push daily  insulin lispro (HumaLOG) corrective regimen sliding scale   SubCutaneous three times a day before meals  insulin lispro (HumaLOG) corrective regimen sliding scale   SubCutaneous at bedtime  levoFLOXacin  Tablet 500 milliGRAM(s) Oral every 24 hours  losartan 25 milliGRAM(s) Oral daily  metoprolol succinate ER 25 milliGRAM(s) Oral daily  pantoprazole    Tablet 40 milliGRAM(s) Oral before breakfast  saccharomyces boulardii 250 milliGRAM(s) Oral two times a day  spironolactone 25 milliGRAM(s) Oral daily  tiotropium 18 MICROgram(s) Capsule 1 Capsule(s) Inhalation daily    MEDICATIONS  (PRN):  ALBUTerol    0.083% 2.5 milliGRAM(s) Nebulizer every 2 hours PRN Shortness of Breath and/or Wheezing  aluminum hydroxide/magnesium hydroxide/simethicone Suspension 30 milliLiter(s) Oral every 6 hours PRN Dyspepsia  dextrose 40% Gel 15 Gram(s) Oral once PRN Blood Glucose LESS THAN 70 milliGRAM(s)/deciliter  glucagon  Injectable 1 milliGRAM(s) IntraMuscular once PRN Glucose LESS THAN 70 milligrams/deciliter      Vital Signs Last 24 Hrs  T(C): 36.8 (06 Jul 2019 00:52), Max: 36.8 (06 Jul 2019 00:52)  T(F): 98.2 (06 Jul 2019 00:52), Max: 98.2 (06 Jul 2019 00:52)  HR: 77 (06 Jul 2019 05:04) (68 - 80)  BP: 96/57 (06 Jul 2019 05:04) (96/57 - 123/59)  BP(mean): --  RR: 18 (06 Jul 2019 05:04) (18 - 18)  SpO2: 94% (06 Jul 2019 05:04) (88% - 95%)  ICU Vital Signs Last 24 Hrs  TAMIKO KELLY  I&O's Detail    05 Jul 2019 07:01  -  06 Jul 2019 07:00  --------------------------------------------------------  IN:  Total IN: 0 mL    OUT:    Voided: 2400 mL  Total OUT: 2400 mL    Total NET: -2400 mL        I&O's Summary    05 Jul 2019 07:01  -  06 Jul 2019 07:00  --------------------------------------------------------  IN: 0 mL / OUT: 2400 mL / NET: -2400 mL      Drug Dosing Weight  TAMIKO KELLY      PHYSICAL EXAM:  General: Appears well developed, well nourished alert and cooperative.  HEENT: Head; normocephalic, atraumatic.  Eyes: Pupils reactive, cornea wnl.  Neck: Supple, no nodes adenopathy, no NVD or carotid bruit or thyromegaly.  CARDIOVASCULAR: Normal S1 and S2, No murmur, rub, gallop or lift.   LUNGS: No rales, rhonchi or wheeze. Normal breath sounds bilaterally.  ABDOMEN: Soft, nontender without mass or organomegaly. bowel sounds normoactive.  EXTREMITIES: No clubbing, cyanosis or edema. Distal pulses wnl.   SKIN: warm and dry with normal turgor.  NEURO: Alert/oriented x 3/normal motor exam. No pathologic reflexes.    PSYCH: normal affect.        LABS:                        11.1   8.61  )-----------( 389      ( 05 Jul 2019 07:54 )             39.6     07-05    143  |  92<L>  |  16.0  ----------------------------<  102  3.8   |  37.0<H>  |  0.80    Ca    8.8      05 Jul 2019 07:54  Mg     1.9     07-05       ASSESSMENT AND PLAN:    84y Female with past medical history significant for CAD remote PCI HTN HLD HFpEF COPD who presents with COPD exacerbations acute on chronic HFpEF; negative for AMI and TTE stable    Plan  1.  Change to po lasix 40 mg daily   2.  ASA and statin therapy   3.  COPD management  4.   on  aldactone for HFpEF  5.  Volume status improving      Thank you for allowing [facility] to participate in the care of this patient.  Please feel free to call with any questions or concerns. [place] [facility] - [facility], [facility]                                   [address] [place] [address]                                                      PHONE: [phone]                                                         FAX: [phone]  [email or web address]  ---------------------------------------------------------------------------------------------------------------------------------    Overnight events/patient complaints:  sob/edema improving     PAST MEDICAL & SURGICAL HISTORY:  Stented coronary artery: Mi in 2014, s/p stent of right coronary artery  Bladder prolapse, female, acquired  Bladder tumor  CAD (coronary artery disease)  Neuropathy  High cholesterol  Hypertension  Diabetes  Status post cardiac catheterization  Ankle fracture: Rt      No Known Allergies    MEDICATIONS  (STANDING):  ALBUTerol    90 MICROgram(s) HFA Inhaler 1 Puff(s) Inhalation every 4 hours  ALBUTerol/ipratropium for Nebulization 3 milliLiter(s) Nebulizer every 6 hours  aspirin  chewable 81 milliGRAM(s) Oral daily  atorvastatin 20 milliGRAM(s) Oral at bedtime  dextrose 5%. 1000 milliLiter(s) (50 mL/Hr) IV Continuous <Continuous>  dextrose 50% Injectable 12.5 Gram(s) IV Push once  dextrose 50% Injectable 25 Gram(s) IV Push once  dextrose 50% Injectable 25 Gram(s) IV Push once  enoxaparin Injectable 40 milliGRAM(s) SubCutaneous daily  furosemide   Injectable 40 milliGRAM(s) IV Push daily  insulin lispro (HumaLOG) corrective regimen sliding scale   SubCutaneous three times a day before meals  insulin lispro (HumaLOG) corrective regimen sliding scale   SubCutaneous at bedtime  levoFLOXacin  Tablet 500 milliGRAM(s) Oral every 24 hours  losartan 25 milliGRAM(s) Oral daily  metoprolol succinate ER 25 milliGRAM(s) Oral daily  pantoprazole    Tablet 40 milliGRAM(s) Oral before breakfast  saccharomyces boulardii 250 milliGRAM(s) Oral two times a day  spironolactone 25 milliGRAM(s) Oral daily  tiotropium 18 MICROgram(s) Capsule 1 Capsule(s) Inhalation daily    MEDICATIONS  (PRN):  ALBUTerol    0.083% 2.5 milliGRAM(s) Nebulizer every 2 hours PRN Shortness of Breath and/or Wheezing  aluminum hydroxide/magnesium hydroxide/simethicone Suspension 30 milliLiter(s) Oral every 6 hours PRN Dyspepsia  dextrose 40% Gel 15 Gram(s) Oral once PRN Blood Glucose LESS THAN 70 milliGRAM(s)/deciliter  glucagon  Injectable 1 milliGRAM(s) IntraMuscular once PRN Glucose LESS THAN 70 milligrams/deciliter      Vital Signs Last 24 Hrs  T(C): 36.8 (06 Jul 2019 00:52), Max: 36.8 (06 Jul 2019 00:52)  T(F): 98.2 (06 Jul 2019 00:52), Max: 98.2 (06 Jul 2019 00:52)  HR: 77 (06 Jul 2019 05:04) (68 - 80)  BP: 96/57 (06 Jul 2019 05:04) (96/57 - 123/59)  BP(mean): --  RR: 18 (06 Jul 2019 05:04) (18 - 18)  SpO2: 94% (06 Jul 2019 05:04) (88% - 95%)  ICU Vital Signs Last 24 Hrs  TAMIKO KELLY  I&O's Detail    05 Jul 2019 07:01  -  06 Jul 2019 07:00  --------------------------------------------------------  IN:  Total IN: 0 mL    OUT:    Voided: 2400 mL  Total OUT: 2400 mL    Total NET: -2400 mL        I&O's Summary    05 Jul 2019 07:01  -  06 Jul 2019 07:00  --------------------------------------------------------  IN: 0 mL / OUT: 2400 mL / NET: -2400 mL      Drug Dosing Weight  TAMIKO KELLY      PHYSICAL EXAM:  General: Appears well developed, well nourished alert and cooperative.  HEENT: Head; normocephalic, atraumatic.  Eyes: Pupils reactive, cornea wnl.  Neck: Supple, no nodes adenopathy, no NVD or carotid bruit or thyromegaly.  CARDIOVASCULAR: Normal S1 and S2, No murmur, rub, gallop or lift.   LUNGS: No rales, rhonchi or wheeze. Normal breath sounds bilaterally.  ABDOMEN: Soft, nontender without mass or organomegaly. bowel sounds normoactive.  EXTREMITIES: No clubbing, cyanosis or edema. Distal pulses wnl.   SKIN: warm and dry with normal turgor.  NEURO: Alert/oriented x 3/normal motor exam. No pathologic reflexes.    PSYCH: normal affect.        LABS:                        11.1   8.61  )-----------( 389      ( 05 Jul 2019 07:54 )             39.6     07-05    143  |  92<L>  |  16.0  ----------------------------<  102  3.8   |  37.0<H>  |  0.80    Ca    8.8      05 Jul 2019 07:54  Mg     1.9     07-05       ASSESSMENT AND PLAN:    84y Female with past medical history significant for CAD remote PCI HTN HLD HFpEF COPD who presents with COPD exacerbations acute on chronic HFpEF; negative for AMI and TTE stable    Plan  1.  Change to po lasix 20 mg daily   2.  ASA and statin therapy   3.  COPD management  4.   on  aldactone for HFpEF  5.  Volume status improving      Thank you for allowing [facility] to participate in the care of this patient.  Please feel free to call with any questions or concerns. [place] [facility] - [facility], THE HEART CENTER                                   [address] [place] [address]                                                      PHONE: [phone]                                                         FAX: [phone]  [email or web address]  ---------------------------------------------------------------------------------------------------------------------------------    Overnight events/patient complaints:  sob/edema improving     PAST MEDICAL & SURGICAL HISTORY:  Stented coronary artery: Mi in 2014, s/p stent of right coronary artery  Bladder prolapse, female, acquired  Bladder tumor  CAD (coronary artery disease)  Neuropathy  High cholesterol  Hypertension  Diabetes  Status post cardiac catheterization  Ankle fracture: Rt      No Known Allergies    MEDICATIONS  (STANDING):  ALBUTerol    90 MICROgram(s) HFA Inhaler 1 Puff(s) Inhalation every 4 hours  ALBUTerol/ipratropium for Nebulization 3 milliLiter(s) Nebulizer every 6 hours  aspirin  chewable 81 milliGRAM(s) Oral daily  atorvastatin 20 milliGRAM(s) Oral at bedtime  dextrose 5%. 1000 milliLiter(s) (50 mL/Hr) IV Continuous <Continuous>  dextrose 50% Injectable 12.5 Gram(s) IV Push once  dextrose 50% Injectable 25 Gram(s) IV Push once  dextrose 50% Injectable 25 Gram(s) IV Push once  enoxaparin Injectable 40 milliGRAM(s) SubCutaneous daily  furosemide   Injectable 40 milliGRAM(s) IV Push daily  insulin lispro (HumaLOG) corrective regimen sliding scale   SubCutaneous three times a day before meals  insulin lispro (HumaLOG) corrective regimen sliding scale   SubCutaneous at bedtime  levoFLOXacin  Tablet 500 milliGRAM(s) Oral every 24 hours  losartan 25 milliGRAM(s) Oral daily  metoprolol succinate ER 25 milliGRAM(s) Oral daily  pantoprazole    Tablet 40 milliGRAM(s) Oral before breakfast  saccharomyces boulardii 250 milliGRAM(s) Oral two times a day  spironolactone 25 milliGRAM(s) Oral daily  tiotropium 18 MICROgram(s) Capsule 1 Capsule(s) Inhalation daily    MEDICATIONS  (PRN):  ALBUTerol    0.083% 2.5 milliGRAM(s) Nebulizer every 2 hours PRN Shortness of Breath and/or Wheezing  aluminum hydroxide/magnesium hydroxide/simethicone Suspension 30 milliLiter(s) Oral every 6 hours PRN Dyspepsia  dextrose 40% Gel 15 Gram(s) Oral once PRN Blood Glucose LESS THAN 70 milliGRAM(s)/deciliter  glucagon  Injectable 1 milliGRAM(s) IntraMuscular once PRN Glucose LESS THAN 70 milligrams/deciliter      Vital Signs Last 24 Hrs  T(C): 36.8 (06 Jul 2019 00:52), Max: 36.8 (06 Jul 2019 00:52)  T(F): 98.2 (06 Jul 2019 00:52), Max: 98.2 (06 Jul 2019 00:52)  HR: 77 (06 Jul 2019 05:04) (68 - 80)  BP: 96/57 (06 Jul 2019 05:04) (96/57 - 123/59)  BP(mean): --  RR: 18 (06 Jul 2019 05:04) (18 - 18)  SpO2: 94% (06 Jul 2019 05:04) (88% - 95%)  ICU Vital Signs Last 24 Hrs  TAMIKO KELLY  I&O's Detail    05 Jul 2019 07:01  -  06 Jul 2019 07:00  --------------------------------------------------------  IN:  Total IN: 0 mL    OUT:    Voided: 2400 mL  Total OUT: 2400 mL    Total NET: -2400 mL        I&O's Summary    05 Jul 2019 07:01  -  06 Jul 2019 07:00  --------------------------------------------------------  IN: 0 mL / OUT: 2400 mL / NET: -2400 mL      Drug Dosing Weight  TAMIKO KELLY      PHYSICAL EXAM:  General: Appears well developed, well nourished alert and cooperative.  HEENT: Head; normocephalic, atraumatic.  Eyes: Pupils reactive, cornea wnl.  Neck: Supple, no nodes adenopathy, no NVD or carotid bruit or thyromegaly.  CARDIOVASCULAR: Normal S1 and S2, No murmur, rub, gallop or lift.   LUNGS: No rales, rhonchi or wheeze. Normal breath sounds bilaterally.  ABDOMEN: Soft, nontender without mass or organomegaly. bowel sounds normoactive.  EXTREMITIES: No clubbing, cyanosis or edema. Distal pulses wnl.   SKIN: warm and dry with normal turgor.  NEURO: Alert/oriented x 3/normal motor exam. No pathologic reflexes.    PSYCH: normal affect.        LABS:                        11.1   8.61  )-----------( 389      ( 05 Jul 2019 07:54 )             39.6     07-05    143  |  92<L>  |  16.0  ----------------------------<  102  3.8   |  37.0<H>  |  0.80    Ca    8.8      05 Jul 2019 07:54  Mg     1.9     07-05       ASSESSMENT AND PLAN:    84y Female with past medical history significant for CAD remote PCI HTN HLD HFpEF COPD who presents with COPD exacerbations acute on chronic HFpEF; negative for AMI and TTE stable    Plan  1.  Change to po lasix 20 mg daily   2.  ASA and statin therapy   3.  COPD management  4.   on  aldactone for HFpEF  5.  Volume status improved    Thank you for allowing [facility] to participate in the care of this patient.  Please feel free to call with any questions or concerns.

## 2019-07-07 LAB
CULTURE RESULTS: SIGNIFICANT CHANGE UP
CULTURE RESULTS: SIGNIFICANT CHANGE UP
SPECIMEN SOURCE: SIGNIFICANT CHANGE UP
SPECIMEN SOURCE: SIGNIFICANT CHANGE UP

## 2019-07-09 ENCOUNTER — APPOINTMENT (OUTPATIENT)
Age: 84
End: 2019-07-09
Payer: MEDICARE

## 2019-07-09 VITALS
SYSTOLIC BLOOD PRESSURE: 136 MMHG | OXYGEN SATURATION: 97 % | DIASTOLIC BLOOD PRESSURE: 82 MMHG | RESPIRATION RATE: 17 BRPM | HEART RATE: 79 BPM

## 2019-07-09 PROCEDURE — 99349 HOME/RES VST EST MOD MDM 40: CPT

## 2019-07-09 RX ORDER — ALBUTEROL SULFATE 108 UG/1
AEROSOL, METERED RESPIRATORY (INHALATION)
Refills: 0 | Status: DISCONTINUED | COMMUNITY
End: 2019-07-09

## 2019-07-09 RX ORDER — UMECLIDINIUM 62.5 UG/1
62.5 AEROSOL, POWDER ORAL DAILY
Qty: 4 | Refills: 5 | Status: DISCONTINUED | COMMUNITY
Start: 2018-11-07 | End: 2019-07-09

## 2019-07-09 RX ORDER — FAMOTIDINE 20 MG/1
20 TABLET, FILM COATED ORAL
Qty: 180 | Refills: 3 | Status: DISCONTINUED | COMMUNITY
Start: 2018-11-02 | End: 2019-07-09

## 2019-07-10 ENCOUNTER — NON-APPOINTMENT (OUTPATIENT)
Age: 84
End: 2019-07-10

## 2019-07-10 ENCOUNTER — APPOINTMENT (OUTPATIENT)
Dept: CARDIOLOGY | Facility: CLINIC | Age: 84
End: 2019-07-10
Payer: MEDICARE

## 2019-07-10 VITALS
DIASTOLIC BLOOD PRESSURE: 66 MMHG | SYSTOLIC BLOOD PRESSURE: 104 MMHG | HEIGHT: 62 IN | HEART RATE: 94 BPM | OXYGEN SATURATION: 83 % | BODY MASS INDEX: 31.47 KG/M2 | WEIGHT: 171 LBS

## 2019-07-10 DIAGNOSIS — R06.02 SHORTNESS OF BREATH: ICD-10-CM

## 2019-07-10 PROCEDURE — 99215 OFFICE O/P EST HI 40 MIN: CPT

## 2019-07-10 PROCEDURE — 93000 ELECTROCARDIOGRAM COMPLETE: CPT

## 2019-07-10 NOTE — DISCUSSION/SUMMARY
[FreeTextEntry1] : Pt is an 85 y/o F with PMH CAD s/p NSTEMI 12/2014 ALLISON to RCA, normal LV function, HTN, HLD, DM, COPD, obesity who presents today for f/u. \par She was recently admitted with pneumonia and an exacerbation of diastolic congestive heart failure.\par Clinically, she is improving. Her breathing and volume status are significantly improved. She will continue taking Lasix 20, along with her spironolactone. She will check her daily weights, and if she gains 2 pounds, she is to call the office and her Lasix will be doubled. \par EKG today is unremarkable. Echocardiogram with normal LV function. Her blood pressure is at goal. I have stressed the importance of medication compliance, and limiting her salt and Cheeto intake. She will use her oxygen when she leaves the house.\par For her CAD, she will continue ASA, lipitor 40mg and metoprolol for prior NSTEMI\par \par The described plan was discussed with the pt.  All questions and concerns were addressed to the best of my knowledge. \par She will follow up with Dr. Lion in 6-8 weeks.

## 2019-07-10 NOTE — HISTORY OF PRESENT ILLNESS
[FreeTextEntry1] : Pt is an 85 y/o F who presents today for f/u.  She has CAD, NSTEMI 12/2014 s/p ALLISON to RCA while hospitalized at [facility] for ankle injury, normal LV function, DM, former smoker, COPD.  Recently diagnosed with bladder cancer and is now s/p BCG treatments.  No recent hematuria.  She otherwise feels well and has no cardiac complaints.\par She is accompanied by her daughter today\par \par More recently, she was admitted to [facility] with shortness of breath. She was found to be in acute decompensated diastolic heart failure, and was treated with IV diuretics. An echocardiogram demonstrated normal left ventricular systolic function, with mild LV diastolic dysfunction, and mildly elevated pulmonary pressures. She was eventually sent home on Lasix 20 mg daily, along with spironolactone. She was also treated for community acquired pneumonia during this hospitalization.\par \par   Today, she is here for follow-up. She is feeling well overall. She reports that her legs still have mild edema, though are 3 times smaller and than previously seen. She is walking around without significant issue. She has oxygen at home, but has not been using it. She has not been gaining weight, and reports compliance with her medications. Prior to her hospitalization, she was eating Cheetos every day.\par \par TTE 09/2018 shows normal LV function min MR/TR\par \par PMH: CAD NSTEMI s/p ALLISON to RCA 12/2014, HLD, HTN, DM, COPD, obesity\par Smoking status: quit 2014\par Current exercise: none\par Family hx: both parents with MI - unknown age\par Previous cardiac testing: stress test 10 yrs ago "normal"\par Previous hospitalizations: ankle surgery 2014

## 2019-07-10 NOTE — REVIEW OF SYSTEMS
[see HPI] : see HPI [Feeling Fatigued] : not feeling fatigued [Chest  Pressure] : no chest pressure [Shortness Of Breath] : no shortness of breath [Dyspnea on exertion] : dyspnea during exertion [Chest Pain] : no chest pain [Lower Ext Edema] : lower extremity edema [Leg Claudication] : no intermittent leg claudication [Palpitations] : no palpitations [Negative] : Heme/Lymph

## 2019-07-10 NOTE — PHYSICAL EXAM
[Well Groomed] : well groomed [Normal Appearance] : normal appearance [General Appearance - Well Developed] : well developed [General Appearance - Well Nourished] : well nourished [General Appearance - In No Acute Distress] : no acute distress [No Deformities] : no deformities [Eyelids - No Xanthelasma] : the eyelids demonstrated no xanthelasmas [Normal Conjunctiva] : the conjunctiva exhibited no abnormalities [Normal Oral Mucosa] : normal oral mucosa [No Oral Pallor] : no oral pallor [No Oral Cyanosis] : no oral cyanosis [Exaggerated Use Of Accessory Muscles For Inspiration] : no accessory muscle use [Respiration, Rhythm And Depth] : normal respiratory rhythm and effort [Normal S1] : normal S1 [Normal S2] : normal S2 [Normal Rate] : normal [S4] : no S4 [S3] : no S3 [No Murmur] : no murmurs heard [Right Carotid Bruit] : no bruit heard over the right carotid [Left Carotid Bruit] : no bruit heard over the left carotid [Right Femoral Bruit] : no bruit heard over the right femoral artery [Left Femoral Bruit] : no bruit heard over the left femoral artery [2+] : left 2+ [___ +] : bilateral [unfilled]U+ pretibial pitting edema [No Abnormalities] : the abdominal aorta was not enlarged and no bruit was heard [Abdomen Soft] : soft [Abdomen Mass (___ Cm)] : no abdominal mass palpated [Abdomen Tenderness] : non-tender [FreeTextEntry1] : ambulates with walker [Nail Clubbing] : no clubbing of the fingernails [Cyanosis, Localized] : no localized cyanosis [Petechial Hemorrhages (___cm)] : no petechial hemorrhages [Skin Color & Pigmentation] : normal skin color and pigmentation [] : no rash [Skin Lesions] : no skin lesions [No Venous Stasis] : no venous stasis [No Skin Ulcers] : no skin ulcer [No Xanthoma] : no  xanthoma was observed [Affect] : the affect was normal [Impaired Insight] : insight and judgment were intact [Oriented To Time, Place, And Person] : oriented to person, place, and time [No Anxiety] : not feeling anxious [Mood] : the mood was normal

## 2019-07-12 ENCOUNTER — APPOINTMENT (OUTPATIENT)
Dept: FAMILY MEDICINE | Facility: CLINIC | Age: 84
End: 2019-07-12
Payer: MEDICARE

## 2019-07-12 VITALS
DIASTOLIC BLOOD PRESSURE: 62 MMHG | SYSTOLIC BLOOD PRESSURE: 104 MMHG | OXYGEN SATURATION: 97 % | TEMPERATURE: 98.1 F | BODY MASS INDEX: 32.2 KG/M2 | HEART RATE: 74 BPM | WEIGHT: 175 LBS | HEIGHT: 62 IN

## 2019-07-12 VITALS — OXYGEN SATURATION: 91 %

## 2019-07-12 DIAGNOSIS — R79.89 OTHER SPECIFIED ABNORMAL FINDINGS OF BLOOD CHEMISTRY: ICD-10-CM

## 2019-07-12 DIAGNOSIS — R94.6 ABNORMAL RESULTS OF THYROID FUNCTION STUDIES: ICD-10-CM

## 2019-07-12 DIAGNOSIS — F32.9 MAJOR DEPRESSIVE DISORDER, SINGLE EPISODE, UNSPECIFIED: ICD-10-CM

## 2019-07-12 PROCEDURE — 99496 TRANSJ CARE MGMT HIGH F2F 7D: CPT | Mod: 25

## 2019-07-12 PROCEDURE — 36415 COLL VENOUS BLD VENIPUNCTURE: CPT

## 2019-07-15 ENCOUNTER — FORM ENCOUNTER (OUTPATIENT)
Age: 84
End: 2019-07-15

## 2019-07-15 ENCOUNTER — APPOINTMENT (OUTPATIENT)
Dept: PULMONOLOGY | Facility: CLINIC | Age: 84
End: 2019-07-15
Payer: MEDICARE

## 2019-07-15 VITALS
DIASTOLIC BLOOD PRESSURE: 58 MMHG | WEIGHT: 176 LBS | HEART RATE: 94 BPM | HEIGHT: 62 IN | TEMPERATURE: 98.9 F | RESPIRATION RATE: 17 BRPM | SYSTOLIC BLOOD PRESSURE: 128 MMHG | BODY MASS INDEX: 32.39 KG/M2 | OXYGEN SATURATION: 85 %

## 2019-07-15 LAB
25(OH)D3 SERPL-MCNC: 30.9 NG/ML
ALBUMIN SERPL ELPH-MCNC: 3.6 G/DL
ALP BLD-CCNC: 106 U/L
ALT SERPL-CCNC: 8 U/L
ANION GAP SERPL CALC-SCNC: 13 MMOL/L
APPEARANCE: CLEAR
AST SERPL-CCNC: 12 U/L
BACTERIA: NEGATIVE
BASOPHILS # BLD AUTO: 0.08 K/UL
BASOPHILS NFR BLD AUTO: 1 %
BILIRUB SERPL-MCNC: 0.4 MG/DL
BILIRUBIN URINE: NEGATIVE
BLOOD URINE: NEGATIVE
BUN SERPL-MCNC: 14 MG/DL
CALCIUM SERPL-MCNC: 9.1 MG/DL
CHLORIDE SERPL-SCNC: 106 MMOL/L
CHOLEST SERPL-MCNC: 120 MG/DL
CHOLEST/HDLC SERPL: 3 RATIO
CO2 SERPL-SCNC: 25 MMOL/L
COLOR: YELLOW
CREAT SERPL-MCNC: 0.78 MG/DL
EOSINOPHIL # BLD AUTO: 0.23 K/UL
EOSINOPHIL NFR BLD AUTO: 2.7 %
FERRITIN SERPL-MCNC: 19 NG/ML
FOLATE SERPL-MCNC: 13.9 NG/ML
GLUCOSE QUALITATIVE U: NEGATIVE
GLUCOSE SERPL-MCNC: 129 MG/DL
HCT VFR BLD CALC: 39.8 %
HDLC SERPL-MCNC: 40 MG/DL
HGB BLD-MCNC: 11.2 G/DL
HYALINE CASTS: 3 /LPF
IMM GRANULOCYTES NFR BLD AUTO: 0.2 %
IRON SATN MFR SERPL: 7 %
IRON SERPL-MCNC: 26 UG/DL
KETONES URINE: NEGATIVE
LDLC SERPL CALC-MCNC: 67 MG/DL
LEUKOCYTE ESTERASE URINE: NEGATIVE
LYMPHOCYTES # BLD AUTO: 1.4 K/UL
LYMPHOCYTES NFR BLD AUTO: 16.7 %
MAN DIFF?: NORMAL
MCHC RBC-ENTMCNC: 24.2 PG
MCHC RBC-ENTMCNC: 28.1 GM/DL
MCV RBC AUTO: 86.1 FL
MICROSCOPIC-UA: NORMAL
MONOCYTES # BLD AUTO: 0.69 K/UL
MONOCYTES NFR BLD AUTO: 8.2 %
NEUTROPHILS # BLD AUTO: 5.96 K/UL
NEUTROPHILS NFR BLD AUTO: 71.2 %
NITRITE URINE: NEGATIVE
PH URINE: 7
PLATELET # BLD AUTO: 382 K/UL
POTASSIUM SERPL-SCNC: 4.5 MMOL/L
PROT SERPL-MCNC: 7 G/DL
PROTEIN URINE: ABNORMAL
RBC # BLD: 4.62 M/UL
RBC # FLD: 19.9 %
RED BLOOD CELLS URINE: 2 /HPF
SODIUM SERPL-SCNC: 144 MMOL/L
SPECIFIC GRAVITY URINE: 1.02
SQUAMOUS EPITHELIAL CELLS: 6 /HPF
TIBC SERPL-MCNC: 360 UG/DL
TRIGL SERPL-MCNC: 63 MG/DL
UIBC SERPL-MCNC: 334 UG/DL
UROBILINOGEN URINE: NORMAL
VIT B12 SERPL-MCNC: 321 PG/ML
WBC # FLD AUTO: 8.38 K/UL
WHITE BLOOD CELLS URINE: 6 /HPF

## 2019-07-15 PROCEDURE — 99214 OFFICE O/P EST MOD 30 MIN: CPT

## 2019-07-15 RX ORDER — TIOTROPIUM BROMIDE 18 UG/1
18 CAPSULE ORAL; RESPIRATORY (INHALATION) DAILY
Qty: 1 | Refills: 3 | Status: DISCONTINUED | COMMUNITY
Start: 2018-09-14 | End: 2019-07-15

## 2019-07-15 NOTE — ASSESSMENT
[FreeTextEntry1] : The patient is an 85-year-old lady with diastolic cardiac dysfunction, bladder cancer treated, and underlying COPD\par \par Her COPD appears to be stable on a regimen of Advair Spiriva and p.r.n. use of albuterol\par She utilizes oxygen on an as-needed basis depending on her pulse oximetry\par \par She appears to have recovered from her recent episode of congestive heart failure\par \par I would like to see her again from a pulmonary standpoint in about 3 months time

## 2019-07-15 NOTE — HISTORY OF PRESENT ILLNESS
[FreeTextEntry1] : The patient is a very pleasant 85-year-old lady who comes in along with her daughter\par She has underlying COPD and she is on Advair and Spiriva\par \par She also has chronic diastolic congestive heart failure and she recently was treated at the hospital for exacerbation of CHF. This is likely due to dietary indiscretion with a high sodium\par \par She was doing better and discharged and she has since been followed up by her primary care physician as well as her cardiologist\par \par

## 2019-07-16 ENCOUNTER — APPOINTMENT (OUTPATIENT)
Dept: ULTRASOUND IMAGING | Facility: CLINIC | Age: 84
End: 2019-07-16
Payer: MEDICARE

## 2019-07-16 ENCOUNTER — OUTPATIENT (OUTPATIENT)
Dept: OUTPATIENT SERVICES | Facility: HOSPITAL | Age: 84
LOS: 1 days | End: 2019-07-16

## 2019-07-16 DIAGNOSIS — R94.6 ABNORMAL RESULTS OF THYROID FUNCTION STUDIES: ICD-10-CM

## 2019-07-16 DIAGNOSIS — S82.899A OTHER FRACTURE OF UNSPECIFIED LOWER LEG, INITIAL ENCOUNTER FOR CLOSED FRACTURE: Chronic | ICD-10-CM

## 2019-07-16 DIAGNOSIS — Z98.89 OTHER SPECIFIED POSTPROCEDURAL STATES: Chronic | ICD-10-CM

## 2019-07-16 PROCEDURE — 76536 US EXAM OF HEAD AND NECK: CPT | Mod: 26

## 2019-07-17 ENCOUNTER — OTHER (OUTPATIENT)
Age: 84
End: 2019-07-17

## 2019-07-22 ENCOUNTER — APPOINTMENT (OUTPATIENT)
Dept: OTOLARYNGOLOGY | Facility: CLINIC | Age: 84
End: 2019-07-22
Payer: MEDICARE

## 2019-07-22 VITALS
HEIGHT: 62 IN | SYSTOLIC BLOOD PRESSURE: 106 MMHG | HEART RATE: 73 BPM | WEIGHT: 172 LBS | BODY MASS INDEX: 31.65 KG/M2 | DIASTOLIC BLOOD PRESSURE: 66 MMHG

## 2019-07-22 PROCEDURE — 31575 DIAGNOSTIC LARYNGOSCOPY: CPT

## 2019-07-22 PROCEDURE — 99204 OFFICE O/P NEW MOD 45 MIN: CPT | Mod: 25

## 2019-07-22 RX ORDER — MITOMYCIN 40 MG/80ML
40 INJECTION, POWDER, LYOPHILIZED, FOR SOLUTION INTRAVENOUS
Qty: 1 | Refills: 0 | Status: COMPLETED | COMMUNITY
Start: 2019-05-21 | End: 2019-07-22

## 2019-07-22 RX ORDER — MITOMYCIN 40 MG/80ML
40 INJECTION, POWDER, LYOPHILIZED, FOR SOLUTION INTRAVENOUS
Qty: 1 | Refills: 0 | Status: COMPLETED | COMMUNITY
Start: 2019-05-28 | End: 2019-07-22

## 2019-07-22 RX ORDER — MITOMYCIN 40 MG/80ML
40 INJECTION, POWDER, LYOPHILIZED, FOR SOLUTION INTRAVENOUS
Qty: 1 | Refills: 2 | Status: COMPLETED | COMMUNITY
Start: 2019-05-07 | End: 2019-07-22

## 2019-07-22 NOTE — DATA REVIEWED
[de-identified] : US with multinodular thyroid with dominant solid nodule in the R. lower pole nodule measuring 2.0 x 2.0 x 1.8 cm.  There are other subcentimeter nodules in the left lobe.  No LAD.

## 2019-07-22 NOTE — HISTORY OF PRESENT ILLNESS
[de-identified] : This is a patient referred by Dr. Wright for thyroid nodule. This was found 1 month ago during hospital visit for legs swelling, per pt imaging was done and told have thyroid lesion and then was send for sono of the thyroid. \par sono of thyroid 7/2019-Heterogeneous thyroid gland with bilateral nodules. There is a 2.0 cm solid dominant right lobe nodule for which ultrasound-guided fine-needle aspiration biopsy is recommended.\par No dysphagia, dysphonia or dyspnea.\par Patient denies h/o radiation and has no family h/o thyroid cancer.\par

## 2019-07-22 NOTE — PROCEDURE
[Gag Reflex] : gag reflex preventing mirror examination [None] : none [Flexible Endoscope] : examined with the flexible endoscope [Serial Number: ___] : Serial Number: [unfilled] [de-identified] : No lesions in the NPx, OPx, HPx or larynx.  VC are mobile, airway patent.\par

## 2019-07-22 NOTE — PHYSICAL EXAM
[de-identified] : R. thyroid nodule, approx. 1.5 cm, mobile, no TTP, no LAD. [Laryngoscopy Performed] : laryngoscopy was performed, see procedure section for findings [FreeTextEntry1] : No concerning lesions in the OC/OPx on inspection or palpation.\par  [Normal] : no rashes

## 2019-07-25 ENCOUNTER — FORM ENCOUNTER (OUTPATIENT)
Age: 84
End: 2019-07-25

## 2019-07-26 ENCOUNTER — OUTPATIENT (OUTPATIENT)
Dept: OUTPATIENT SERVICES | Facility: HOSPITAL | Age: 84
LOS: 1 days | End: 2019-07-26
Payer: MEDICARE

## 2019-07-26 ENCOUNTER — RESULT REVIEW (OUTPATIENT)
Age: 84
End: 2019-07-26

## 2019-07-26 ENCOUNTER — APPOINTMENT (OUTPATIENT)
Dept: ULTRASOUND IMAGING | Facility: CLINIC | Age: 84
End: 2019-07-26
Payer: MEDICARE

## 2019-07-26 DIAGNOSIS — S82.899A OTHER FRACTURE OF UNSPECIFIED LOWER LEG, INITIAL ENCOUNTER FOR CLOSED FRACTURE: Chronic | ICD-10-CM

## 2019-07-26 DIAGNOSIS — Z98.89 OTHER SPECIFIED POSTPROCEDURAL STATES: Chronic | ICD-10-CM

## 2019-07-26 DIAGNOSIS — E04.1 NONTOXIC SINGLE THYROID NODULE: ICD-10-CM

## 2019-07-26 PROCEDURE — 10005 FNA BX W/US GDN 1ST LES: CPT

## 2019-07-26 PROCEDURE — 88173 CYTOPATH EVAL FNA REPORT: CPT | Mod: 26

## 2019-07-26 PROCEDURE — 88173 CYTOPATH EVAL FNA REPORT: CPT

## 2019-07-30 ENCOUNTER — LABORATORY RESULT (OUTPATIENT)
Age: 84
End: 2019-07-30

## 2019-07-30 ENCOUNTER — APPOINTMENT (OUTPATIENT)
Dept: UROLOGY | Facility: CLINIC | Age: 84
End: 2019-07-30
Payer: MEDICARE

## 2019-07-30 ENCOUNTER — APPOINTMENT (OUTPATIENT)
Dept: FAMILY MEDICINE | Facility: CLINIC | Age: 84
End: 2019-07-30
Payer: MEDICARE

## 2019-07-30 VITALS
SYSTOLIC BLOOD PRESSURE: 114 MMHG | OXYGEN SATURATION: 82 % | DIASTOLIC BLOOD PRESSURE: 60 MMHG | HEART RATE: 81 BPM | HEIGHT: 62 IN | WEIGHT: 175.5 LBS | BODY MASS INDEX: 32.3 KG/M2 | TEMPERATURE: 98.2 F

## 2019-07-30 VITALS — OXYGEN SATURATION: 90 %

## 2019-07-30 DIAGNOSIS — D64.9 ANEMIA, UNSPECIFIED: ICD-10-CM

## 2019-07-30 DIAGNOSIS — R80.9 PROTEINURIA, UNSPECIFIED: ICD-10-CM

## 2019-07-30 PROCEDURE — 99214 OFFICE O/P EST MOD 30 MIN: CPT | Mod: 25

## 2019-07-30 PROCEDURE — 82270 OCCULT BLOOD FECES: CPT

## 2019-07-30 PROCEDURE — 99213 OFFICE O/P EST LOW 20 MIN: CPT

## 2019-07-30 PROCEDURE — 36415 COLL VENOUS BLD VENIPUNCTURE: CPT

## 2019-07-30 NOTE — HISTORY OF PRESENT ILLNESS
[FreeTextEntry1] : patient presents in follow up . has known high grade bladder cancer.  She has not been voiding well and then had an incontinent episode in the office.  no hematuria. no urgency.  She has not had a pessary to the best of her knowledge

## 2019-07-30 NOTE — LETTER BODY
[Dear  ___] : Dear  [unfilled], [Courtesy Letter:] : I had the pleasure of seeing your patient, [unfilled], in my office today. [Please see my note below.] : Please see my note below. [Referral Closing:] : Thank you very much for seeing this patient.  If you have any questions, please do not hesitate to contact me. [Sincerely,] : Sincerely, [DrAna  ___] : Dr. CHAMBERS [FreeTextEntry3] : Ed\par \par Ike Sanders MD\par [facility]\par  of Urology\par [employer] at [facility]\par

## 2019-07-30 NOTE — ASSESSMENT
[FreeTextEntry1] : Impression:\par \par bladder cancer history\par prolapse\par \par Plan:\par cysto to assess for recurrence.\par Will likely need urogyn referral.

## 2019-07-31 ENCOUNTER — LABORATORY RESULT (OUTPATIENT)
Age: 84
End: 2019-07-31

## 2019-07-31 LAB
APPEARANCE: CLEAR
BACTERIA: NEGATIVE
BILIRUBIN URINE: NEGATIVE
BLOOD URINE: NEGATIVE
COLOR: NORMAL
GLUCOSE QUALITATIVE U: NEGATIVE
HYALINE CASTS: 2 /LPF
KETONES URINE: NEGATIVE
LEUKOCYTE ESTERASE URINE: NEGATIVE
MICROSCOPIC-UA: NORMAL
NITRITE URINE: NEGATIVE
PH URINE: 7
PROTEIN URINE: NEGATIVE
RED BLOOD CELLS URINE: 1 /HPF
SPECIFIC GRAVITY URINE: 1.01
SQUAMOUS EPITHELIAL CELLS: 7 /HPF
UROBILINOGEN URINE: NORMAL
WHITE BLOOD CELLS URINE: 2 /HPF

## 2019-08-01 ENCOUNTER — APPOINTMENT (OUTPATIENT)
Dept: UROLOGY | Facility: CLINIC | Age: 84
End: 2019-08-01
Payer: MEDICARE

## 2019-08-01 ENCOUNTER — LABORATORY RESULT (OUTPATIENT)
Age: 84
End: 2019-08-01

## 2019-08-01 PROCEDURE — 52000 CYSTOURETHROSCOPY: CPT

## 2019-08-02 LAB
BACTERIA UR CULT: NORMAL
BASOPHILS # BLD AUTO: 0.06 K/UL
BASOPHILS NFR BLD AUTO: 0.6 %
EOSINOPHIL # BLD AUTO: 0.45 K/UL
EOSINOPHIL NFR BLD AUTO: 4.6 %
FERRITIN SERPL-MCNC: 14 NG/ML
FOLATE SERPL-MCNC: 8.8 NG/ML
HCT VFR BLD CALC: 40.3 %
HGB BLD-MCNC: 11.3 G/DL
IMM GRANULOCYTES NFR BLD AUTO: 0.3 %
IRON SATN MFR SERPL: 6 %
IRON SERPL-MCNC: 21 UG/DL
LYMPHOCYTES # BLD AUTO: 2.14 K/UL
LYMPHOCYTES NFR BLD AUTO: 21.9 %
MAN DIFF?: NORMAL
MCHC RBC-ENTMCNC: 23.2 PG
MCHC RBC-ENTMCNC: 28 GM/DL
MCV RBC AUTO: 82.8 FL
MONOCYTES # BLD AUTO: 0.78 K/UL
MONOCYTES NFR BLD AUTO: 8 %
NEUTROPHILS # BLD AUTO: 6.29 K/UL
NEUTROPHILS NFR BLD AUTO: 64.6 %
PLATELET # BLD AUTO: 360 K/UL
RBC # BLD: 4.87 M/UL
RBC # FLD: 20.6 %
TIBC SERPL-MCNC: 350 UG/DL
UIBC SERPL-MCNC: 329 UG/DL
VIT B12 SERPL-MCNC: 289 PG/ML
WBC # FLD AUTO: 9.75 K/UL

## 2019-08-05 ENCOUNTER — RX RENEWAL (OUTPATIENT)
Age: 84
End: 2019-08-05

## 2019-08-08 ENCOUNTER — NON-APPOINTMENT (OUTPATIENT)
Age: 84
End: 2019-08-08

## 2019-08-08 ENCOUNTER — APPOINTMENT (OUTPATIENT)
Dept: CARDIOLOGY | Facility: CLINIC | Age: 84
End: 2019-08-08
Payer: MEDICARE

## 2019-08-08 VITALS
DIASTOLIC BLOOD PRESSURE: 72 MMHG | WEIGHT: 175 LBS | BODY MASS INDEX: 34.36 KG/M2 | SYSTOLIC BLOOD PRESSURE: 127 MMHG | OXYGEN SATURATION: 80 % | HEIGHT: 60 IN | HEART RATE: 97 BPM

## 2019-08-08 DIAGNOSIS — N32.89 OTHER SPECIFIED DISORDERS OF BLADDER: ICD-10-CM

## 2019-08-08 PROCEDURE — 99214 OFFICE O/P EST MOD 30 MIN: CPT | Mod: 25

## 2019-08-08 PROCEDURE — 93000 ELECTROCARDIOGRAM COMPLETE: CPT

## 2019-08-08 NOTE — PHYSICAL EXAM
[General Appearance - Well Developed] : well developed [Normal Appearance] : normal appearance [Well Groomed] : well groomed [General Appearance - Well Nourished] : well nourished [No Deformities] : no deformities [General Appearance - In No Acute Distress] : no acute distress [Eyelids - No Xanthelasma] : the eyelids demonstrated no xanthelasmas [Normal Conjunctiva] : the conjunctiva exhibited no abnormalities [Normal Oral Mucosa] : normal oral mucosa [No Oral Pallor] : no oral pallor [No Oral Cyanosis] : no oral cyanosis [Respiration, Rhythm And Depth] : normal respiratory rhythm and effort [Exaggerated Use Of Accessory Muscles For Inspiration] : no accessory muscle use [Heart Sounds] : normal S1 and S2 [Heart Rate And Rhythm] : heart rate and rhythm were normal [Murmurs] : no murmurs present [Edema] : no peripheral edema present [Arterial Pulses Normal] : the arterial pulses were normal [Abdomen Soft] : soft [Abdomen Tenderness] : non-tender [Abdomen Mass (___ Cm)] : no abdominal mass palpated [Nail Clubbing] : no clubbing of the fingernails [Cyanosis, Localized] : no localized cyanosis [Petechial Hemorrhages (___cm)] : no petechial hemorrhages [] : no ischemic changes [Oriented To Time, Place, And Person] : oriented to person, place, and time [Impaired Insight] : insight and judgment were intact [Mood] : the mood was normal [Affect] : the affect was normal [No Anxiety] : not feeling anxious [FreeTextEntry1] : ambulates with walker

## 2019-08-08 NOTE — HISTORY OF PRESENT ILLNESS
[FreeTextEntry1] : Pt is an 84 y/o F who presents today for f/u.  She has PMH CAD NSTEMI 12/2014 s/p ALLISON to RCA while hospitalized at [place] for ankle injury, normal LV function, DM, former smoker, COPD.  Recently diagnosed with bladder cancer s/p BCG treatments.  8/23/2019 is scheduled for urological surgery - "removal of a spot' seen on cystoscopy at [facility].  She notes that she otherwise feels well and has no cardiac complaints.  Her leg swelling has improved.  She denies CP, worsening SOB, diaphoresis, palpitations, dizziness, syncope, LE edema, PND. \par She is accompanied by her daughter today\par \par TTE 09/2018 shows normal LV function min MR/TR\par Nuclear stress test 10/2018 normal myocardial perfusion\par \par PMH: CAD NSTEMI s/p ALLISON to RCA 12/2014, HLD, HTN, DM, COPD, obesity\par Smoking status: quit 2014\par Current exercise: none\par Family hx: both parents with MI - unknown age\par Previous cardiac testing: stress test 10 yrs ago "normal"\par Previous hospitalizations: ankle surgery 2014

## 2019-08-08 NOTE — DISCUSSION/SUMMARY
[FreeTextEntry1] : Pt is an 86 y/o F with PMH CAD s/p NSTEMI 12/2014 ALLISON to RCA, normal LV function, HTN, HLD, DM, COPD, obesity who presents today for f/u.  She has been diagnosed with bladder cancer and is now BCG treatments.  Plan for surgery 08/2019\par TTE 9/2018 shows normal LV function with min MR/TR\par Nuclear stress test 10/2018 shows normal myocardial perfusion\par She appears euvolemic today - c/w lasix qd, low salt diet\par CAD s/p PCI: c/w ASA, lipitor 40mg and metoprolol for prior NSTEMI\par BP well controlled\par She is optimized from a cardiac standpoint.  At this time there are no cardiac contraindications for planned surgery\par DM: follows with PCP. Advised lifestyle modifications\par f/u 3-4 mnths or sooner PRN\par The described plan was discussed with the pt.  All questions and concerns were addressed to the best of my knowledge. \par

## 2019-08-08 NOTE — REVIEW OF SYSTEMS
[see HPI] : see HPI [Negative] : Heme/Lymph [Feeling Fatigued] : not feeling fatigued [Shortness Of Breath] : no shortness of breath [Chest  Pressure] : no chest pressure [Dyspnea on exertion] : dyspnea during exertion [Chest Pain] : no chest pain [Lower Ext Edema] : no extremity edema [Palpitations] : no palpitations [Leg Claudication] : no intermittent leg claudication

## 2019-08-09 ENCOUNTER — OUTPATIENT (OUTPATIENT)
Dept: OUTPATIENT SERVICES | Facility: HOSPITAL | Age: 84
LOS: 1 days | End: 2019-08-09
Payer: MEDICARE

## 2019-08-09 VITALS
HEART RATE: 92 BPM | SYSTOLIC BLOOD PRESSURE: 140 MMHG | WEIGHT: 174.17 LBS | RESPIRATION RATE: 16 BRPM | TEMPERATURE: 99 F | HEIGHT: 60 IN | DIASTOLIC BLOOD PRESSURE: 70 MMHG

## 2019-08-09 DIAGNOSIS — D49.4 NEOPLASM OF UNSPECIFIED BEHAVIOR OF BLADDER: ICD-10-CM

## 2019-08-09 DIAGNOSIS — S82.899A OTHER FRACTURE OF UNSPECIFIED LOWER LEG, INITIAL ENCOUNTER FOR CLOSED FRACTURE: Chronic | ICD-10-CM

## 2019-08-09 DIAGNOSIS — Z29.9 ENCOUNTER FOR PROPHYLACTIC MEASURES, UNSPECIFIED: ICD-10-CM

## 2019-08-09 DIAGNOSIS — I10 ESSENTIAL (PRIMARY) HYPERTENSION: ICD-10-CM

## 2019-08-09 DIAGNOSIS — Z01.818 ENCOUNTER FOR OTHER PREPROCEDURAL EXAMINATION: ICD-10-CM

## 2019-08-09 DIAGNOSIS — Z98.890 OTHER SPECIFIED POSTPROCEDURAL STATES: Chronic | ICD-10-CM

## 2019-08-09 DIAGNOSIS — Z98.89 OTHER SPECIFIED POSTPROCEDURAL STATES: Chronic | ICD-10-CM

## 2019-08-09 DIAGNOSIS — Z95.5 PRESENCE OF CORONARY ANGIOPLASTY IMPLANT AND GRAFT: ICD-10-CM

## 2019-08-09 DIAGNOSIS — E11.9 TYPE 2 DIABETES MELLITUS WITHOUT COMPLICATIONS: ICD-10-CM

## 2019-08-09 LAB
ANION GAP SERPL CALC-SCNC: 11 MMOL/L — SIGNIFICANT CHANGE UP (ref 5–17)
APPEARANCE UR: CLEAR — SIGNIFICANT CHANGE UP
BACTERIA # UR AUTO: ABNORMAL
BILIRUB UR-MCNC: NEGATIVE — SIGNIFICANT CHANGE UP
BUN SERPL-MCNC: 10 MG/DL — SIGNIFICANT CHANGE UP (ref 8–20)
CALCIUM SERPL-MCNC: 9.5 MG/DL — SIGNIFICANT CHANGE UP (ref 8.6–10.2)
CHLORIDE SERPL-SCNC: 103 MMOL/L — SIGNIFICANT CHANGE UP (ref 98–107)
CO2 SERPL-SCNC: 27 MMOL/L — SIGNIFICANT CHANGE UP (ref 22–29)
COLOR SPEC: YELLOW — SIGNIFICANT CHANGE UP
CREAT SERPL-MCNC: 0.69 MG/DL — SIGNIFICANT CHANGE UP (ref 0.5–1.3)
DIFF PNL FLD: ABNORMAL
EPI CELLS # UR: SIGNIFICANT CHANGE UP
GLUCOSE SERPL-MCNC: 121 MG/DL — HIGH (ref 70–115)
GLUCOSE UR QL: NEGATIVE MG/DL — SIGNIFICANT CHANGE UP
HCT VFR BLD CALC: 39.1 % — SIGNIFICANT CHANGE UP (ref 34.5–45)
HGB BLD-MCNC: 11.1 G/DL — LOW (ref 11.5–15.5)
KETONES UR-MCNC: NEGATIVE — SIGNIFICANT CHANGE UP
LEUKOCYTE ESTERASE UR-ACNC: ABNORMAL
MCHC RBC-ENTMCNC: 23.1 PG — LOW (ref 27–34)
MCHC RBC-ENTMCNC: 28.4 GM/DL — LOW (ref 32–36)
MCV RBC AUTO: 81.3 FL — SIGNIFICANT CHANGE UP (ref 80–100)
NITRITE UR-MCNC: NEGATIVE — SIGNIFICANT CHANGE UP
PH UR: 6 — SIGNIFICANT CHANGE UP (ref 5–8)
PLATELET # BLD AUTO: 430 K/UL — HIGH (ref 150–400)
POTASSIUM SERPL-MCNC: 4 MMOL/L — SIGNIFICANT CHANGE UP (ref 3.5–5.3)
POTASSIUM SERPL-SCNC: 4 MMOL/L — SIGNIFICANT CHANGE UP (ref 3.5–5.3)
PROT UR-MCNC: 30 MG/DL
RBC # BLD: 4.81 M/UL — SIGNIFICANT CHANGE UP (ref 3.8–5.2)
RBC # FLD: 20.7 % — HIGH (ref 10.3–14.5)
RBC CASTS # UR COMP ASSIST: ABNORMAL /HPF (ref 0–4)
SODIUM SERPL-SCNC: 141 MMOL/L — SIGNIFICANT CHANGE UP (ref 135–145)
SP GR SPEC: 1.02 — SIGNIFICANT CHANGE UP (ref 1.01–1.02)
UROBILINOGEN FLD QL: NEGATIVE MG/DL — SIGNIFICANT CHANGE UP
WBC # BLD: 9.82 K/UL — SIGNIFICANT CHANGE UP (ref 3.8–10.5)
WBC # FLD AUTO: 9.82 K/UL — SIGNIFICANT CHANGE UP (ref 3.8–10.5)
WBC UR QL: ABNORMAL

## 2019-08-09 PROCEDURE — G0463: CPT

## 2019-08-09 PROCEDURE — 85027 COMPLETE CBC AUTOMATED: CPT

## 2019-08-09 PROCEDURE — 36415 COLL VENOUS BLD VENIPUNCTURE: CPT

## 2019-08-09 PROCEDURE — 80048 BASIC METABOLIC PNL TOTAL CA: CPT

## 2019-08-09 PROCEDURE — 81001 URINALYSIS AUTO W/SCOPE: CPT

## 2019-08-09 PROCEDURE — 87086 URINE CULTURE/COLONY COUNT: CPT

## 2019-08-09 RX ORDER — CEFAZOLIN SODIUM 1 G
2000 VIAL (EA) INJECTION ONCE
Refills: 0 | Status: DISCONTINUED | OUTPATIENT
Start: 2019-08-23 | End: 2019-09-11

## 2019-08-09 RX ORDER — DENOSUMAB 60 MG/ML
0 INJECTION SUBCUTANEOUS
Qty: 0 | Refills: 0 | DISCHARGE

## 2019-08-09 RX ORDER — PANTOPRAZOLE SODIUM 20 MG/1
1 TABLET, DELAYED RELEASE ORAL
Qty: 0 | Refills: 0 | DISCHARGE

## 2019-08-09 RX ORDER — SODIUM CHLORIDE 9 MG/ML
3 INJECTION INTRAMUSCULAR; INTRAVENOUS; SUBCUTANEOUS ONCE
Refills: 0 | Status: DISCONTINUED | OUTPATIENT
Start: 2019-08-23 | End: 2019-09-11

## 2019-08-09 NOTE — H&P PST ADULT - HISTORY OF PRESENT ILLNESS
86 yo female with history of bladder cancer, pt states difficulty urinating planning cystoscopy bladder biopsy. 84 yo female with history of bladder cancer pt treated more than one year ago with BCG, pt states difficulty urinating planning cystoscopy bladder biopsy.

## 2019-08-09 NOTE — H&P PST ADULT - NSICDXPROBLEM_GEN_ALL_CORE_FT
PROBLEM DIAGNOSES  Problem: Hypertension  Assessment and Plan: Preop medical eval.    Problem: Stented coronary artery  Assessment and Plan: Preop cardiac eval    Problem: Diabetes  Assessment and Plan: POC glucose DOS    Problem: Bladder tumor  Assessment and Plan: Cystoscopy with bladder biopsy.    Problem: Need for prophylactic measure  Assessment and Plan: Caprini score 7, high VTE risk, SCDs ordered, surgical team to assess need for pharm proph

## 2019-08-09 NOTE — H&P PST ADULT - NSICDXPASTMEDICALHX_GEN_ALL_CORE_FT
PAST MEDICAL HISTORY:  Bladder prolapse, female, acquired     Bladder tumor     CAD (coronary artery disease)     Chronic obstructive pulmonary disease, unspecified COPD type     Diabetes     Diabetes     High cholesterol     Hypertension     Neuropathy     Stented coronary artery MI in 2014, s/p stent of right coronary artery

## 2019-08-09 NOTE — H&P PST ADULT - ASSESSMENT
86 yo female with bladder tumor.    CAPRINI VTE 2.0 SCORE [CLOT updated 2019]    AGE RELATED RISK FACTORS                                                       MOBILITY RELATED FACTORS  [ ] Age 41-60 years                                            (1 Point)                    [ ] Bed rest                                                        (1 Point)  [ ] Age: 61-74 years                                           (2 Points)                  [ ] Plaster cast                                                   (2 Points)  [ x ] Age= 75 years                                              (3 Points)                    [ ] Bed bound for more than 72 hours                 (2 Points)    DISEASE RELATED RISK FACTORS                                               GENDER SPECIFIC FACTORS  [ x ] Edema in the lower extremities                       (1 Point)              [ ] Pregnancy                                                     (1 Point)  [ ] Varicose veins                                               (1 Point)                     [ ] Post-partum < 6 weeks                                   (1 Point)             [ x ] BMI > 25 Kg/m2                                            (1 Point)                     [ ] Hormonal therapy  or oral contraception          (1 Point)                 [ ] Sepsis (in the previous month)                        (1 Point)               [ ] History of pregnancy complications                 (1 point)  [ ] Pneumonia or serious lung disease                                               [ ] Unexplained or recurrent                     (1 Point)           (in the previous month)                               (1 Point)  [ ] Abnormal pulmonary function test                     (1 Point)                 SURGERY RELATED RISK FACTORS  [ ] Acute myocardial infarction                              (1 Point)               [ ]  Section                                             (1 Point)  [ ] Congestive heart failure (in the previous month)  (1 Point)      [x ] Minor surgery                                                  (1 Point)   [ ] Inflammatory bowel disease                             (1 Point)               [ ] Arthroscopic surgery                                        (2 Points)  [ ] Central venous access                                      (2 Points)                [ ] General surgery lasting more than 45 minutes (2 points)  [ x ] Malignancy- Present or previous                   (2 Points)                [ ] Elective arthroplasty                                         (5 points)    [ ] Stroke (in the previous month)                          (5 Points)                                                                                                                                                           HEMATOLOGY RELATED FACTORS                                                 TRAUMA RELATED RISK FACTORS  [ ] Prior episodes of VTE                                     (3 Points)                [ ] Fracture of the hip, pelvis, or leg                       (5 Points)  [ ] Positive family history for VTE                         (3 Points)             [ ] Acute spinal cord injury (in the previous month)  (5 Points)  [ ] Prothrombin 02746 A                                     (3 Points)               [ ] Paralysis  (less than 1 month)                             (5 Points)  [ ] Factor V Leiden                                             (3 Points)                  [ ] Multiple Trauma within 1 month                        (5 Points)  [ ] Lupus anticoagulants                                     (3 Points)                                                           [ ] Anticardiolipin antibodies                               (3 Points)                                                       [ ] High homocysteine in the blood                      (3 Points)                                             [ ] Other congenital or acquired thrombophilia      (3 Points)                                                [ ] Heparin induced thrombocytopenia                  (3 Points)                                     Total Score [     7     ]

## 2019-08-09 NOTE — H&P PST ADULT - NSANTHOSAYNRD_GEN_A_CORE
No. EUGENIA screening performed.  STOP BANG Legend: 0-2 = LOW Risk; 3-4 = INTERMEDIATE Risk; 5-8 = HIGH Risk

## 2019-08-09 NOTE — ASU PATIENT PROFILE, ADULT - PMH
Bladder prolapse, female, acquired    Bladder tumor    CAD (coronary artery disease)    Chronic obstructive pulmonary disease, unspecified COPD type    Diabetes    Diabetes    High cholesterol    Hypertension    Neuropathy    Stented coronary artery  MI in 2014, s/p stent of right coronary artery

## 2019-08-11 LAB
CULTURE RESULTS: SIGNIFICANT CHANGE UP
SPECIMEN SOURCE: SIGNIFICANT CHANGE UP

## 2019-08-12 ENCOUNTER — FORM ENCOUNTER (OUTPATIENT)
Age: 84
End: 2019-08-12

## 2019-08-13 ENCOUNTER — OUTPATIENT (OUTPATIENT)
Dept: OUTPATIENT SERVICES | Facility: HOSPITAL | Age: 84
LOS: 1 days | End: 2019-08-13
Payer: MEDICARE

## 2019-08-13 ENCOUNTER — OTHER (OUTPATIENT)
Age: 84
End: 2019-08-13

## 2019-08-13 ENCOUNTER — APPOINTMENT (OUTPATIENT)
Dept: FAMILY MEDICINE | Facility: CLINIC | Age: 84
End: 2019-08-13
Payer: MEDICARE

## 2019-08-13 ENCOUNTER — LABORATORY RESULT (OUTPATIENT)
Age: 84
End: 2019-08-13

## 2019-08-13 VITALS
BODY MASS INDEX: 34.95 KG/M2 | TEMPERATURE: 98.1 F | DIASTOLIC BLOOD PRESSURE: 64 MMHG | HEIGHT: 60 IN | HEART RATE: 83 BPM | SYSTOLIC BLOOD PRESSURE: 128 MMHG | WEIGHT: 178 LBS | OXYGEN SATURATION: 79 %

## 2019-08-13 VITALS — OXYGEN SATURATION: 92 %

## 2019-08-13 DIAGNOSIS — Z98.890 OTHER SPECIFIED POSTPROCEDURAL STATES: Chronic | ICD-10-CM

## 2019-08-13 DIAGNOSIS — S82.899A OTHER FRACTURE OF UNSPECIFIED LOWER LEG, INITIAL ENCOUNTER FOR CLOSED FRACTURE: Chronic | ICD-10-CM

## 2019-08-13 DIAGNOSIS — Z98.89 OTHER SPECIFIED POSTPROCEDURAL STATES: Chronic | ICD-10-CM

## 2019-08-13 DIAGNOSIS — R82.90 UNSPECIFIED ABNORMAL FINDINGS IN URINE: ICD-10-CM

## 2019-08-13 DIAGNOSIS — N00.8 ACUTE NEPHRITIC SYNDROME WITH OTHER MORPHOLOGIC CHANGES: ICD-10-CM

## 2019-08-13 PROBLEM — Z95.5 PRESENCE OF CORONARY ANGIOPLASTY IMPLANT AND GRAFT: Chronic | Status: ACTIVE | Noted: 2018-10-24

## 2019-08-13 PROBLEM — E11.9 TYPE 2 DIABETES MELLITUS WITHOUT COMPLICATIONS: Chronic | Status: ACTIVE | Noted: 2019-08-09

## 2019-08-13 PROBLEM — J44.9 CHRONIC OBSTRUCTIVE PULMONARY DISEASE, UNSPECIFIED: Chronic | Status: ACTIVE | Noted: 2019-08-09

## 2019-08-13 PROCEDURE — 71046 X-RAY EXAM CHEST 2 VIEWS: CPT | Mod: 26

## 2019-08-13 PROCEDURE — 99214 OFFICE O/P EST MOD 30 MIN: CPT | Mod: 25

## 2019-08-13 PROCEDURE — 36415 COLL VENOUS BLD VENIPUNCTURE: CPT

## 2019-08-13 RX ORDER — SPIRONOLACTONE 25 MG/1
25 TABLET ORAL DAILY
Refills: 0 | Status: DISCONTINUED | COMMUNITY
Start: 2019-07-09 | End: 2019-08-13

## 2019-08-14 ENCOUNTER — LABORATORY RESULT (OUTPATIENT)
Age: 84
End: 2019-08-14

## 2019-08-14 ENCOUNTER — CLINICAL ADVICE (OUTPATIENT)
Age: 84
End: 2019-08-14

## 2019-08-14 ENCOUNTER — APPOINTMENT (OUTPATIENT)
Dept: PULMONOLOGY | Facility: CLINIC | Age: 84
End: 2019-08-14
Payer: MEDICARE

## 2019-08-14 VITALS
OXYGEN SATURATION: 87 % | SYSTOLIC BLOOD PRESSURE: 120 MMHG | HEIGHT: 61 IN | DIASTOLIC BLOOD PRESSURE: 64 MMHG | HEART RATE: 97 BPM | BODY MASS INDEX: 33.42 KG/M2 | WEIGHT: 177 LBS | RESPIRATION RATE: 16 BRPM

## 2019-08-14 DIAGNOSIS — R79.1 ABNORMAL COAGULATION PROFILE: ICD-10-CM

## 2019-08-14 PROCEDURE — 94729 DIFFUSING CAPACITY: CPT

## 2019-08-14 PROCEDURE — 94664 DEMO&/EVAL PT USE INHALER: CPT | Mod: 59

## 2019-08-14 PROCEDURE — 99215 OFFICE O/P EST HI 40 MIN: CPT | Mod: 25

## 2019-08-14 PROCEDURE — 94060 EVALUATION OF WHEEZING: CPT

## 2019-08-14 PROCEDURE — 94727 GAS DIL/WSHOT DETER LNG VOL: CPT

## 2019-08-14 PROCEDURE — 94618 PULMONARY STRESS TESTING: CPT

## 2019-08-14 NOTE — PHYSICAL EXAM
[General Appearance - Well Developed] : well developed [Normal Appearance] : normal appearance [Well Groomed] : well groomed [General Appearance - Well Nourished] : well nourished [No Deformities] : no deformities [General Appearance - In No Acute Distress] : no acute distress [Normal Conjunctiva] : the conjunctiva exhibited no abnormalities [Eyelids - No Xanthelasma] : the eyelids demonstrated no xanthelasmas [Normal Oropharynx] : normal oropharynx [Neck Appearance] : the appearance of the neck was normal [Neck Cervical Mass (___cm)] : no neck mass was observed [Thyroid Diffuse Enlargement] : the thyroid was not enlarged [Jugular Venous Distention Increased] : there was no jugular-venous distention [Thyroid Nodule] : there were no palpable thyroid nodules [Heart Rate And Rhythm] : heart rate and rhythm were normal [Heart Sounds] : normal S1 and S2 [Murmurs] : no murmurs present [Respiration, Rhythm And Depth] : normal respiratory rhythm and effort [Exaggerated Use Of Accessory Muscles For Inspiration] : no accessory muscle use [Auscultation Breath Sounds / Voice Sounds] : lungs were clear to auscultation bilaterally [Abdomen Soft] : soft [Abdomen Tenderness] : non-tender [Abnormal Walk] : normal gait [Abdomen Mass (___ Cm)] : no abdominal mass palpated [Gait - Sufficient For Exercise Testing] : the gait was sufficient for exercise testing [Nail Clubbing] : no clubbing of the fingernails [Petechial Hemorrhages (___cm)] : no petechial hemorrhages [Cyanosis, Localized] : no localized cyanosis [Skin Color & Pigmentation] : normal skin color and pigmentation [] : no rash [No Venous Stasis] : no venous stasis [No Skin Ulcers] : no skin ulcer [Skin Lesions] : no skin lesions [No Xanthoma] : no  xanthoma was observed [Sensation] : the sensory exam was normal to light touch and pinprick [Deep Tendon Reflexes (DTR)] : deep tendon reflexes were 2+ and symmetric [No Focal Deficits] : no focal deficits [Impaired Insight] : insight and judgment were intact [Oriented To Time, Place, And Person] : oriented to person, place, and time [Affect] : the affect was normal

## 2019-08-14 NOTE — ASSESSMENT
[FreeTextEntry1] : The patient is an 85-year-old lady with COPD who is oxygen requiring\par \par Her pulmonary status appears to be stable although she clearly is hypoxemic without supplemental oxygen\par Her pulmonary functions are consistent with emphysema with a decreased diffusion capacity as well as air trapping and significant obstructive airways disease\par The patient is at her baseline with respect to COPD,which is severe.   Risk of pulmonary complication is higher for her coming cystoscopy but not prohibitive\par The patient requires continuous oxygen and will be utilizing this at home and with exercise\par \par I have reviewed the patient's medications and use of oxygen with the patient and her daughter and son who accompany her

## 2019-08-14 NOTE — PROCEDURE
[FreeTextEntry1] : The patient hadn't an oxygen saturation 71% on room air with a heart rate of 97\par With 2 L the patient had an oxygen saturation of 87% at rest\par \par The patient was exercised at 4 L of oxygen and she maintained an oxygen saturation of 92% with a heart rate of 95\par \par Pulmonary functions were obtained the total lung capacity appears to be 127% predicted and her diffusion capacity is decreased at 37% predicted\par Spirometry demonstrates an FEV1 of 0.93 L and an FEV1 percent of 62%\par Midexpiratory flow rates are decreased at 34% predicted\par These findings are consistent with emphysema, significant airways obstruction with air trapping

## 2019-08-14 NOTE — HISTORY OF PRESENT ILLNESS
[FreeTextEntry1] : The patient is an 85-year-old lady with COPD also has multiple other problems including coronary artery disease\par She has bladder cancer and she is preop for cystoscopy and bladder biopsy by Dr. Sanders\par \par The patient's pulmonary status has been stable but unfortunately she has not been utilizing her medications regularly and she has not been utilizing her oxygen that has been prescribed\par \par Oxygen studies were obtained today see below\par Regular medications include Advair twice a day and Incruse for COPD

## 2019-08-15 LAB
ALBUMIN SERPL ELPH-MCNC: 3.6 G/DL
ALP BLD-CCNC: 101 U/L
ALT SERPL-CCNC: 7 U/L
ANION GAP SERPL CALC-SCNC: 15 MMOL/L
APPEARANCE: ABNORMAL
APTT BLD: 52.2 SEC
AST SERPL-CCNC: 13 U/L
BACTERIA UR CULT: NORMAL
BACTERIA: ABNORMAL
BASOPHILS # BLD AUTO: 0.04 K/UL
BASOPHILS NFR BLD AUTO: 0.4 %
BILIRUB SERPL-MCNC: 0.6 MG/DL
BILIRUBIN URINE: NEGATIVE
BLOOD URINE: NEGATIVE
BUN SERPL-MCNC: 14 MG/DL
CALCIUM SERPL-MCNC: 9.5 MG/DL
CHLORIDE SERPL-SCNC: 106 MMOL/L
CO2 SERPL-SCNC: 22 MMOL/L
COLOR: YELLOW
CREAT SERPL-MCNC: 0.77 MG/DL
EOSINOPHIL # BLD AUTO: 0.35 K/UL
EOSINOPHIL NFR BLD AUTO: 3.8 %
GLUCOSE QUALITATIVE U: NEGATIVE
GLUCOSE SERPL-MCNC: 100 MG/DL
HCT VFR BLD CALC: 36.5 %
HGB BLD-MCNC: 10.7 G/DL
HYALINE CASTS: 0 /LPF
IMM GRANULOCYTES NFR BLD AUTO: 0.5 %
INR PPP: 1.17 RATIO
KETONES URINE: NEGATIVE
LEUKOCYTE ESTERASE URINE: ABNORMAL
LYMPHOCYTES # BLD AUTO: 1.79 K/UL
LYMPHOCYTES NFR BLD AUTO: 19.6 %
MAN DIFF?: NORMAL
MCHC RBC-ENTMCNC: 23.8 PG
MCHC RBC-ENTMCNC: 29.3 GM/DL
MCV RBC AUTO: 81.1 FL
MICROSCOPIC-UA: NORMAL
MONOCYTES # BLD AUTO: 0.68 K/UL
MONOCYTES NFR BLD AUTO: 7.4 %
NEUTROPHILS # BLD AUTO: 6.23 K/UL
NEUTROPHILS NFR BLD AUTO: 68.3 %
NITRITE URINE: NEGATIVE
PH URINE: 6
PLATELET # BLD AUTO: 382 K/UL
POTASSIUM SERPL-SCNC: 4.5 MMOL/L
PROT SERPL-MCNC: 7.3 G/DL
PROTEIN URINE: ABNORMAL
PT BLD: 13.5 SEC
RBC # BLD: 4.5 M/UL
RBC # FLD: 22 %
RED BLOOD CELLS URINE: 2 /HPF
SODIUM SERPL-SCNC: 143 MMOL/L
SPECIFIC GRAVITY URINE: 1.02
SQUAMOUS EPITHELIAL CELLS: 4 /HPF
UROBILINOGEN URINE: NORMAL
WBC # FLD AUTO: 9.14 K/UL
WHITE BLOOD CELLS URINE: 192 /HPF

## 2019-08-18 NOTE — COUNSELING
[Fall prevention counseling provided] : fall prevention  [Good understanding] : Patient has a good understanding of disease, goals and obesity follow-up plan [Walking] : Walking [Low Salt Diet] : Low salt diet [Adequate lighting] : Adequate lighting [No throw rugs] : No throw rugs [Use proper foot wear] : Use proper foot wear [Use recommended devices] : Use recommended devices [None] : None [de-identified] : 1. CHF\par    - low sodium diet\par    - 1-1.2L fluid restriction (including soup, tea, juice and waster)\par    - Daily weight monitor; if wt gain >1-2lb in 2-3 days, > 3-5lbs in a week, please call CCC/CN \par    - Daily BP monitor, if SBP<100, do not give BP meds, wait 1 hrs to recheck BP, if SBP<100 again, call CCC/CN\par \par 2. Pulmonary health\par    - Deep breathing exercise in sitting up tall or standing position, 10 times hourly\par    - Continue using nebulizers and inhalers Q4hrs as ordered.\par \par

## 2019-08-18 NOTE — PHYSICAL EXAM
[Well Nourished] : well nourished [No Acute Distress] : no acute distress [Well Developed] : well developed [Well-Appearing] : well-appearing [Normal Sclera/Conjunctiva] : normal sclera/conjunctiva [PERRL] : pupils equal round and reactive to light [Normal Outer Ear/Nose] : the outer ears and nose were normal in appearance [EOMI] : extraocular movements intact [No JVD] : no jugular venous distention [Normal Oropharynx] : the oropharynx was normal [No Lymphadenopathy] : no lymphadenopathy [Supple] : supple [Thyroid Normal, No Nodules] : the thyroid was normal and there were no nodules present [No Accessory Muscle Use] : no accessory muscle use [No Respiratory Distress] : no respiratory distress  [Clear to Auscultation] : lungs were clear to auscultation bilaterally [Normal Rate] : normal rate  [Regular Rhythm] : with a regular rhythm [Normal S1, S2] : normal S1 and S2 [No Murmur] : no murmur heard [No Carotid Bruits] : no carotid bruits [No Abdominal Bruit] : a ~M bruit was not heard ~T in the abdomen [No Varicosities] : no varicosities [Pedal Pulses Present] : the pedal pulses are present [No Extremity Clubbing/Cyanosis] : no extremity clubbing/cyanosis [No Palpable Aorta] : no palpable aorta [Soft] : abdomen soft [Non Tender] : non-tender [Non-distended] : non-distended [No Masses] : no abdominal mass palpated [No HSM] : no HSM [Normal Posterior Cervical Nodes] : no posterior cervical lymphadenopathy [Normal Bowel Sounds] : normal bowel sounds [No CVA Tenderness] : no CVA  tenderness [Normal Anterior Cervical Nodes] : no anterior cervical lymphadenopathy [No Spinal Tenderness] : no spinal tenderness [No Joint Swelling] : no joint swelling [Grossly Normal Strength/Tone] : grossly normal strength/tone [No Rash] : no rash [Coordination Grossly Intact] : coordination grossly intact [No Focal Deficits] : no focal deficits [Deep Tendon Reflexes (DTR)] : deep tendon reflexes were 2+ and symmetric [Normal Gait] : normal gait [Speech Grossly Normal] : speech grossly normal [Normal Affect] : the affect was normal [Memory Grossly Normal] : memory grossly normal [Normal Mood] : the mood was normal [Normal Insight/Judgement] : insight and judgment were intact [de-identified] : coarse crackles at base, no wheezing  [de-identified] : 2+ pitting edema to shon feet

## 2019-08-18 NOTE — ASSESSMENT
[FreeTextEntry1] : Ms. Calabrese seen at home, daughter Sury Mason present during the visit, patient living with latge family and has good support. med rec done, Spiriva missing, e-prescription sent to designated pharmacy,\par VSS, coarse crackles at base bilaterally and 2+ pitting edema noted to shon feet, however, current presentation is improvement so advised to continue the same dose as ordered at discharge.\par CHFand COPD education provided, advised to monitor daily weight and BP

## 2019-08-18 NOTE — PLAN
[FreeTextEntry1] : 1. CHF - continue Furosemide 20mg, Losartan 25mg, Spironolactine 25mg daily, Monitor weight daily, BP prior to taking BP meds in the morning, Low salt diet, fluid restriciton, Follow up with Cardiologist on 7/10\par 2. COPD - Continue Adviar, Spiriva, Nebs as ordered, carry rescue inhaler, Follow up with Dr. Brewer on 7/12\par 3. Reviewed all medications at length with patient, all questions addressed. Worsening symptoms discussed with pt understanding. No issues or concerns at this time. Pt encouraged to call [facility]/CN for at [phone] w/any questions, concerns or issues. Will continue to follow up with patient status\par \par

## 2019-08-18 NOTE — REVIEW OF SYSTEMS
[Fever] : no fever [Fatigue] : fatigue [Chills] : no chills [Chest Pain] : no chest pain [Palpitations] : no palpitations [Leg Claudication] : no leg claudication [Lower Ext Edema] : lower extremity edema [Orthopnea] : no orthopnea [Paroysmal Nocturnal Dyspnea] : no paroysmal nocturnal dyspnea [Shortness Of Breath] : no shortness of breath [Wheezing] : no wheezing [Dyspnea on Exertion] : dyspnea on exertion [Cough] : no cough [Abdominal Pain] : no abdominal pain [Nausea] : no nausea [Constipation] : no constipation [Vomiting] : no vomiting [Diarrhea] : diarrhea [Heartburn] : no heartburn [Dysuria] : no dysuria [Melena] : no melena [Incontinence] : no incontinence [Hematuria] : no hematuria [Anxiety] : no anxiety [Depression] : no depression [Negative] : Heme/Lymph

## 2019-08-18 NOTE — HISTORY OF PRESENT ILLNESS
[Other: _____] : [unfilled] [Family Member] : family member [FreeTextEntry1] : Follow-up at home s/p  hospitalization for management of CHF+ COPD exacerbation [de-identified] : Ms. Calabrese is 85 y/o female with pmhx of CHF, thyroid nodule, DM2, HTN, COPD with bronchitis, hospitalizaed at [place] 7/2-7/6 for CHF and COPD exacerbation, discharged Home with [facility], Patient was contacted post discharge within 48hours for post-d/c follow up call, Chart reviewed and med rec done,\par Follow-up visit made at home for medical management and chronic diseases educaiton

## 2019-08-22 ENCOUNTER — RESULT REVIEW (OUTPATIENT)
Age: 84
End: 2019-08-22

## 2019-08-22 ENCOUNTER — TRANSCRIPTION ENCOUNTER (OUTPATIENT)
Age: 84
End: 2019-08-22

## 2019-08-23 ENCOUNTER — APPOINTMENT (OUTPATIENT)
Dept: UROLOGY | Facility: HOSPITAL | Age: 84
End: 2019-08-23

## 2019-08-23 ENCOUNTER — OTHER (OUTPATIENT)
Age: 84
End: 2019-08-23

## 2019-08-23 ENCOUNTER — RESULT REVIEW (OUTPATIENT)
Age: 84
End: 2019-08-23

## 2019-08-23 ENCOUNTER — OUTPATIENT (OUTPATIENT)
Dept: INPATIENT UNIT | Facility: HOSPITAL | Age: 84
LOS: 1 days | End: 2019-08-23
Payer: MEDICARE

## 2019-08-23 VITALS
SYSTOLIC BLOOD PRESSURE: 152 MMHG | DIASTOLIC BLOOD PRESSURE: 70 MMHG | HEART RATE: 83 BPM | OXYGEN SATURATION: 94 % | WEIGHT: 174.17 LBS | RESPIRATION RATE: 24 BRPM | TEMPERATURE: 99 F

## 2019-08-23 VITALS
DIASTOLIC BLOOD PRESSURE: 75 MMHG | SYSTOLIC BLOOD PRESSURE: 153 MMHG | OXYGEN SATURATION: 94 % | HEART RATE: 65 BPM | RESPIRATION RATE: 24 BRPM

## 2019-08-23 DIAGNOSIS — Z98.89 OTHER SPECIFIED POSTPROCEDURAL STATES: Chronic | ICD-10-CM

## 2019-08-23 DIAGNOSIS — D49.4 NEOPLASM OF UNSPECIFIED BEHAVIOR OF BLADDER: ICD-10-CM

## 2019-08-23 DIAGNOSIS — Z98.890 OTHER SPECIFIED POSTPROCEDURAL STATES: Chronic | ICD-10-CM

## 2019-08-23 DIAGNOSIS — S82.899A OTHER FRACTURE OF UNSPECIFIED LOWER LEG, INITIAL ENCOUNTER FOR CLOSED FRACTURE: Chronic | ICD-10-CM

## 2019-08-23 LAB
GLUCOSE BLDC GLUCOMTR-MCNC: 104 MG/DL — HIGH (ref 70–99)
GLUCOSE BLDC GLUCOMTR-MCNC: 113 MG/DL — HIGH (ref 70–99)

## 2019-08-23 PROCEDURE — 82962 GLUCOSE BLOOD TEST: CPT

## 2019-08-23 PROCEDURE — 52204 CYSTOSCOPY W/BIOPSY(S): CPT

## 2019-08-23 PROCEDURE — 88305 TISSUE EXAM BY PATHOLOGIST: CPT | Mod: 26

## 2019-08-23 PROCEDURE — 88112 CYTOPATH CELL ENHANCE TECH: CPT | Mod: 26

## 2019-08-23 PROCEDURE — 88305 TISSUE EXAM BY PATHOLOGIST: CPT

## 2019-08-23 PROCEDURE — 88112 CYTOPATH CELL ENHANCE TECH: CPT

## 2019-08-23 RX ORDER — SODIUM CHLORIDE 9 MG/ML
1000 INJECTION INTRAMUSCULAR; INTRAVENOUS; SUBCUTANEOUS
Refills: 0 | Status: DISCONTINUED | OUTPATIENT
Start: 2019-08-23 | End: 2019-08-23

## 2019-08-23 RX ORDER — DEXAMETHASONE 0.5 MG/5ML
8 ELIXIR ORAL ONCE
Refills: 0 | Status: DISCONTINUED | OUTPATIENT
Start: 2019-08-23 | End: 2019-08-23

## 2019-08-23 RX ORDER — ONDANSETRON 8 MG/1
4 TABLET, FILM COATED ORAL ONCE
Refills: 0 | Status: DISCONTINUED | OUTPATIENT
Start: 2019-08-23 | End: 2019-08-23

## 2019-08-23 RX ORDER — FENTANYL CITRATE 50 UG/ML
25 INJECTION INTRAVENOUS
Refills: 0 | Status: DISCONTINUED | OUTPATIENT
Start: 2019-08-23 | End: 2019-08-23

## 2019-08-23 RX ORDER — SODIUM CHLORIDE 9 MG/ML
1000 INJECTION, SOLUTION INTRAVENOUS
Refills: 0 | Status: DISCONTINUED | OUTPATIENT
Start: 2019-08-23 | End: 2019-09-11

## 2019-08-23 RX ORDER — CEPHALEXIN 500 MG
1 CAPSULE ORAL
Qty: 9 | Refills: 0
Start: 2019-08-23

## 2019-08-23 NOTE — ASU DISCHARGE PLAN (ADULT/PEDIATRIC) - CARE PROVIDER_API CALL
Ike Sanders)  Urology  [address], [address]  [place] [address]  Phone: [phone]  Fax: [phone]  Follow Up Time: 2 weeks

## 2019-08-26 LAB
NON-GYNECOLOGICAL CYTOLOGY STUDY: SIGNIFICANT CHANGE UP
SURGICAL PATHOLOGY STUDY: SIGNIFICANT CHANGE UP

## 2019-09-09 ENCOUNTER — APPOINTMENT (OUTPATIENT)
Dept: UROLOGY | Facility: CLINIC | Age: 84
End: 2019-09-09
Payer: MEDICARE

## 2019-09-09 VITALS — DIASTOLIC BLOOD PRESSURE: 71 MMHG | HEART RATE: 111 BPM | RESPIRATION RATE: 13 BRPM | SYSTOLIC BLOOD PRESSURE: 145 MMHG

## 2019-09-09 PROCEDURE — 99213 OFFICE O/P EST LOW 20 MIN: CPT

## 2019-09-09 NOTE — ASSESSMENT
[FreeTextEntry1] : Impression:\par \par minimal flat, non-invasive bladder cancer\par \par Plan:\par \par follow up 3 months.

## 2019-09-09 NOTE — PHYSICAL EXAM
[General Appearance - Well Developed] : well developed [General Appearance - Well Nourished] : well nourished [Normal Appearance] : normal appearance [Well Groomed] : well groomed [General Appearance - In No Acute Distress] : no acute distress [Abdomen Tenderness] : non-tender [Abdomen Soft] : soft [Costovertebral Angle Tenderness] : no ~M costovertebral angle tenderness [Edema] : no peripheral edema [] : no respiratory distress [Respiration, Rhythm And Depth] : normal respiratory rhythm and effort [Exaggerated Use Of Accessory Muscles For Inspiration] : no accessory muscle use [Oriented To Time, Place, And Person] : oriented to person, place, and time [Affect] : the affect was normal [Mood] : the mood was normal [Not Anxious] : not anxious [Normal Station and Gait] : the gait and station were normal for the patient's age [No Focal Deficits] : no focal deficits

## 2019-09-09 NOTE — LETTER BODY
[Dear  ___] : Dear  [unfilled], [Courtesy Letter:] : I had the pleasure of seeing your patient, [unfilled], in my office today. [Please see my note below.] : Please see my note below. [Sincerely,] : Sincerely, [FreeTextEntry3] : Ed\par \par Ike Sanders MD\par [facility]\par  of Urology\par [employer] at [facility]\par

## 2019-09-09 NOTE — HISTORY OF PRESENT ILLNESS
[FreeTextEntry1] : has a TURBT. no hematuria or dysuria. no urgency or fevers or chills. Has been having a little shortness of breath.

## 2019-09-12 ENCOUNTER — NON-APPOINTMENT (OUTPATIENT)
Age: 84
End: 2019-09-12

## 2019-09-12 ENCOUNTER — APPOINTMENT (OUTPATIENT)
Dept: CARDIOLOGY | Facility: CLINIC | Age: 84
End: 2019-09-12
Payer: MEDICARE

## 2019-09-12 VITALS
BODY MASS INDEX: 33.63 KG/M2 | DIASTOLIC BLOOD PRESSURE: 70 MMHG | HEART RATE: 94 BPM | WEIGHT: 178 LBS | OXYGEN SATURATION: 86 % | SYSTOLIC BLOOD PRESSURE: 126 MMHG

## 2019-09-12 PROCEDURE — 93000 ELECTROCARDIOGRAM COMPLETE: CPT

## 2019-09-12 PROCEDURE — 99214 OFFICE O/P EST MOD 30 MIN: CPT | Mod: 25

## 2019-09-12 NOTE — PHYSICAL EXAM
[Normal Appearance] : normal appearance [General Appearance - Well Developed] : well developed [Well Groomed] : well groomed [No Deformities] : no deformities [General Appearance - Well Nourished] : well nourished [General Appearance - In No Acute Distress] : no acute distress [Eyelids - No Xanthelasma] : the eyelids demonstrated no xanthelasmas [Normal Conjunctiva] : the conjunctiva exhibited no abnormalities [No Oral Pallor] : no oral pallor [Normal Oral Mucosa] : normal oral mucosa [No Oral Cyanosis] : no oral cyanosis [Exaggerated Use Of Accessory Muscles For Inspiration] : no accessory muscle use [Respiration, Rhythm And Depth] : normal respiratory rhythm and effort [Heart Sounds] : normal S1 and S2 [Heart Rate And Rhythm] : heart rate and rhythm were normal [Murmurs] : no murmurs present [Arterial Pulses Normal] : the arterial pulses were normal [Edema] : no peripheral edema present [Abdomen Soft] : soft [Abdomen Tenderness] : non-tender [Nail Clubbing] : no clubbing of the fingernails [Abdomen Mass (___ Cm)] : no abdominal mass palpated [Petechial Hemorrhages (___cm)] : no petechial hemorrhages [Cyanosis, Localized] : no localized cyanosis [Oriented To Time, Place, And Person] : oriented to person, place, and time [] : no ischemic changes [Impaired Insight] : insight and judgment were intact [Affect] : the affect was normal [No Anxiety] : not feeling anxious [Mood] : the mood was normal [FreeTextEntry1] : ambulates with walker

## 2019-09-12 NOTE — HISTORY OF PRESENT ILLNESS
[FreeTextEntry1] : Pt is an 84 y/o F who presents today for f/u.  She has PMH CAD NSTEMI 12/2014 s/p ALLISON to RCA while hospitalized at [place] for ankle injury, normal LV function, DM, former smoker, COPD.  Recently diagnosed with bladder cancer s/p BCG treatments.  8/23/2019 had bladder biopsy which was found to be cancer.  She states that she has been feeling SOB but refuses to use oxygen.  She notes that breathing worsens with exertion.  She notes that her legs are swollen.  Overall her diet has been worse - eating salty things.  She denies CP, diaphoresis, palpitations, dizziness, syncope, LE edema, PND. \par She is accompanied by her daughter today\par \par TTE 09/2018 shows normal LV function min MR/TR\par Nuclear stress test 10/2018 normal myocardial perfusion\par \par PMH: CAD NSTEMI s/p ALLISON to RCA 12/2014, HLD, HTN, DM, COPD, obesity\par Smoking status: quit 2014\par Current exercise: none\par Family hx: both parents with MI - unknown age\par Previous cardiac testing: stress test 10 yrs ago "normal"\par Previous hospitalizations: ankle surgery 2014

## 2019-09-12 NOTE — REVIEW OF SYSTEMS
[see HPI] : see HPI [Dyspnea on exertion] : dyspnea during exertion [Negative] : Heme/Lymph [Feeling Fatigued] : not feeling fatigued [Shortness Of Breath] : no shortness of breath [Chest  Pressure] : no chest pressure [Chest Pain] : no chest pain [Lower Ext Edema] : no extremity edema [Leg Claudication] : no intermittent leg claudication [Palpitations] : no palpitations

## 2019-09-12 NOTE — DISCUSSION/SUMMARY
[FreeTextEntry1] : Pt is an 84 y/o F with PMH CAD s/p NSTEMI 12/2014 ALLISON to RCA, normal LV function, HTN, HLD, DM, COPD, obesity, bladder cancer who presents today for SOB - acute on chronic diastolic dysfunction\par increase lasix to 40mg qd, low salt diet, check daily weights\par Advised her to be compliant with home O2 but she is very reluctant\par CAD s/p PCI: c/w ASA, lipitor 40mg and metoprolol for prior NSTEMI\par BP well controlled\par DM: follows with PCP. Advised lifestyle modifications\par f/u 1-2 mnths or sooner PRN\par The described plan was discussed with the pt.  All questions and concerns were addressed to the best of my knowledge. \par \par TTE 9/2018 shows normal LV function with min MR/TR\par Nuclear stress test 10/2018 shows normal myocardial perfusion\par

## 2019-10-02 ENCOUNTER — RX RENEWAL (OUTPATIENT)
Age: 84
End: 2019-10-02

## 2019-10-14 ENCOUNTER — APPOINTMENT (OUTPATIENT)
Dept: PULMONOLOGY | Facility: CLINIC | Age: 84
End: 2019-10-14

## 2019-10-16 ENCOUNTER — APPOINTMENT (OUTPATIENT)
Dept: PULMONOLOGY | Facility: CLINIC | Age: 84
End: 2019-10-16
Payer: MEDICARE

## 2019-10-16 VITALS
HEART RATE: 93 BPM | TEMPERATURE: 98.4 F | HEIGHT: 61 IN | WEIGHT: 166.13 LBS | BODY MASS INDEX: 31.37 KG/M2 | SYSTOLIC BLOOD PRESSURE: 137 MMHG | OXYGEN SATURATION: 59 % | DIASTOLIC BLOOD PRESSURE: 75 MMHG

## 2019-10-16 PROCEDURE — 99214 OFFICE O/P EST MOD 30 MIN: CPT

## 2019-10-16 NOTE — HISTORY OF PRESENT ILLNESS
[FreeTextEntry1] : 85-year-old lady with mild COPD\par \par She has multiple other medical problems including congestive heart failure as well as bladder neoplasm followed by Dr. Sanders\par \par The patient has oxygen that she virtually never uses it\par She says she only uses it when she needs it\par \par

## 2019-10-16 NOTE — ASSESSMENT
[FreeTextEntry1] : Very very pleasant 85-year-old lady with advanced cardiac disease, and bladder neoplasm\par The patient has coronary artery disease, previous stents, diabetes and COPD\par \par Although pulse ox is at rest 86% on room air, she does not appear to be cyanotic in any way\par I still suspect that current saturation is artifactual\par \par I have recommended however the use of oxygen more often. I have strongly recommended supplemental oxygen at night. I doubt that the patient will do this\par \par I have asked patient to return here in 3 months time but will happily see her earlier as required

## 2019-10-16 NOTE — PROCEDURE
[FreeTextEntry1] : While elevating her hand, pulse ox on room air is 86% with fairly good pulse trace

## 2019-11-04 ENCOUNTER — RX RENEWAL (OUTPATIENT)
Age: 84
End: 2019-11-04

## 2019-11-12 ENCOUNTER — APPOINTMENT (OUTPATIENT)
Dept: CARDIOLOGY | Facility: CLINIC | Age: 84
End: 2019-11-12
Payer: MEDICARE

## 2019-11-12 ENCOUNTER — NON-APPOINTMENT (OUTPATIENT)
Age: 84
End: 2019-11-12

## 2019-11-12 VITALS
OXYGEN SATURATION: 75 % | SYSTOLIC BLOOD PRESSURE: 146 MMHG | DIASTOLIC BLOOD PRESSURE: 76 MMHG | HEART RATE: 92 BPM | WEIGHT: 170 LBS | BODY MASS INDEX: 32.1 KG/M2 | HEIGHT: 61 IN

## 2019-11-12 DIAGNOSIS — R06.09 OTHER FORMS OF DYSPNEA: ICD-10-CM

## 2019-11-12 PROCEDURE — 99214 OFFICE O/P EST MOD 30 MIN: CPT | Mod: 25

## 2019-11-12 PROCEDURE — 93000 ELECTROCARDIOGRAM COMPLETE: CPT

## 2019-11-12 NOTE — DISCUSSION/SUMMARY
[FreeTextEntry1] : Pt is an 86 y/o F with PMH CAD s/p NSTEMI 12/2014 ALLISON to RCA, normal LV function, HTN, HLD, DM, COPD, obesity, bladder cancer who presents today for f/u - feeling better, breathing has improved\par Advised her to be compliant with home O2 but she is very reluctant\par CAD s/p PCI: c/w ASA, lipitor 40mg and metoprolol for prior NSTEMI\par BP well controlled\par DM: follows with PCP. Advised lifestyle modifications\par f/u 4 mnths or sooner PRN\par The described plan was discussed with the pt.  All questions and concerns were addressed to the best of my knowledge. \par \par TTE 9/2018 shows normal LV function with min MR/TR\par Nuclear stress test 10/2018 shows normal myocardial perfusion\par

## 2019-11-12 NOTE — REVIEW OF SYSTEMS
[see HPI] : see HPI [Dyspnea on exertion] : dyspnea during exertion [Negative] : Heme/Lymph [Shortness Of Breath] : no shortness of breath [Feeling Fatigued] : not feeling fatigued [Chest  Pressure] : no chest pressure [Lower Ext Edema] : no extremity edema [Chest Pain] : no chest pain [Leg Claudication] : no intermittent leg claudication [Palpitations] : no palpitations

## 2019-11-12 NOTE — HISTORY OF PRESENT ILLNESS
[FreeTextEntry1] : Pt is an 86 y/o F who presents today for f/u.  She has PMH CAD NSTEMI 12/2014 s/p ALLISON to RCA while hospitalized at [place] for ankle injury, normal LV function, DM, former smoker, COPD.  Recently diagnosed with bladder cancer s/p BCG treatments.  8/23/2019 had bladder biopsy which was found to be cancer.  She states that her breathing has improved along with her leg swelling.  She denies CP, diaphoresis, palpitations, dizziness, syncope, LE edema, PND. \par She is accompanied by her daughter today\par \par TTE 09/2018 shows normal LV function min MR/TR\par Nuclear stress test 10/2018 normal myocardial perfusion\par \par PMH: CAD NSTEMI s/p ALLISON to RCA 12/2014, HLD, HTN, DM, COPD, obesity\par Smoking status: quit 2014\par Current exercise: none\par Family hx: both parents with MI - unknown age\par Previous cardiac testing: stress test 10 yrs ago "normal"\par Previous hospitalizations: ankle surgery 2014

## 2019-12-09 ENCOUNTER — APPOINTMENT (OUTPATIENT)
Dept: UROLOGY | Facility: CLINIC | Age: 84
End: 2019-12-09
Payer: MEDICARE

## 2019-12-09 VITALS
DIASTOLIC BLOOD PRESSURE: 72 MMHG | HEIGHT: 61 IN | BODY MASS INDEX: 32.1 KG/M2 | WEIGHT: 170 LBS | SYSTOLIC BLOOD PRESSURE: 132 MMHG | HEART RATE: 98 BPM

## 2019-12-09 PROCEDURE — 99213 OFFICE O/P EST LOW 20 MIN: CPT | Mod: 25

## 2019-12-09 PROCEDURE — 52000 CYSTOURETHROSCOPY: CPT

## 2019-12-27 ENCOUNTER — OUTPATIENT (OUTPATIENT)
Dept: OUTPATIENT SERVICES | Facility: HOSPITAL | Age: 84
LOS: 1 days | End: 2019-12-27
Payer: MEDICARE

## 2019-12-27 VITALS
RESPIRATION RATE: 14 BRPM | TEMPERATURE: 98 F | DIASTOLIC BLOOD PRESSURE: 80 MMHG | WEIGHT: 173.5 LBS | HEART RATE: 89 BPM | SYSTOLIC BLOOD PRESSURE: 140 MMHG | HEIGHT: 62 IN

## 2019-12-27 DIAGNOSIS — Z98.890 OTHER SPECIFIED POSTPROCEDURAL STATES: Chronic | ICD-10-CM

## 2019-12-27 DIAGNOSIS — Z95.5 PRESENCE OF CORONARY ANGIOPLASTY IMPLANT AND GRAFT: ICD-10-CM

## 2019-12-27 DIAGNOSIS — D49.4 NEOPLASM OF UNSPECIFIED BEHAVIOR OF BLADDER: ICD-10-CM

## 2019-12-27 DIAGNOSIS — Z29.9 ENCOUNTER FOR PROPHYLACTIC MEASURES, UNSPECIFIED: ICD-10-CM

## 2019-12-27 DIAGNOSIS — Z01.818 ENCOUNTER FOR OTHER PREPROCEDURAL EXAMINATION: ICD-10-CM

## 2019-12-27 DIAGNOSIS — Z98.89 OTHER SPECIFIED POSTPROCEDURAL STATES: Chronic | ICD-10-CM

## 2019-12-27 DIAGNOSIS — S82.899A OTHER FRACTURE OF UNSPECIFIED LOWER LEG, INITIAL ENCOUNTER FOR CLOSED FRACTURE: Chronic | ICD-10-CM

## 2019-12-27 LAB
ANION GAP SERPL CALC-SCNC: 14 MMOL/L — SIGNIFICANT CHANGE UP (ref 5–17)
ANISOCYTOSIS BLD QL: SLIGHT — SIGNIFICANT CHANGE UP
APPEARANCE UR: CLEAR — SIGNIFICANT CHANGE UP
APTT BLD: 34.4 SEC — SIGNIFICANT CHANGE UP (ref 27.5–36.3)
BACTERIA # UR AUTO: ABNORMAL
BASOPHILS # BLD AUTO: 0.09 K/UL — SIGNIFICANT CHANGE UP (ref 0–0.2)
BASOPHILS NFR BLD AUTO: 0.9 % — SIGNIFICANT CHANGE UP (ref 0–2)
BILIRUB UR-MCNC: NEGATIVE — SIGNIFICANT CHANGE UP
BUN SERPL-MCNC: 6 MG/DL — LOW (ref 8–20)
CALCIUM SERPL-MCNC: 9 MG/DL — SIGNIFICANT CHANGE UP (ref 8.6–10.2)
CHLORIDE SERPL-SCNC: 99 MMOL/L — SIGNIFICANT CHANGE UP (ref 98–107)
CO2 SERPL-SCNC: 29 MMOL/L — SIGNIFICANT CHANGE UP (ref 22–29)
COLOR SPEC: YELLOW — SIGNIFICANT CHANGE UP
CREAT SERPL-MCNC: 0.64 MG/DL — SIGNIFICANT CHANGE UP (ref 0.5–1.3)
DIFF PNL FLD: NEGATIVE — SIGNIFICANT CHANGE UP
EOSINOPHIL # BLD AUTO: 0.25 K/UL — SIGNIFICANT CHANGE UP (ref 0–0.5)
EOSINOPHIL NFR BLD AUTO: 2.6 % — SIGNIFICANT CHANGE UP (ref 0–6)
EPI CELLS # UR: SIGNIFICANT CHANGE UP
GIANT PLATELETS BLD QL SMEAR: PRESENT — SIGNIFICANT CHANGE UP
GLUCOSE SERPL-MCNC: 79 MG/DL — SIGNIFICANT CHANGE UP (ref 70–115)
GLUCOSE UR QL: NEGATIVE MG/DL — SIGNIFICANT CHANGE UP
HBA1C BLD-MCNC: 7.7 % — HIGH (ref 4–5.6)
HCT VFR BLD CALC: 49.9 % — HIGH (ref 34.5–45)
HGB BLD-MCNC: 14.7 G/DL — SIGNIFICANT CHANGE UP (ref 11.5–15.5)
KETONES UR-MCNC: NEGATIVE — SIGNIFICANT CHANGE UP
LEUKOCYTE ESTERASE UR-ACNC: ABNORMAL
LYMPHOCYTES # BLD AUTO: 2.01 K/UL — SIGNIFICANT CHANGE UP (ref 1–3.3)
LYMPHOCYTES # BLD AUTO: 20.9 % — SIGNIFICANT CHANGE UP (ref 13–44)
MACROCYTES BLD QL: SLIGHT — SIGNIFICANT CHANGE UP
MANUAL SMEAR VERIFICATION: SIGNIFICANT CHANGE UP
MCHC RBC-ENTMCNC: 25.1 PG — LOW (ref 27–34)
MCHC RBC-ENTMCNC: 29.5 GM/DL — LOW (ref 32–36)
MCV RBC AUTO: 85.3 FL — SIGNIFICANT CHANGE UP (ref 80–100)
MICROCYTES BLD QL: SLIGHT — SIGNIFICANT CHANGE UP
MONOCYTES # BLD AUTO: 1 K/UL — HIGH (ref 0–0.9)
MONOCYTES NFR BLD AUTO: 10.4 % — SIGNIFICANT CHANGE UP (ref 2–14)
NEUTROPHILS # BLD AUTO: 6.26 K/UL — SIGNIFICANT CHANGE UP (ref 1.8–7.4)
NEUTROPHILS NFR BLD AUTO: 64.3 % — SIGNIFICANT CHANGE UP (ref 43–77)
NEUTS BAND # BLD: 0.9 % — SIGNIFICANT CHANGE UP (ref 0–8)
NITRITE UR-MCNC: NEGATIVE — SIGNIFICANT CHANGE UP
PH UR: 6.5 — SIGNIFICANT CHANGE UP (ref 5–8)
PLAT MORPH BLD: NORMAL — SIGNIFICANT CHANGE UP
PLATELET # BLD AUTO: 375 K/UL — SIGNIFICANT CHANGE UP (ref 150–400)
POLYCHROMASIA BLD QL SMEAR: SLIGHT — SIGNIFICANT CHANGE UP
POTASSIUM SERPL-MCNC: 3.6 MMOL/L — SIGNIFICANT CHANGE UP (ref 3.5–5.3)
POTASSIUM SERPL-SCNC: 3.6 MMOL/L — SIGNIFICANT CHANGE UP (ref 3.5–5.3)
PROT UR-MCNC: NEGATIVE MG/DL — SIGNIFICANT CHANGE UP
RBC # BLD: 5.85 M/UL — HIGH (ref 3.8–5.2)
RBC # FLD: 25.9 % — HIGH (ref 10.3–14.5)
RBC BLD AUTO: SIGNIFICANT CHANGE UP
RBC CASTS # UR COMP ASSIST: SIGNIFICANT CHANGE UP /HPF (ref 0–4)
SODIUM SERPL-SCNC: 142 MMOL/L — SIGNIFICANT CHANGE UP (ref 135–145)
SP GR SPEC: 1.01 — SIGNIFICANT CHANGE UP (ref 1.01–1.02)
UROBILINOGEN FLD QL: NEGATIVE MG/DL — SIGNIFICANT CHANGE UP
WBC # BLD: 9.6 K/UL — SIGNIFICANT CHANGE UP (ref 3.8–10.5)
WBC # FLD AUTO: 9.6 K/UL — SIGNIFICANT CHANGE UP (ref 3.8–10.5)
WBC UR QL: ABNORMAL

## 2019-12-27 PROCEDURE — 81001 URINALYSIS AUTO W/SCOPE: CPT

## 2019-12-27 PROCEDURE — 36415 COLL VENOUS BLD VENIPUNCTURE: CPT

## 2019-12-27 PROCEDURE — 85027 COMPLETE CBC AUTOMATED: CPT

## 2019-12-27 PROCEDURE — 93010 ELECTROCARDIOGRAM REPORT: CPT

## 2019-12-27 PROCEDURE — G0463: CPT

## 2019-12-27 PROCEDURE — 85730 THROMBOPLASTIN TIME PARTIAL: CPT

## 2019-12-27 PROCEDURE — 87086 URINE CULTURE/COLONY COUNT: CPT

## 2019-12-27 PROCEDURE — 80048 BASIC METABOLIC PNL TOTAL CA: CPT

## 2019-12-27 PROCEDURE — 83036 HEMOGLOBIN GLYCOSYLATED A1C: CPT

## 2019-12-27 PROCEDURE — 93005 ELECTROCARDIOGRAM TRACING: CPT

## 2019-12-27 RX ORDER — CEFAZOLIN SODIUM 1 G
2000 VIAL (EA) INJECTION ONCE
Refills: 0 | Status: DISCONTINUED | OUTPATIENT
Start: 2020-01-10 | End: 2020-02-03

## 2019-12-27 NOTE — H&P PST ADULT - HISTORY OF PRESENT ILLNESS
Patient is a 86 y/o female aox3 uses a walker for balance , patient retired electronic worker , 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 Patient c/o her bladder dropping with urinary urgency ,went to her PCP and was found to have a" bladder tumor ". Patient had tumor removed in may 2019 . Patient  went for a follow up with her PCP and as per patient the" bladder tumor has grown back ". Patient denies c/o pain or discomfort denies bleeding . Patient presents to PST for evaluation for a transurethral resection of bladder tumor on 1/10/ with Dr Sanders

## 2019-12-27 NOTE — H&P PST ADULT - ASSESSMENT
OPIOID RISK TOOL    NASIM EACH BOX THAT APPLIES AND ADD TOTALS AT THE END    FAMILY HISTORY OF SUBSTANCE ABUSE                 FEMALE         MALE                                                Alcohol                             [  ]1 pt          [  ]3pts                                               Illegal Drugs                     [  ]2 pts        [  ]3pts                                               Rx Drugs                           [  ]4 pts        [  ]4 pts    PERSONAL HISTORY OF SUBSTANCE ABUSE                                                                                          Alcohol                             [  ]3 pts       [  ]3 pts                                               Illegal Drugs                     [  ]4 pts        [  ]4 pts                                               Rx Drugs                           [  ]5 pts        [  ]5 pts    AGE BETWEEN 16-45 YEARS                                      [  ]1 pt         [  ]1 pt    HISTORY OF PREADOLESCENT   SEXUAL ABUSE                                                             [  ]3 pts        [  ]0pts    PSYCHOLOGICAL DISEASE                     ADD, OCD, Bipolar, Schizophrenia        [  ]2 pts         [  ]2 pts                      Depression                                               [  ]1 pt           [  ]1 pt           SCORING TOTAL   (add numbers and type here)              (*0**)                                     A score of 3 or lower indicated LOW risk for future opioid abuse  A score of 4 to 7 indicated moderate risk for future opioid abuse  A score of 8 or higher indicates a high risk for opioid abuse      CAPRINI VTE 2.0 SCORE [CLOT updated 2019]    AGE RELATED RISK FACTORS                                                       MOBILITY RELATED FACTORS  [ ] Age 41-60 years                                            (1 Point)                    [ ] Bed rest                                                        (1 Point)  [ ] Age: 61-74 years                                           (2 Points)                  [ ] Plaster cast                                                   (2 Points)  [x ] Age= 75 years                                              (3 Points)                    [ ] Bed bound for more than 72 hours                 (2 Points)    DISEASE RELATED RISK FACTORS                                               GENDER SPECIFIC FACTORS  [ ] Edema in the lower extremities                       (1 Point)              [ ] Pregnancy                                                     (1 Point)  [ ] Varicose veins                                               (1 Point)                     [ ] Post-partum < 6 weeks                                   (1 Point)             [x ] BMI > 25 Kg/m2                                            (1 Point)                     [ ] Hormonal therapy  or oral contraception          (1 Point)                 [ ] Sepsis (in the previous month)                        (1 Point)               [ ] History of pregnancy complications                 (1 point)  [ ] Pneumonia or serious lung disease                                               [ ] Unexplained or recurrent                     (1 Point)           (in the previous month)                               (1 Point)  [ ] Abnormal pulmonary function test                     (1 Point)                 SURGERY RELATED RISK FACTORS  [ ] Acute myocardial infarction                              (1 Point)               [ ]  Section                                             (1 Point)  [ ] Congestive heart failure (in the previous month)  (1 Point)      [ ] Minor surgery                                                  (1 Point)   [ ] Inflammatory bowel disease                             (1 Point)               [ ] Arthroscopic surgery                                        (2 Points)  [ ] Central venous access                                      (2 Points)                [ x] General surgery lasting more than 45 minutes (2 points)  [ ] Malignancy- Present or previous                   (2 Points)                [ ] Elective arthroplasty                                         (5 points)    [ ] Stroke (in the previous month)                          (5 Points)                                                                                                                                                           HEMATOLOGY RELATED FACTORS                                                 TRAUMA RELATED RISK FACTORS  [ ] Prior episodes of VTE                                     (3 Points)                [ ] Fracture of the hip, pelvis, or leg                       (5 Points)  [ ] Positive family history for VTE                         (3 Points)             [ ] Acute spinal cord injury (in the previous month)  (5 Points)  [ ] Prothrombin 70382 A                                     (3 Points)               [ ] Paralysis  (less than 1 month)                             (5 Points)  [ ] Factor V Leiden                                             (3 Points)                  [ ] Multiple Trauma within 1 month                        (5 Points)  [ ] Lupus anticoagulants                                     (3 Points)                                                           [ ] Anticardiolipin antibodies                               (3 Points)                                                       [ ] High homocysteine in the blood                      (3 Points)                                             [ ] Other congenital or acquired thrombophilia      (3 Points)                                                [ ] Heparin induced thrombocytopenia                  (3 Points)                                     Total Score [   5       ]        Patient is a 84 y/o female aox3 uses a walker for balance , patient retired electronic worker , 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 Patient c/o her bladder dropping with urinary urgency ,went to her PCP and was found to have a" bladder tumor ". Patient had tumor removed in may 2019 . Patient  went for a follow up with her PCP and as per patient the" bladder tumor has grown back ". Patient denies c/o pain or discomfort denies bleeding . Patient presents to PST for evaluation for a transurethral resection of bladder tumor on 1/10/ with Dr Sanders           Patient educated on pre op prep and review of medication for procedure with understanding

## 2019-12-27 NOTE — H&P PST ADULT - NSICDXPROBLEM_GEN_ALL_CORE_FT
PROBLEM DIAGNOSES  Problem: Neoplasm of unspecified behavior of bladder  Assessment and Plan: transurethral resection of the bladder     Problem: Need for prophylactic measure  Assessment and Plan: caprini score is 5 moderate VTE risk surgical team to assess the need for pharm proph     Problem: H/O heart artery stent  Assessment and Plan: medical clearance with Dr Campos  cardio clearance [phone] Dr Lion [phone]

## 2019-12-29 LAB
CULTURE RESULTS: SIGNIFICANT CHANGE UP
SPECIMEN SOURCE: SIGNIFICANT CHANGE UP

## 2019-12-31 ENCOUNTER — APPOINTMENT (OUTPATIENT)
Dept: FAMILY MEDICINE | Facility: CLINIC | Age: 84
End: 2019-12-31
Payer: MEDICARE

## 2019-12-31 ENCOUNTER — LABORATORY RESULT (OUTPATIENT)
Age: 84
End: 2019-12-31

## 2019-12-31 VITALS
OXYGEN SATURATION: 77 % | WEIGHT: 170 LBS | DIASTOLIC BLOOD PRESSURE: 72 MMHG | TEMPERATURE: 98.4 F | HEIGHT: 61 IN | BODY MASS INDEX: 32.1 KG/M2 | SYSTOLIC BLOOD PRESSURE: 144 MMHG | HEART RATE: 85 BPM

## 2019-12-31 VITALS — OXYGEN SATURATION: 84 %

## 2019-12-31 VITALS — DIASTOLIC BLOOD PRESSURE: 65 MMHG | SYSTOLIC BLOOD PRESSURE: 142 MMHG

## 2019-12-31 DIAGNOSIS — Z01.818 ENCOUNTER FOR OTHER PREPROCEDURAL EXAMINATION: ICD-10-CM

## 2019-12-31 DIAGNOSIS — R09.02 HYPOXEMIA: ICD-10-CM

## 2019-12-31 PROCEDURE — 36415 COLL VENOUS BLD VENIPUNCTURE: CPT

## 2019-12-31 PROCEDURE — 99214 OFFICE O/P EST MOD 30 MIN: CPT | Mod: 25

## 2019-12-31 RX ORDER — UMECLIDINIUM 62.5 UG/1
62.5 AEROSOL, POWDER ORAL
Qty: 3 | Refills: 3 | Status: DISCONTINUED | COMMUNITY
Start: 2019-07-15 | End: 2019-12-31

## 2019-12-31 RX ORDER — ASPIRIN 325 MG
81 TABLET ORAL
Refills: 0 | Status: DISCONTINUED | COMMUNITY
End: 2019-12-31

## 2020-01-01 ENCOUNTER — APPOINTMENT (OUTPATIENT)
Dept: FAMILY MEDICINE | Facility: CLINIC | Age: 85
End: 2020-01-01

## 2020-01-01 ENCOUNTER — NON-APPOINTMENT (OUTPATIENT)
Age: 85
End: 2020-01-01

## 2020-01-01 ENCOUNTER — APPOINTMENT (OUTPATIENT)
Dept: HOME HEALTH SERVICES | Facility: HOME HEALTH | Age: 85
End: 2020-01-01
Payer: MEDICARE

## 2020-01-01 ENCOUNTER — TRANSCRIPTION ENCOUNTER (OUTPATIENT)
Age: 85
End: 2020-01-01

## 2020-01-01 ENCOUNTER — APPOINTMENT (OUTPATIENT)
Dept: PULMONOLOGY | Facility: CLINIC | Age: 85
End: 2020-01-01

## 2020-01-01 ENCOUNTER — APPOINTMENT (OUTPATIENT)
Dept: UROLOGY | Facility: HOSPITAL | Age: 85
End: 2020-01-01

## 2020-01-01 ENCOUNTER — APPOINTMENT (OUTPATIENT)
Dept: CARDIOLOGY | Facility: CLINIC | Age: 85
End: 2020-01-01

## 2020-01-01 ENCOUNTER — RX RENEWAL (OUTPATIENT)
Age: 85
End: 2020-01-01

## 2020-01-01 ENCOUNTER — APPOINTMENT (OUTPATIENT)
Dept: CARDIOLOGY | Facility: CLINIC | Age: 85
End: 2020-01-01
Payer: MEDICARE

## 2020-01-01 ENCOUNTER — APPOINTMENT (OUTPATIENT)
Dept: UROLOGY | Facility: CLINIC | Age: 85
End: 2020-01-01

## 2020-01-01 ENCOUNTER — APPOINTMENT (OUTPATIENT)
Dept: OTOLARYNGOLOGY | Facility: CLINIC | Age: 85
End: 2020-01-01

## 2020-01-01 ENCOUNTER — APPOINTMENT (OUTPATIENT)
Dept: HOME HEALTH SERVICES | Facility: HOME HEALTH | Age: 85
End: 2020-01-01

## 2020-01-01 ENCOUNTER — LABORATORY RESULT (OUTPATIENT)
Age: 85
End: 2020-01-01

## 2020-01-01 ENCOUNTER — APPOINTMENT (OUTPATIENT)
Dept: PULMONOLOGY | Facility: CLINIC | Age: 85
End: 2020-01-01
Payer: MEDICARE

## 2020-01-01 VITALS — OXYGEN SATURATION: 92 %

## 2020-01-01 VITALS — TEMPERATURE: 98 F

## 2020-01-01 VITALS — DIASTOLIC BLOOD PRESSURE: 62 MMHG | OXYGEN SATURATION: 71 % | HEART RATE: 87 BPM | SYSTOLIC BLOOD PRESSURE: 96 MMHG

## 2020-01-01 VITALS
OXYGEN SATURATION: 79 % | DIASTOLIC BLOOD PRESSURE: 60 MMHG | SYSTOLIC BLOOD PRESSURE: 130 MMHG | HEART RATE: 75 BPM | RESPIRATION RATE: 22 BRPM

## 2020-01-01 VITALS — HEIGHT: 61 IN | WEIGHT: 150 LBS | BODY MASS INDEX: 28.32 KG/M2

## 2020-01-01 DIAGNOSIS — J44.9 CHRONIC OBSTRUCTIVE PULMONARY DISEASE, UNSPECIFIED: ICD-10-CM

## 2020-01-01 DIAGNOSIS — I26.99 OTHER PULMONARY EMBOLISM W/OUT ACUTE COR PULMONALE: ICD-10-CM

## 2020-01-01 DIAGNOSIS — D50.9 IRON DEFICIENCY ANEMIA, UNSPECIFIED: ICD-10-CM

## 2020-01-01 DIAGNOSIS — E11.9 TYPE 2 DIABETES MELLITUS WITHOUT COMPLICATIONS: ICD-10-CM

## 2020-01-01 DIAGNOSIS — R53.83 OTHER FATIGUE: ICD-10-CM

## 2020-01-01 DIAGNOSIS — I26.99 OTHER PULMONARY EMBOLISM WITHOUT ACUTE COR PULMONALE: ICD-10-CM

## 2020-01-01 DIAGNOSIS — I25.10 ATHEROSCLEROTIC HEART DISEASE OF NATIVE CORONARY ARTERY W/OUT ANGINA PECTORIS: ICD-10-CM

## 2020-01-01 DIAGNOSIS — I50.32 CHRONIC DIASTOLIC (CONGESTIVE) HEART FAILURE: ICD-10-CM

## 2020-01-01 DIAGNOSIS — I50.30 UNSPECIFIED DIASTOLIC (CONGESTIVE) HEART FAILURE: ICD-10-CM

## 2020-01-01 DIAGNOSIS — Z23 ENCOUNTER FOR IMMUNIZATION: ICD-10-CM

## 2020-01-01 DIAGNOSIS — Z71.89 OTHER SPECIFIED COUNSELING: ICD-10-CM

## 2020-01-01 DIAGNOSIS — Z51.5 ENCOUNTER FOR PALLIATIVE CARE: ICD-10-CM

## 2020-01-01 DIAGNOSIS — I25.10 ATHEROSCLEROTIC HEART DISEASE OF NATIVE CORONARY ARTERY WITHOUT ANGINA PECTORIS: ICD-10-CM

## 2020-01-01 DIAGNOSIS — I50.9 HEART FAILURE, UNSPECIFIED: ICD-10-CM

## 2020-01-01 DIAGNOSIS — Z86.711 PERSONAL HISTORY OF PULMONARY EMBOLISM: ICD-10-CM

## 2020-01-01 DIAGNOSIS — D49.4 NEOPLASM OF UNSPECIFIED BEHAVIOR OF BLADDER: ICD-10-CM

## 2020-01-01 DIAGNOSIS — I27.20 PULMONARY HYPERTENSION, UNSPECIFIED: ICD-10-CM

## 2020-01-01 DIAGNOSIS — C67.9 MALIGNANT NEOPLASM OF BLADDER, UNSPECIFIED: ICD-10-CM

## 2020-01-01 DIAGNOSIS — E11.9 TYPE 2 DIABETES MELLITUS W/OUT COMPLICATIONS: ICD-10-CM

## 2020-01-01 DIAGNOSIS — I70.0 ATHEROSCLEROSIS OF AORTA: ICD-10-CM

## 2020-01-01 LAB
25(OH)D3 SERPL-MCNC: 43.8 NG/ML
ALBUMIN SERPL ELPH-MCNC: 3.7 G/DL
ALP BLD-CCNC: 89 U/L
ALT SERPL-CCNC: <5 U/L
ANION GAP SERPL CALC-SCNC: 11 MMOL/L — SIGNIFICANT CHANGE UP (ref 5–17)
ANION GAP SERPL CALC-SCNC: 12 MMOL/L — SIGNIFICANT CHANGE UP (ref 5–17)
ANION GAP SERPL CALC-SCNC: 12 MMOL/L — SIGNIFICANT CHANGE UP (ref 5–17)
ANION GAP SERPL CALC-SCNC: 14 MMOL/L
AST SERPL-CCNC: 14 U/L
BASOPHILS # BLD AUTO: 0.03 K/UL
BASOPHILS # BLD AUTO: 0.05 K/UL
BASOPHILS NFR BLD AUTO: 0.4 %
BASOPHILS NFR BLD AUTO: 0.6 %
BILIRUB SERPL-MCNC: 0.4 MG/DL
BUN SERPL-MCNC: 18 MG/DL
BUN SERPL-MCNC: 21 MG/DL — HIGH (ref 8–20)
BUN SERPL-MCNC: 21 MG/DL — HIGH (ref 8–20)
BUN SERPL-MCNC: 30 MG/DL — HIGH (ref 8–20)
CALCIUM SERPL-MCNC: 8.6 MG/DL — SIGNIFICANT CHANGE UP (ref 8.6–10.2)
CALCIUM SERPL-MCNC: 8.9 MG/DL — SIGNIFICANT CHANGE UP (ref 8.6–10.2)
CALCIUM SERPL-MCNC: 9 MG/DL — SIGNIFICANT CHANGE UP (ref 8.6–10.2)
CALCIUM SERPL-MCNC: 9.5 MG/DL
CHLORIDE SERPL-SCNC: 105 MMOL/L
CHLORIDE SERPL-SCNC: 95 MMOL/L — LOW (ref 98–107)
CHLORIDE SERPL-SCNC: 95 MMOL/L — LOW (ref 98–107)
CHLORIDE SERPL-SCNC: 99 MMOL/L — SIGNIFICANT CHANGE UP (ref 98–107)
CO2 SERPL-SCNC: 22 MMOL/L
CO2 SERPL-SCNC: 33 MMOL/L — HIGH (ref 22–29)
CO2 SERPL-SCNC: 34 MMOL/L — HIGH (ref 22–29)
CO2 SERPL-SCNC: 35 MMOL/L — HIGH (ref 22–29)
CREAT SERPL-MCNC: 0.49 MG/DL — LOW (ref 0.5–1.3)
CREAT SERPL-MCNC: 0.57 MG/DL — SIGNIFICANT CHANGE UP (ref 0.5–1.3)
CREAT SERPL-MCNC: 0.72 MG/DL — SIGNIFICANT CHANGE UP (ref 0.5–1.3)
CREAT SERPL-MCNC: 0.83 MG/DL
CULTURE RESULTS: SIGNIFICANT CHANGE UP
CULTURE RESULTS: SIGNIFICANT CHANGE UP
EOSINOPHIL # BLD AUTO: 0.2 K/UL
EOSINOPHIL # BLD AUTO: 0.21 K/UL
EOSINOPHIL NFR BLD AUTO: 2.2 %
EOSINOPHIL NFR BLD AUTO: 2.9 %
FERRITIN SERPL-MCNC: 7 NG/ML
FOLATE SERPL-MCNC: 18.9 NG/ML
GLUCOSE BLDC GLUCOMTR-MCNC: 106 MG/DL — HIGH (ref 70–99)
GLUCOSE BLDC GLUCOMTR-MCNC: 113 MG/DL — HIGH (ref 70–99)
GLUCOSE BLDC GLUCOMTR-MCNC: 115 MG/DL — HIGH (ref 70–99)
GLUCOSE BLDC GLUCOMTR-MCNC: 115 MG/DL — HIGH (ref 70–99)
GLUCOSE BLDC GLUCOMTR-MCNC: 121 MG/DL — HIGH (ref 70–99)
GLUCOSE BLDC GLUCOMTR-MCNC: 129 MG/DL — HIGH (ref 70–99)
GLUCOSE BLDC GLUCOMTR-MCNC: 132 MG/DL — HIGH (ref 70–99)
GLUCOSE BLDC GLUCOMTR-MCNC: 134 MG/DL — HIGH (ref 70–99)
GLUCOSE BLDC GLUCOMTR-MCNC: 134 MG/DL — HIGH (ref 70–99)
GLUCOSE BLDC GLUCOMTR-MCNC: 136 MG/DL — HIGH (ref 70–99)
GLUCOSE BLDC GLUCOMTR-MCNC: 137 MG/DL — HIGH (ref 70–99)
GLUCOSE BLDC GLUCOMTR-MCNC: 140 MG/DL — HIGH (ref 70–99)
GLUCOSE BLDC GLUCOMTR-MCNC: 141 MG/DL — HIGH (ref 70–99)
GLUCOSE BLDC GLUCOMTR-MCNC: 148 MG/DL — HIGH (ref 70–99)
GLUCOSE BLDC GLUCOMTR-MCNC: 153 MG/DL — HIGH (ref 70–99)
GLUCOSE BLDC GLUCOMTR-MCNC: 156 MG/DL — HIGH (ref 70–99)
GLUCOSE BLDC GLUCOMTR-MCNC: 157 MG/DL — HIGH (ref 70–99)
GLUCOSE BLDC GLUCOMTR-MCNC: 159 MG/DL — HIGH (ref 70–99)
GLUCOSE BLDC GLUCOMTR-MCNC: 160 MG/DL — HIGH (ref 70–99)
GLUCOSE BLDC GLUCOMTR-MCNC: 163 MG/DL — HIGH (ref 70–99)
GLUCOSE BLDC GLUCOMTR-MCNC: 167 MG/DL — HIGH (ref 70–99)
GLUCOSE BLDC GLUCOMTR-MCNC: 167 MG/DL — HIGH (ref 70–99)
GLUCOSE BLDC GLUCOMTR-MCNC: 171 MG/DL — HIGH (ref 70–99)
GLUCOSE BLDC GLUCOMTR-MCNC: 172 MG/DL — HIGH (ref 70–99)
GLUCOSE BLDC GLUCOMTR-MCNC: 175 MG/DL — HIGH (ref 70–99)
GLUCOSE BLDC GLUCOMTR-MCNC: 195 MG/DL — HIGH (ref 70–99)
GLUCOSE BLDC GLUCOMTR-MCNC: 216 MG/DL — HIGH (ref 70–99)
GLUCOSE BLDC GLUCOMTR-MCNC: 235 MG/DL — HIGH (ref 70–99)
GLUCOSE BLDC GLUCOMTR-MCNC: 91 MG/DL — SIGNIFICANT CHANGE UP (ref 70–99)
GLUCOSE BLDC GLUCOMTR-MCNC: 91 MG/DL — SIGNIFICANT CHANGE UP (ref 70–99)
GLUCOSE SERPL-MCNC: 120 MG/DL — HIGH (ref 70–99)
GLUCOSE SERPL-MCNC: 131 MG/DL — HIGH (ref 70–99)
GLUCOSE SERPL-MCNC: 93 MG/DL — SIGNIFICANT CHANGE UP (ref 70–99)
HCT VFR BLD CALC: 27 %
HCT VFR BLD CALC: 28.3 %
HCT VFR BLD CALC: 43.5 % — SIGNIFICANT CHANGE UP (ref 34.5–45)
HCT VFR BLD CALC: 44.3 % — SIGNIFICANT CHANGE UP (ref 34.5–45)
HCT VFR BLD CALC: 45.1 % — HIGH (ref 34.5–45)
HCT VFR BLD CALC: 47.9 % — HIGH (ref 34.5–45)
HGB BLD-MCNC: 13 G/DL — SIGNIFICANT CHANGE UP (ref 11.5–15.5)
HGB BLD-MCNC: 13.2 G/DL — SIGNIFICANT CHANGE UP (ref 11.5–15.5)
HGB BLD-MCNC: 13.4 G/DL — SIGNIFICANT CHANGE UP (ref 11.5–15.5)
HGB BLD-MCNC: 14.1 G/DL — SIGNIFICANT CHANGE UP (ref 11.5–15.5)
HGB BLD-MCNC: 6.9 G/DL
HGB BLD-MCNC: 7.1 G/DL
IMM GRANULOCYTES NFR BLD AUTO: 0.2 %
IMM GRANULOCYTES NFR BLD AUTO: 0.3 %
IRON SATN MFR SERPL: 5 %
IRON SERPL-MCNC: 18 UG/DL
LYMPHOCYTES # BLD AUTO: 1.89 K/UL
LYMPHOCYTES # BLD AUTO: 2.29 K/UL
LYMPHOCYTES NFR BLD AUTO: 25.2 %
LYMPHOCYTES NFR BLD AUTO: 26.3 %
MAGNESIUM SERPL-MCNC: 2 MG/DL — SIGNIFICANT CHANGE UP (ref 1.6–2.6)
MAN DIFF?: NORMAL
MAN DIFF?: NORMAL
MCHC RBC-ENTMCNC: 16.6 PG
MCHC RBC-ENTMCNC: 17.2 PG
MCHC RBC-ENTMCNC: 25.1 GM/DL
MCHC RBC-ENTMCNC: 25.2 PG — LOW (ref 27–34)
MCHC RBC-ENTMCNC: 25.3 PG — LOW (ref 27–34)
MCHC RBC-ENTMCNC: 25.5 PG — LOW (ref 27–34)
MCHC RBC-ENTMCNC: 25.6 GM/DL
MCHC RBC-ENTMCNC: 25.6 PG — LOW (ref 27–34)
MCHC RBC-ENTMCNC: 29.4 GM/DL — LOW (ref 32–36)
MCHC RBC-ENTMCNC: 29.7 GM/DL — LOW (ref 32–36)
MCHC RBC-ENTMCNC: 29.8 GM/DL — LOW (ref 32–36)
MCHC RBC-ENTMCNC: 29.9 GM/DL — LOW (ref 32–36)
MCV RBC AUTO: 66.3 FL
MCV RBC AUTO: 67.2 FL
MCV RBC AUTO: 85 FL — SIGNIFICANT CHANGE UP (ref 80–100)
MCV RBC AUTO: 85.7 FL — SIGNIFICANT CHANGE UP (ref 80–100)
MCV RBC AUTO: 85.7 FL — SIGNIFICANT CHANGE UP (ref 80–100)
MCV RBC AUTO: 85.8 FL — SIGNIFICANT CHANGE UP (ref 80–100)
MONOCYTES # BLD AUTO: 0.55 K/UL
MONOCYTES # BLD AUTO: 0.85 K/UL
MONOCYTES NFR BLD AUTO: 7.6 %
MONOCYTES NFR BLD AUTO: 9.4 %
NEUTROPHILS # BLD AUTO: 4.5 K/UL
NEUTROPHILS # BLD AUTO: 5.66 K/UL
NEUTROPHILS NFR BLD AUTO: 62.4 %
NEUTROPHILS NFR BLD AUTO: 62.5 %
PHOSPHATE SERPL-MCNC: 3.5 MG/DL — SIGNIFICANT CHANGE UP (ref 2.4–4.7)
PLATELET # BLD AUTO: 351 K/UL — SIGNIFICANT CHANGE UP (ref 150–400)
PLATELET # BLD AUTO: 352 K/UL — SIGNIFICANT CHANGE UP (ref 150–400)
PLATELET # BLD AUTO: 378 K/UL — SIGNIFICANT CHANGE UP (ref 150–400)
PLATELET # BLD AUTO: 383 K/UL — SIGNIFICANT CHANGE UP (ref 150–400)
PLATELET # BLD AUTO: 429 K/UL
PLATELET # BLD AUTO: 433 K/UL
POTASSIUM SERPL-MCNC: 4 MMOL/L — SIGNIFICANT CHANGE UP (ref 3.5–5.3)
POTASSIUM SERPL-MCNC: 4.7 MMOL/L — SIGNIFICANT CHANGE UP (ref 3.5–5.3)
POTASSIUM SERPL-MCNC: 4.8 MMOL/L — SIGNIFICANT CHANGE UP (ref 3.5–5.3)
POTASSIUM SERPL-SCNC: 4 MMOL/L — SIGNIFICANT CHANGE UP (ref 3.5–5.3)
POTASSIUM SERPL-SCNC: 4.7 MMOL/L — SIGNIFICANT CHANGE UP (ref 3.5–5.3)
POTASSIUM SERPL-SCNC: 4.8 MMOL/L
POTASSIUM SERPL-SCNC: 4.8 MMOL/L — SIGNIFICANT CHANGE UP (ref 3.5–5.3)
PROT SERPL-MCNC: 6.8 G/DL
RBC # BLD: 4.02 M/UL
RBC # BLD: 4.27 M/UL
RBC # BLD: 5.07 M/UL — SIGNIFICANT CHANGE UP (ref 3.8–5.2)
RBC # BLD: 5.21 M/UL — HIGH (ref 3.8–5.2)
RBC # BLD: 5.26 M/UL — HIGH (ref 3.8–5.2)
RBC # BLD: 5.59 M/UL — HIGH (ref 3.8–5.2)
RBC # FLD: 20.6 % — HIGH (ref 10.3–14.5)
RBC # FLD: 20.8 % — HIGH (ref 10.3–14.5)
RBC # FLD: 21.2 % — HIGH (ref 10.3–14.5)
RBC # FLD: 21.2 % — HIGH (ref 10.3–14.5)
RBC # FLD: 25.2 %
RBC # FLD: 25.8 %
SODIUM SERPL-SCNC: 141 MMOL/L
SODIUM SERPL-SCNC: 141 MMOL/L — SIGNIFICANT CHANGE UP (ref 135–145)
SODIUM SERPL-SCNC: 141 MMOL/L — SIGNIFICANT CHANGE UP (ref 135–145)
SODIUM SERPL-SCNC: 144 MMOL/L — SIGNIFICANT CHANGE UP (ref 135–145)
SPECIMEN SOURCE: SIGNIFICANT CHANGE UP
SPECIMEN SOURCE: SIGNIFICANT CHANGE UP
TIBC SERPL-MCNC: 393 UG/DL
UIBC SERPL-MCNC: 375 UG/DL
VIT B12 SERPL-MCNC: 357 PG/ML
WBC # BLD: 13.29 K/UL — HIGH (ref 3.8–10.5)
WBC # BLD: 14.66 K/UL — HIGH (ref 3.8–10.5)
WBC # BLD: 14.93 K/UL — HIGH (ref 3.8–10.5)
WBC # BLD: 15.95 K/UL — HIGH (ref 3.8–10.5)
WBC # FLD AUTO: 13.29 K/UL — HIGH (ref 3.8–10.5)
WBC # FLD AUTO: 14.66 K/UL — HIGH (ref 3.8–10.5)
WBC # FLD AUTO: 14.93 K/UL — HIGH (ref 3.8–10.5)
WBC # FLD AUTO: 15.95 K/UL — HIGH (ref 3.8–10.5)
WBC # FLD AUTO: 7.2 K/UL
WBC # FLD AUTO: 9.07 K/UL

## 2020-01-01 PROCEDURE — 99233 SBSQ HOSP IP/OBS HIGH 50: CPT

## 2020-01-01 PROCEDURE — 92610 EVALUATE SWALLOWING FUNCTION: CPT

## 2020-01-01 PROCEDURE — 80048 BASIC METABOLIC PNL TOTAL CA: CPT

## 2020-01-01 PROCEDURE — 82962 GLUCOSE BLOOD TEST: CPT

## 2020-01-01 PROCEDURE — 82947 ASSAY GLUCOSE BLOOD QUANT: CPT

## 2020-01-01 PROCEDURE — 84295 ASSAY OF SERUM SODIUM: CPT

## 2020-01-01 PROCEDURE — 99349 HOME/RES VST EST MOD MDM 40: CPT | Mod: 95

## 2020-01-01 PROCEDURE — 71046 X-RAY EXAM CHEST 2 VIEWS: CPT

## 2020-01-01 PROCEDURE — 99232 SBSQ HOSP IP/OBS MODERATE 35: CPT

## 2020-01-01 PROCEDURE — 85027 COMPLETE CBC AUTOMATED: CPT

## 2020-01-01 PROCEDURE — 99349 HOME/RES VST EST MOD MDM 40: CPT | Mod: 25,95

## 2020-01-01 PROCEDURE — 83605 ASSAY OF LACTIC ACID: CPT

## 2020-01-01 PROCEDURE — 71045 X-RAY EXAM CHEST 1 VIEW: CPT

## 2020-01-01 PROCEDURE — G0506: CPT | Mod: 95

## 2020-01-01 PROCEDURE — 93308 TTE F-UP OR LMTD: CPT

## 2020-01-01 PROCEDURE — 82330 ASSAY OF CALCIUM: CPT

## 2020-01-01 PROCEDURE — 82435 ASSAY OF BLOOD CHLORIDE: CPT

## 2020-01-01 PROCEDURE — 99213 OFFICE O/P EST LOW 20 MIN: CPT | Mod: 95

## 2020-01-01 PROCEDURE — G9005: CPT

## 2020-01-01 PROCEDURE — 93970 EXTREMITY STUDY: CPT

## 2020-01-01 PROCEDURE — 96375 TX/PRO/DX INJ NEW DRUG ADDON: CPT

## 2020-01-01 PROCEDURE — 97116 GAIT TRAINING THERAPY: CPT

## 2020-01-01 PROCEDURE — 85730 THROMBOPLASTIN TIME PARTIAL: CPT

## 2020-01-01 PROCEDURE — 93308 TTE F-UP OR LMTD: CPT | Mod: 26

## 2020-01-01 PROCEDURE — 99239 HOSP IP/OBS DSCHRG MGMT >30: CPT

## 2020-01-01 PROCEDURE — 94660 CPAP INITIATION&MGMT: CPT

## 2020-01-01 PROCEDURE — 36415 COLL VENOUS BLD VENIPUNCTURE: CPT

## 2020-01-01 PROCEDURE — 80053 COMPREHEN METABOLIC PANEL: CPT

## 2020-01-01 PROCEDURE — 36600 WITHDRAWAL OF ARTERIAL BLOOD: CPT

## 2020-01-01 PROCEDURE — 93306 TTE W/DOPPLER COMPLETE: CPT

## 2020-01-01 PROCEDURE — 87631 RESP VIRUS 3-5 TARGETS: CPT

## 2020-01-01 PROCEDURE — 85610 PROTHROMBIN TIME: CPT

## 2020-01-01 PROCEDURE — 94640 AIRWAY INHALATION TREATMENT: CPT

## 2020-01-01 PROCEDURE — 99285 EMERGENCY DEPT VISIT HI MDM: CPT | Mod: 25

## 2020-01-01 PROCEDURE — 84132 ASSAY OF SERUM POTASSIUM: CPT

## 2020-01-01 PROCEDURE — 83735 ASSAY OF MAGNESIUM: CPT

## 2020-01-01 PROCEDURE — G0439: CPT | Mod: 95

## 2020-01-01 PROCEDURE — 84100 ASSAY OF PHOSPHORUS: CPT

## 2020-01-01 PROCEDURE — 71045 X-RAY EXAM CHEST 1 VIEW: CPT | Mod: 26

## 2020-01-01 PROCEDURE — 71275 CT ANGIOGRAPHY CHEST: CPT

## 2020-01-01 PROCEDURE — 96374 THER/PROPH/DIAG INJ IV PUSH: CPT

## 2020-01-01 PROCEDURE — 83036 HEMOGLOBIN GLYCOSYLATED A1C: CPT

## 2020-01-01 PROCEDURE — 99497 ADVNCD CARE PLAN 30 MIN: CPT | Mod: 95

## 2020-01-01 PROCEDURE — 87040 BLOOD CULTURE FOR BACTERIA: CPT

## 2020-01-01 PROCEDURE — 85014 HEMATOCRIT: CPT

## 2020-01-01 PROCEDURE — 97163 PT EVAL HIGH COMPLEX 45 MIN: CPT

## 2020-01-01 PROCEDURE — 84484 ASSAY OF TROPONIN QUANT: CPT

## 2020-01-01 PROCEDURE — 82803 BLOOD GASES ANY COMBINATION: CPT

## 2020-01-01 PROCEDURE — 93005 ELECTROCARDIOGRAM TRACING: CPT

## 2020-01-01 PROCEDURE — 94760 N-INVAS EAR/PLS OXIMETRY 1: CPT

## 2020-01-01 PROCEDURE — 99215 OFFICE O/P EST HI 40 MIN: CPT

## 2020-01-01 PROCEDURE — 83880 ASSAY OF NATRIURETIC PEPTIDE: CPT

## 2020-01-01 PROCEDURE — 97530 THERAPEUTIC ACTIVITIES: CPT

## 2020-01-01 RX ORDER — APIXABAN 2.5 MG/1
1 TABLET, FILM COATED ORAL
Qty: 60 | Refills: 0
Start: 2020-01-01 | End: 2020-01-01

## 2020-01-01 RX ORDER — FUROSEMIDE 40 MG
1 TABLET ORAL
Qty: 0 | Refills: 0 | DISCHARGE
Start: 2020-01-01

## 2020-01-01 RX ORDER — SPIRONOLACTONE 25 MG/1
25 TABLET ORAL DAILY
Qty: 90 | Refills: 0 | Status: ACTIVE | COMMUNITY
Start: 2020-01-01

## 2020-01-01 RX ORDER — FLUTICASONE PROPIONATE AND SALMETEROL 250; 50 UG/1; UG/1
250-50 POWDER RESPIRATORY (INHALATION)
Qty: 1 | Refills: 3 | Status: DISCONTINUED | COMMUNITY
Start: 2018-09-14 | End: 2020-01-01

## 2020-01-01 RX ORDER — TIOTROPIUM BROMIDE 18 UG/1
18 CAPSULE ORAL; RESPIRATORY (INHALATION) DAILY
Qty: 1 | Refills: 3 | Status: ACTIVE | COMMUNITY
Start: 2020-01-03

## 2020-01-01 RX ORDER — MORPHINE SULFATE 50 MG/1
1.5 CAPSULE, EXTENDED RELEASE ORAL
Qty: 180 | Refills: 0
Start: 2020-01-01 | End: 2020-01-01

## 2020-01-01 RX ORDER — ALBUTEROL SULFATE 90 UG/1
108 (90 BASE) AEROSOL, METERED RESPIRATORY (INHALATION) EVERY 6 HOURS
Qty: 1 | Refills: 3 | Status: DISCONTINUED | COMMUNITY
Start: 2018-09-17 | End: 2020-01-01

## 2020-01-01 RX ORDER — SPIRONOLACTONE 25 MG/1
1 TABLET, FILM COATED ORAL
Qty: 0 | Refills: 0 | DISCHARGE
Start: 2020-01-01

## 2020-01-01 RX ORDER — ATORVASTATIN CALCIUM 10 MG/1
10 TABLET, FILM COATED ORAL
Qty: 30 | Refills: 0 | Status: DISCONTINUED | COMMUNITY
Start: 2019-07-09 | End: 2020-01-01

## 2020-01-01 RX ORDER — FUROSEMIDE 40 MG/1
40 TABLET ORAL DAILY
Qty: 90 | Refills: 1 | Status: DISCONTINUED | COMMUNITY
Start: 2020-01-01 | End: 2020-01-01

## 2020-01-01 RX ORDER — MORPHINE SULFATE 50 MG/1
2.5 CAPSULE, EXTENDED RELEASE ORAL EVERY 4 HOURS
Refills: 0 | Status: DISCONTINUED | OUTPATIENT
Start: 2020-01-01 | End: 2020-01-01

## 2020-01-01 RX ORDER — UMECLIDINIUM 62.5 UG/1
62.5 AEROSOL, POWDER ORAL DAILY
Qty: 4 | Refills: 5 | Status: DISCONTINUED | COMMUNITY
Start: 2020-01-14 | End: 2020-01-01

## 2020-01-01 RX ORDER — MORPHINE SULFATE 50 MG/1
2.5 CAPSULE, EXTENDED RELEASE ORAL
Qty: 75 | Refills: 0
Start: 2020-01-01 | End: 2020-01-01

## 2020-01-01 RX ORDER — CHLORHEXIDINE GLUCONATE 4 %
325 (65 FE) LIQUID (ML) TOPICAL
Qty: 60 | Refills: 6 | Status: ACTIVE | COMMUNITY
Start: 2020-01-01 | End: 1900-01-01

## 2020-01-01 RX ORDER — PANTOPRAZOLE 40 MG/1
40 TABLET, DELAYED RELEASE ORAL DAILY
Qty: 30 | Refills: 0 | Status: DISCONTINUED | COMMUNITY
Start: 2019-07-09 | End: 2020-01-01

## 2020-01-01 RX ORDER — ALBUTEROL SULFATE 2.5 MG/3ML
(2.5 MG/3ML) SOLUTION RESPIRATORY (INHALATION)
Qty: 1 | Refills: 3 | Status: ACTIVE | COMMUNITY
Start: 2018-10-10

## 2020-01-01 RX ORDER — POTASSIUM CHLORIDE 20 MEQ
20 PACKET (EA) ORAL ONCE
Refills: 0 | Status: DISCONTINUED | OUTPATIENT
Start: 2020-01-01 | End: 2020-01-01

## 2020-01-01 RX ORDER — TIOTROPIUM BROMIDE 18 UG/1
18 CAPSULE ORAL; RESPIRATORY (INHALATION) DAILY
Qty: 1 | Refills: 3 | Status: ACTIVE | COMMUNITY
Start: 2020-01-01

## 2020-01-01 RX ORDER — MORPHINE SULFATE 50 MG/1
2.5 CAPSULE, EXTENDED RELEASE ORAL
Qty: 450 | Refills: 0
Start: 2020-01-01 | End: 2020-01-01

## 2020-01-01 RX ORDER — LOSARTAN POTASSIUM 25 MG/1
25 TABLET, FILM COATED ORAL DAILY
Qty: 1 | Refills: 3 | Status: DISCONTINUED | COMMUNITY
Start: 2019-07-09 | End: 2020-01-01

## 2020-01-01 RX ORDER — METOPROLOL TARTRATE 50 MG
1 TABLET ORAL
Qty: 0 | Refills: 0 | DISCHARGE
Start: 2020-01-01

## 2020-01-01 RX ORDER — ATORVASTATIN CALCIUM 40 MG/1
40 TABLET, FILM COATED ORAL
Refills: 0 | Status: ACTIVE | COMMUNITY
Start: 2020-01-01

## 2020-01-01 RX ORDER — MORPHINE SULFATE 50 MG/1
2 CAPSULE, EXTENDED RELEASE ORAL AT BEDTIME
Refills: 0 | Status: DISCONTINUED | OUTPATIENT
Start: 2020-01-01 | End: 2020-01-01

## 2020-01-01 RX ORDER — ATORVASTATIN CALCIUM 80 MG/1
1 TABLET, FILM COATED ORAL
Qty: 0 | Refills: 0 | DISCHARGE
Start: 2020-01-01

## 2020-01-01 RX ORDER — ATORVASTATIN CALCIUM 40 MG/1
40 TABLET, FILM COATED ORAL
Qty: 90 | Refills: 1 | Status: DISCONTINUED | COMMUNITY
Start: 2018-09-17 | End: 2020-01-01

## 2020-01-01 RX ORDER — UMECLIDINIUM 62.5 UG/1
62.5 AEROSOL, POWDER ORAL DAILY
Qty: 4 | Refills: 5 | Status: DISCONTINUED | COMMUNITY
Start: 2020-01-08 | End: 2020-01-01

## 2020-01-01 RX ORDER — MORPHINE SULFATE 50 MG/1
0.75 CAPSULE, EXTENDED RELEASE ORAL
Qty: 90 | Refills: 0
Start: 2020-01-01 | End: 2020-01-01

## 2020-01-01 RX ORDER — METFORMIN HYDROCHLORIDE 850 MG/1
1 TABLET ORAL
Qty: 0 | Refills: 0 | DISCHARGE

## 2020-01-01 RX ORDER — ASPIRIN/CALCIUM CARB/MAGNESIUM 324 MG
1 TABLET ORAL
Qty: 0 | Refills: 0 | DISCHARGE
Start: 2020-01-01

## 2020-01-01 RX ORDER — ATORVASTATIN CALCIUM 80 MG/1
40 TABLET, FILM COATED ORAL
Qty: 0 | Refills: 0 | DISCHARGE

## 2020-01-01 RX ORDER — MORPHINE SULFATE 50 MG/1
1.5 CAPSULE, EXTENDED RELEASE ORAL
Qty: 30 | Refills: 0
Start: 2020-01-01 | End: 2020-01-01

## 2020-01-01 RX ADMIN — MORPHINE SULFATE 2 MILLIGRAM(S): 50 CAPSULE, EXTENDED RELEASE ORAL at 22:35

## 2020-01-01 RX ADMIN — APIXABAN 5 MILLIGRAM(S): 2.5 TABLET, FILM COATED ORAL at 17:15

## 2020-01-01 RX ADMIN — BUDESONIDE AND FORMOTEROL FUMARATE DIHYDRATE 2 PUFF(S): 160; 4.5 AEROSOL RESPIRATORY (INHALATION) at 20:28

## 2020-01-01 RX ADMIN — Medication 2: at 12:29

## 2020-01-01 RX ADMIN — Medication 40 MILLIGRAM(S): at 17:07

## 2020-01-01 RX ADMIN — Medication 4: at 21:34

## 2020-01-01 RX ADMIN — APIXABAN 5 MILLIGRAM(S): 2.5 TABLET, FILM COATED ORAL at 18:50

## 2020-01-01 RX ADMIN — Medication 40 MILLIGRAM(S): at 06:30

## 2020-01-01 RX ADMIN — BUDESONIDE AND FORMOTEROL FUMARATE DIHYDRATE 2 PUFF(S): 160; 4.5 AEROSOL RESPIRATORY (INHALATION) at 20:20

## 2020-01-01 RX ADMIN — Medication 25 MILLIGRAM(S): at 05:14

## 2020-01-01 RX ADMIN — Medication 2: at 21:13

## 2020-01-01 RX ADMIN — APIXABAN 5 MILLIGRAM(S): 2.5 TABLET, FILM COATED ORAL at 05:43

## 2020-01-01 RX ADMIN — Medication 40 MILLIGRAM(S): at 22:19

## 2020-01-01 RX ADMIN — Medication 40 MILLIGRAM(S): at 05:08

## 2020-01-01 RX ADMIN — SPIRONOLACTONE 25 MILLIGRAM(S): 25 TABLET, FILM COATED ORAL at 05:08

## 2020-01-01 RX ADMIN — BUDESONIDE AND FORMOTEROL FUMARATE DIHYDRATE 2 PUFF(S): 160; 4.5 AEROSOL RESPIRATORY (INHALATION) at 20:44

## 2020-01-01 RX ADMIN — Medication 40 MILLIGRAM(S): at 05:43

## 2020-01-01 RX ADMIN — Medication 25 MILLIGRAM(S): at 08:59

## 2020-01-01 RX ADMIN — BUDESONIDE AND FORMOTEROL FUMARATE DIHYDRATE 2 PUFF(S): 160; 4.5 AEROSOL RESPIRATORY (INHALATION) at 20:38

## 2020-01-01 RX ADMIN — Medication 25 MILLIGRAM(S): at 05:47

## 2020-01-01 RX ADMIN — Medication 40 MILLIGRAM(S): at 18:48

## 2020-01-01 RX ADMIN — Medication 81 MILLIGRAM(S): at 13:21

## 2020-01-01 RX ADMIN — APIXABAN 5 MILLIGRAM(S): 2.5 TABLET, FILM COATED ORAL at 05:15

## 2020-01-01 RX ADMIN — BUDESONIDE AND FORMOTEROL FUMARATE DIHYDRATE 2 PUFF(S): 160; 4.5 AEROSOL RESPIRATORY (INHALATION) at 08:59

## 2020-01-01 RX ADMIN — Medication 81 MILLIGRAM(S): at 12:43

## 2020-01-01 RX ADMIN — Medication 40 MILLIGRAM(S): at 05:48

## 2020-01-01 RX ADMIN — SPIRONOLACTONE 25 MILLIGRAM(S): 25 TABLET, FILM COATED ORAL at 05:56

## 2020-01-01 RX ADMIN — APIXABAN 5 MILLIGRAM(S): 2.5 TABLET, FILM COATED ORAL at 06:02

## 2020-01-01 RX ADMIN — BUDESONIDE AND FORMOTEROL FUMARATE DIHYDRATE 2 PUFF(S): 160; 4.5 AEROSOL RESPIRATORY (INHALATION) at 08:49

## 2020-01-01 RX ADMIN — BUDESONIDE AND FORMOTEROL FUMARATE DIHYDRATE 2 PUFF(S): 160; 4.5 AEROSOL RESPIRATORY (INHALATION) at 08:41

## 2020-01-01 RX ADMIN — Medication 2: at 21:28

## 2020-01-01 RX ADMIN — APIXABAN 5 MILLIGRAM(S): 2.5 TABLET, FILM COATED ORAL at 05:56

## 2020-01-01 RX ADMIN — APIXABAN 5 MILLIGRAM(S): 2.5 TABLET, FILM COATED ORAL at 05:47

## 2020-01-01 RX ADMIN — APIXABAN 5 MILLIGRAM(S): 2.5 TABLET, FILM COATED ORAL at 05:08

## 2020-01-01 RX ADMIN — Medication 81 MILLIGRAM(S): at 12:09

## 2020-01-01 RX ADMIN — Medication 40 MILLIGRAM(S): at 05:56

## 2020-01-01 RX ADMIN — BUDESONIDE AND FORMOTEROL FUMARATE DIHYDRATE 2 PUFF(S): 160; 4.5 AEROSOL RESPIRATORY (INHALATION) at 08:52

## 2020-01-01 RX ADMIN — APIXABAN 5 MILLIGRAM(S): 2.5 TABLET, FILM COATED ORAL at 06:30

## 2020-01-01 RX ADMIN — Medication 20 MILLIGRAM(S): at 05:28

## 2020-01-01 RX ADMIN — ATORVASTATIN CALCIUM 40 MILLIGRAM(S): 80 TABLET, FILM COATED ORAL at 21:13

## 2020-01-01 RX ADMIN — Medication 2: at 13:39

## 2020-01-01 RX ADMIN — Medication 40 MILLIGRAM(S): at 13:16

## 2020-01-01 RX ADMIN — SPIRONOLACTONE 25 MILLIGRAM(S): 25 TABLET, FILM COATED ORAL at 05:43

## 2020-01-01 RX ADMIN — BUDESONIDE AND FORMOTEROL FUMARATE DIHYDRATE 2 PUFF(S): 160; 4.5 AEROSOL RESPIRATORY (INHALATION) at 20:16

## 2020-01-01 RX ADMIN — APIXABAN 5 MILLIGRAM(S): 2.5 TABLET, FILM COATED ORAL at 17:28

## 2020-01-01 RX ADMIN — Medication 81 MILLIGRAM(S): at 11:16

## 2020-01-01 RX ADMIN — Medication 20 MILLIGRAM(S): at 06:02

## 2020-01-01 RX ADMIN — Medication 81 MILLIGRAM(S): at 12:40

## 2020-01-01 RX ADMIN — SPIRONOLACTONE 25 MILLIGRAM(S): 25 TABLET, FILM COATED ORAL at 05:28

## 2020-01-01 RX ADMIN — Medication 25 MILLIGRAM(S): at 06:09

## 2020-01-01 RX ADMIN — ATORVASTATIN CALCIUM 40 MILLIGRAM(S): 80 TABLET, FILM COATED ORAL at 21:52

## 2020-01-01 RX ADMIN — BUDESONIDE AND FORMOTEROL FUMARATE DIHYDRATE 2 PUFF(S): 160; 4.5 AEROSOL RESPIRATORY (INHALATION) at 08:58

## 2020-01-01 RX ADMIN — SPIRONOLACTONE 25 MILLIGRAM(S): 25 TABLET, FILM COATED ORAL at 06:09

## 2020-01-01 RX ADMIN — Medication 81 MILLIGRAM(S): at 11:33

## 2020-01-01 RX ADMIN — APIXABAN 5 MILLIGRAM(S): 2.5 TABLET, FILM COATED ORAL at 17:35

## 2020-01-01 RX ADMIN — Medication 25 MILLIGRAM(S): at 05:43

## 2020-01-01 RX ADMIN — Medication 30 MILLIGRAM(S): at 05:08

## 2020-01-01 RX ADMIN — Medication 3 MILLILITER(S): at 09:09

## 2020-01-01 RX ADMIN — Medication 40 MILLIGRAM(S): at 14:08

## 2020-01-01 RX ADMIN — Medication 25 MILLIGRAM(S): at 06:30

## 2020-01-01 RX ADMIN — Medication 2: at 13:25

## 2020-01-01 RX ADMIN — ATORVASTATIN CALCIUM 40 MILLIGRAM(S): 80 TABLET, FILM COATED ORAL at 21:00

## 2020-01-01 RX ADMIN — APIXABAN 5 MILLIGRAM(S): 2.5 TABLET, FILM COATED ORAL at 17:38

## 2020-01-01 RX ADMIN — SPIRONOLACTONE 25 MILLIGRAM(S): 25 TABLET, FILM COATED ORAL at 05:47

## 2020-01-01 RX ADMIN — Medication 2: at 11:57

## 2020-01-01 RX ADMIN — CHLORHEXIDINE GLUCONATE 1 APPLICATION(S): 213 SOLUTION TOPICAL at 06:33

## 2020-01-01 RX ADMIN — Medication 4: at 18:15

## 2020-01-01 RX ADMIN — Medication 2: at 13:21

## 2020-01-01 RX ADMIN — Medication 2: at 22:19

## 2020-01-01 RX ADMIN — BUDESONIDE AND FORMOTEROL FUMARATE DIHYDRATE 2 PUFF(S): 160; 4.5 AEROSOL RESPIRATORY (INHALATION) at 09:18

## 2020-01-01 RX ADMIN — Medication 40 MILLIGRAM(S): at 06:31

## 2020-01-01 RX ADMIN — Medication 2: at 18:16

## 2020-01-01 RX ADMIN — Medication 40 MILLIGRAM(S): at 05:15

## 2020-01-01 RX ADMIN — BUDESONIDE AND FORMOTEROL FUMARATE DIHYDRATE 2 PUFF(S): 160; 4.5 AEROSOL RESPIRATORY (INHALATION) at 20:01

## 2020-01-01 RX ADMIN — ATORVASTATIN CALCIUM 40 MILLIGRAM(S): 80 TABLET, FILM COATED ORAL at 21:46

## 2020-01-01 RX ADMIN — Medication 40 MILLIGRAM(S): at 05:28

## 2020-01-01 RX ADMIN — BUDESONIDE AND FORMOTEROL FUMARATE DIHYDRATE 2 PUFF(S): 160; 4.5 AEROSOL RESPIRATORY (INHALATION) at 09:00

## 2020-01-01 RX ADMIN — CHLORHEXIDINE GLUCONATE 1 APPLICATION(S): 213 SOLUTION TOPICAL at 05:47

## 2020-01-01 RX ADMIN — Medication 40 MILLIGRAM(S): at 06:09

## 2020-01-01 RX ADMIN — ATORVASTATIN CALCIUM 40 MILLIGRAM(S): 80 TABLET, FILM COATED ORAL at 21:34

## 2020-01-01 RX ADMIN — SPIRONOLACTONE 25 MILLIGRAM(S): 25 TABLET, FILM COATED ORAL at 05:15

## 2020-01-01 RX ADMIN — MORPHINE SULFATE 2 MILLIGRAM(S): 50 CAPSULE, EXTENDED RELEASE ORAL at 22:20

## 2020-01-01 RX ADMIN — Medication 40 MILLIGRAM(S): at 05:14

## 2020-01-01 RX ADMIN — BUDESONIDE AND FORMOTEROL FUMARATE DIHYDRATE 2 PUFF(S): 160; 4.5 AEROSOL RESPIRATORY (INHALATION) at 20:40

## 2020-01-01 RX ADMIN — Medication 2: at 13:24

## 2020-01-01 RX ADMIN — APIXABAN 5 MILLIGRAM(S): 2.5 TABLET, FILM COATED ORAL at 05:28

## 2020-01-01 RX ADMIN — APIXABAN 5 MILLIGRAM(S): 2.5 TABLET, FILM COATED ORAL at 17:42

## 2020-01-01 RX ADMIN — Medication 25 MILLIGRAM(S): at 05:56

## 2020-01-01 RX ADMIN — ATORVASTATIN CALCIUM 40 MILLIGRAM(S): 80 TABLET, FILM COATED ORAL at 21:27

## 2020-01-01 RX ADMIN — APIXABAN 5 MILLIGRAM(S): 2.5 TABLET, FILM COATED ORAL at 17:07

## 2020-01-01 RX ADMIN — ATORVASTATIN CALCIUM 40 MILLIGRAM(S): 80 TABLET, FILM COATED ORAL at 22:19

## 2020-01-01 RX ADMIN — Medication 2: at 09:18

## 2020-01-01 RX ADMIN — Medication 81 MILLIGRAM(S): at 13:26

## 2020-01-01 RX ADMIN — BUDESONIDE AND FORMOTEROL FUMARATE DIHYDRATE 2 PUFF(S): 160; 4.5 AEROSOL RESPIRATORY (INHALATION) at 08:18

## 2020-01-01 RX ADMIN — APIXABAN 5 MILLIGRAM(S): 2.5 TABLET, FILM COATED ORAL at 18:18

## 2020-01-01 RX ADMIN — SPIRONOLACTONE 25 MILLIGRAM(S): 25 TABLET, FILM COATED ORAL at 06:31

## 2020-01-01 RX ADMIN — Medication 30 MILLIGRAM(S): at 06:08

## 2020-01-01 RX ADMIN — ATORVASTATIN CALCIUM 40 MILLIGRAM(S): 80 TABLET, FILM COATED ORAL at 21:47

## 2020-01-01 RX ADMIN — Medication 81 MILLIGRAM(S): at 11:39

## 2020-01-01 RX ADMIN — APIXABAN 5 MILLIGRAM(S): 2.5 TABLET, FILM COATED ORAL at 06:09

## 2020-01-01 RX ADMIN — BUDESONIDE AND FORMOTEROL FUMARATE DIHYDRATE 2 PUFF(S): 160; 4.5 AEROSOL RESPIRATORY (INHALATION) at 09:38

## 2020-01-01 RX ADMIN — Medication 40 MILLIGRAM(S): at 05:47

## 2020-01-01 RX ADMIN — Medication 3 MILLILITER(S): at 20:35

## 2020-01-02 ENCOUNTER — APPOINTMENT (OUTPATIENT)
Dept: CARDIOLOGY | Facility: CLINIC | Age: 85
End: 2020-01-02
Payer: MEDICARE

## 2020-01-02 VITALS
BODY MASS INDEX: 32.1 KG/M2 | OXYGEN SATURATION: 86 % | HEART RATE: 108 BPM | HEIGHT: 61 IN | DIASTOLIC BLOOD PRESSURE: 60 MMHG | SYSTOLIC BLOOD PRESSURE: 130 MMHG | WEIGHT: 170 LBS

## 2020-01-02 DIAGNOSIS — Z01.810 ENCOUNTER FOR PREPROCEDURAL CARDIOVASCULAR EXAMINATION: ICD-10-CM

## 2020-01-02 PROCEDURE — 99214 OFFICE O/P EST MOD 30 MIN: CPT

## 2020-01-02 NOTE — DISCUSSION/SUMMARY
[FreeTextEntry1] : Pt is an 86 y/o F with PMH CAD s/p NSTEMI 12/2014 ALLISON to RCA, normal LV function, HTN, HLD, DM, COPD, obesity, bladder cancer who presents today for evaluation prior to bladder surgery - feeling well\par CAD s/p PCI: c/w ASA, lipitor 40mg and metoprolol for prior NSTEMI\par BP well controlled\par DM: follows with PCP. Advised lifestyle modifications\par At this time the pt has no signs or symptoms of active ischemia, heart failure, life-threatening arrhythmias, severe valvular pathology.\par VSS\par There are no cardiac contraindications for planned procedure. \par The described plan was discussed with the pt.  All questions and concerns were addressed to the best of my knowledge. \par \par TTE 9/2018 shows normal LV function with min MR/TR\par Nuclear stress test 10/2018 shows normal myocardial perfusion\par

## 2020-01-02 NOTE — PHYSICAL EXAM
[General Appearance - Well Developed] : well developed [Normal Appearance] : normal appearance [Well Groomed] : well groomed [No Deformities] : no deformities [General Appearance - Well Nourished] : well nourished [General Appearance - In No Acute Distress] : no acute distress [Normal Conjunctiva] : the conjunctiva exhibited no abnormalities [Eyelids - No Xanthelasma] : the eyelids demonstrated no xanthelasmas [Normal Oral Mucosa] : normal oral mucosa [No Oral Pallor] : no oral pallor [No Oral Cyanosis] : no oral cyanosis [Respiration, Rhythm And Depth] : normal respiratory rhythm and effort [Exaggerated Use Of Accessory Muscles For Inspiration] : no accessory muscle use [Heart Rate And Rhythm] : heart rate and rhythm were normal [Murmurs] : no murmurs present [Heart Sounds] : normal S1 and S2 [Arterial Pulses Normal] : the arterial pulses were normal [Edema] : no peripheral edema present [Abdomen Soft] : soft [Abdomen Tenderness] : non-tender [Abdomen Mass (___ Cm)] : no abdominal mass palpated [Cyanosis, Localized] : no localized cyanosis [Nail Clubbing] : no clubbing of the fingernails [Petechial Hemorrhages (___cm)] : no petechial hemorrhages [] : no ischemic changes [Oriented To Time, Place, And Person] : oriented to person, place, and time [Impaired Insight] : insight and judgment were intact [Affect] : the affect was normal [Mood] : the mood was normal [No Anxiety] : not feeling anxious [FreeTextEntry1] : ambulates with walker

## 2020-01-02 NOTE — HISTORY OF PRESENT ILLNESS
[FreeTextEntry1] : Pt is an 86 y/o F who presents today for f/u.  She has PMH CAD NSTEMI 12/2014 s/p ALLISON to RCA while hospitalized at [place] for ankle injury, normal LV function, DM, former smoker, COPD.  Diagnosed with bladder cancer s/p BCG treatments.  8/23/2019 had bladder biopsy which was found to be cancer.  She is scheduled for transurethral resection of bladder lesion 1/10/2020 at [facility].  She states that  she has been feeling well since last OV - breathing at baseline. She denies CP, diaphoresis, palpitations, dizziness, syncope, LE edema, PND. \par She is accompanied by her daughter today\par \par TTE 09/2018 shows normal LV function min MR/TR\par Nuclear stress test 10/2018 normal myocardial perfusion\par \par PMH: CAD NSTEMI s/p ALLISON to RCA 12/2014, HLD, HTN, DM, COPD, obesity\par Smoking status: quit 2014\par Current exercise: none\par Family hx: both parents with MI - unknown age\par Previous cardiac testing: stress test 10 yrs ago "normal"\par Previous hospitalizations: ankle surgery 2014

## 2020-01-03 ENCOUNTER — APPOINTMENT (OUTPATIENT)
Dept: PULMONOLOGY | Facility: CLINIC | Age: 85
End: 2020-01-03
Payer: MEDICARE

## 2020-01-03 VITALS
HEIGHT: 62 IN | WEIGHT: 170 LBS | HEART RATE: 113 BPM | OXYGEN SATURATION: 71 % | SYSTOLIC BLOOD PRESSURE: 132 MMHG | BODY MASS INDEX: 31.28 KG/M2 | DIASTOLIC BLOOD PRESSURE: 76 MMHG

## 2020-01-03 DIAGNOSIS — Z01.818 ENCOUNTER FOR OTHER PREPROCEDURAL EXAMINATION: ICD-10-CM

## 2020-01-03 LAB
ABO + RH PNL BLD: NORMAL
ANION GAP SERPL CALC-SCNC: 16 MMOL/L
BASOPHILS # BLD AUTO: 0.05 K/UL
BASOPHILS NFR BLD AUTO: 0.7 %
BUN SERPL-MCNC: 6 MG/DL
CALCIUM SERPL-MCNC: 8.8 MG/DL
CHLORIDE SERPL-SCNC: 100 MMOL/L
CO2 SERPL-SCNC: 26 MMOL/L
CREAT SERPL-MCNC: 0.64 MG/DL
EOSINOPHIL # BLD AUTO: 0.29 K/UL
EOSINOPHIL NFR BLD AUTO: 3.9 %
GLUCOSE SERPL-MCNC: 148 MG/DL
HCT VFR BLD CALC: 49.6 %
HGB BLD-MCNC: 14.4 G/DL
IMM GRANULOCYTES NFR BLD AUTO: 0.3 %
INR PPP: 0.97 RATIO
LYMPHOCYTES # BLD AUTO: 1.39 K/UL
LYMPHOCYTES NFR BLD AUTO: 18.6 %
MAN DIFF?: NORMAL
MCHC RBC-ENTMCNC: 25.2 PG
MCHC RBC-ENTMCNC: 29 GM/DL
MCV RBC AUTO: 86.7 FL
MONOCYTES # BLD AUTO: 0.56 K/UL
MONOCYTES NFR BLD AUTO: 7.5 %
NEUTROPHILS # BLD AUTO: 5.18 K/UL
NEUTROPHILS NFR BLD AUTO: 69 %
PLATELET # BLD AUTO: 351 K/UL
POTASSIUM SERPL-SCNC: 4 MMOL/L
PT BLD: 11.2 SEC
RBC # BLD: 5.72 M/UL
RBC # FLD: 26.6 %
SODIUM SERPL-SCNC: 142 MMOL/L
WBC # FLD AUTO: 7.49 K/UL

## 2020-01-03 PROCEDURE — 99215 OFFICE O/P EST HI 40 MIN: CPT

## 2020-01-03 NOTE — HISTORY OF PRESENT ILLNESS
[Coronary Artery Disease] : coronary artery disease [COPD] : COPD [Asthma] : asthma [No Adverse Anesthesia Reaction] : no adverse anesthesia reaction in self or family member [Diabetes] : diabetes [Aortic Stenosis] : no aortic stenosis [Atrial Fibrillation] : no atrial fibrillation [Recent Myocardial Infarction] : no recent myocardial infarction [Implantable Device/Pacemaker] : no implantable device/pacemaker [Sleep Apnea] : no sleep apnea [Smoker] : not a smoker [Chronic Anticoagulation] : no chronic anticoagulation [Chronic Kidney Disease] : no chronic kidney disease [FreeTextEntry1] : Transurethral resection of bladder tumor [FreeTextEntry2] : 01/10/2020 [FreeTextEntry3] : Dr. Sanders, [place] [FreeTextEntry4] : 84 yo female presents to office for medical clearance.  Scheduled for transurethral resection of bladder lesion on 1/10/2020 c Dr. Sanders at [facility].  Pt c Hx of Bladder Ca.   pt denies poor appetite,  denies unintentional wt. loss,  no change in energy level.  no no f/c/s \par Denies CP at rest or c activity.   Denies SOB at rest, +ve SOB c minimal exertional activity.  pt chronically hypoxic c rx'ed supplemental O2, however, is non-compliant c 24/7 use.   States uses O2 via NC 2.5L as needed.  no dizziness no palpitations.   no N/V/D  +BM qd no bloody/black stools \par Denies urinary complaints at this time \par \par NO hx of adverse reaction to anesthesia \par NO allergy to shellfish/latex/iodine \par +Hx of Blood Tfx x 1\par +ve upper and lower dentures   \par \par **Former Smoker, Quit 2014,  62 pk yr Hx of Smoking**  [FreeTextEntry6] : see HPI

## 2020-01-03 NOTE — ASSESSMENT
[As per surgery] : as per surgery [FreeTextEntry4] : Medically optimized for proposed procedure pending PST labs, provided close glucose monitoring in performed pre and post op, and pending \par Cardiac Clearance c Dr. Lion scheduled on 1/2/20 and Pulmonary Clearance c Dr. Brewer scheduled on 1/3/20 \par \par \par Cardiac Clearance c Dr. Lion scheduled on 1/2/20 and Pulmonary Clearance c Dr. Brewer scheduled on 1/3/20 \par \par Addendum Note: Medically optimized for proposed procedure.  Cardiac Clearance chloe Lion scheduled on 1/2/20 and Pulmonary Clearance c Dr. Brewer scheduled on 1/3/20

## 2020-01-03 NOTE — PHYSICAL EXAM
[General Appearance - Well Developed] : well developed [Normal Appearance] : normal appearance [Well Groomed] : well groomed [No Deformities] : no deformities [General Appearance - In No Acute Distress] : no acute distress [General Appearance - Well Nourished] : well nourished [Eyelids - No Xanthelasma] : the eyelids demonstrated no xanthelasmas [Normal Oropharynx] : normal oropharynx [Normal Conjunctiva] : the conjunctiva exhibited no abnormalities [Jugular Venous Distention Increased] : there was no jugular-venous distention [Neck Appearance] : the appearance of the neck was normal [Neck Cervical Mass (___cm)] : no neck mass was observed [Thyroid Nodule] : there were no palpable thyroid nodules [Thyroid Diffuse Enlargement] : the thyroid was not enlarged [Murmurs] : no murmurs present [Heart Rate And Rhythm] : heart rate and rhythm were normal [Heart Sounds] : normal S1 and S2 [Exaggerated Use Of Accessory Muscles For Inspiration] : no accessory muscle use [Respiration, Rhythm And Depth] : normal respiratory rhythm and effort [Abdomen Soft] : soft [Auscultation Breath Sounds / Voice Sounds] : lungs were clear to auscultation bilaterally [Abdomen Tenderness] : non-tender [Abnormal Walk] : normal gait [Gait - Sufficient For Exercise Testing] : the gait was sufficient for exercise testing [Abdomen Mass (___ Cm)] : no abdominal mass palpated [Cyanosis, Localized] : no localized cyanosis [Nail Clubbing] : no clubbing of the fingernails [Petechial Hemorrhages (___cm)] : no petechial hemorrhages [No Venous Stasis] : no venous stasis [Skin Color & Pigmentation] : normal skin color and pigmentation [Skin Lesions] : no skin lesions [] : no rash [Deep Tendon Reflexes (DTR)] : deep tendon reflexes were 2+ and symmetric [No Skin Ulcers] : no skin ulcer [No Xanthoma] : no  xanthoma was observed [Oriented To Time, Place, And Person] : oriented to person, place, and time [Sensation] : the sensory exam was normal to light touch and pinprick [No Focal Deficits] : no focal deficits [Impaired Insight] : insight and judgment were intact [Affect] : the affect was normal

## 2020-01-03 NOTE — ASSESSMENT
[FreeTextEntry1] : The patient is an 85-year-old lady with mild to moderate COPD, followed by Dr. Lion and bladder cancer\par \par She is preop for another transurethral bladder procedure\par \par Her pulmonary status appears to be stable although she remains hypoxemic\par She appears to be optimized as well as she can be from a pulmonary standpoint\par \par I am recommending that she resume Spiriva once a day and the use of albuterol 4 times a day\par I am also strongly recommending that she maintain the use of nasal oxygen at 2 L on a 24-hour basis\par \par I have asked the patient to return here in one month for an interim reevaluation

## 2020-01-03 NOTE — HISTORY OF PRESENT ILLNESS
[FreeTextEntry1] : The patient is an 85-year-old lady with mild to moderate COPD known congestive heart failure and bladder cancer followed by Dr. Sanders\par \par She does not appear to be utilizing her pulmonary medication spirometry or albuterol nebs at all regularly\par She also unfortunately does not appear to be utilizing her oxygen except very occasionally\par \par Today her pulse oximetry on room air is 79% as it has been multiple times in the past\par She has been cautioned multiple times to continue to use oxygen

## 2020-01-03 NOTE — PHYSICAL EXAM
[No Acute Distress] : no acute distress [Well Developed] : well developed [Well Nourished] : well nourished [PERRL] : pupils equal round and reactive to light [EOMI] : extraocular movements intact [No JVD] : no jugular venous distention [Normal Outer Ear/Nose] : the outer ears and nose were normal in appearance [No Respiratory Distress] : no respiratory distress  [No Accessory Muscle Use] : no accessory muscle use [Normal Rate] : normal rate  [Clear to Auscultation] : lungs were clear to auscultation bilaterally [Normal S1, S2] : normal S1 and S2 [Regular Rhythm] : with a regular rhythm [Non-distended] : non-distended [No CVA Tenderness] : no CVA  tenderness [Grossly Normal Strength/Tone] : grossly normal strength/tone [No Rash] : no rash [No Focal Deficits] : no focal deficits [Coordination Grossly Intact] : coordination grossly intact [Normal Gait] : normal gait [Normal Affect] : the affect was normal [Normal Mood] : the mood was normal [Normal Insight/Judgement] : insight and judgment were intact [de-identified] : no w/r/r B/L at this time  [de-identified] : 1+ ankle edema B/L

## 2020-01-09 ENCOUNTER — RESULT REVIEW (OUTPATIENT)
Age: 85
End: 2020-01-09

## 2020-01-09 ENCOUNTER — TRANSCRIPTION ENCOUNTER (OUTPATIENT)
Age: 85
End: 2020-01-09

## 2020-01-10 ENCOUNTER — OUTPATIENT (OUTPATIENT)
Dept: INPATIENT UNIT | Facility: HOSPITAL | Age: 85
LOS: 1 days | End: 2020-01-10
Payer: MEDICARE

## 2020-01-10 ENCOUNTER — RESULT REVIEW (OUTPATIENT)
Age: 85
End: 2020-01-10

## 2020-01-10 ENCOUNTER — APPOINTMENT (OUTPATIENT)
Dept: UROLOGY | Facility: HOSPITAL | Age: 85
End: 2020-01-10

## 2020-01-10 VITALS
SYSTOLIC BLOOD PRESSURE: 153 MMHG | OXYGEN SATURATION: 91 % | HEIGHT: 62 IN | TEMPERATURE: 99 F | RESPIRATION RATE: 16 BRPM | HEART RATE: 81 BPM | DIASTOLIC BLOOD PRESSURE: 64 MMHG | WEIGHT: 173.5 LBS

## 2020-01-10 VITALS
SYSTOLIC BLOOD PRESSURE: 160 MMHG | HEART RATE: 82 BPM | RESPIRATION RATE: 23 BRPM | OXYGEN SATURATION: 82 % | DIASTOLIC BLOOD PRESSURE: 79 MMHG | TEMPERATURE: 98 F

## 2020-01-10 DIAGNOSIS — Z98.89 OTHER SPECIFIED POSTPROCEDURAL STATES: Chronic | ICD-10-CM

## 2020-01-10 DIAGNOSIS — S82.899A OTHER FRACTURE OF UNSPECIFIED LOWER LEG, INITIAL ENCOUNTER FOR CLOSED FRACTURE: Chronic | ICD-10-CM

## 2020-01-10 DIAGNOSIS — D49.4 NEOPLASM OF UNSPECIFIED BEHAVIOR OF BLADDER: ICD-10-CM

## 2020-01-10 DIAGNOSIS — Z98.890 OTHER SPECIFIED POSTPROCEDURAL STATES: Chronic | ICD-10-CM

## 2020-01-10 LAB
BLD GP AB SCN SERPL QL: SIGNIFICANT CHANGE UP
GLUCOSE BLDC GLUCOMTR-MCNC: 114 MG/DL — HIGH (ref 70–99)

## 2020-01-10 PROCEDURE — 36415 COLL VENOUS BLD VENIPUNCTURE: CPT

## 2020-01-10 PROCEDURE — 82962 GLUCOSE BLOOD TEST: CPT

## 2020-01-10 PROCEDURE — 52234 CYSTOSCOPY AND TREATMENT: CPT

## 2020-01-10 PROCEDURE — 86901 BLOOD TYPING SEROLOGIC RH(D): CPT

## 2020-01-10 PROCEDURE — 94640 AIRWAY INHALATION TREATMENT: CPT

## 2020-01-10 PROCEDURE — 86900 BLOOD TYPING SEROLOGIC ABO: CPT

## 2020-01-10 PROCEDURE — 86850 RBC ANTIBODY SCREEN: CPT

## 2020-01-10 PROCEDURE — 88305 TISSUE EXAM BY PATHOLOGIST: CPT

## 2020-01-10 PROCEDURE — 88305 TISSUE EXAM BY PATHOLOGIST: CPT | Mod: 26

## 2020-01-10 PROCEDURE — 88112 CYTOPATH CELL ENHANCE TECH: CPT | Mod: 26

## 2020-01-10 PROCEDURE — 88112 CYTOPATH CELL ENHANCE TECH: CPT

## 2020-01-10 RX ORDER — IPRATROPIUM/ALBUTEROL SULFATE 18-103MCG
3 AEROSOL WITH ADAPTER (GRAM) INHALATION
Qty: 0 | Refills: 0 | DISCHARGE
Start: 2020-01-10

## 2020-01-10 RX ORDER — TIOTROPIUM BROMIDE 18 UG/1
1 CAPSULE ORAL; RESPIRATORY (INHALATION)
Qty: 0 | Refills: 0 | DISCHARGE

## 2020-01-10 RX ORDER — CHOLECALCIFEROL (VITAMIN D3) 125 MCG
1 CAPSULE ORAL
Qty: 0 | Refills: 0 | DISCHARGE

## 2020-01-10 RX ORDER — CEPHALEXIN 500 MG
1 CAPSULE ORAL
Qty: 9 | Refills: 0
Start: 2020-01-10

## 2020-01-10 RX ORDER — SODIUM CHLORIDE 9 MG/ML
1000 INJECTION, SOLUTION INTRAVENOUS
Refills: 0 | Status: DISCONTINUED | OUTPATIENT
Start: 2020-01-10 | End: 2020-02-03

## 2020-01-10 RX ORDER — FAMOTIDINE 10 MG/ML
1 INJECTION INTRAVENOUS
Qty: 0 | Refills: 0 | DISCHARGE

## 2020-01-10 RX ORDER — ALBUTEROL 90 UG/1
2 AEROSOL, METERED ORAL
Qty: 0 | Refills: 0 | DISCHARGE

## 2020-01-10 RX ORDER — IPRATROPIUM/ALBUTEROL SULFATE 18-103MCG
3 AEROSOL WITH ADAPTER (GRAM) INHALATION EVERY 6 HOURS
Refills: 0 | Status: DISCONTINUED | OUTPATIENT
Start: 2020-01-10 | End: 2020-02-03

## 2020-01-10 RX ADMIN — Medication 3 MILLILITER(S): at 08:21

## 2020-01-10 NOTE — ASU DISCHARGE PLAN (ADULT/PEDIATRIC) - CARE PROVIDER_API CALL
Ike Sanders)  Urology  [address], [address]  [place] [address]  Phone: [phone]  Fax: [phone]  Established Patient  Follow Up Time: 2 weeks

## 2020-01-10 NOTE — ASU DISCHARGE PLAN (ADULT/PEDIATRIC) - CALL YOUR DOCTOR IF YOU HAVE ANY OF THE FOLLOWING:
Unable to urinate/Bleeding that does not stop/101F/Fever greater than (need to indicate Fahrenheit or Celsius)

## 2020-01-10 NOTE — BRIEF OPERATIVE NOTE - NSICDXBRIEFPROCEDURE_GEN_ALL_CORE_FT
PROCEDURES:  Cystourethroscopy with resection of small tumor of bladder 10-Freddie-2020 09:43:29  Ike Sanders

## 2020-01-13 ENCOUNTER — APPOINTMENT (OUTPATIENT)
Dept: OTOLARYNGOLOGY | Facility: CLINIC | Age: 85
End: 2020-01-13
Payer: MEDICARE

## 2020-01-13 VITALS
HEART RATE: 90 BPM | HEIGHT: 62 IN | SYSTOLIC BLOOD PRESSURE: 127 MMHG | BODY MASS INDEX: 31.28 KG/M2 | WEIGHT: 170 LBS | DIASTOLIC BLOOD PRESSURE: 80 MMHG

## 2020-01-13 DIAGNOSIS — E04.1 NONTOXIC SINGLE THYROID NODULE: ICD-10-CM

## 2020-01-13 LAB — NON-GYNECOLOGICAL CYTOLOGY STUDY: SIGNIFICANT CHANGE UP

## 2020-01-13 PROCEDURE — 99213 OFFICE O/P EST LOW 20 MIN: CPT

## 2020-01-13 NOTE — HISTORY OF PRESENT ILLNESS
[de-identified] : This is a patient referred by Dr. Wright for thyroid nodule. This was found during hospital visit for legs swelling, per pt imaging was done and told have thyroid lesion and then was send for sono of the thyroid. sono of thyroid 7/2019-Heterogeneous thyroid gland with bilateral nodules. There is a 2.0 cm solid dominant right lobe nodule and 7/2019  ultrasound-guided fine-needle aspiration- benign nodule c.w with nodular goiter.  pt has no c.o and here for 6 months followup. No dysphagia, dysphonia or dyspnea. Patient denies h/o radiation and has no family h/o thyroid cancer.\par

## 2020-01-13 NOTE — DATA REVIEWED
[de-identified] : US today with multinodular thyroid with dominant solid nodule in the R. lower pole nodule measuring 1.87 x 1.48 x 2.01 cm. There are other subcentimeter nodules in the left lobe. No LAD.

## 2020-01-13 NOTE — PHYSICAL EXAM
[Normal] : no rashes [de-identified] : R. thyroid nodule, approx. 1.5 cm, mobile, no TTP, no LAD. [FreeTextEntry1] : No concerning lesions in the OC/OPx on inspection or palpation.\par

## 2020-01-14 LAB — SURGICAL PATHOLOGY STUDY: SIGNIFICANT CHANGE UP

## 2020-01-17 ENCOUNTER — APPOINTMENT (OUTPATIENT)
Dept: PULMONOLOGY | Facility: CLINIC | Age: 85
End: 2020-01-17

## 2020-01-27 ENCOUNTER — APPOINTMENT (OUTPATIENT)
Dept: UROLOGY | Facility: CLINIC | Age: 85
End: 2020-01-27
Payer: MEDICARE

## 2020-01-27 VITALS — HEART RATE: 86 BPM | SYSTOLIC BLOOD PRESSURE: 131 MMHG | RESPIRATION RATE: 17 BRPM | DIASTOLIC BLOOD PRESSURE: 75 MMHG

## 2020-01-27 PROCEDURE — 99213 OFFICE O/P EST LOW 20 MIN: CPT

## 2020-01-27 NOTE — HISTORY OF PRESENT ILLNESS
[FreeTextEntry1] : She has been feeling ok until this morning.  She his been feeling "woozy" today.Voiding oK. no hematuria. sometimes has urgency.  no fevers or chills.

## 2020-01-27 NOTE — ASSESSMENT
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  No muscle on specimen.  \par \par Given the risk of repeat surgical procedure will hold on surgery \par \par BCG 6 week course.\par \par If unavailable then will consider gemcitabine or mitomycin C\par \par

## 2020-01-27 NOTE — LETTER BODY
[Dear  ___] : Dear  [unfilled], [Courtesy Letter:] : I had the pleasure of seeing your patient, [unfilled], in my office today. [Please see my note below.] : Please see my note below. [Sincerely,] : Sincerely, [FreeTextEntry3] : Ed\par \par Ike Sanders MD\par [facility]\par  of Urology\par [employer] [facility]\par

## 2020-02-04 ENCOUNTER — RX RENEWAL (OUTPATIENT)
Age: 85
End: 2020-02-04

## 2020-02-04 ENCOUNTER — FORM ENCOUNTER (OUTPATIENT)
Age: 85
End: 2020-02-04

## 2020-02-05 ENCOUNTER — OUTPATIENT (OUTPATIENT)
Dept: OUTPATIENT SERVICES | Facility: HOSPITAL | Age: 85
LOS: 1 days | End: 2020-02-05

## 2020-02-05 ENCOUNTER — APPOINTMENT (OUTPATIENT)
Dept: CT IMAGING | Facility: CLINIC | Age: 85
End: 2020-02-05
Payer: MEDICARE

## 2020-02-05 DIAGNOSIS — S82.899A OTHER FRACTURE OF UNSPECIFIED LOWER LEG, INITIAL ENCOUNTER FOR CLOSED FRACTURE: Chronic | ICD-10-CM

## 2020-02-05 DIAGNOSIS — Z98.89 OTHER SPECIFIED POSTPROCEDURAL STATES: Chronic | ICD-10-CM

## 2020-02-05 DIAGNOSIS — C67.9 MALIGNANT NEOPLASM OF BLADDER, UNSPECIFIED: ICD-10-CM

## 2020-02-05 DIAGNOSIS — Z98.890 OTHER SPECIFIED POSTPROCEDURAL STATES: Chronic | ICD-10-CM

## 2020-02-05 PROCEDURE — 74178 CT ABD&PLV WO CNTR FLWD CNTR: CPT | Mod: 26

## 2020-02-06 ENCOUNTER — APPOINTMENT (OUTPATIENT)
Dept: UROLOGY | Facility: CLINIC | Age: 85
End: 2020-02-06
Payer: MEDICARE

## 2020-02-06 VITALS — HEART RATE: 116 BPM | DIASTOLIC BLOOD PRESSURE: 76 MMHG | RESPIRATION RATE: 18 BRPM | SYSTOLIC BLOOD PRESSURE: 143 MMHG

## 2020-02-06 PROCEDURE — 51720 TREATMENT OF BLADDER LESION: CPT

## 2020-02-06 RX ORDER — BACILLUS CALMETTE-GUERIN 50 MG/50ML
50 POWDER, FOR SUSPENSION INTRAVESICAL
Qty: 1 | Refills: 0 | Status: COMPLETED | COMMUNITY
Start: 2020-02-06 | End: 2020-02-06

## 2020-02-06 RX ORDER — BACILLUS CALMETTE-GUERIN 50 MG/50ML
50 POWDER, FOR SUSPENSION INTRAVESICAL
Qty: 0 | Refills: 0 | Status: COMPLETED | OUTPATIENT
Start: 2020-02-06

## 2020-02-06 RX ADMIN — BACILLUS CALMETTE-GUERIN 0 MG: 50 POWDER, FOR SUSPENSION INTRAVESICAL at 00:00

## 2020-02-10 ENCOUNTER — APPOINTMENT (OUTPATIENT)
Dept: CARDIOLOGY | Facility: CLINIC | Age: 85
End: 2020-02-10

## 2020-02-13 ENCOUNTER — APPOINTMENT (OUTPATIENT)
Dept: UROLOGY | Facility: CLINIC | Age: 85
End: 2020-02-13

## 2020-02-13 ENCOUNTER — APPOINTMENT (OUTPATIENT)
Dept: UROLOGY | Facility: CLINIC | Age: 85
End: 2020-02-13
Payer: MEDICARE

## 2020-02-13 PROCEDURE — 99213 OFFICE O/P EST LOW 20 MIN: CPT

## 2020-02-13 NOTE — LETTER BODY
[Dear  ___] : Dear  [unfilled], [Please see my note below.] : Please see my note below. [Courtesy Letter:] : I had the pleasure of seeing your patient, [unfilled], in my office today. [Sincerely,] : Sincerely, [FreeTextEntry3] : Ed\par \par Ike Sanders MD\par [facility]\par  of Urology\par [employer] at [facility]\par

## 2020-02-13 NOTE — PHYSICAL EXAM
[Normal Appearance] : normal appearance [General Appearance - Well Nourished] : well nourished [General Appearance - Well Developed] : well developed [General Appearance - In No Acute Distress] : no acute distress [Well Groomed] : well groomed [Respiration, Rhythm And Depth] : normal respiratory rhythm and effort [] : no respiratory distress [Skin Color & Pigmentation] : normal skin color and pigmentation [Exaggerated Use Of Accessory Muscles For Inspiration] : no accessory muscle use [FreeTextEntry1] : slow gait

## 2020-02-13 NOTE — HISTORY OF PRESENT ILLNESS
[FreeTextEntry1] : Patient with bladder cancer.  did not get muscle on recent TURBT to assess for invasion. no hematuria. no urgency. no pelvic pain. no flank pain.

## 2020-02-20 ENCOUNTER — APPOINTMENT (OUTPATIENT)
Dept: UROLOGY | Facility: CLINIC | Age: 85
End: 2020-02-20

## 2020-02-28 ENCOUNTER — OUTPATIENT (OUTPATIENT)
Dept: OUTPATIENT SERVICES | Facility: HOSPITAL | Age: 85
LOS: 1 days | End: 2020-02-28
Payer: MEDICARE

## 2020-02-28 ENCOUNTER — INPATIENT (INPATIENT)
Facility: HOSPITAL | Age: 85
LOS: 12 days | Discharge: HOME | DRG: 175 | End: 2020-03-12
Attending: HOSPITALIST | Admitting: INTERNAL MEDICINE
Payer: MEDICARE

## 2020-02-28 VITALS
DIASTOLIC BLOOD PRESSURE: 64 MMHG | RESPIRATION RATE: 28 BRPM | HEART RATE: 147 BPM | SYSTOLIC BLOOD PRESSURE: 120 MMHG | OXYGEN SATURATION: 73 % | HEIGHT: 62 IN | WEIGHT: 169.98 LBS

## 2020-02-28 DIAGNOSIS — S82.899A OTHER FRACTURE OF UNSPECIFIED LOWER LEG, INITIAL ENCOUNTER FOR CLOSED FRACTURE: Chronic | ICD-10-CM

## 2020-02-28 DIAGNOSIS — Z98.890 OTHER SPECIFIED POSTPROCEDURAL STATES: Chronic | ICD-10-CM

## 2020-02-28 DIAGNOSIS — Z98.89 OTHER SPECIFIED POSTPROCEDURAL STATES: Chronic | ICD-10-CM

## 2020-02-28 DIAGNOSIS — I26.99 OTHER PULMONARY EMBOLISM WITHOUT ACUTE COR PULMONALE: ICD-10-CM

## 2020-02-28 DIAGNOSIS — Z01.818 ENCOUNTER FOR OTHER PREPROCEDURAL EXAMINATION: ICD-10-CM

## 2020-02-28 LAB
ALBUMIN SERPL ELPH-MCNC: 3 G/DL — LOW (ref 3.3–5.2)
ALP SERPL-CCNC: 156 U/L — HIGH (ref 40–120)
ALT FLD-CCNC: 19 U/L — SIGNIFICANT CHANGE UP
ANION GAP SERPL CALC-SCNC: 17 MMOL/L — SIGNIFICANT CHANGE UP (ref 5–17)
ANION GAP SERPL CALC-SCNC: 18 MMOL/L — HIGH (ref 5–17)
ANISOCYTOSIS BLD QL: SLIGHT — SIGNIFICANT CHANGE UP
APTT BLD: 30.6 SEC — SIGNIFICANT CHANGE UP (ref 27.5–36.3)
APTT BLD: >200 SEC — CRITICAL HIGH (ref 27.5–36.3)
AST SERPL-CCNC: 33 U/L — HIGH
BASOPHILS # BLD AUTO: 0 K/UL — SIGNIFICANT CHANGE UP (ref 0–0.2)
BASOPHILS NFR BLD AUTO: 0 % — SIGNIFICANT CHANGE UP (ref 0–2)
BILIRUB SERPL-MCNC: 2.5 MG/DL — HIGH (ref 0.4–2)
BUN SERPL-MCNC: 16 MG/DL — SIGNIFICANT CHANGE UP (ref 8–20)
BUN SERPL-MCNC: 18 MG/DL — SIGNIFICANT CHANGE UP (ref 8–20)
CALCIUM SERPL-MCNC: 8.1 MG/DL — LOW (ref 8.6–10.2)
CALCIUM SERPL-MCNC: 8.3 MG/DL — LOW (ref 8.6–10.2)
CHLORIDE SERPL-SCNC: 96 MMOL/L — LOW (ref 98–107)
CHLORIDE SERPL-SCNC: 98 MMOL/L — SIGNIFICANT CHANGE UP (ref 98–107)
CO2 SERPL-SCNC: 23 MMOL/L — SIGNIFICANT CHANGE UP (ref 22–29)
CO2 SERPL-SCNC: 24 MMOL/L — SIGNIFICANT CHANGE UP (ref 22–29)
CREAT SERPL-MCNC: 1.01 MG/DL — SIGNIFICANT CHANGE UP (ref 0.5–1.3)
CREAT SERPL-MCNC: 1.38 MG/DL — HIGH (ref 0.5–1.3)
EOSINOPHIL # BLD AUTO: 0 K/UL — SIGNIFICANT CHANGE UP (ref 0–0.5)
EOSINOPHIL NFR BLD AUTO: 0 % — SIGNIFICANT CHANGE UP (ref 0–6)
FLU A RESULT: SIGNIFICANT CHANGE UP
FLU A RESULT: SIGNIFICANT CHANGE UP
FLUAV AG NPH QL: SIGNIFICANT CHANGE UP
FLUBV AG NPH QL: SIGNIFICANT CHANGE UP
GAS PNL BLDA: SIGNIFICANT CHANGE UP
GIANT PLATELETS BLD QL SMEAR: PRESENT — SIGNIFICANT CHANGE UP
GLUCOSE SERPL-MCNC: 172 MG/DL — HIGH (ref 70–99)
GLUCOSE SERPL-MCNC: 173 MG/DL — HIGH (ref 70–99)
HCT VFR BLD CALC: 48 % — HIGH (ref 34.5–45)
HGB BLD-MCNC: 13.9 G/DL — SIGNIFICANT CHANGE UP (ref 11.5–15.5)
HYPOCHROMIA BLD QL: SLIGHT — SIGNIFICANT CHANGE UP
INR BLD: 1.33 RATIO — HIGH (ref 0.88–1.16)
INR BLD: 1.43 RATIO — HIGH (ref 0.88–1.16)
LACTATE SERPL-SCNC: 2.9 MMOL/L — HIGH (ref 0.5–2)
LYMPHOCYTES # BLD AUTO: 0.87 K/UL — LOW (ref 1–3.3)
LYMPHOCYTES # BLD AUTO: 6.1 % — LOW (ref 13–44)
MACROCYTES BLD QL: SLIGHT — SIGNIFICANT CHANGE UP
MANUAL SMEAR VERIFICATION: SIGNIFICANT CHANGE UP
MCHC RBC-ENTMCNC: 25.1 PG — LOW (ref 27–34)
MCHC RBC-ENTMCNC: 29 GM/DL — LOW (ref 32–36)
MCV RBC AUTO: 86.8 FL — SIGNIFICANT CHANGE UP (ref 80–100)
MICROCYTES BLD QL: SLIGHT — SIGNIFICANT CHANGE UP
MONOCYTES # BLD AUTO: 0.63 K/UL — SIGNIFICANT CHANGE UP (ref 0–0.9)
MONOCYTES NFR BLD AUTO: 4.4 % — SIGNIFICANT CHANGE UP (ref 2–14)
NEUTROPHILS # BLD AUTO: 12.33 K/UL — HIGH (ref 1.8–7.4)
NEUTROPHILS NFR BLD AUTO: 86 % — HIGH (ref 43–77)
NRBC # BLD: 4 /100 — HIGH (ref 0–0)
NT-PROBNP SERPL-SCNC: 5408 PG/ML — HIGH (ref 0–300)
OVALOCYTES BLD QL SMEAR: SLIGHT — SIGNIFICANT CHANGE UP
PLAT MORPH BLD: NORMAL — SIGNIFICANT CHANGE UP
PLATELET # BLD AUTO: 321 K/UL — SIGNIFICANT CHANGE UP (ref 150–400)
POIKILOCYTOSIS BLD QL AUTO: SLIGHT — SIGNIFICANT CHANGE UP
POLYCHROMASIA BLD QL SMEAR: SLIGHT — SIGNIFICANT CHANGE UP
POTASSIUM SERPL-MCNC: 3.3 MMOL/L — LOW (ref 3.5–5.3)
POTASSIUM SERPL-MCNC: 4.2 MMOL/L — SIGNIFICANT CHANGE UP (ref 3.5–5.3)
POTASSIUM SERPL-SCNC: 3.3 MMOL/L — LOW (ref 3.5–5.3)
POTASSIUM SERPL-SCNC: 4.2 MMOL/L — SIGNIFICANT CHANGE UP (ref 3.5–5.3)
PROT SERPL-MCNC: 7.6 G/DL — SIGNIFICANT CHANGE UP (ref 6.6–8.7)
PROTHROM AB SERPL-ACNC: 15.2 SEC — HIGH (ref 10–12.9)
PROTHROM AB SERPL-ACNC: 16.3 SEC — HIGH (ref 10–12.9)
RBC # BLD: 5.53 M/UL — HIGH (ref 3.8–5.2)
RBC # FLD: 20.9 % — HIGH (ref 10.3–14.5)
RBC BLD AUTO: ABNORMAL
RSV RESULT: SIGNIFICANT CHANGE UP
RSV RNA RESP QL NAA+PROBE: SIGNIFICANT CHANGE UP
SODIUM SERPL-SCNC: 138 MMOL/L — SIGNIFICANT CHANGE UP (ref 135–145)
SODIUM SERPL-SCNC: 138 MMOL/L — SIGNIFICANT CHANGE UP (ref 135–145)
TROPONIN T SERPL-MCNC: 0.04 NG/ML — SIGNIFICANT CHANGE UP (ref 0–0.06)
VARIANT LYMPHS # BLD: 3.5 % — SIGNIFICANT CHANGE UP (ref 0–6)
WBC # BLD: 14.34 K/UL — HIGH (ref 3.8–10.5)
WBC # FLD AUTO: 14.34 K/UL — HIGH (ref 3.8–10.5)

## 2020-02-28 PROCEDURE — 71046 X-RAY EXAM CHEST 2 VIEWS: CPT | Mod: 26

## 2020-02-28 PROCEDURE — 93306 TTE W/DOPPLER COMPLETE: CPT | Mod: 26

## 2020-02-28 PROCEDURE — 71275 CT ANGIOGRAPHY CHEST: CPT | Mod: 26

## 2020-02-28 PROCEDURE — 99291 CRITICAL CARE FIRST HOUR: CPT

## 2020-02-28 PROCEDURE — 93010 ELECTROCARDIOGRAM REPORT: CPT

## 2020-02-28 PROCEDURE — G0463: CPT

## 2020-02-28 RX ORDER — IPRATROPIUM/ALBUTEROL SULFATE 18-103MCG
3 AEROSOL WITH ADAPTER (GRAM) INHALATION EVERY 6 HOURS
Refills: 0 | Status: DISCONTINUED | OUTPATIENT
Start: 2020-02-28 | End: 2020-02-29

## 2020-02-28 RX ORDER — DEXTROSE 50 % IN WATER 50 %
25 SYRINGE (ML) INTRAVENOUS ONCE
Refills: 0 | Status: DISCONTINUED | OUTPATIENT
Start: 2020-02-28 | End: 2020-01-01

## 2020-02-28 RX ORDER — CEFTRIAXONE 500 MG/1
1000 INJECTION, POWDER, FOR SOLUTION INTRAMUSCULAR; INTRAVENOUS EVERY 24 HOURS
Refills: 0 | Status: COMPLETED | OUTPATIENT
Start: 2020-02-29 | End: 2020-03-03

## 2020-02-28 RX ORDER — SODIUM CHLORIDE 9 MG/ML
1000 INJECTION, SOLUTION INTRAVENOUS
Refills: 0 | Status: DISCONTINUED | OUTPATIENT
Start: 2020-02-28 | End: 2020-01-01

## 2020-02-28 RX ORDER — HEPARIN SODIUM 5000 [USP'U]/ML
6500 INJECTION INTRAVENOUS; SUBCUTANEOUS EVERY 6 HOURS
Refills: 0 | Status: DISCONTINUED | OUTPATIENT
Start: 2020-02-28 | End: 2020-02-28

## 2020-02-28 RX ORDER — TIOTROPIUM BROMIDE 18 UG/1
1 CAPSULE ORAL; RESPIRATORY (INHALATION) DAILY
Refills: 0 | Status: DISCONTINUED | OUTPATIENT
Start: 2020-02-28 | End: 2020-02-28

## 2020-02-28 RX ORDER — CEFTRIAXONE 500 MG/1
1000 INJECTION, POWDER, FOR SOLUTION INTRAMUSCULAR; INTRAVENOUS ONCE
Refills: 0 | Status: COMPLETED | OUTPATIENT
Start: 2020-02-28 | End: 2020-02-28

## 2020-02-28 RX ORDER — ATORVASTATIN CALCIUM 80 MG/1
40 TABLET, FILM COATED ORAL AT BEDTIME
Refills: 0 | Status: DISCONTINUED | OUTPATIENT
Start: 2020-02-28 | End: 2020-01-01

## 2020-02-28 RX ORDER — DEXTROSE 50 % IN WATER 50 %
15 SYRINGE (ML) INTRAVENOUS ONCE
Refills: 0 | Status: DISCONTINUED | OUTPATIENT
Start: 2020-02-28 | End: 2020-01-01

## 2020-02-28 RX ORDER — INFLUENZA VIRUS VACCINE 15; 15; 15; 15 UG/.5ML; UG/.5ML; UG/.5ML; UG/.5ML
0.5 SUSPENSION INTRAMUSCULAR ONCE
Refills: 0 | Status: COMPLETED | OUTPATIENT
Start: 2020-02-28 | End: 2020-02-28

## 2020-02-28 RX ORDER — BUDESONIDE AND FORMOTEROL FUMARATE DIHYDRATE 160; 4.5 UG/1; UG/1
2 AEROSOL RESPIRATORY (INHALATION)
Refills: 0 | Status: DISCONTINUED | OUTPATIENT
Start: 2020-02-28 | End: 2020-01-01

## 2020-02-28 RX ORDER — FUROSEMIDE 40 MG
40 TABLET ORAL DAILY
Refills: 0 | Status: DISCONTINUED | OUTPATIENT
Start: 2020-02-28 | End: 2020-03-03

## 2020-02-28 RX ORDER — INSULIN LISPRO 100/ML
VIAL (ML) SUBCUTANEOUS
Refills: 0 | Status: DISCONTINUED | OUTPATIENT
Start: 2020-02-28 | End: 2020-01-01

## 2020-02-28 RX ORDER — AZITHROMYCIN 500 MG/1
500 TABLET, FILM COATED ORAL EVERY 24 HOURS
Refills: 0 | Status: DISCONTINUED | OUTPATIENT
Start: 2020-02-29 | End: 2020-03-03

## 2020-02-28 RX ORDER — HEPARIN SODIUM 5000 [USP'U]/ML
3000 INJECTION INTRAVENOUS; SUBCUTANEOUS EVERY 6 HOURS
Refills: 0 | Status: DISCONTINUED | OUTPATIENT
Start: 2020-02-28 | End: 2020-02-28

## 2020-02-28 RX ORDER — HEPARIN SODIUM 5000 [USP'U]/ML
6500 INJECTION INTRAVENOUS; SUBCUTANEOUS ONCE
Refills: 0 | Status: COMPLETED | OUTPATIENT
Start: 2020-02-28 | End: 2020-02-28

## 2020-02-28 RX ORDER — CHLORHEXIDINE GLUCONATE 213 G/1000ML
1 SOLUTION TOPICAL
Refills: 0 | Status: DISCONTINUED | OUTPATIENT
Start: 2020-02-28 | End: 2020-01-01

## 2020-02-28 RX ORDER — SODIUM CHLORIDE 9 MG/ML
1000 INJECTION INTRAMUSCULAR; INTRAVENOUS; SUBCUTANEOUS ONCE
Refills: 0 | Status: COMPLETED | OUTPATIENT
Start: 2020-02-28 | End: 2020-02-28

## 2020-02-28 RX ORDER — GLUCAGON INJECTION, SOLUTION 0.5 MG/.1ML
1 INJECTION, SOLUTION SUBCUTANEOUS ONCE
Refills: 0 | Status: DISCONTINUED | OUTPATIENT
Start: 2020-02-28 | End: 2020-01-01

## 2020-02-28 RX ORDER — AZITHROMYCIN 500 MG/1
500 TABLET, FILM COATED ORAL ONCE
Refills: 0 | Status: COMPLETED | OUTPATIENT
Start: 2020-02-28 | End: 2020-02-28

## 2020-02-28 RX ORDER — ASPIRIN/CALCIUM CARB/MAGNESIUM 324 MG
81 TABLET ORAL DAILY
Refills: 0 | Status: DISCONTINUED | OUTPATIENT
Start: 2020-02-28 | End: 2020-01-01

## 2020-02-28 RX ORDER — HEPARIN SODIUM 5000 [USP'U]/ML
6500 INJECTION INTRAVENOUS; SUBCUTANEOUS EVERY 6 HOURS
Refills: 0 | Status: DISCONTINUED | OUTPATIENT
Start: 2020-02-28 | End: 2020-02-29

## 2020-02-28 RX ORDER — METOPROLOL TARTRATE 50 MG
25 TABLET ORAL DAILY
Refills: 0 | Status: DISCONTINUED | OUTPATIENT
Start: 2020-02-28 | End: 2020-01-01

## 2020-02-28 RX ORDER — HEPARIN SODIUM 5000 [USP'U]/ML
INJECTION INTRAVENOUS; SUBCUTANEOUS
Qty: 25000 | Refills: 0 | Status: DISCONTINUED | OUTPATIENT
Start: 2020-02-28 | End: 2020-02-28

## 2020-02-28 RX ORDER — DEXTROSE 50 % IN WATER 50 %
12.5 SYRINGE (ML) INTRAVENOUS ONCE
Refills: 0 | Status: DISCONTINUED | OUTPATIENT
Start: 2020-02-28 | End: 2020-01-01

## 2020-02-28 RX ORDER — HEPARIN SODIUM 5000 [USP'U]/ML
INJECTION INTRAVENOUS; SUBCUTANEOUS
Qty: 25000 | Refills: 0 | Status: DISCONTINUED | OUTPATIENT
Start: 2020-02-28 | End: 2020-02-29

## 2020-02-28 RX ORDER — HEPARIN SODIUM 5000 [USP'U]/ML
3000 INJECTION INTRAVENOUS; SUBCUTANEOUS EVERY 6 HOURS
Refills: 0 | Status: DISCONTINUED | OUTPATIENT
Start: 2020-02-28 | End: 2020-02-29

## 2020-02-28 RX ORDER — IPRATROPIUM/ALBUTEROL SULFATE 18-103MCG
3 AEROSOL WITH ADAPTER (GRAM) INHALATION
Refills: 0 | Status: COMPLETED | OUTPATIENT
Start: 2020-02-28 | End: 2020-02-28

## 2020-02-28 RX ADMIN — HEPARIN SODIUM 1400 UNIT(S)/HR: 5000 INJECTION INTRAVENOUS; SUBCUTANEOUS at 23:10

## 2020-02-28 RX ADMIN — Medication 2: at 21:41

## 2020-02-28 RX ADMIN — Medication 125 MILLIGRAM(S): at 11:07

## 2020-02-28 RX ADMIN — HEPARIN SODIUM 6500 UNIT(S): 5000 INJECTION INTRAVENOUS; SUBCUTANEOUS at 15:53

## 2020-02-28 RX ADMIN — Medication 3 MILLILITER(S): at 11:17

## 2020-02-28 RX ADMIN — Medication 3 MILLILITER(S): at 12:16

## 2020-02-28 RX ADMIN — HEPARIN SODIUM 1400 UNIT(S)/HR: 5000 INJECTION INTRAVENOUS; SUBCUTANEOUS at 15:54

## 2020-02-28 RX ADMIN — AZITHROMYCIN 255 MILLIGRAM(S): 500 TABLET, FILM COATED ORAL at 14:25

## 2020-02-28 RX ADMIN — Medication 3 MILLILITER(S): at 20:22

## 2020-02-28 RX ADMIN — ATORVASTATIN CALCIUM 40 MILLIGRAM(S): 80 TABLET, FILM COATED ORAL at 21:50

## 2020-02-28 RX ADMIN — Medication 3 MILLILITER(S): at 11:07

## 2020-02-28 RX ADMIN — SODIUM CHLORIDE 1000 MILLILITER(S): 9 INJECTION INTRAMUSCULAR; INTRAVENOUS; SUBCUTANEOUS at 12:21

## 2020-02-28 RX ADMIN — CEFTRIAXONE 100 MILLIGRAM(S): 500 INJECTION, POWDER, FOR SOLUTION INTRAMUSCULAR; INTRAVENOUS at 12:21

## 2020-02-28 NOTE — PROGRESS NOTE ADULT - SUBJECTIVE AND OBJECTIVE BOX
Patient is a 85y old  Female who presents with a chief complaint of SOB (28 Feb 2020 16:10)      BRIEF HOSPITAL COURSE: 84 y/o F with a h/o DM, HTN, HLD, CAD s/p remote PCI, COPD on home O2, HFpEF, recurrent bladder CA s/p TURBT ( 01/2020), admitted earlier with acute mixed hypoxemic/hypercapnic respiratory failure. Pt was scheduled for a cystoscopy today but was found to need increasing O2 requirements prompting the ED transfer. Patient c/o worsening PARTIDA and cough productive of yellow sputum. CT chest w/ segmental and subsegmental PEs.      Events last 24 hours: Received patient on NRB mask at 100% FiO2. Some work of breathing. Urgently placed on BiPAP with consistent 80% FiO2 requirement.      PAST MEDICAL & SURGICAL HISTORY:  Diabetes  Chronic obstructive pulmonary disease, unspecified COPD type  Stented coronary artery: MI in 2014, s/p stent of right coronary artery  Bladder prolapse, female, acquired  Bladder tumor  CAD (coronary artery disease)  Neuropathy  High cholesterol  Hypertension  Diabetes  S/P cystoscopy  Status post cardiac catheterization  Ankle fracture: Rt      Review of Systems:  CONSTITUTIONAL: No fever, chills, (+) fatigue  EYES: No eye pain, visual disturbances, or discharge  ENMT:  No difficulty hearing, tinnitus, vertigo; No sinus or throat pain  NECK: No pain or stiffness  RESPIRATORY: (+) cough, no wheezing, chills or hemoptysis; (+) shortness of breath  CARDIOVASCULAR: No chest pain, palpitations, dizziness, (+) leg swelling  GASTROINTESTINAL: No abdominal or epigastric pain. No nausea, vomiting, or hematemesis; No diarrhea or constipation. No melena or hematochezia.  GENITOURINARY: No dysuria, frequency, hematuria, or incontinence  NEUROLOGICAL: No headaches, memory loss, loss of strength, numbness, or tremors  SKIN: No itching, burning, rashes, or lesions   MUSCULOSKELETAL: No joint pain or swelling; No muscle, back, or extremity pain  PSYCHIATRIC: No depression, anxiety, mood swings, or difficulty sleeping      Medications:  azithromycin  IVPB 500 milliGRAM(s) IV Intermittent every 24 hours  cefTRIAXone   IVPB 1000 milliGRAM(s) IV Intermittent every 24 hours  furosemide   Injectable 40 milliGRAM(s) IV Push daily  metoprolol succinate ER 25 milliGRAM(s) Oral daily  albuterol/ipratropium for Nebulization 3 milliLiter(s) Nebulizer every 6 hours  budesonide  80 MICROgram(s)/formoterol 4.5 MICROgram(s) Inhaler 2 Puff(s) Inhalation two times a day  aspirin  chewable 81 milliGRAM(s) Oral daily  heparin  Infusion.  Unit(s)/Hr IV Continuous <Continuous>  heparin  Injectable 6500 Unit(s) IV Push every 6 hours PRN  heparin  Injectable 3000 Unit(s) IV Push every 6 hours PRN  atorvastatin 40 milliGRAM(s) Oral at bedtime  dextrose 40% Gel 15 Gram(s) Oral once PRN  dextrose 50% Injectable 12.5 Gram(s) IV Push once  dextrose 50% Injectable 25 Gram(s) IV Push once  dextrose 50% Injectable 25 Gram(s) IV Push once  glucagon  Injectable 1 milliGRAM(s) IntraMuscular once PRN  insulin lispro (HumaLOG) corrective regimen sliding scale   SubCutaneous Before meals and at bedtime  dextrose 5%. 1000 milliLiter(s) IV Continuous <Continuous>  influenza   Vaccine 0.5 milliLiter(s) IntraMuscular once  chlorhexidine 4% Liquid 1 Application(s) Topical <User Schedule>            ICU Vital Signs Last 24 Hrs  T(C): 36.5 (29 Feb 2020 04:55), Max: 36.6 (28 Feb 2020 12:15)  T(F): 97.7 (29 Feb 2020 04:55), Max: 97.8 (28 Feb 2020 12:15)  HR: 89 (29 Feb 2020 03:51) (83 - 147)  BP: 111/62 (29 Feb 2020 00:00) (110/58 - 137/64)  BP(mean): 81 (29 Feb 2020 00:00) (76 - 85)  ABP: --  ABP(mean): --  RR: 12 (29 Feb 2020 00:00) (12 - 48)  SpO2: 88% (29 Feb 2020 03:51) (73% - 95%)      ABG - ( 29 Feb 2020 04:05 )  pH, Arterial: 7.34  pH, Blood: x     /  pCO2: 54    /  pO2: 60    / HCO3: 26    / Base Excess: 2.4   /  SaO2: 88                  I&O's Detail        LABS:                        13.9   14.34 )-----------( 321      ( 28 Feb 2020 11:16 )             48.0     02-28    138  |  98  |  18.0  ----------------------------<  173<H>  4.2   |  23.0  |  1.01    Ca    8.1<L>      28 Feb 2020 21:03    TPro  7.6  /  Alb  3.0<L>  /  TBili  2.5<H>  /  DBili  x   /  AST  33<H>  /  ALT  19  /  AlkPhos  156<H>  02-28      CARDIAC MARKERS ( 28 Feb 2020 21:03 )  x     / 0.04 ng/mL / x     / x     / x      CARDIAC MARKERS ( 28 Feb 2020 16:13 )  x     / 0.04 ng/mL / x     / x     / x      CARDIAC MARKERS ( 28 Feb 2020 12:14 )  x     / 0.04 ng/mL / x     / x     / x          CAPILLARY BLOOD GLUCOSE        PT/INR - ( 28 Feb 2020 21:03 )   PT: 16.3 sec;   INR: 1.43 ratio         PTT - ( 28 Feb 2020 21:03 )  PTT:>200.0 sec    CULTURES:        Physical Examination:    General: some resp distress.  Alert, oriented, interactive, nonfocal, on BiPAP, somnolent    HEENT: Pupils equal, reactive to light.  Symmetric.    PULM: diminished bilaterally, no significant sputum production    CVS: Regular rate and rhythm, no murmurs, rubs, or gallops    ABD: Soft, nondistended, nontender, normoactive bowel sounds, no masses    EXT: b/l LE edema, nontender    SKIN: Warm and well perfused, no rashes noted.    NEURO: A&Ox3, strength 5/5 all extremities, cranial nerves grossly intact, no focal deficits      RADIOLOGY:     < from: CT Angio Chest w/ IV Cont (02.28.20 @ 14:10) >  FINDINGS:    LUNGS AND AIRWAYS: Patent central airways.  Centrilobular emphysema. Scattered subsegmental atelectasis.    PLEURA: No pleural effusion.    MEDIASTINUM AND LANIE: 1.2 cm right paratracheal node, stable.    VESSELS: Acute segmental and subsegmental PE throughout the right lung. Acute subsegmental PE within the left upper lobe. Main pulmonary artery normal in caliber. Atherosclerotic changes of the aorta including prominent atheromatous plaque in the upper abdomen. Reflux of contrast into the IVC and hepatic veins suggestive of right heart failure.    HEART: Heart size is normal. No pericardial effusion. Flattening and bowing of the interventricular septum suggestive of right heart strain.    CHEST WALL AND LOWER NECK: Enlarged heterogeneous right thyroid lobe, unchanged.    VISUALIZED UPPER ABDOMEN: Cholelithiasis. Trace perihepatic ascites. Nodular thickening of the adrenal glands and a stable 1.4 cm left adrenal nodule.    BONES: Mild degenerative changes.     IMPRESSION:     Positive acute segmental and subsegmental PE throughout the right lung and within the left upper lobe. Findings suggestive of right heart strain.            CRITICAL CARE TIME SPENT: 35 mins  Time spent evaluating/treating patient with medical issues that pose a high risk for life threatening deterioration and/or end-organ damage, reviewing data/labs/imaging, discussing case with multidisciplinary team, discussing plan/goals of care with patient/family. Non-inclusive of procedure time. Patient is a 85y old  Female who presents with a chief complaint of SOB (28 Feb 2020 16:10)      BRIEF HOSPITAL COURSE: 86 y/o F with a h/o DM, HTN, HLD, CAD s/p remote PCI, COPD on home O2, HFpEF, recurrent bladder CA s/p TURBT ( 01/2020), admitted earlier with acute mixed hypoxemic/hypercapnic respiratory failure. Pt was scheduled for a cystoscopy today but was found to need increasing O2 requirements prompting the ED transfer. Patient c/o worsening PARTIDA and cough productive of yellow sputum. CT chest w/ segmental and subsegmental PEs.      Events last 24 hours: Received patient on NRB mask at 100% FiO2 with hypoxemia and work of breathing. Urgently placed on BiPAP with consistent 80% FiO2 requirement. On heparin infusion. Somnolent.      PAST MEDICAL & SURGICAL HISTORY:  Diabetes  Chronic obstructive pulmonary disease, unspecified COPD type  Stented coronary artery: MI in 2014, s/p stent of right coronary artery  Bladder prolapse, female, acquired  Bladder tumor  CAD (coronary artery disease)  Neuropathy  High cholesterol  Hypertension  Diabetes  S/P cystoscopy  Status post cardiac catheterization  Ankle fracture: Rt      Review of Systems:  CONSTITUTIONAL: No fever, chills, (+) fatigue  EYES: No eye pain, visual disturbances, or discharge  ENMT:  No difficulty hearing, tinnitus, vertigo; No sinus or throat pain  NECK: No pain or stiffness  RESPIRATORY: (+) cough, no wheezing, chills or hemoptysis; (+) shortness of breath  CARDIOVASCULAR: No chest pain, palpitations, dizziness, (+) leg swelling  GASTROINTESTINAL: No abdominal or epigastric pain. No nausea, vomiting, or hematemesis; No diarrhea or constipation. No melena or hematochezia.  GENITOURINARY: No dysuria, frequency, hematuria, or incontinence  NEUROLOGICAL: No headaches, memory loss, loss of strength, numbness, or tremors  SKIN: No itching, burning, rashes, or lesions   MUSCULOSKELETAL: No joint pain or swelling; No muscle, back, or extremity pain  PSYCHIATRIC: No depression, anxiety, mood swings, or difficulty sleeping      Medications:  azithromycin  IVPB 500 milliGRAM(s) IV Intermittent every 24 hours  cefTRIAXone   IVPB 1000 milliGRAM(s) IV Intermittent every 24 hours  furosemide   Injectable 40 milliGRAM(s) IV Push daily  metoprolol succinate ER 25 milliGRAM(s) Oral daily  albuterol/ipratropium for Nebulization 3 milliLiter(s) Nebulizer every 6 hours  budesonide  80 MICROgram(s)/formoterol 4.5 MICROgram(s) Inhaler 2 Puff(s) Inhalation two times a day  aspirin  chewable 81 milliGRAM(s) Oral daily  heparin  Infusion.  Unit(s)/Hr IV Continuous <Continuous>  heparin  Injectable 6500 Unit(s) IV Push every 6 hours PRN  heparin  Injectable 3000 Unit(s) IV Push every 6 hours PRN  atorvastatin 40 milliGRAM(s) Oral at bedtime  dextrose 40% Gel 15 Gram(s) Oral once PRN  dextrose 50% Injectable 12.5 Gram(s) IV Push once  dextrose 50% Injectable 25 Gram(s) IV Push once  dextrose 50% Injectable 25 Gram(s) IV Push once  glucagon  Injectable 1 milliGRAM(s) IntraMuscular once PRN  insulin lispro (HumaLOG) corrective regimen sliding scale   SubCutaneous Before meals and at bedtime  dextrose 5%. 1000 milliLiter(s) IV Continuous <Continuous>  influenza   Vaccine 0.5 milliLiter(s) IntraMuscular once  chlorhexidine 4% Liquid 1 Application(s) Topical <User Schedule>            ICU Vital Signs Last 24 Hrs  T(C): 36.5 (29 Feb 2020 04:55), Max: 36.6 (28 Feb 2020 12:15)  T(F): 97.7 (29 Feb 2020 04:55), Max: 97.8 (28 Feb 2020 12:15)  HR: 89 (29 Feb 2020 03:51) (83 - 147)  BP: 111/62 (29 Feb 2020 00:00) (110/58 - 137/64)  BP(mean): 81 (29 Feb 2020 00:00) (76 - 85)  ABP: --  ABP(mean): --  RR: 12 (29 Feb 2020 00:00) (12 - 48)  SpO2: 88% (29 Feb 2020 03:51) (73% - 95%)      ABG - ( 29 Feb 2020 04:05 )  pH, Arterial: 7.34  pH, Blood: x     /  pCO2: 54    /  pO2: 60    / HCO3: 26    / Base Excess: 2.4   /  SaO2: 88                  I&O's Detail        LABS:                        13.9   14.34 )-----------( 321      ( 28 Feb 2020 11:16 )             48.0     02-28    138  |  98  |  18.0  ----------------------------<  173<H>  4.2   |  23.0  |  1.01    Ca    8.1<L>      28 Feb 2020 21:03    TPro  7.6  /  Alb  3.0<L>  /  TBili  2.5<H>  /  DBili  x   /  AST  33<H>  /  ALT  19  /  AlkPhos  156<H>  02-28      CARDIAC MARKERS ( 28 Feb 2020 21:03 )  x     / 0.04 ng/mL / x     / x     / x      CARDIAC MARKERS ( 28 Feb 2020 16:13 )  x     / 0.04 ng/mL / x     / x     / x      CARDIAC MARKERS ( 28 Feb 2020 12:14 )  x     / 0.04 ng/mL / x     / x     / x          CAPILLARY BLOOD GLUCOSE        PT/INR - ( 28 Feb 2020 21:03 )   PT: 16.3 sec;   INR: 1.43 ratio         PTT - ( 28 Feb 2020 21:03 )  PTT:>200.0 sec    CULTURES:        Physical Examination:    General: some resp distress.  Alert, oriented, interactive, nonfocal, on BiPAP, somnolent    HEENT: Pupils equal, reactive to light.  Symmetric.    PULM: diminished bilaterally, no significant sputum production    CVS: Regular rate and rhythm, no murmurs, rubs, or gallops    ABD: Soft, nondistended, nontender, normoactive bowel sounds, no masses    EXT: b/l LE edema, nontender    SKIN: Warm and well perfused, no rashes noted.    NEURO: A&Ox3, strength 5/5 all extremities, cranial nerves grossly intact, no focal deficits      RADIOLOGY:     < from: CT Angio Chest w/ IV Cont (02.28.20 @ 14:10) >  FINDINGS:    LUNGS AND AIRWAYS: Patent central airways.  Centrilobular emphysema. Scattered subsegmental atelectasis.    PLEURA: No pleural effusion.    MEDIASTINUM AND LANIE: 1.2 cm right paratracheal node, stable.    VESSELS: Acute segmental and subsegmental PE throughout the right lung. Acute subsegmental PE within the left upper lobe. Main pulmonary artery normal in caliber. Atherosclerotic changes of the aorta including prominent atheromatous plaque in the upper abdomen. Reflux of contrast into the IVC and hepatic veins suggestive of right heart failure.    HEART: Heart size is normal. No pericardial effusion. Flattening and bowing of the interventricular septum suggestive of right heart strain.    CHEST WALL AND LOWER NECK: Enlarged heterogeneous right thyroid lobe, unchanged.    VISUALIZED UPPER ABDOMEN: Cholelithiasis. Trace perihepatic ascites. Nodular thickening of the adrenal glands and a stable 1.4 cm left adrenal nodule.    BONES: Mild degenerative changes.     IMPRESSION:     Positive acute segmental and subsegmental PE throughout the right lung and within the left upper lobe. Findings suggestive of right heart strain.            CRITICAL CARE TIME SPENT: 35 mins  Time spent evaluating/treating patient with medical issues that pose a high risk for life threatening deterioration and/or end-organ damage, reviewing data/labs/imaging, discussing case with multidisciplinary team, discussing plan/goals of care with patient/family. Non-inclusive of procedure time.

## 2020-02-28 NOTE — ED ADULT NURSE NOTE - NSFALLRSKINDICATORS_ED_ALL_ED
PAST SURGICAL HISTORY:  Fractured elbow left elbow fracture repair,2007    H/O appendicitis h/o appendicectomy    History of ankle fracture h/o repair
no

## 2020-02-28 NOTE — PROVIDER CONTACT NOTE (CRITICAL VALUE NOTIFICATION) - ACTION/TREATMENT ORDERED:
continue with current treatment and antibiotic
continue following protocol, retart in 1 hours after hold
fluids ordered

## 2020-02-28 NOTE — H&P ADULT - HISTORY OF PRESENT ILLNESS
85F w/ PMHx DM, HTN, HLD, CAD s/p remote PCI, COPD on home O2, HFpEF, recurrent bladder CA s/p TURBT ( 01/2020) was sent over from presurgical testing to the ED for evaluation of SOB. Pt was scheduled for a cystoscopy today but was found to need increasing O2 requirements prompting the ED transfer. Pt give h/o worsening exertional dyspnea and pdt yellow cough. No fevers, CP, N/V/D/C or pain abdomen. Pt was placed on venti mask @ 50% and her Sat was ~ 86%. CT chest w/ segmental and subsegmental PEs

## 2020-02-28 NOTE — ED ADULT TRIAGE NOTE - CHIEF COMPLAINT QUOTE
Pt comes from [facility] c/o SOB and not feeling well x days, supposed to have cystoscopy w/ bladder biopsy done, sat 80% at [facility] on portable pulse ox

## 2020-02-28 NOTE — PATIENT PROFILE ADULT - HAS THE PATIENT EXPERIENCED ANY OF THE FOLLOWING WITHIN THE WEEK PRIOR TO ADMISSION?
Noreen Ramos RN  05/01/19 [id number]
no

## 2020-02-28 NOTE — ED ADULT NURSE REASSESSMENT NOTE - NS ED NURSE REASSESS COMMENT FT1
Hep gtt initiated without incident.  Pt educated on s/s of hep gtt complications.  Pt verbalizes understanding.

## 2020-02-28 NOTE — ED ADULT NURSE NOTE - OBJECTIVE STATEMENT
Pt was in WR of PST when RN noticed pt had labored breathing and O2 sat in 80s, as per pt granddaughter, pt states she didn't feel well last night but did not want to come to ED because they knew they were coming tomorrow, hx of COPD, noncompliant w/ home O2, hx of cardiac stents, pt presents w/ labored breathing and tachypnea, denies n/v, granddaughter states "shes always saying shes cold, maybe shes anemic", denies fever/chills, initial O2 sat in ED 73% placed on NRB @ 15L, bringing up yellow phlegm, former smoker

## 2020-02-28 NOTE — ED PROVIDER NOTE - PHYSICAL EXAMINATION
General: NAD  Head:  NC, AT  Eyes: EOMI, PERRLA, no scleral icterus  Ears: no erythema/drainage  Nose: midline, no bleeding/drainage  Throat: uvula midline, no lesions, MMM  Cardiac: RRR, no m/r/g, no lower extremity edema  Respiratory: CTABL, no wheezes/rales, equal chest wall expansions, decreased expiratory phase, labored respirations  Abdomen: soft, ND, NT, no rebound tenderness, no guarding, nonperitonitic  MSK/Vascular: full ROM, distal pulses intact, soft compartments, warm extremities  Neuro: AAOx3, motor/sensory intact  Psych: calm, cooperative, normal affect

## 2020-02-28 NOTE — H&P ADULT - NSHPPHYSICALEXAM_GEN_ALL_CORE
General: In mild distress    Neuro: AAO*3, No motor, sensory, or cranial nerve deficit    HEENT: Pupils equal, reactive to light, Moist oral mucosa    PULM: Bibasilar crackles L > R    CVS: Regular rhythm and controlled rate, no murmurs, rubs, or gallops    ABD: Soft, nondistended, nontender, normoactive bowel sounds    EXT: 2+ b/l LE edema, nontender with pedal pulse palpable     SKIN: Warm and well perfused, no acute rashes

## 2020-02-28 NOTE — H&P ADULT - NSHPREVIEWOFSYSTEMS_GEN_ALL_CORE
CONSTITUTIONAL: No fever, chills, or fatigue  EYES: No eye pain, visual disturbances, or discharge  ENMT:  No difficulty hearing, tinnitus, vertigo; No sinus or throat pain  NECK: No pain or stiffness  RESPIRATORY: No cough, wheezing, chills or hemoptysis; +ve shortness of breath and exertional dyspnea  CARDIOVASCULAR: No chest pain, palpitations, dizziness. +ve leg swelling  GASTROINTESTINAL: No abdominal or epigastric pain. No nausea, vomiting, or hematemesis; No diarrhea or constipation. No melena or hematochezia.  GENITOURINARY: No dysuria, frequency, hematuria, or incontinence  NEUROLOGICAL: No headaches, memory loss, loss of strength, numbness, or tremors  SKIN: No itching, burning, rashes, or lesions   MUSCULOSKELETAL: No joint pain or swelling; No muscle, back, or extremity pain  PSYCHIATRIC: No depression, anxiety, mood swings, or difficulty sleeping

## 2020-02-28 NOTE — ED PROVIDER NOTE - PROGRESS NOTE DETAILS
Tamie Rosenberg, Resident: Pt receiving nebs, satting 88% on 5L on venturi mask. Breathing more comfortably when compared to initial presentation Tamie Rosenberg, Resident: Pt receiving nebs, satting 88% on 5L on venturi mask. Breathing more comfortably when compared to initial presentation. Lactate elevated, likely secondary to increased anaerobic respiration due to lack of supplemental oxygen use for COPD. Tamie Rosenberg, Resident: Spoke with MICU, Doris Caraballo, will evaluate the patient. Pt found to have pulmonary embolism, started anticoagulation. Sending repeat labs.

## 2020-02-28 NOTE — H&P ADULT - ASSESSMENT
Acute hypoxic hypercapnic resp failure   Acute subsegmental PE  COPD exacerbation   CAP   HFpEF  CAD s/p PCI  CA bladder s/p TURBT  DM     Neuro: No active issues  Cardiovascular: Aim for MAP target 65. Continues ASA and Toprol XL. Start IV lasix. Hold losartan and Aldactone. TTE. Continue Hep gtt  Resp/Chest: Maintain SpO2 > 88%. Plan on starting on BIPAP HS and PRN  GI/Nutrition: Regular diet  ID: Started on Rocephin and Zithromax. f/u cultures. RSV +ve  Renal: Hold losartan and aldactone. Avoid nephrotoxic agents. Strict I&O  Endocrinology: Maintain Blood sugar < 180. ISS as per protocol  Haem/Oncology:  DVT ppx w/ HSQ    Code status: Pt and family is contemplating DNR/ DNI at this time. But is a full code for now. Will consult palliative care    Critical Care time: 35 minutes assessing presenting problems of acute illness that poses high probability of life threatening deterioration or end organ damage/dysfunction.  Medical decision making including Initiating plan of care, reviewing data, reviewing radiology, discussing with multidisciplinary team, non inclusive of procedures, discussing goals of care with patient/family

## 2020-02-28 NOTE — PROGRESS NOTE ADULT - ASSESSMENT
84 y/o F with a h/o DM, HTN, HLD, CAD s/p remote PCI, COPD on home O2, HFpEF, recurrent bladder CA s/p TURBT ( 01/2020), with acute mixed hypoxemic/hypercapnic respiratory failure, extensive pulmonary emboli, cor pulmonale, acute COPD exacerbation, CAP.

## 2020-02-28 NOTE — PROGRESS NOTE ADULT - PROBLEM SELECTOR PLAN 1
Multifactorial- extensive b/l PEs, COPD, pneumonia. Started urgently on BiPAP due to significant FiO2 requirement and work of breathing. Actively titrating ventilator settings to maintain SaO2 > 88%, consistently requiring 70-80% FiO2. Recheck blood gas. Keep HOB elevated > 30 degrees. Patient is at high risk for further life threatening decompensation and endotracheal intubation, especially in the setting of underlying advanced lung disease.

## 2020-02-28 NOTE — ED ADULT NURSE REASSESSMENT NOTE - NS ED NURSE REASSESS COMMENT FT1
Report received from EMILY Rodney. Pt laying on stretcher, tachypneic breathing. Breathing tx currently going. Family at bedside.

## 2020-02-28 NOTE — ED ADULT NURSE REASSESSMENT NOTE - NS ED NURSE REASSESS COMMENT FT1
Pt placed on venti mask @ 50%. O2 SAT @ 86%. As per MD Rosenberg, keep pt on venti s/t NAD noted, respirations even and nonlabored. 16

## 2020-02-28 NOTE — ED ADULT NURSE NOTE - NSIMPLEMENTINTERV_GEN_ALL_ED
Implemented All Universal Safety Interventions:  [place] to call system. Call bell, personal items and telephone within reach. Instruct patient to call for assistance. Room bathroom lighting operational. Non-slip footwear when patient is off stretcher. Physically safe environment: no spills, clutter or unnecessary equipment. Stretcher in lowest position, wheels locked, appropriate side rails in place.

## 2020-02-28 NOTE — ED ADULT NURSE REASSESSMENT NOTE - NS ED NURSE REASSESS COMMENT FT1
Pt to be placed on heparin gtt. fall risk bracelet placed on arm, bed rails raised, red socks in place.

## 2020-02-28 NOTE — ED ADULT NURSE NOTE - ED STAT RN HANDOFF DETAILS
Bedside handoff report given to EMILY Yanez.  Pt transported to ICU without incident.  Hep gtt infusing at 14ml/hr prior to transport to ICU.

## 2020-02-28 NOTE — ED ADULT NURSE REASSESSMENT NOTE - NS ED NURSE REASSESS COMMENT FT1
Received called from ED monitor tech, pt sats < 85%. Pt with labored/tachypneic breathing. Expiratory wheezes presents. MD Rosenberg at bedside. Third duoneb tx given, pt switched to highflow nebulizer mask.

## 2020-02-28 NOTE — ED PROVIDER NOTE - NS ED ROS FT
Constitutional: no fever, sweats, and no chills.  Eyes: no pain, no redness, and no visual changes.  ENMT: no ear pain and no hearing problems, no nasal congestion/drainage, no dysphagia, and no throat pain, no neck pain, no stiffness  CV: no chest pain, no edema.  Resp: no chest pain, no cough  GI: no abdominal pain, no bloating, no constipation, no diarrhea, no nausea and no vomiting.  : no dysuria, no hematuria  MSK: no msk pain, no weakness  Skin: no jaundice, no lesions, and no rashes.  Neuro: no LOC, no headache, no sensory deficits, and no weakness.

## 2020-02-28 NOTE — ED PROVIDER NOTE - CLINICAL SUMMARY MEDICAL DECISION MAKING FREE TEXT BOX
Pt is an 85 y.o. F hx of COPD and cancer presenting with shortness of breath, tachypnea, tachycardia. Labs, EKG, imaging, meds. Will follow up. Pt is an 85 y.o. F hx of COPD and cancer presenting with shortness of breath, tachypnea, tachycardia. Labs, EKG, imaging, meds. Will follow up. Pt has COPD and is not compliant with oxygen, presented in respiratory distress, elevated lactate secondary to respiratory distress. The patient is not septic, tachycardic, tachypneic, and elevated lactate secondary to increasing respiratory requirements from chronic lack of supplemental oxygen use. Will treat with fluids, IV abx, will follow up labs, meds, imaging and reevaluate.

## 2020-02-28 NOTE — ED PROVIDER NOTE - OBJECTIVE STATEMENT
Pt is a 85 y.o. F hx of COPD noncompliant with 2.5 L, CAD s/p ........presenting with shortness of breath Pt is a 85 y.o. F hx of COPD noncompliant with 2.5 L home O2, CAD s/p PCI, bladder cancer s/p TURBT on treatment, HTN, HLD, DM presenting with shortness of breath. The pt was scheduled to receive a cystoscopy today when staff at the outpatient center noted the patient with increasing oxygen requirements, shortness of breath and called an ambulance for the patient. The pt denies any recent history of travel, chest pain, fever, sweats, chills, or recent illness.

## 2020-02-28 NOTE — ED PROVIDER NOTE - ATTENDING CONTRIBUTION TO CARE
84 y/o with h/o COPD and bladder cancer comes in c/o sob   no chest pian    no wheeze no rales   ct r/o PE

## 2020-02-29 DIAGNOSIS — J44.1 CHRONIC OBSTRUCTIVE PULMONARY DISEASE WITH (ACUTE) EXACERBATION: ICD-10-CM

## 2020-02-29 DIAGNOSIS — J96.01 ACUTE RESPIRATORY FAILURE WITH HYPOXIA: ICD-10-CM

## 2020-02-29 DIAGNOSIS — I26.99 OTHER PULMONARY EMBOLISM WITHOUT ACUTE COR PULMONALE: ICD-10-CM

## 2020-02-29 DIAGNOSIS — J18.9 PNEUMONIA, UNSPECIFIED ORGANISM: ICD-10-CM

## 2020-02-29 LAB
ALBUMIN SERPL ELPH-MCNC: 2.7 G/DL — LOW (ref 3.3–5.2)
ALP SERPL-CCNC: 127 U/L — HIGH (ref 40–120)
ALT FLD-CCNC: 19 U/L — SIGNIFICANT CHANGE UP
ANION GAP SERPL CALC-SCNC: 16 MMOL/L — SIGNIFICANT CHANGE UP (ref 5–17)
APTT BLD: 110.6 SEC — HIGH (ref 27.5–36.3)
AST SERPL-CCNC: 22 U/L — SIGNIFICANT CHANGE UP
BASE EXCESS BLDA CALC-SCNC: 2.4 MMOL/L — SIGNIFICANT CHANGE UP (ref -3–3)
BILIRUB SERPL-MCNC: 1.1 MG/DL — SIGNIFICANT CHANGE UP (ref 0.4–2)
BLOOD GAS COMMENTS ARTERIAL: SIGNIFICANT CHANGE UP
BUN SERPL-MCNC: 20 MG/DL — SIGNIFICANT CHANGE UP (ref 8–20)
CALCIUM SERPL-MCNC: 8.4 MG/DL — LOW (ref 8.6–10.2)
CHLORIDE SERPL-SCNC: 97 MMOL/L — LOW (ref 98–107)
CO2 SERPL-SCNC: 25 MMOL/L — SIGNIFICANT CHANGE UP (ref 22–29)
CREAT SERPL-MCNC: 0.91 MG/DL — SIGNIFICANT CHANGE UP (ref 0.5–1.3)
GAS PNL BLDA: SIGNIFICANT CHANGE UP
GLUCOSE BLDC GLUCOMTR-MCNC: 123 MG/DL — HIGH (ref 70–99)
GLUCOSE BLDC GLUCOMTR-MCNC: 134 MG/DL — HIGH (ref 70–99)
GLUCOSE BLDC GLUCOMTR-MCNC: 153 MG/DL — HIGH (ref 70–99)
GLUCOSE SERPL-MCNC: 139 MG/DL — HIGH (ref 70–99)
HCO3 BLDA-SCNC: 26 MMOL/L — SIGNIFICANT CHANGE UP (ref 20–26)
HCT VFR BLD CALC: 41.7 % — SIGNIFICANT CHANGE UP (ref 34.5–45)
HGB BLD-MCNC: 12.6 G/DL — SIGNIFICANT CHANGE UP (ref 11.5–15.5)
HOROWITZ INDEX BLDA+IHG-RTO: SIGNIFICANT CHANGE UP
MAGNESIUM SERPL-MCNC: 2 MG/DL — SIGNIFICANT CHANGE UP (ref 1.8–2.6)
MCHC RBC-ENTMCNC: 25.5 PG — LOW (ref 27–34)
MCHC RBC-ENTMCNC: 30.2 GM/DL — LOW (ref 32–36)
MCV RBC AUTO: 84.2 FL — SIGNIFICANT CHANGE UP (ref 80–100)
NRBC # BLD: 2 /100 WBCS — HIGH (ref 0–0)
PCO2 BLDA: 54 MMHG — HIGH (ref 35–45)
PH BLDA: 7.34 — LOW (ref 7.35–7.45)
PHOSPHATE SERPL-MCNC: 4.2 MG/DL — SIGNIFICANT CHANGE UP (ref 2.4–4.7)
PLATELET # BLD AUTO: 268 K/UL — SIGNIFICANT CHANGE UP (ref 150–400)
PO2 BLDA: 60 MMHG — LOW (ref 83–108)
POTASSIUM SERPL-MCNC: 3.9 MMOL/L — SIGNIFICANT CHANGE UP (ref 3.5–5.3)
POTASSIUM SERPL-SCNC: 3.9 MMOL/L — SIGNIFICANT CHANGE UP (ref 3.5–5.3)
PROT SERPL-MCNC: 6.9 G/DL — SIGNIFICANT CHANGE UP (ref 6.6–8.7)
RBC # BLD: 4.95 M/UL — SIGNIFICANT CHANGE UP (ref 3.8–5.2)
RBC # FLD: 20.4 % — HIGH (ref 10.3–14.5)
SAO2 % BLDA: 88 % — LOW (ref 95–99)
SODIUM SERPL-SCNC: 138 MMOL/L — SIGNIFICANT CHANGE UP (ref 135–145)
WBC # BLD: 16.58 K/UL — HIGH (ref 3.8–10.5)
WBC # FLD AUTO: 16.58 K/UL — HIGH (ref 3.8–10.5)

## 2020-02-29 PROCEDURE — 93308 TTE F-UP OR LMTD: CPT | Mod: 26

## 2020-02-29 PROCEDURE — 99233 SBSQ HOSP IP/OBS HIGH 50: CPT

## 2020-02-29 PROCEDURE — 93970 EXTREMITY STUDY: CPT | Mod: 26

## 2020-02-29 RX ORDER — IPRATROPIUM/ALBUTEROL SULFATE 18-103MCG
3 AEROSOL WITH ADAPTER (GRAM) INHALATION EVERY 6 HOURS
Refills: 0 | Status: DISCONTINUED | OUTPATIENT
Start: 2020-02-29 | End: 2020-01-01

## 2020-02-29 RX ORDER — ENOXAPARIN SODIUM 100 MG/ML
80 INJECTION SUBCUTANEOUS EVERY 12 HOURS
Refills: 0 | Status: DISCONTINUED | OUTPATIENT
Start: 2020-02-29 | End: 2020-03-03

## 2020-02-29 RX ADMIN — Medication 2: at 17:40

## 2020-02-29 RX ADMIN — Medication 25 MILLIGRAM(S): at 05:16

## 2020-02-29 RX ADMIN — Medication 40 MILLIGRAM(S): at 22:33

## 2020-02-29 RX ADMIN — ENOXAPARIN SODIUM 80 MILLIGRAM(S): 100 INJECTION SUBCUTANEOUS at 22:33

## 2020-02-29 RX ADMIN — AZITHROMYCIN 255 MILLIGRAM(S): 500 TABLET, FILM COATED ORAL at 16:17

## 2020-02-29 RX ADMIN — HEPARIN SODIUM 1200 UNIT(S)/HR: 5000 INJECTION INTRAVENOUS; SUBCUTANEOUS at 07:10

## 2020-02-29 RX ADMIN — ATORVASTATIN CALCIUM 40 MILLIGRAM(S): 80 TABLET, FILM COATED ORAL at 22:33

## 2020-02-29 RX ADMIN — Medication 3 MILLILITER(S): at 02:13

## 2020-02-29 RX ADMIN — Medication 40 MILLIGRAM(S): at 05:16

## 2020-02-29 RX ADMIN — Medication 40 MILLIGRAM(S): at 16:17

## 2020-02-29 RX ADMIN — Medication 3 MILLILITER(S): at 08:13

## 2020-02-29 RX ADMIN — CHLORHEXIDINE GLUCONATE 1 APPLICATION(S): 213 SOLUTION TOPICAL at 03:37

## 2020-02-29 RX ADMIN — ENOXAPARIN SODIUM 80 MILLIGRAM(S): 100 INJECTION SUBCUTANEOUS at 11:29

## 2020-02-29 RX ADMIN — CEFTRIAXONE 100 MILLIGRAM(S): 500 INJECTION, POWDER, FOR SOLUTION INTRAMUSCULAR; INTRAVENOUS at 16:17

## 2020-02-29 NOTE — CONSULT NOTE ADULT - SUBJECTIVE AND OBJECTIVE BOX
[facility]                                                       [facility]                                                               Office:  [address] [address]                                                              Telephone: [phone]. Fax:[phone]                                                                        CARDIOLOGY CONSULTATION NOTE                                                                                             Consult requested by:  Dr. Echeverria  Reason for Consultation: Pulmonary Embolism   History obtained by: Patient and medical record   obtained: No    Chief complaint:    Patient is a 85y old  Female who presents with a chief complaint of SOB (29 Feb 2020 09:11)        HPI: Pt is a 84 y/o female with medical history of DM,HTN, CAD s/p PCI, COPD with home o2, HFpEF, active bladde CA, s/p TURBT (1/2020), who presents for SOB. Pt was in PST for cystoscopy procedure but presented with SOB and was sent to ED for workup. Pt was hypoxic on presentation and had CT Angio chest which showed acute segmental, subsegmental PE. Pt currently presents as well appearing, hemodynamically stable, on bipap.       REVIEW OF SYMPTOMS:     CONSTITUTIONAL: No fever, weight loss, or fatigue  ENMT:  No difficulty hearing, tinnitus, vertigo; No sinus or throat pain  NECK: No pain or stiffness  CARDIOVASCULAR: See HPI  RESPIRATORY: No Dyspnea on exertion, Shortness of breath, cough, wheezing  : No dysuria, no hematuria   GI: No dark color stool, no melena, no diarrhea, no constipation, no abdominal pain   NEURO: No headache, no dizziness, no slurred speech   MUSCULOSKELETAL: No joint pain or swelling; No muscle, back, or extremity pain  PSYCH: No agitation, no anxiety.    ALL OTHER REVIEW OF SYSTEMS ARE NEGATIVE.      PREVIOUS DIAGNOSTIC TESTING  ECHO FINDINGS:< from: TTE Echo Complete w/Doppler (02.28.20 @ 22:09) >  Summary:   1. Technically difficult study.   2. Left ventricular ejection fraction, by visual estimation, is 55 to 60%.   3. Spectral Doppler shows impaired relaxation pattern of left ventricular myocardial filling (Grade I diastolic dysfunction).   4. The right ventricle is not well visualized. At limited views the right ventricle apears mildly enlarged and RV systolic fuction appears moderately reduced. Consider a FU study with definity if clinically indicated,.   5. Estimated pulmonary artery systolic pressure is 53.4 mmHg assuming a right atrial pressure of 15 mmHg, which is consistent with moderate pulmonary hypertension.   6. Results d/w ICU team at the time of the conlcusion of the study.    < end of copied text >      ALLERGIES: Allergies    No Known Allergies    Intolerances      PAST MEDICAL HISTORY  Diabetes  Chronic obstructive pulmonary disease, unspecified COPD type  Stented coronary artery  Bladder prolapse, female, acquired  Bladder tumor  CAD (coronary artery disease)  Neuropathy  High cholesterol  Hypertension  Diabetes      PAST SURGICAL HISTORY  S/P cystoscopy  Status post cardiac catheterization  Ankle fracture  No significant past surgical history      FAMILY HISTORY:  FH: stomach cancer (Sibling)      SOCIAL HISTORY:    CIGARETTES:   quit 30 years   ALCOHOL: Denies  DRUGS: Denies      CURRENT MEDICATIONS:  furosemide   Injectable 40 milliGRAM(s) IV Push daily  metoprolol succinate ER 25 milliGRAM(s) Oral daily  budesonide  80 MICROgram(s)/formoterol 4.5 MICROgram(s) Inhaler   aspirin  chewable  atorvastatin  azithromycin  IVPB  cefTRIAXone   IVPB  chlorhexidine 4% Liquid  dextrose 5%.  dextrose 50% Injectable  dextrose 50% Injectable  dextrose 50% Injectable  enoxaparin Injectable  influenza   Vaccine  insulin lispro (HumaLOG) corrective regimen sliding scale  methylPREDNISolone sodium succinate Injectable        HOME MEDICATIONS:    albuterol 90 mcg/inh inhalation aerosol: 2 puff(s) inhaled 4 times a day, As Needed (10 Freddie 2020 07:48)  famotidine 20 mg oral tablet: 1 tab(s) orally 2 times a day, As Needed (10 Freddie 2020 07:48)  ipratropium-albuterol 0.5 mg-2.5 mg/3 mLinhalation solution: 3 milliliter(s) inhaled every 6 hours (10 Freddie 2020 10:01)  Lipitor: 40 milligram(s) orally once a day (at bedtime) (10 Freddie 2020 07:48)  metFORMIN 500 mg oral tablet: 1 tab(s) orally 2 times a day (10 Freddie 2020 07:48)  Spiriva 18 mcg inhalation capsule: 1 cap(s) inhaled once a day (10 Freddie 2020 07:48)  Vitamin D3 2000 intl units oral tablet: 1 tab(s) orally once a day (10 Freddie 2020 07:48)      Vital Signs Last 24 Hrs  T(C): 36.8 (29 Feb 2020 08:00), Max: 36.8 (29 Feb 2020 08:00)  T(F): 98.2 (29 Feb 2020 08:00), Max: 98.2 (29 Feb 2020 08:00)  HR: 72 (29 Feb 2020 12:01) (72 - 112)  BP: 107/58 (29 Feb 2020 11:00) (102/58 - 137/64)  BP(mean): 78 (29 Feb 2020 11:00) (75 - 91)  RR: 13 (29 Feb 2020 11:00) (12 - 48)  SpO2: 89% (29 Feb 2020 12:01) (84% - 95%)      PHYSICAL EXAM:  Constitutional: Comfortable . No acute distress.   HEENT: Atraumatic and normocephalic , neck is supple . no JVD. No carotid bruit. PEERL   CNS: A&Ox3. No focal deficits. EOMI.   Lymph Nodes: Cervical : Not palpable.  Respiratory: minimal exam, clear on auscultation in upper lobes     Cardiovascular: S1S2 RRR. No murmur/rubs or gallop.  Gastrointestinal: Soft non-tender and non distended . +Bowel sounds. negative Schuler's sign.  Extremities: bilateral lower extremity, +2/+2 pitting     Psychiatric: Calm . no agitation.  Skin: No skin rash/ulcers visualized to face, hands or feet.    Intake and output:   02-28 @ 07:01  -  02-29 @ 07:00  --------------------------------------------------------  IN: 127 mL / OUT: 0 mL / NET: 127 mL    02-29 @ 07:01  - 02-29 @ 12:11  --------------------------------------------------------  IN: 18 mL / OUT: 0 mL / NET: 18 mL        LABS:                        12.6   16.58 )-----------( 268      ( 29 Feb 2020 06:02 )             41.7     02-29    138  |  97<L>  |  20.0  ----------------------------<  139<H>  3.9   |  25.0  |  0.91    Ca    8.4<L>      29 Feb 2020 06:02  Phos  4.2     02-29  Mg     2.0     02-29    TPro  6.9  /  Alb  2.7<L>  /  TBili  1.1  /  DBili  x   /  AST  22  /  ALT  19  /  AlkPhos  127<H>  02-29    CARDIAC MARKERS ( 28 Feb 2020 21:03 )  x     / 0.04 ng/mL / x     / x     / x      CARDIAC MARKERS ( 28 Feb 2020 16:13 )  x     / 0.04 ng/mL / x     / x     / x      CARDIAC MARKERS ( 28 Feb 2020 12:14 )  x     / 0.04 ng/mL / x     / x     / x        ;p-BNP=Serum Pro-Brain Natriuretic Peptide: 5408 pg/mL (02-28 @ 16:13)    PT/INR - ( 28 Feb 2020 21:03 )   PT: 16.3 sec;   INR: 1.43 ratio         PTT - ( 29 Feb 2020 06:02 )  PTT:110.6 sec      INTERPRETATION OF TELEMETRY: NSR HR @ 92      RADIOLOGY & ADDITIONAL STUDIES:    X-ray:  < from: Xray Chest 2 Views PA/Lat (02.28.20 @ 13:42) >  IMPRESSION:   Probable congestive heart.. Superimposed pneumonia cannot be excluded..    < end of copied text >    CT scan: < from: CT Angio Chest w/ IV Cont (02.28.20 @ 14:10) >    IMPRESSION:     Positive acute segmental and subsegmental PE throughout the right lung and within the left upper lobe. Findings suggestive of right heart strain.    < end of copied text >

## 2020-02-29 NOTE — PROGRESS NOTE ADULT - ASSESSMENT
ASSESSMENT:    85F admitted to MICU with mixed hypercaneic/ hypoxemic resp. failure 2/2 AECOPd as well as b/l PE's.    Events last 24 hours:   -patient made DNR/DNi during day  -PE with Rv strain, dara has decided against giving tPA at this lisseth    PLAN:    #Mixed hypercapneic/hypoxemic jamin. failure w/ AECOPD as well as B/L PE's  Patient currently on BiPAP  -will titrate IPAP and EPAP to maintain pH >7.30 and SaO2 >92%  -alarms reviewed  -NPO while on BiPAP , patient is DNR/DNi. WIll maitnain BiPAp for comfort and to ease  -will continue coverage for CAP with rocpehin and zithromax  -will maitnain full dose lovenox for PE's will need conversion to Po agent for long term A/C  -continue goals of care discussions with family   -maintain glycemic control with ISS while admitted

## 2020-02-29 NOTE — PROGRESS NOTE ADULT - SUBJECTIVE AND OBJECTIVE BOX
On Admission  02-28-20 (1d)  HPI:  85F w/ PMHx DM, HTN, HLD, CAD s/p remote PCI, COPD on home O2, HFpEF, recurrent bladder CA s/p TURBT ( 01/2020) was sent over from presurgical testing to the ED for evaluation of SOB. Pt was scheduled for a cystoscopy today but was found to need increasing O2 requirements prompting the ED transfer. Pt give h/o worsening exertional dyspnea and pdt yellow cough. No fevers, CP, N/V/D/C or pain abdomen. Pt was placed on venti mask @ 50% and her Sat was ~ 86%. CT chest w/ segmental and subsegmental PEs (28 Feb 2020 16:10)    PAST MEDICAL & SURGICAL HISTORY:  Diabetes  Chronic obstructive pulmonary disease, unspecified COPD type  Stented coronary artery: MI in 2014, s/p stent of right coronary artery  Bladder prolapse, female, acquired  Bladder tumor  CAD (coronary artery disease)  Neuropathy  High cholesterol  Hypertension  Diabetes  S/P cystoscopy  Status post cardiac catheterization  Ankle fracture: Rt      Antimicrobial:  azithromycin  IVPB 500 milliGRAM(s) IV Intermittent every 24 hours  cefTRIAXone   IVPB 1000 milliGRAM(s) IV Intermittent every 24 hours    Cardiovascular:  furosemide   Injectable 40 milliGRAM(s) IV Push daily  metoprolol succinate ER 25 milliGRAM(s) Oral daily    Pulmonary:  albuterol/ipratropium for Nebulization 3 milliLiter(s) Nebulizer every 6 hours PRN  budesonide  80 MICROgram(s)/formoterol 4.5 MICROgram(s) Inhaler 2 Puff(s) Inhalation two times a day    Hematalogic:  aspirin  chewable 81 milliGRAM(s) Oral daily  enoxaparin Injectable 80 milliGRAM(s) SubCutaneous every 12 hours    Other:  atorvastatin 40 milliGRAM(s) Oral at bedtime  chlorhexidine 4% Liquid 1 Application(s) Topical <User Schedule>  dextrose 40% Gel 15 Gram(s) Oral once PRN  dextrose 5%. 1000 milliLiter(s) IV Continuous <Continuous>  dextrose 50% Injectable 12.5 Gram(s) IV Push once  dextrose 50% Injectable 25 Gram(s) IV Push once  dextrose 50% Injectable 25 Gram(s) IV Push once  glucagon  Injectable 1 milliGRAM(s) IntraMuscular once PRN  influenza   Vaccine 0.5 milliLiter(s) IntraMuscular once  insulin lispro (HumaLOG) corrective regimen sliding scale   SubCutaneous Before meals and at bedtime  methylPREDNISolone sodium succinate Injectable 40 milliGRAM(s) IV Push every 8 hours      Drug Dosing Weight  Height (cm): 157.48 (28 Feb 2020 10:27)  Weight (kg): 76.113 (28 Feb 2020 15:13)  BMI (kg/m2): 30.7 (28 Feb 2020 15:13)  BSA (m2): 1.77 (28 Feb 2020 15:13)    T(C): 36.8 (02-29-20 @ 08:00), Max: 36.8 (02-29-20 @ 08:00)  HR: 76 (02-29-20 @ 08:15)  BP: 102/58 (02-29-20 @ 08:00)  BP(mean): 75 (02-29-20 @ 08:00)  ABP: --  ABP(mean): --  RR: 17 (02-29-20 @ 08:00)  SpO2: 91% (02-29-20 @ 08:15)    ABG - ( 29 Feb 2020 04:05 )  pH, Arterial: 7.34  pH, Blood: x     /  pCO2: 54    /  pO2: 60    / HCO3: 26    / Base Excess: 2.4   /  SaO2: 88                    02-28 @ 07:01  -  02-29 @ 07:00  --------------------------------------------------------  IN: 127 mL / OUT: 0 mL / NET: 127 mL              LABS:  CBC Full  -  ( 29 Feb 2020 06:02 )  WBC Count : 16.58 K/uL  RBC Count : 4.95 M/uL  Hemoglobin : 12.6 g/dL  Hematocrit : 41.7 %  Platelet Count - Automated : 268 K/uL  Mean Cell Volume : 84.2 fl  Mean Cell Hemoglobin : 25.5 pg  Mean Cell Hemoglobin Concentration : 30.2 gm/dL  Auto Neutrophil # : x  Auto Lymphocyte # : x  Auto Monocyte # : x  Auto Eosinophil # : x  Auto Basophil # : x  Auto Neutrophil % : x  Auto Lymphocyte % : x  Auto Monocyte % : x  Auto Eosinophil % : x  Auto Basophil % : x    02-29    138  |  97<L>  |  20.0  ----------------------------<  139<H>  3.9   |  25.0  |  0.91    Ca    8.4<L>      29 Feb 2020 06:02  Phos  4.2     02-29  Mg     2.0     02-29    TPro  6.9  /  Alb  2.7<L>  /  TBili  1.1  /  DBili  x   /  AST  22  /  ALT  19  /  AlkPhos  127<H>  02-29    PT/INR - ( 28 Feb 2020 21:03 )   PT: 16.3 sec;   INR: 1.43 ratio         PTT - ( 29 Feb 2020 06:02 )  PTT:110.6 sec      ____________________________________________________________________________________________________

## 2020-02-29 NOTE — PROGRESS NOTE ADULT - SUBJECTIVE AND OBJECTIVE BOX
Patient is a 85y old  Female who presents with a chief complaint of SOB (29 Feb 2020 22:24)      BRIEF HOSPITAL COURSE:     85F admitted to MICU with mixed hypercaneic/ hypoxemic resp. failure 2/2 AECOPd as well as b/l PE's.    Events last 24 hours:   -patient made DNR/DNi during day  -PE with Rv strain, famiy has decided against giving tPA at this time    PAST MEDICAL & SURGICAL HISTORY:  Diabetes  Chronic obstructive pulmonary disease, unspecified COPD type  Stented coronary artery: MI in 2014, s/p stent of right coronary artery  Bladder prolapse, female, acquired  Bladder tumor  CAD (coronary artery disease)  Neuropathy  High cholesterol  Hypertension  Diabetes  S/P cystoscopy  Status post cardiac catheterization  Ankle fracture: Rt      Review of Systems:  CONSTITUTIONAL: No fever, chills, or fatigue  RESPIRATORY: No cough, wheezing, chills or hemoptysis; No shortness of breath  CARDIOVASCULAR: No chest pain, palpitations, dizziness, or leg swelling  GASTROINTESTINAL: No abdominal or epigastric pain. No nausea, vomiting, or hematemesis; No diarrhea or constipation. No melena or hematochezia.        Medications:  azithromycin  IVPB 500 milliGRAM(s) IV Intermittent every 24 hours  cefTRIAXone   IVPB 1000 milliGRAM(s) IV Intermittent every 24 hours    furosemide   Injectable 40 milliGRAM(s) IV Push daily  metoprolol succinate ER 25 milliGRAM(s) Oral daily    albuterol/ipratropium for Nebulization 3 milliLiter(s) Nebulizer every 6 hours PRN  budesonide  80 MICROgram(s)/formoterol 4.5 MICROgram(s) Inhaler 2 Puff(s) Inhalation two times a day        aspirin  chewable 81 milliGRAM(s) Oral daily  enoxaparin Injectable 80 milliGRAM(s) SubCutaneous every 12 hours        atorvastatin 40 milliGRAM(s) Oral at bedtime  dextrose 40% Gel 15 Gram(s) Oral once PRN  dextrose 50% Injectable 12.5 Gram(s) IV Push once  dextrose 50% Injectable 25 Gram(s) IV Push once  dextrose 50% Injectable 25 Gram(s) IV Push once  glucagon  Injectable 1 milliGRAM(s) IntraMuscular once PRN  insulin lispro (HumaLOG) corrective regimen sliding scale   SubCutaneous Before meals and at bedtime  methylPREDNISolone sodium succinate Injectable 40 milliGRAM(s) IV Push every 8 hours    dextrose 5%. 1000 milliLiter(s) IV Continuous <Continuous>    influenza   Vaccine 0.5 milliLiter(s) IntraMuscular once    chlorhexidine 4% Liquid 1 Application(s) Topical <User Schedule>            ICU Vital Signs Last 24 Hrs  T(C): 36.3 (29 Feb 2020 23:34), Max: 36.8 (29 Feb 2020 08:00)  T(F): 97.3 (29 Feb 2020 23:34), Max: 98.2 (29 Feb 2020 08:00)  HR: 77 (01 Mar 2020 03:00) (66 - 96)  BP: 110/59 (01 Mar 2020 03:00) (100/61 - 132/65)  BP(mean): 81 (01 Mar 2020 03:00) (75 - 91)  RR: 19 (01 Mar 2020 03:00) (13 - 33)  SpO2: 90% (01 Mar 2020 03:00) (84% - 93%)      ABG - ( 29 Feb 2020 04:05 )  pH, Arterial: 7.34  pH, Blood: x     /  pCO2: 54    /  pO2: 60    / HCO3: 26    / Base Excess: 2.4   /  SaO2: 88                  I&O's Detail    28 Feb 2020 07:01  -  29 Feb 2020 07:00  --------------------------------------------------------  IN:    heparin  Infusion.: 127 mL  Total IN: 127 mL    OUT:  Total OUT: 0 mL    Total NET: 127 mL      29 Feb 2020 07:01  -  01 Mar 2020 03:35  --------------------------------------------------------  IN:    heparin  Infusion.: 18 mL    Solution: 50 mL    Solution: 250 mL  Total IN: 318 mL    OUT:    Voided: 700 mL  Total OUT: 700 mL    Total NET: -382 mL            LABS:                        12.6   16.58 )-----------( 268      ( 29 Feb 2020 06:02 )             41.7     02-29    138  |  97<L>  |  20.0  ----------------------------<  139<H>  3.9   |  25.0  |  0.91    Ca    8.4<L>      29 Feb 2020 06:02  Phos  4.2     02-29  Mg     2.0     02-29    TPro  6.9  /  Alb  2.7<L>  /  TBili  1.1  /  DBili  x   /  AST  22  /  ALT  19  /  AlkPhos  127<H>  02-29      CARDIAC MARKERS ( 28 Feb 2020 21:03 )  x     / 0.04 ng/mL / x     / x     / x      CARDIAC MARKERS ( 28 Feb 2020 16:13 )  x     / 0.04 ng/mL / x     / x     / x      CARDIAC MARKERS ( 28 Feb 2020 12:14 )  x     / 0.04 ng/mL / x     / x     / x          CAPILLARY BLOOD GLUCOSE      POCT Blood Glucose.: 134 mg/dL (29 Feb 2020 22:42)    PT/INR - ( 28 Feb 2020 21:03 )   PT: 16.3 sec;   INR: 1.43 ratio         PTT - ( 29 Feb 2020 06:02 )  PTT:110.6 sec    CULTURES:      Physical Examination:    General: No acute distress.  Lethargic    HEENT: Pupils equal, reactive to light.  Symmetric.    diminished at bases bilaterally, no significant sputum production    CVS: Regular rate and rhythm, no murmurs, rubs, or gallops    ABD: Soft, nondistended, nontender, normoactive bowel sounds, no masses    EXT: No edema, nontender    SKIN: Warm and well perfused, no rashes noted.

## 2020-02-29 NOTE — PROGRESS NOTE ADULT - ASSESSMENT
86 yo female with hx of CAD s/p remote PCI, DM, HTN, HLD, COPD, CHFpEF, recurrent bladder cancer scheduled to undergo cystoscopy but referred to ED due to hypoxemia and dyspnea, found to have multiple segmental and subsegmental PE's.   Also being treated for COPD exacerbation.     Pulmonary emboli  Hemodynamically stable but very hypoxic. Troponin normal, no tachycardia, BP stable.    - On full dose AC  - Thrombolysis may help with hypoxemia but patient at higher risk of bleeding due to bladder cancer and advanced age.     COPD exac  - steroids, nebs, abx  - will wean off bipap

## 2020-03-01 LAB
ANION GAP SERPL CALC-SCNC: 12 MMOL/L — SIGNIFICANT CHANGE UP (ref 5–17)
BUN SERPL-MCNC: 33 MG/DL — HIGH (ref 8–20)
CALCIUM SERPL-MCNC: 8.6 MG/DL — SIGNIFICANT CHANGE UP (ref 8.6–10.2)
CHLORIDE SERPL-SCNC: 99 MMOL/L — SIGNIFICANT CHANGE UP (ref 98–107)
CO2 SERPL-SCNC: 28 MMOL/L — SIGNIFICANT CHANGE UP (ref 22–29)
CREAT SERPL-MCNC: 0.83 MG/DL — SIGNIFICANT CHANGE UP (ref 0.5–1.3)
GLUCOSE BLDC GLUCOMTR-MCNC: 124 MG/DL — HIGH (ref 70–99)
GLUCOSE BLDC GLUCOMTR-MCNC: 128 MG/DL — HIGH (ref 70–99)
GLUCOSE BLDC GLUCOMTR-MCNC: 140 MG/DL — HIGH (ref 70–99)
GLUCOSE BLDC GLUCOMTR-MCNC: 146 MG/DL — HIGH (ref 70–99)
GLUCOSE SERPL-MCNC: 148 MG/DL — HIGH (ref 70–99)
HCT VFR BLD CALC: 42 % — SIGNIFICANT CHANGE UP (ref 34.5–45)
HGB BLD-MCNC: 12.8 G/DL — SIGNIFICANT CHANGE UP (ref 11.5–15.5)
LACTATE SERPL-SCNC: 1.5 MMOL/L — SIGNIFICANT CHANGE UP (ref 0.5–2)
MAGNESIUM SERPL-MCNC: 2.1 MG/DL — SIGNIFICANT CHANGE UP (ref 1.6–2.6)
MCHC RBC-ENTMCNC: 25.7 PG — LOW (ref 27–34)
MCHC RBC-ENTMCNC: 30.5 GM/DL — LOW (ref 32–36)
MCV RBC AUTO: 84.3 FL — SIGNIFICANT CHANGE UP (ref 80–100)
NRBC # BLD: 1 /100 WBCS — HIGH (ref 0–0)
PHOSPHATE SERPL-MCNC: 3.8 MG/DL — SIGNIFICANT CHANGE UP (ref 2.4–4.7)
PLATELET # BLD AUTO: 309 K/UL — SIGNIFICANT CHANGE UP (ref 150–400)
POTASSIUM SERPL-MCNC: 4.6 MMOL/L — SIGNIFICANT CHANGE UP (ref 3.5–5.3)
POTASSIUM SERPL-SCNC: 4.6 MMOL/L — SIGNIFICANT CHANGE UP (ref 3.5–5.3)
RBC # BLD: 4.98 M/UL — SIGNIFICANT CHANGE UP (ref 3.8–5.2)
RBC # FLD: 20.7 % — HIGH (ref 10.3–14.5)
SODIUM SERPL-SCNC: 139 MMOL/L — SIGNIFICANT CHANGE UP (ref 135–145)
WBC # BLD: 16.5 K/UL — HIGH (ref 3.8–10.5)
WBC # FLD AUTO: 16.5 K/UL — HIGH (ref 3.8–10.5)

## 2020-03-01 PROCEDURE — 93010 ELECTROCARDIOGRAM REPORT: CPT

## 2020-03-01 PROCEDURE — 99233 SBSQ HOSP IP/OBS HIGH 50: CPT

## 2020-03-01 RX ADMIN — Medication 40 MILLIGRAM(S): at 14:20

## 2020-03-01 RX ADMIN — Medication 40 MILLIGRAM(S): at 06:44

## 2020-03-01 RX ADMIN — CEFTRIAXONE 100 MILLIGRAM(S): 500 INJECTION, POWDER, FOR SOLUTION INTRAMUSCULAR; INTRAVENOUS at 16:29

## 2020-03-01 RX ADMIN — AZITHROMYCIN 255 MILLIGRAM(S): 500 TABLET, FILM COATED ORAL at 16:30

## 2020-03-01 RX ADMIN — ENOXAPARIN SODIUM 80 MILLIGRAM(S): 100 INJECTION SUBCUTANEOUS at 18:03

## 2020-03-01 RX ADMIN — Medication 25 MILLIGRAM(S): at 06:44

## 2020-03-01 RX ADMIN — Medication 40 MILLIGRAM(S): at 22:16

## 2020-03-01 RX ADMIN — ENOXAPARIN SODIUM 80 MILLIGRAM(S): 100 INJECTION SUBCUTANEOUS at 06:54

## 2020-03-01 NOTE — DIETITIAN INITIAL EVALUATION ADULT. - OTHER INFO
86 yo female with hx of CAD s/p remote PCI, DM, HTN, HLD, COPD, CHFpEF, recurrent bladder cancer scheduled to undergo cystoscopy but referred to ED due to hypoxemia and dyspnea, found to have multiple segmental and subsegmental PE's.   Also being treated for COPD exacerbation.   Pt on CPAP , NPO  reports poor appetite PTA, unaware of usual weight.  Family at bedside also unaware of usual weight.  physical signs of malnutrition [ ]absent [ x]present. [ ]Mild [x ]Moderate [ ]Severe Muscle wasting present at [x] temples [x ]clavicle [ ]interosseos [ ]calf. [ ]Mild [ x]Moderate [ ]Severe subcutaneous fat loss presents at [ ]ribs []orbital region [ ]triceps [ ]buccal area.    10/2018 pt was 184 lbs per RD note - 13 lb weight loss since then noted

## 2020-03-01 NOTE — DIETITIAN INITIAL EVALUATION ADULT. - PERTINENT MEDS FT
MEDICATIONS  (STANDING):  aspirin  chewable 81 milliGRAM(s) Oral daily  atorvastatin 40 milliGRAM(s) Oral at bedtime  azithromycin  IVPB 500 milliGRAM(s) IV Intermittent every 24 hours  budesonide  80 MICROgram(s)/formoterol 4.5 MICROgram(s) Inhaler 2 Puff(s) Inhalation two times a day  cefTRIAXone   IVPB 1000 milliGRAM(s) IV Intermittent every 24 hours  chlorhexidine 4% Liquid 1 Application(s) Topical <User Schedule>  dextrose 5%. 1000 milliLiter(s) (50 mL/Hr) IV Continuous <Continuous>  dextrose 50% Injectable 12.5 Gram(s) IV Push once  dextrose 50% Injectable 25 Gram(s) IV Push once  dextrose 50% Injectable 25 Gram(s) IV Push once  enoxaparin Injectable 80 milliGRAM(s) SubCutaneous every 12 hours  furosemide   Injectable 40 milliGRAM(s) IV Push daily  influenza   Vaccine 0.5 milliLiter(s) IntraMuscular once  insulin lispro (HumaLOG) corrective regimen sliding scale   SubCutaneous Before meals and at bedtime  methylPREDNISolone sodium succinate Injectable 40 milliGRAM(s) IV Push every 8 hours  metoprolol succinate ER 25 milliGRAM(s) Oral daily    MEDICATIONS  (PRN):  albuterol/ipratropium for Nebulization 3 milliLiter(s) Nebulizer every 6 hours PRN Shortness of Breath and/or Wheezing  dextrose 40% Gel 15 Gram(s) Oral once PRN Blood Glucose LESS THAN 70 milliGRAM(s)/deciliter  glucagon  Injectable 1 milliGRAM(s) IntraMuscular once PRN Glucose LESS THAN 70 milligrams/deciliter

## 2020-03-01 NOTE — CHART NOTE - NSCHARTNOTEFT_GEN_A_CORE
Upon Nutritional Assessment by the Registered Dietitian your patient was determined to meet criteria / has evidence of the following diagnosis/diagnoses:          [x ] moderate Protein Calorie Malnutrition      Findings as based on:  •  Comprehensive nutrition assessment and consultation      Treatment:    The following diet has been recommended:  glucerna TID upon diet advancement   PROVIDER Section:     By signing this assessment you are acknowledging and agree with the diagnosis/diagnoses assigned by the Registered Dietitian    Comments:

## 2020-03-01 NOTE — PROGRESS NOTE ADULT - SUBJECTIVE AND OBJECTIVE BOX
On Admission  02-28-20 (2d)  HPI:  85F w/ PMHx DM, HTN, HLD, CAD s/p remote PCI, COPD on home O2, HFpEF, recurrent bladder CA s/p TURBT ( 01/2020) was sent over from presurgical testing to the ED for evaluation of SOB. Pt was scheduled for a cystoscopy today but was found to need increasing O2 requirements prompting the ED transfer. Pt give h/o worsening exertional dyspnea and pdt yellow cough. No fevers, CP, N/V/D/C or pain abdomen. Pt was placed on venti mask @ 50% and her Sat was ~ 86%. CT chest w/ segmental and subsegmental PEs (28 Feb 2020 16:10)    PAST MEDICAL & SURGICAL HISTORY:  Diabetes  Chronic obstructive pulmonary disease, unspecified COPD type  Stented coronary artery: MI in 2014, s/p stent of right coronary artery  Bladder prolapse, female, acquired  Bladder tumor  CAD (coronary artery disease)  Neuropathy  High cholesterol  Hypertension  Diabetes  S/P cystoscopy  Status post cardiac catheterization  Ankle fracture: Rt      Antimicrobial:  azithromycin  IVPB 500 milliGRAM(s) IV Intermittent every 24 hours  cefTRIAXone   IVPB 1000 milliGRAM(s) IV Intermittent every 24 hours    Cardiovascular:  furosemide   Injectable 40 milliGRAM(s) IV Push daily  metoprolol succinate ER 25 milliGRAM(s) Oral daily    Pulmonary:  albuterol/ipratropium for Nebulization 3 milliLiter(s) Nebulizer every 6 hours PRN  budesonide  80 MICROgram(s)/formoterol 4.5 MICROgram(s) Inhaler 2 Puff(s) Inhalation two times a day    Hematalogic:  aspirin  chewable 81 milliGRAM(s) Oral daily  enoxaparin Injectable 80 milliGRAM(s) SubCutaneous every 12 hours    Other:  atorvastatin 40 milliGRAM(s) Oral at bedtime  chlorhexidine 4% Liquid 1 Application(s) Topical <User Schedule>  dextrose 40% Gel 15 Gram(s) Oral once PRN  dextrose 5%. 1000 milliLiter(s) IV Continuous <Continuous>  dextrose 50% Injectable 12.5 Gram(s) IV Push once  dextrose 50% Injectable 25 Gram(s) IV Push once  dextrose 50% Injectable 25 Gram(s) IV Push once  glucagon  Injectable 1 milliGRAM(s) IntraMuscular once PRN  influenza   Vaccine 0.5 milliLiter(s) IntraMuscular once  insulin lispro (HumaLOG) corrective regimen sliding scale   SubCutaneous Before meals and at bedtime  methylPREDNISolone sodium succinate Injectable 40 milliGRAM(s) IV Push every 8 hours      Drug Dosing Weight  Height (cm): 157.48 (28 Feb 2020 10:27)  Weight (kg): 76.113 (28 Feb 2020 15:13)  BMI (kg/m2): 30.7 (28 Feb 2020 15:13)  BSA (m2): 1.77 (28 Feb 2020 15:13)    T(C): 36.3 (03-01-20 @ 08:01), Max: 36.7 (02-29-20 @ 13:02)  HR: 60 (03-01-20 @ 09:00)  BP: 119/65 (03-01-20 @ 09:00)  BP(mean): 87 (03-01-20 @ 09:00)  ABP: --  ABP(mean): --  RR: 14 (03-01-20 @ 09:00)  SpO2: 88% (03-01-20 @ 09:00)    ABG - ( 29 Feb 2020 04:05 )  pH, Arterial: 7.34  pH, Blood: x     /  pCO2: 54    /  pO2: 60    / HCO3: 26    / Base Excess: 2.4   /  SaO2: 88                    02-29 @ 07:01  -  03-01 @ 07:00  --------------------------------------------------------  IN: 318 mL / OUT: 1350 mL / NET: -1032 mL              LABS:  CBC Full  -  ( 01 Mar 2020 06:13 )  WBC Count : 16.50 K/uL  RBC Count : 4.98 M/uL  Hemoglobin : 12.8 g/dL  Hematocrit : 42.0 %  Platelet Count - Automated : 309 K/uL  Mean Cell Volume : 84.3 fl  Mean Cell Hemoglobin : 25.7 pg  Mean Cell Hemoglobin Concentration : 30.5 gm/dL  Auto Neutrophil # : x  Auto Lymphocyte # : x  Auto Monocyte # : x  Auto Eosinophil # : x  Auto Basophil # : x  Auto Neutrophil % : x  Auto Lymphocyte % : x  Auto Monocyte % : x  Auto Eosinophil % : x  Auto Basophil % : x    03-01    139  |  99  |  33.0<H>  ----------------------------<  148<H>  4.6   |  28.0  |  0.83    Ca    8.6      01 Mar 2020 06:13  Phos  3.8     03-01  Mg     2.1     03-01    TPro  6.9  /  Alb  2.7<L>  /  TBili  1.1  /  DBili  x   /  AST  22  /  ALT  19  /  AlkPhos  127<H>  02-29    PT/INR - ( 28 Feb 2020 21:03 )   PT: 16.3 sec;   INR: 1.43 ratio         PTT - ( 29 Feb 2020 06:02 )  PTT:110.6 sec      ____________________________________________________________________________________________________

## 2020-03-01 NOTE — DIETITIAN INITIAL EVALUATION ADULT. - PERTINENT LABORATORY DATA
03-01 Na139 mmol/L Glu 148 mg/dL<H> K+ 4.6 mmol/L Cr  0.83 mg/dL BUN 33.0 mg/dL<H> Phos 3.8 mg/dL Alb n/a   PAB n/a

## 2020-03-01 NOTE — PROGRESS NOTE ADULT - ASSESSMENT
84 yo female with hx of CAD s/p remote PCI, DM, HTN, HLD, COPD, CHFpEF, recurrent bladder cancer scheduled to undergo cystoscopy but referred to ED due to hypoxemia and dyspnea, found to have multiple segmental and subsegmental PE's.   Also being treated for COPD exacerbation.     Pulmonary emboli  Hemodynamically stable but very hypoxic. Troponin normal, no tachycardia, BP stable.    - On full dose AC  - Family refused thrombolysis, patient wants DNR/DNI, family declines any aggressive interventions     COPD exac  - steroids, nebs, abx  - will wean off bipap to high flow as tolerated

## 2020-03-02 LAB
ANION GAP SERPL CALC-SCNC: 13 MMOL/L — SIGNIFICANT CHANGE UP (ref 5–17)
BUN SERPL-MCNC: 40 MG/DL — HIGH (ref 8–20)
CALCIUM SERPL-MCNC: 8.7 MG/DL — SIGNIFICANT CHANGE UP (ref 8.6–10.2)
CHLORIDE SERPL-SCNC: 100 MMOL/L — SIGNIFICANT CHANGE UP (ref 98–107)
CO2 SERPL-SCNC: 28 MMOL/L — SIGNIFICANT CHANGE UP (ref 22–29)
CREAT SERPL-MCNC: 0.8 MG/DL — SIGNIFICANT CHANGE UP (ref 0.5–1.3)
GLUCOSE BLDC GLUCOMTR-MCNC: 128 MG/DL — HIGH (ref 70–99)
GLUCOSE BLDC GLUCOMTR-MCNC: 134 MG/DL — HIGH (ref 70–99)
GLUCOSE BLDC GLUCOMTR-MCNC: 163 MG/DL — HIGH (ref 70–99)
GLUCOSE SERPL-MCNC: 126 MG/DL — HIGH (ref 70–99)
HCT VFR BLD CALC: 41.3 % — SIGNIFICANT CHANGE UP (ref 34.5–45)
HGB BLD-MCNC: 12.5 G/DL — SIGNIFICANT CHANGE UP (ref 11.5–15.5)
MAGNESIUM SERPL-MCNC: 2.3 MG/DL — SIGNIFICANT CHANGE UP (ref 1.6–2.6)
MCHC RBC-ENTMCNC: 25.6 PG — LOW (ref 27–34)
MCHC RBC-ENTMCNC: 30.3 GM/DL — LOW (ref 32–36)
MCV RBC AUTO: 84.6 FL — SIGNIFICANT CHANGE UP (ref 80–100)
PHOSPHATE SERPL-MCNC: 3.6 MG/DL — SIGNIFICANT CHANGE UP (ref 2.4–4.7)
PLATELET # BLD AUTO: 247 K/UL — SIGNIFICANT CHANGE UP (ref 150–400)
POTASSIUM SERPL-MCNC: 4.8 MMOL/L — SIGNIFICANT CHANGE UP (ref 3.5–5.3)
POTASSIUM SERPL-SCNC: 4.8 MMOL/L — SIGNIFICANT CHANGE UP (ref 3.5–5.3)
RBC # BLD: 4.88 M/UL — SIGNIFICANT CHANGE UP (ref 3.8–5.2)
RBC # FLD: 20.5 % — HIGH (ref 10.3–14.5)
SODIUM SERPL-SCNC: 141 MMOL/L — SIGNIFICANT CHANGE UP (ref 135–145)
WBC # BLD: 11.98 K/UL — HIGH (ref 3.8–10.5)
WBC # FLD AUTO: 11.98 K/UL — HIGH (ref 3.8–10.5)

## 2020-03-02 PROCEDURE — 99233 SBSQ HOSP IP/OBS HIGH 50: CPT

## 2020-03-02 RX ADMIN — ENOXAPARIN SODIUM 80 MILLIGRAM(S): 100 INJECTION SUBCUTANEOUS at 06:21

## 2020-03-02 RX ADMIN — Medication 25 MILLIGRAM(S): at 06:21

## 2020-03-02 RX ADMIN — BUDESONIDE AND FORMOTEROL FUMARATE DIHYDRATE 2 PUFF(S): 160; 4.5 AEROSOL RESPIRATORY (INHALATION) at 08:46

## 2020-03-02 RX ADMIN — Medication 40 MILLIGRAM(S): at 15:26

## 2020-03-02 RX ADMIN — ENOXAPARIN SODIUM 80 MILLIGRAM(S): 100 INJECTION SUBCUTANEOUS at 18:17

## 2020-03-02 RX ADMIN — Medication 2: at 23:27

## 2020-03-02 RX ADMIN — Medication 40 MILLIGRAM(S): at 06:21

## 2020-03-02 RX ADMIN — BUDESONIDE AND FORMOTEROL FUMARATE DIHYDRATE 2 PUFF(S): 160; 4.5 AEROSOL RESPIRATORY (INHALATION) at 20:58

## 2020-03-02 RX ADMIN — ATORVASTATIN CALCIUM 40 MILLIGRAM(S): 80 TABLET, FILM COATED ORAL at 23:27

## 2020-03-02 RX ADMIN — Medication 81 MILLIGRAM(S): at 11:59

## 2020-03-02 RX ADMIN — CEFTRIAXONE 100 MILLIGRAM(S): 500 INJECTION, POWDER, FOR SOLUTION INTRAMUSCULAR; INTRAVENOUS at 15:27

## 2020-03-02 RX ADMIN — AZITHROMYCIN 255 MILLIGRAM(S): 500 TABLET, FILM COATED ORAL at 15:27

## 2020-03-02 RX ADMIN — Medication 40 MILLIGRAM(S): at 23:26

## 2020-03-02 NOTE — CONSULT NOTE ADULT - ATTENDING COMMENTS
Thank you for the opportunity to assist with the care of this patient.   [facility] [phone].

## 2020-03-02 NOTE — CONSULT NOTE ADULT - SUBJECTIVE AND OBJECTIVE BOX
HPI: 85F with PMH as listed admitted 2/28 with shortness of breath. She is found to have segmental and subsegmental PE, COPD exacerbation, currently on high flow. Patient refused tPA. Patient has been dependent on high flow and bipap, now downgraded to medical service.     PERTINENT PMH REVIEWED: Yes     PAST MEDICAL & SURGICAL HISTORY:  Diabetes  Chronic obstructive pulmonary disease, unspecified COPD type  Stented coronary artery: MI in 2014, s/p stent of right coronary artery  Bladder prolapse, female, acquired  Bladder tumor  CAD (coronary artery disease)  Neuropathy  High cholesterol  Hypertension  Diabetes  S/P cystoscopy  Status post cardiac catheterization  Ankle fracture: Rt    SOCIAL HISTORY: past smoker, quit 4 years ago.                                     Admitted from:  home    Surrogate - Sury Mason - [phone]    FAMILY HISTORY:  FH: stomach cancer (Sibling)    Baseline ADLs (prior to admission):  Independent/ Dependent      Allergies    No Known Allergies    Present Symptoms:     Dyspnea: 0 1 2 3   Nausea/Vomiting: Yes No  Anxiety:  Yes No  Depression: Yes No  Fatigue: Yes No  Loss of appetite: Yes No    Pain:             Character-            Duration-            Effect-            Factors-            Frequency-            Location-            Severity-    Review of Systems: Reviewed                     Negative:                     Positive:  Unable to obtain due to poor mentation   All others negative    MEDICATIONS  (STANDING):  aspirin  chewable 81 milliGRAM(s) Oral daily  atorvastatin 40 milliGRAM(s) Oral at bedtime  azithromycin  IVPB 500 milliGRAM(s) IV Intermittent every 24 hours  budesonide  80 MICROgram(s)/formoterol 4.5 MICROgram(s) Inhaler 2 Puff(s) Inhalation two times a day  cefTRIAXone   IVPB 1000 milliGRAM(s) IV Intermittent every 24 hours  chlorhexidine 4% Liquid 1 Application(s) Topical <User Schedule>  dextrose 5%. 1000 milliLiter(s) (50 mL/Hr) IV Continuous <Continuous>  dextrose 50% Injectable 12.5 Gram(s) IV Push once  dextrose 50% Injectable 25 Gram(s) IV Push once  dextrose 50% Injectable 25 Gram(s) IV Push once  enoxaparin Injectable 80 milliGRAM(s) SubCutaneous every 12 hours  furosemide   Injectable 40 milliGRAM(s) IV Push daily  influenza   Vaccine 0.5 milliLiter(s) IntraMuscular once  insulin lispro (HumaLOG) corrective regimen sliding scale   SubCutaneous Before meals and at bedtime  methylPREDNISolone sodium succinate Injectable 40 milliGRAM(s) IV Push every 8 hours  metoprolol succinate ER 25 milliGRAM(s) Oral daily    MEDICATIONS  (PRN):  albuterol/ipratropium for Nebulization 3 milliLiter(s) Nebulizer every 6 hours PRN Shortness of Breath and/or Wheezing  dextrose 40% Gel 15 Gram(s) Oral once PRN Blood Glucose LESS THAN 70 milliGRAM(s)/deciliter  glucagon  Injectable 1 milliGRAM(s) IntraMuscular once PRN Glucose LESS THAN 70 milligrams/deciliter    PHYSICAL EXAM:    Vital Signs Last 24 Hrs  T(C): 36.6 (02 Mar 2020 07:50), Max: 36.6 (02 Mar 2020 07:50)  T(F): 97.9 (02 Mar 2020 07:50), Max: 97.9 (02 Mar 2020 07:50)  HR: 79 (02 Mar 2020 10:00) (58 - 79)  BP: 137/73 (02 Mar 2020 10:00) (95/53 - 137/73)  BP(mean): 98 (02 Mar 2020 10:00) (71 - 98)  RR: 27 (02 Mar 2020 10:00) (12 - 27)  SpO2: 92% (02 Mar 2020 10:00) (84% - 96%)    General: alert  oriented x ____ lethargic agitated                  cachexia  nonverbal  coma    Karnofsky:  %    HEENT: normal  dry mouth  ET tube/trach    Lungs: comfortable tachypnea/labored breathing  excessive secretions    CV: normal  tachycardia    GI: normal  distended  tender  no BS               PEG/NG/OG tube  constipation  last BM:     : normal  incontinent  oliguria/anuria  ceron    MSK: normal  weakness  edema             ambulatory  bedbound/wheelchair bound    Skin: normal  pressure ulcers- Stage_____  no rash    LABS:                      12.5   11.98 )-----------( 247      ( 02 Mar 2020 04:37 )             41.3     03-02    141  |  100  |  40.0<H>  ----------------------------<  126<H>  4.8   |  28.0  |  0.80    Ca    8.7      02 Mar 2020 07:21  Phos  3.6     03-02  Mg     2.3     03-02    I&O's Summary    01 Mar 2020 07:01  -  02 Mar 2020 07:00  --------------------------------------------------------  IN: 300 mL / OUT: 1360 mL / NET: -1060 mL    02 Mar 2020 07:01  -  02 Mar 2020 11:55  --------------------------------------------------------  IN: 200 mL / OUT: 0 mL / NET: 200 mL    RADIOLOGY & ADDITIONAL STUDIES:    ADVANCE DIRECTIVES:   DNR YES NO  Completed on:                     KERRY  YES NO   Completed on:  Living Will  YES NO   Completed on: HPI: 85F with PMH as listed admitted 2/28 with shortness of breath. She is found to have segmental and subsegmental PE, LLE DVT, COPD exacerbation, currently on high flow. Patient refused tPA. Patient has been dependent on high flow and bipap, now downgraded to medical service.     PERTINENT PMH REVIEWED: Yes     PAST MEDICAL & SURGICAL HISTORY:  Diabetes  Chronic obstructive pulmonary disease, unspecified COPD type  Stented coronary artery: MI in 2014, s/p stent of right coronary artery  Bladder prolapse, female, acquired  Bladder tumor  CAD (coronary artery disease)  Neuropathy  High cholesterol  Hypertension  Diabetes  S/P cystoscopy  Status post cardiac catheterization  Ankle fracture: Rt    SOCIAL HISTORY: past smoker, quit 4 years ago.                                     Admitted from:  home    Surrogate - Sury Mason - [phone]    FAMILY HISTORY:  FH: stomach cancer (Sibling)    Baseline ADLs (prior to admission):  mildly dependent      Allergies    No Known Allergies    Present Symptoms:     Dyspnea: 0 - 1   Nausea/Vomiting: No  Anxiety:  No  Depression: No  Fatigue: Yes   Loss of appetite: No    Pain: none             Character-            Duration-            Effect-            Factors-            Frequency-            Location-            Severity-    Review of Systems: Reviewed                     Negative: no chest pain                All others negative    MEDICATIONS  (STANDING):  aspirin  chewable 81 milliGRAM(s) Oral daily  atorvastatin 40 milliGRAM(s) Oral at bedtime  azithromycin  IVPB 500 milliGRAM(s) IV Intermittent every 24 hours  budesonide  80 MICROgram(s)/formoterol 4.5 MICROgram(s) Inhaler 2 Puff(s) Inhalation two times a day  cefTRIAXone   IVPB 1000 milliGRAM(s) IV Intermittent every 24 hours  chlorhexidine 4% Liquid 1 Application(s) Topical <User Schedule>  dextrose 5%. 1000 milliLiter(s) (50 mL/Hr) IV Continuous <Continuous>  dextrose 50% Injectable 12.5 Gram(s) IV Push once  dextrose 50% Injectable 25 Gram(s) IV Push once  dextrose 50% Injectable 25 Gram(s) IV Push once  enoxaparin Injectable 80 milliGRAM(s) SubCutaneous every 12 hours  furosemide   Injectable 40 milliGRAM(s) IV Push daily  influenza   Vaccine 0.5 milliLiter(s) IntraMuscular once  insulin lispro (HumaLOG) corrective regimen sliding scale   SubCutaneous Before meals and at bedtime  methylPREDNISolone sodium succinate Injectable 40 milliGRAM(s) IV Push every 8 hours  metoprolol succinate ER 25 milliGRAM(s) Oral daily    MEDICATIONS  (PRN):  albuterol/ipratropium for Nebulization 3 milliLiter(s) Nebulizer every 6 hours PRN Shortness of Breath and/or Wheezing  dextrose 40% Gel 15 Gram(s) Oral once PRN Blood Glucose LESS THAN 70 milliGRAM(s)/deciliter  glucagon  Injectable 1 milliGRAM(s) IntraMuscular once PRN Glucose LESS THAN 70 milligrams/deciliter    PHYSICAL EXAM:    Vital Signs Last 24 Hrs  T(C): 36.6 (02 Mar 2020 07:50), Max: 36.6 (02 Mar 2020 07:50)  T(F): 97.9 (02 Mar 2020 07:50), Max: 97.9 (02 Mar 2020 07:50)  HR: 79 (02 Mar 2020 10:00) (58 - 79)  BP: 137/73 (02 Mar 2020 10:00) (95/53 - 137/73)  BP(mean): 98 (02 Mar 2020 10:00) (71 - 98)  RR: 27 (02 Mar 2020 10:00) (12 - 27)  SpO2: 92% (02 Mar 2020 10:00) (84% - 96%)    General: alert and oriented x 4     Karnofsky: 30-40 %    HEENT: normal     Lungs: comfortable    CV: normal      GI: normal     : normal      MSK: bedbound/wheelchair bound    Skin: no rash    LABS:                      12.5   11.98 )-----------( 247      ( 02 Mar 2020 04:37 )             41.3     03-02    141  |  100  |  40.0<H>  ----------------------------<  126<H>  4.8   |  28.0  |  0.80    Ca    8.7      02 Mar 2020 07:21  Phos  3.6     03-02  Mg     2.3     03-02    I&O's Summary    01 Mar 2020 07:01  -  02 Mar 2020 07:00  --------------------------------------------------------  IN: 300 mL / OUT: 1360 mL / NET: -1060 mL    02 Mar 2020 07:01  -  02 Mar 2020 11:55  --------------------------------------------------------  IN: 200 mL / OUT: 0 mL / NET: 200 mL    RADIOLOGY & ADDITIONAL STUDIES:    < from: CT Angio Chest w/ IV Cont (02.28.20 @ 14:10) >  IMPRESSION:     Positive acute segmental and subsegmental PE throughout the right lung and within the left upper lobe. Findings suggestive of right heart strain.    < end of copied text >    < from: Xray Chest 2 Views PA/Lat (08.13.19 @ 12:50) >  Emphysema. No focal consolidation.    < end of copied text >    < from: US Duplex Venous Lower Ext Complete, Bilateral (02.29.20 @ 09:44) >    IMPRESSION:/LEFT lower extremity popliteal vein and posterior tibial vein deep vein thrombosis.    Unremarkable ultrasound of the RIGHT lower extremity deep venous system.   Critical value  discussed with physician assistant Ilya on 2/29/2020 at 9:48 AM with read back.  Hospital policies for critical values including read back   policy were followed.  The verbal communication of the critical value   supplements this written report.     < end of copied text >    ADVANCE DIRECTIVES: DNR/I - done by patient herself on admission in ICU

## 2020-03-02 NOTE — PROGRESS NOTE ADULT - ASSESSMENT
85F with  DM, HTN, HLD, CAD s/p remote PCI, COPD on home O2, HFpEF, recurrent bladder CA s/p TURBT ( 01/2020) was sent over from presurgical testing to the ED for evaluation of SOB. Pt was scheduled for a cystoscopy today but was found to need increasing O2 requirements prompting the ED transfer. Pt give h/o worsening exertional dyspnea and productive  yellow cough on admission No fevers.  Pt was placed on venti mask @ 50% and her Sat was ~ 86%. CT chest w/ segmental and subsegmental PEs admitted to ICU   TPA offered by ICU  team , she refused , signed DNI / DNI , she does not any agressive heroic measures , family is supporting her on this decision     1- Bilateral PE with DVT   refused TPA , cont high flow oxygen and anticoagulation     2- Respiratory  failure acute on cronic , hypoxic   cont high flow oxygen , taper  as tolerate   cont neb therapy     3- COPD on home oxygen     4- Bladder  cancer   under care of Dr Sanders  will call to inform him

## 2020-03-02 NOTE — PROGRESS NOTE ADULT - SUBJECTIVE AND OBJECTIVE BOX
Internal Medicine Hospitalist Progress Note    follow up respiratory failure , PE   pt is seen in ICU , family is at the bedside   d/w ICU team , pt is on high flow oxygen sitting in the chair , + dyspnea   She denies any pain at present   she had refused TPA as per ICU team , i asked her and family she understand risks and benefit , due to bleeding risk     she is happy with using oxygen sitting in the chair talking to her family , eating             ROS: as above, all remaining ROS are negative.       BACKGROUND:  MEDICATIONS  (STANDING):  aspirin  chewable 81 milliGRAM(s) Oral daily  atorvastatin 40 milliGRAM(s) Oral at bedtime  azithromycin  IVPB 500 milliGRAM(s) IV Intermittent every 24 hours  budesonide  80 MICROgram(s)/formoterol 4.5 MICROgram(s) Inhaler 2 Puff(s) Inhalation two times a day  cefTRIAXone   IVPB 1000 milliGRAM(s) IV Intermittent every 24 hours  chlorhexidine 4% Liquid 1 Application(s) Topical <User Schedule>  dextrose 5%. 1000 milliLiter(s) (50 mL/Hr) IV Continuous <Continuous>  dextrose 50% Injectable 12.5 Gram(s) IV Push once  dextrose 50% Injectable 25 Gram(s) IV Push once  dextrose 50% Injectable 25 Gram(s) IV Push once  enoxaparin Injectable 80 milliGRAM(s) SubCutaneous every 12 hours  furosemide   Injectable 40 milliGRAM(s) IV Push daily  influenza   Vaccine 0.5 milliLiter(s) IntraMuscular once  insulin lispro (HumaLOG) corrective regimen sliding scale   SubCutaneous Before meals and at bedtime  methylPREDNISolone sodium succinate Injectable 40 milliGRAM(s) IV Push every 8 hours  metoprolol succinate ER 25 milliGRAM(s) Oral daily    MEDICATIONS  (PRN):  albuterol/ipratropium for Nebulization 3 milliLiter(s) Nebulizer every 6 hours PRN Shortness of Breath and/or Wheezing  dextrose 40% Gel 15 Gram(s) Oral once PRN Blood Glucose LESS THAN 70 milliGRAM(s)/deciliter  glucagon  Injectable 1 milliGRAM(s) IntraMuscular once PRN Glucose LESS THAN 70 milligrams/deciliter    Allergies    No Known Allergies    Intolerances            VITALS:  Vital Signs Last 24 Hrs  T(C): 36.8 (02 Mar 2020 11:58), Max: 36.8 (02 Mar 2020 11:58)  T(F): 98.3 (02 Mar 2020 11:58), Max: 98.3 (02 Mar 2020 11:58)  HR: 74 (02 Mar 2020 12:00) (58 - 79)  BP: 119/70 (02 Mar 2020 12:00) (95/53 - 137/73)  BP(mean): 79 (02 Mar 2020 12:00) (71 - 98)  RR: 23 (02 Mar 2020 12:00) (13 - 27)  SpO2: 92% (02 Mar 2020 12:00) (84% - 96%) Daily     Daily Weight in k.2 (02 Mar 2020 03:07)  CAPILLARY BLOOD GLUCOSE      POCT Blood Glucose.: 128 mg/dL (02 Mar 2020 11:32)  POCT Blood Glucose.: 134 mg/dL (02 Mar 2020 07:56)  POCT Blood Glucose.: 124 mg/dL (01 Mar 2020 22:10)  POCT Blood Glucose.: 146 mg/dL (01 Mar 2020 16:54)    I&O's Summary    01 Mar 2020 07:01  -  02 Mar 2020 07:00  --------------------------------------------------------  IN: 300 mL / OUT: 1360 mL / NET: -1060 mL    02 Mar 2020 07:01  -  02 Mar 2020 13:17  --------------------------------------------------------  IN: 200 mL / OUT: 0 mL / NET: 200 mL        PHYSICAL EXAM:      Constitutional: on high flow oxygen , no distress     Neck: supple , no JVD     Respiratory: CTA bilateral diminished BS     Cardiovascular: regular s1 /s2     Gastrointestinal: soft no tenderness , BS positive     Extremities: no pretibial edema             LABS:                        12.5   11.98 )-----------( 247      ( 02 Mar 2020 04:37 )             41.3     03-02    141  |  100  |  40.0<H>  ----------------------------<  126<H>  4.8   |  28.0  |  0.80    Ca    8.7      02 Mar 2020 07:21  Phos  3.6     03-02  Mg     2.3     03-02        < from: CT Angio Chest w/ IV Cont (20 @ 14:10) >  IMPRESSION:     Positive acute segmental and subsegmental PE throughout the right lung and within the left upper lobe. Findings suggestive of right heart strain.      < end of copied text >    Radiology :    < from: TTE Echo Complete w/Doppler (20 @ 22:09) >  ummary:   1. Technically difficult study.   2. Left ventricular ejection fraction, by visual estimation, is 55 to 60%.   3. Spectral Doppler shows impaired relaxation pattern of left ventricular myocardial filling (Grade I diastolic dysfunction).   4. The right ventricle is not well visualized. At limited views the right ventricle apears mildly enlarged and RV systolic fuction appears moderately reduced. Consider a FU study with definity if clinically indicated,.   5. Estimated pulmonary artery systolic pressure is 53.4 mmHg assuming a right atrial pressure of 15 mmHg, which is consistent with moderate pulmonary hypertension.   6. Results d/w ICU team at the time of the conlcusion of the study.    < end of copied text >

## 2020-03-03 LAB
ANION GAP SERPL CALC-SCNC: 12 MMOL/L — SIGNIFICANT CHANGE UP (ref 5–17)
ANION GAP SERPL CALC-SCNC: 14 MMOL/L — SIGNIFICANT CHANGE UP (ref 5–17)
BASOPHILS # BLD AUTO: 0.03 K/UL — SIGNIFICANT CHANGE UP (ref 0–0.2)
BASOPHILS NFR BLD AUTO: 0.8 % — SIGNIFICANT CHANGE UP (ref 0–2)
BUN SERPL-MCNC: 12 MG/DL — SIGNIFICANT CHANGE UP (ref 8–20)
BUN SERPL-MCNC: 38 MG/DL — HIGH (ref 8–20)
CALCIUM SERPL-MCNC: 7.9 MG/DL — LOW (ref 8.6–10.2)
CALCIUM SERPL-MCNC: 8.6 MG/DL — SIGNIFICANT CHANGE UP (ref 8.6–10.2)
CHLORIDE SERPL-SCNC: 104 MMOL/L — SIGNIFICANT CHANGE UP (ref 98–107)
CHLORIDE SERPL-SCNC: 96 MMOL/L — LOW (ref 98–107)
CO2 SERPL-SCNC: 20 MMOL/L — LOW (ref 22–29)
CO2 SERPL-SCNC: 29 MMOL/L — SIGNIFICANT CHANGE UP (ref 22–29)
CREAT SERPL-MCNC: 0.26 MG/DL — LOW (ref 0.5–1.3)
CREAT SERPL-MCNC: 0.67 MG/DL — SIGNIFICANT CHANGE UP (ref 0.5–1.3)
EOSINOPHIL # BLD AUTO: 0.16 K/UL — SIGNIFICANT CHANGE UP (ref 0–0.5)
EOSINOPHIL NFR BLD AUTO: 4.1 % — SIGNIFICANT CHANGE UP (ref 0–6)
GLUCOSE BLDC GLUCOMTR-MCNC: 139 MG/DL — HIGH (ref 70–99)
GLUCOSE BLDC GLUCOMTR-MCNC: 142 MG/DL — HIGH (ref 70–99)
GLUCOSE BLDC GLUCOMTR-MCNC: 155 MG/DL — HIGH (ref 70–99)
GLUCOSE BLDC GLUCOMTR-MCNC: 232 MG/DL — HIGH (ref 70–99)
GLUCOSE SERPL-MCNC: 149 MG/DL — HIGH (ref 70–99)
GLUCOSE SERPL-MCNC: 92 MG/DL — SIGNIFICANT CHANGE UP (ref 70–99)
HBA1C BLD-MCNC: 5.5 % — SIGNIFICANT CHANGE UP (ref 4–5.6)
HCT VFR BLD CALC: 34.8 % — SIGNIFICANT CHANGE UP (ref 34.5–45)
HGB BLD-MCNC: 9.9 G/DL — LOW (ref 11.5–15.5)
IMM GRANULOCYTES NFR BLD AUTO: 0.8 % — SIGNIFICANT CHANGE UP (ref 0–1.5)
LYMPHOCYTES # BLD AUTO: 0.97 K/UL — LOW (ref 1–3.3)
LYMPHOCYTES # BLD AUTO: 25 % — SIGNIFICANT CHANGE UP (ref 13–44)
MAGNESIUM SERPL-MCNC: 2 MG/DL — SIGNIFICANT CHANGE UP (ref 1.6–2.6)
MAGNESIUM SERPL-MCNC: 2.3 MG/DL — SIGNIFICANT CHANGE UP (ref 1.6–2.6)
MCHC RBC-ENTMCNC: 21.5 PG — LOW (ref 27–34)
MCHC RBC-ENTMCNC: 28.4 GM/DL — LOW (ref 32–36)
MCV RBC AUTO: 75.5 FL — LOW (ref 80–100)
MONOCYTES # BLD AUTO: 0.7 K/UL — SIGNIFICANT CHANGE UP (ref 0–0.9)
MONOCYTES NFR BLD AUTO: 18 % — HIGH (ref 2–14)
NEUTROPHILS # BLD AUTO: 1.99 K/UL — SIGNIFICANT CHANGE UP (ref 1.8–7.4)
NEUTROPHILS NFR BLD AUTO: 51.3 % — SIGNIFICANT CHANGE UP (ref 43–77)
PHOSPHATE SERPL-MCNC: 2.2 MG/DL — LOW (ref 2.4–4.7)
PHOSPHATE SERPL-MCNC: 3 MG/DL — SIGNIFICANT CHANGE UP (ref 2.4–4.7)
PLATELET # BLD AUTO: 329 K/UL — SIGNIFICANT CHANGE UP (ref 150–400)
POTASSIUM SERPL-MCNC: 3.4 MMOL/L — LOW (ref 3.5–5.3)
POTASSIUM SERPL-MCNC: 4 MMOL/L — SIGNIFICANT CHANGE UP (ref 3.5–5.3)
POTASSIUM SERPL-SCNC: 3.4 MMOL/L — LOW (ref 3.5–5.3)
POTASSIUM SERPL-SCNC: 4 MMOL/L — SIGNIFICANT CHANGE UP (ref 3.5–5.3)
RBC # BLD: 4.61 M/UL — SIGNIFICANT CHANGE UP (ref 3.8–5.2)
RBC # FLD: 26.6 % — HIGH (ref 10.3–14.5)
SODIUM SERPL-SCNC: 137 MMOL/L — SIGNIFICANT CHANGE UP (ref 135–145)
SODIUM SERPL-SCNC: 138 MMOL/L — SIGNIFICANT CHANGE UP (ref 135–145)
WBC # BLD: 3.88 K/UL — SIGNIFICANT CHANGE UP (ref 3.8–10.5)
WBC # FLD AUTO: 3.88 K/UL — SIGNIFICANT CHANGE UP (ref 3.8–10.5)

## 2020-03-03 PROCEDURE — 99233 SBSQ HOSP IP/OBS HIGH 50: CPT

## 2020-03-03 PROCEDURE — 99232 SBSQ HOSP IP/OBS MODERATE 35: CPT

## 2020-03-03 RX ORDER — SPIRONOLACTONE 25 MG/1
25 TABLET, FILM COATED ORAL DAILY
Refills: 0 | Status: DISCONTINUED | OUTPATIENT
Start: 2020-03-03 | End: 2020-01-01

## 2020-03-03 RX ORDER — APIXABAN 2.5 MG/1
5 TABLET, FILM COATED ORAL EVERY 12 HOURS
Refills: 0 | Status: DISCONTINUED | OUTPATIENT
Start: 2020-03-03 | End: 2020-01-01

## 2020-03-03 RX ORDER — FUROSEMIDE 40 MG
40 TABLET ORAL DAILY
Refills: 0 | Status: DISCONTINUED | OUTPATIENT
Start: 2020-03-03 | End: 2020-01-01

## 2020-03-03 RX ORDER — POTASSIUM CHLORIDE 20 MEQ
40 PACKET (EA) ORAL ONCE
Refills: 0 | Status: COMPLETED | OUTPATIENT
Start: 2020-03-03 | End: 2020-03-03

## 2020-03-03 RX ORDER — POTASSIUM PHOSPHATE, MONOBASIC POTASSIUM PHOSPHATE, DIBASIC 236; 224 MG/ML; MG/ML
15 INJECTION, SOLUTION INTRAVENOUS ONCE
Refills: 0 | Status: COMPLETED | OUTPATIENT
Start: 2020-03-03 | End: 2020-03-03

## 2020-03-03 RX ADMIN — POTASSIUM PHOSPHATE, MONOBASIC POTASSIUM PHOSPHATE, DIBASIC 62.5 MILLIMOLE(S): 236; 224 INJECTION, SOLUTION INTRAVENOUS at 16:46

## 2020-03-03 RX ADMIN — Medication 4: at 18:16

## 2020-03-03 RX ADMIN — ATORVASTATIN CALCIUM 40 MILLIGRAM(S): 80 TABLET, FILM COATED ORAL at 22:37

## 2020-03-03 RX ADMIN — Medication 40 MILLIGRAM(S): at 22:37

## 2020-03-03 RX ADMIN — ENOXAPARIN SODIUM 80 MILLIGRAM(S): 100 INJECTION SUBCUTANEOUS at 06:24

## 2020-03-03 RX ADMIN — SPIRONOLACTONE 25 MILLIGRAM(S): 25 TABLET, FILM COATED ORAL at 18:17

## 2020-03-03 RX ADMIN — Medication 2: at 08:20

## 2020-03-03 RX ADMIN — Medication 40 MILLIEQUIVALENT(S): at 13:47

## 2020-03-03 RX ADMIN — CHLORHEXIDINE GLUCONATE 1 APPLICATION(S): 213 SOLUTION TOPICAL at 06:25

## 2020-03-03 RX ADMIN — Medication 25 MILLIGRAM(S): at 06:23

## 2020-03-03 RX ADMIN — Medication 40 MILLIGRAM(S): at 06:24

## 2020-03-03 RX ADMIN — BUDESONIDE AND FORMOTEROL FUMARATE DIHYDRATE 2 PUFF(S): 160; 4.5 AEROSOL RESPIRATORY (INHALATION) at 09:48

## 2020-03-03 RX ADMIN — CEFTRIAXONE 100 MILLIGRAM(S): 500 INJECTION, POWDER, FOR SOLUTION INTRAMUSCULAR; INTRAVENOUS at 16:00

## 2020-03-03 RX ADMIN — APIXABAN 5 MILLIGRAM(S): 2.5 TABLET, FILM COATED ORAL at 18:16

## 2020-03-03 RX ADMIN — Medication 81 MILLIGRAM(S): at 11:54

## 2020-03-03 RX ADMIN — Medication 40 MILLIGRAM(S): at 13:47

## 2020-03-03 RX ADMIN — BUDESONIDE AND FORMOTEROL FUMARATE DIHYDRATE 2 PUFF(S): 160; 4.5 AEROSOL RESPIRATORY (INHALATION) at 21:08

## 2020-03-03 NOTE — CONSULT NOTE ADULT - SUBJECTIVE AND OBJECTIVE BOX
[facility], [facility]                                   [address] [place] [address]                                                      PHONE: [phone]                                                         FAX: [phone]  [email or web address]  ---------------------------------------------------------------------------------------------------------------------------------    Reason for Consult: PE     HPI:  TAMIKO KELLY is an 85y Female with history of COPD ex smoker ? home O2 HTN HLD HFpEF obese CAD/MI remote PCI bladder cancer DM neuropathy  who was sent over from presurgical testing to the ED for evaluation of SOB. Patient was scheduled for a cystoscopy but was found to need increasing O2 requirements prompting the ED transfer. Patient was placed on VM @ 50% and her Sat was ~ 86%. CT chest w/ segmental and subsegmental PEs.  Patient was evaluated and monitored in MICU.  Patient refused TPA and is now DNR/DNI.  Patient on high flow O2 therapy.  Denies any chest pain orthopnea or PND.        PAST MEDICAL & SURGICAL HISTORY:  Diabetes  Chronic obstructive pulmonary disease, unspecified COPD type  Stented coronary artery: MI in 2014, s/p stent of right coronary artery  Bladder prolapse, female, acquired  Bladder tumor  CAD (coronary artery disease)  Neuropathy  High cholesterol  Hypertension  Diabetes  S/P cystoscopy  Status post cardiac catheterization  Ankle fracture: Rt      No Known Allergies      MEDICATIONS  (STANDING):  apixaban 5 milliGRAM(s) Oral every 12 hours  aspirin  chewable 81 milliGRAM(s) Oral daily  atorvastatin 40 milliGRAM(s) Oral at bedtime  budesonide  80 MICROgram(s)/formoterol 4.5 MICROgram(s) Inhaler 2 Puff(s) Inhalation two times a day  cefTRIAXone   IVPB 1000 milliGRAM(s) IV Intermittent every 24 hours  chlorhexidine 4% Liquid 1 Application(s) Topical <User Schedule>  dextrose 5%. 1000 milliLiter(s) (50 mL/Hr) IV Continuous <Continuous>  dextrose 50% Injectable 12.5 Gram(s) IV Push once  dextrose 50% Injectable 25 Gram(s) IV Push once  dextrose 50% Injectable 25 Gram(s) IV Push once  furosemide    Tablet 40 milliGRAM(s) Oral daily  insulin lispro (HumaLOG) corrective regimen sliding scale   SubCutaneous Before meals and at bedtime  methylPREDNISolone sodium succinate Injectable 40 milliGRAM(s) IV Push every 8 hours  metoprolol succinate ER 25 milliGRAM(s) Oral daily  potassium phosphate IVPB 15 milliMole(s) IV Intermittent once  spironolactone 25 milliGRAM(s) Oral daily    MEDICATIONS  (PRN):  albuterol/ipratropium for Nebulization 3 milliLiter(s) Nebulizer every 6 hours PRN Shortness of Breath and/or Wheezing  dextrose 40% Gel 15 Gram(s) Oral once PRN Blood Glucose LESS THAN 70 milliGRAM(s)/deciliter  glucagon  Injectable 1 milliGRAM(s) IntraMuscular once PRN Glucose LESS THAN 70 milligrams/deciliter      Social History:  Cigarettes:        ex smoker             Alchohol:      none            Illicit Drug Abuse:  none    FH negative for CAD    ROS: Negative other than as mentioned in HPI.    Vital Signs Last 24 Hrs  T(C): 36.7 (03 Mar 2020 07:36), Max: 36.8 (02 Mar 2020 16:02)  T(F): 98.1 (03 Mar 2020 07:36), Max: 98.2 (02 Mar 2020 16:02)  HR: 54 (03 Mar 2020 09:51) (54 - 88)  BP: 139/75 (03 Mar 2020 07:36) (119/57 - 139/75)  BP(mean): 81 (02 Mar 2020 16:55) (81 - 84)  RR: 18 (03 Mar 2020 07:36) (18 - 37)  SpO2: 92% (03 Mar 2020 09:51) (88% - 97%)  ICU Vital Signs Last 24 Hrs  TAMIKO KELLY  I&O's Detail    02 Mar 2020 07:01  -  03 Mar 2020 07:00  --------------------------------------------------------  IN:    Oral Fluid: 450 mL    Solution: 250 mL    Solution: 50 mL  Total IN: 750 mL    OUT:    Voided: 950 mL  Total OUT: 950 mL    Total NET: -200 mL      03 Mar 2020 07:01  -  03 Mar 2020 14:28  --------------------------------------------------------  IN:  Total IN: 0 mL    OUT:    Voided: 1400 mL  Total OUT: 1400 mL    Total NET: -1400 mL        I&O's Summary    02 Mar 2020 07:01  -  03 Mar 2020 07:00  --------------------------------------------------------  IN: 750 mL / OUT: 950 mL / NET: -200 mL    03 Mar 2020 07:01  -  03 Mar 2020 14:28  --------------------------------------------------------  IN: 0 mL / OUT: 1400 mL / NET: -1400 mL      Drug Dosing Weight  TAMIKO KELLY      PHYSICAL EXAM:  General: Appears well developed, well nourished alert and cooperative.  HEENT: Head; normocephalic, atraumatic.  Eyes: Pupils reactive, cornea wnl.  Neck: Supple, no nodes adenopathy, no NVD or carotid bruit or thyromegaly.  CARDIOVASCULAR: Normal S1 and S2, 2/6 murmur, rub, gallop or lift.   LUNGS: No rales, rhonchi or wheeze. Normal breath sounds bilaterally.  ABDOMEN: Soft, nontender without mass or organomegaly. bowel sounds normoactive.  EXTREMITIES: No clubbing, cyanosis or edema. Distal pulses wnl.   SKIN: warm and dry with normal turgor.  NEURO: Alert/oriented x 3/normal motor exam. No pathologic reflexes.    PSYCH: normal affect.        LABS:                        9.9    3.88  )-----------( 329      ( 03 Mar 2020 08:30 )             34.8     03-03    138  |  104  |  12.0  ----------------------------<  92  3.4<L>   |  20.0<L>  |  0.26<L>    Ca    7.9<L>      03 Mar 2020 08:30  Phos  2.2     03-03  Mg     2.0     03-03      TAMIKO KELLY            RADIOLOGY & ADDITIONAL STUDIES:    INTERPRETATION OF TELEMETRY (personally reviewed):    ECG:  Diagnosis Line Normal sinus rhythm  Cannot rule out Anterior infarct , age undetermined  Prolonged QT    Confirmed by RADHA JARAMILLO ([id number]) on 3/1/2020 5:14:29 PM      ECHO:  Summary:   1. Technically difficult study.   2. Left ventricular ejection fraction, by visual estimation, is 55 to 60%.   3. Spectral Doppler shows impaired relaxation pattern of left ventricular myocardial filling (Grade I diastolic dysfunction).   4. The right ventricle is not well visualized. At limited views the right ventricle apears mildly enlarged and RV systolic fuction appears moderately reduced. Consider a FU study with definity if clinically indicated,.   5. Estimated pulmonary artery systolic pressure is 53.4 mmHg assuming a right atrial pressure of 15 mmHg, which is consistent with moderate pulmonary hypertension.   6. Results d/w ICU team at the time of the conlcusion of the study.    MD Rebeca Electronically signed on 2/29/2020 at 10:46:19 AM       CARDIAC CATHETERIZATION:  CORONARY VESSELS: The coronary circulation is right dominant.  RCA:   --  Proximal RCA: There was a 80 % stenosis. FFR showed index of  0.79  COMPLICATIONS: There were no complications. No complications occurred  during the cath lab visit. No complications occurred during the cath lab  visit.  SUMMARY:  CORONARY VESSELS: Proximal RCA: There was a 80 % stenosis. FFR showed index  of 0.79  RECOMMENDATIONS: ASA and Plavix  DIAGNOSTIC IMPRESSIONS: Severe RCA disease treated with PTCA/STENT  (ALLISON-Resolute) with good result  DIAGNOSTIC RECOMMENDATIONS: ASA and Plavix  INTERVENTIONAL IMPRESSIONS: Severe RCA disease treated with PTCA/STENT  (ALLISON-Resolute) with good result  INTERVENTIONAL RECOMMENDATIONS: ASA and Plavix  Prepared and signed by  Mickey Martines MD      IMPRESSION:     Positive acute segmental and subsegmental PE throughout the right lung and within the left upper lobe. Findings suggestive of right heart strain.    Findings were discussed with Dr. Taylor on  2/28/2020 2:39 PM by Dr. Swenson with read back confirmation.      Assessment and Plan:  In summary, TAMIKO KELLY is an 85y Female with past medical history significant for history of COPD ex smoker ? home O2 HTN HLD HFpEF obese CAD/MI remote PCI bladder cancer DM neuropathy  who was sent over from presurgical testing to the ED for evaluation of SOB. Patient was scheduled for a cystoscopy but was found to need increasing O2 requirements prompting the ED transfer. Patient was placed on VM @ 50% and her Sat was ~ 86%. CT chest w/ segmental and subsegmental PEs.  Patient was evaluated and monitored in MICU.  Patient refused TPA and is now DNR/DNI.  Patient on high flow O2 therapy.  Denies any chest pain orthopnea or PND.  TTE normal EF RV failure likely acute on chronic is setting of COPD PA ~ 53 mmHg see above     Plan  1.  Agree with long term AC   2.  Change Lasix to 40 mg po daily  3.  Continue current cardiovascular medications     Poor prognosis D/W with patient and family

## 2020-03-03 NOTE — PROGRESS NOTE ADULT - SUBJECTIVE AND OBJECTIVE BOX
Internal Medicine Hospitalist Progress Note    follow up For DVT/PE and respiratory failure   pt is seen in am , sitting in the bed , no distress + SOB on high flow oxygen   overnight events noted     Vital Signs Last 24 Hrs  T(C): 36.7 (03 Mar 2020 07:36), Max: 36.8 (02 Mar 2020 16:02)  T(F): 98.1 (03 Mar 2020 07:36), Max: 98.2 (02 Mar 2020 16:02)  HR: 54 (03 Mar 2020 09:51) (54 - 88)  BP: 139/75 (03 Mar 2020 07:36) (108/55 - 139/75)  BP(mean): 81 (02 Mar 2020 16:55) (74 - 84)  RR: 18 (03 Mar 2020 07:36) (18 - 37)  SpO2: 92% (03 Mar 2020 09:51) (87% - 97%)          Constitutional: on high flow oxygen , no distress     Neck: supple , no JVD     Respiratory: CTA bilateral diminished BS     Cardiovascular: regular s1 /s2     Gastrointestinal: soft no tenderness , BS positive     Extremities: no pretibial edema                             9.9    3.88  )-----------( 329      ( 03 Mar 2020 08:30 )             34.8   03-03    138  |  104  |  12.0  ----------------------------<  92  3.4<L>   |  20.0<L>  |  0.26<L>    Ca    7.9<L>      03 Mar 2020 08:30  Phos  2.2     03-03  Mg     2.0     03-03                < from: CT Angio Chest w/ IV Cont (02.28.20 @ 14:10) >  IMPRESSION:     Positive acute segmental and subsegmental PE throughout the right lung and within the left upper lobe. Findings suggestive of right heart strain.      < end of copied text >    Radiology :    < from: TTE Echo Complete w/Doppler (02.28.20 @ 22:09) >  ummary:   1. Technically difficult study.   2. Left ventricular ejection fraction, by visual estimation, is 55 to 60%.   3. Spectral Doppler shows impaired relaxation pattern of left ventricular myocardial filling (Grade I diastolic dysfunction).   4. The right ventricle is not well visualized. At limited views the right ventricle apears mildly enlarged and RV systolic fuction appears moderately reduced. Consider a FU study with definity if clinically indicated,.   5. Estimated pulmonary artery systolic pressure is 53.4 mmHg assuming a right atrial pressure of 15 mmHg, which is consistent with moderate pulmonary hypertension.   6. Results d/w ICU team at the time of the conlcusion of the study.    < end of copied text >

## 2020-03-03 NOTE — CONSULT NOTE ADULT - ASSESSMENT
L DVT and b/l PE  Mild COPD with severely reduced diffusion capacity  Echo with R strain and severe pulm HTN   Pt with likely chronic pulm HTN from long h/o smoking, COPD and hypoxia, which is likely now worse due to PE  Acute on chronic hypoxic respiratory failure    Rec:    On Eliquis  If desired, could consider hematology evaluation for hypercoagulable w/u  On Symbicort for h/o COPD  Drug nebs as needed  Current on HFO2 - wean as tolerates  Decrease steroids as able  No longer appears to require biPAP therapy - will d/c  Weight loss  Pt is DNR - palliative care following
Pt is a 86 y/o female with medical history of DM,HTN, CAD s/p PCI, COPD with home o2, HFpEF, active bladde CA, s/p TURBT (1/2020), who presents for SOB. Pt was in PST for cystoscopy procedure but presented with SOB and was sent to ED for workup. Pt was hypoxic on presentation and had CT Angio chest which showed acute segmental, subsegmental PE. Pt currently presents as well appearing, hemodynamically stable, on bipap.      Subsegmental PE  -Echo-EF 55-60%, grade I diastolic dys. RV sys. fx appears moderately reduced, RV appears mildly enlarged  -Repeat ECHO with definity needed to determine RV fx, r/o RV strain  -Pending echo results, will determine pt candidacy for EKOS intervention
85F with COPD, CAD, DM, admitted with acute hypoxic respiratory failure found to have segmental / subsegmental bilateral PE's, LLE DVT, COPD exacerbation, currently on high settings of high flow.     #1 Bilateral PE - anticoagulation. patient refuse TPA. remains high flow/bipap dependent  #2 Acute Hypoxic respiratory failure - remains dependent on high flow and bipap. also being treated for possible superimposed pneumonia and COPD exacerbation.   #3 Acute exacerbation of COPD - nebs/steroids. oxygen supplementation. wean off high flow/bipap as tolerated. Can try low dose opiates - even oxycodone 5mg PO to help ease work of breathing a bit.    #4 Encounter for palliative care - met with patient and her daughter Sury Mason at bedside, as well as grand daughter - Sury Limon. Patient happy to be on high flow and being able to eat. Will have to see how she does and if she can tolerate being off high flow.

## 2020-03-03 NOTE — PROGRESS NOTE ADULT - SUBJECTIVE AND OBJECTIVE BOX
OVERNIGHT EVENTS: remains on high flow     Present Symptoms:     Dyspnea: 0 -1   Nausea/Vomiting: No  Anxiety:  No  Depression: No  Fatigue: Yes   Loss of appetite: No    Pain: none             Character-            Duration-            Effect-            Factors-            Frequency-            Location-            Severity-    Review of Systems: Reviewed                     Negative: no chest pain                 All others negative    MEDICATIONS  (STANDING):  apixaban 5 milliGRAM(s) Oral every 12 hours  aspirin  chewable 81 milliGRAM(s) Oral daily  atorvastatin 40 milliGRAM(s) Oral at bedtime  budesonide  80 MICROgram(s)/formoterol 4.5 MICROgram(s) Inhaler 2 Puff(s) Inhalation two times a day  cefTRIAXone   IVPB 1000 milliGRAM(s) IV Intermittent every 24 hours  chlorhexidine 4% Liquid 1 Application(s) Topical <User Schedule>  dextrose 5%. 1000 milliLiter(s) (50 mL/Hr) IV Continuous <Continuous>  dextrose 50% Injectable 12.5 Gram(s) IV Push once  dextrose 50% Injectable 25 Gram(s) IV Push once  dextrose 50% Injectable 25 Gram(s) IV Push once  furosemide   Injectable 40 milliGRAM(s) IV Push daily  insulin lispro (HumaLOG) corrective regimen sliding scale   SubCutaneous Before meals and at bedtime  methylPREDNISolone sodium succinate Injectable 40 milliGRAM(s) IV Push every 8 hours  metoprolol succinate ER 25 milliGRAM(s) Oral daily  potassium phosphate IVPB 15 milliMole(s) IV Intermittent once  spironolactone 25 milliGRAM(s) Oral daily    MEDICATIONS  (PRN):  albuterol/ipratropium for Nebulization 3 milliLiter(s) Nebulizer every 6 hours PRN Shortness of Breath and/or Wheezing  dextrose 40% Gel 15 Gram(s) Oral once PRN Blood Glucose LESS THAN 70 milliGRAM(s)/deciliter  glucagon  Injectable 1 milliGRAM(s) IntraMuscular once PRN Glucose LESS THAN 70 milligrams/deciliter      PHYSICAL EXAM:    Vital Signs Last 24 Hrs  T(C): 36.7 (03 Mar 2020 07:36), Max: 36.8 (02 Mar 2020 16:02)  T(F): 98.1 (03 Mar 2020 07:36), Max: 98.2 (02 Mar 2020 16:02)  HR: 54 (03 Mar 2020 09:51) (54 - 88)  BP: 139/75 (03 Mar 2020 07:36) (119/57 - 139/75)  BP(mean): 81 (02 Mar 2020 16:55) (81 - 84)  RR: 18 (03 Mar 2020 07:36) (18 - 37)  SpO2: 92% (03 Mar 2020 09:51) (88% - 97%)    General: alert  oriented x 4     Karnofsky: 30-40 %    HEENT: normal      Lungs: mild tachypnea    CV: normal      GI: normal      : normal      MSK: bedbound/wheelchair bound    Skin: no rash    LABS:                      9.9    3.88  )-----------( 329      ( 03 Mar 2020 08:30 )             34.8     03-03    138  |  104  |  12.0  ----------------------------<  92  3.4<L>   |  20.0<L>  |  0.26<L>    Ca    7.9<L>      03 Mar 2020 08:30  Phos  2.2     03-03  Mg     2.0     03-03    I&O's Summary    02 Mar 2020 07:01  -  03 Mar 2020 07:00  --------------------------------------------------------  IN: 750 mL / OUT: 950 mL / NET: -200 mL    03 Mar 2020 07:01  -  03 Mar 2020 14:23  --------------------------------------------------------  IN: 0 mL / OUT: 1400 mL / NET: -1400 mL    RADIOLOGY & ADDITIONAL STUDIES:    ADVANCE DIRECTIVES: DNR/I

## 2020-03-03 NOTE — CONSULT NOTE ADULT - SUBJECTIVE AND OBJECTIVE BOX
PULMONARY CONSULT NOTE      TAMIKO KELLY  MRN-[id number]    Patient is a 85y old  Female who presents with a chief complaint of SOB (03 Mar 2020 14:28)      HISTORY OF PRESENT ILLNESS:    85F w/ PMHx DM, HTN, HLD, CAD s/p remote PCI, COPD on home O2, HFpEF, recurrent bladder CA s/p TURBT ( 01/2020) was sent over from presurgical testing to the ED for evaluation of SOB. Pt was scheduled for a cystoscopy today but was found to need increasing O2 requirements prompting the ED transfer. Pt give h/o worsening exertional dyspnea and pdt yellow cough. No fevers, CP, N/V/D/C or pain abdomen. Pt was placed on venti mask @ 50% and her Sat was ~ 86%. CT chest w/ segmental and subsegmental PEs. Pt admitted to ICU but refused aggressive therapy with thrombolytics. Palliative care now following. Pt remains on HFO2. She is a former smoker of 1 ppd x 60+ years but quit in 2016. She is followed with Dr. Brewer for mild COPD with an FEV1 of 0.93L (6%) and normal FVC of 1.5L (81%) with elevated lung volumes and severely reduced diffusion capacity. She is comfortable on HFO2.    MEDICATIONS  (STANDING):  apixaban 5 milliGRAM(s) Oral every 12 hours  aspirin  chewable 81 milliGRAM(s) Oral daily  atorvastatin 40 milliGRAM(s) Oral at bedtime  budesonide  80 MICROgram(s)/formoterol 4.5 MICROgram(s) Inhaler 2 Puff(s) Inhalation two times a day  chlorhexidine 4% Liquid 1 Application(s) Topical <User Schedule>  dextrose 5%. 1000 milliLiter(s) (50 mL/Hr) IV Continuous <Continuous>  dextrose 50% Injectable 12.5 Gram(s) IV Push once  dextrose 50% Injectable 25 Gram(s) IV Push once  dextrose 50% Injectable 25 Gram(s) IV Push once  furosemide    Tablet 40 milliGRAM(s) Oral daily  insulin lispro (HumaLOG) corrective regimen sliding scale   SubCutaneous Before meals and at bedtime  methylPREDNISolone sodium succinate Injectable 40 milliGRAM(s) IV Push every 8 hours  metoprolol succinate ER 25 milliGRAM(s) Oral daily  potassium phosphate IVPB 15 milliMole(s) IV Intermittent once  spironolactone 25 milliGRAM(s) Oral daily      MEDICATIONS  (PRN):  albuterol/ipratropium for Nebulization 3 milliLiter(s) Nebulizer every 6 hours PRN Shortness of Breath and/or Wheezing  dextrose 40% Gel 15 Gram(s) Oral once PRN Blood Glucose LESS THAN 70 milliGRAM(s)/deciliter  glucagon  Injectable 1 milliGRAM(s) IntraMuscular once PRN Glucose LESS THAN 70 milligrams/deciliter      Allergies    No Known Allergies    Intolerances        PAST MEDICAL & SURGICAL HISTORY:  Diabetes  Chronic obstructive pulmonary disease, unspecified COPD type  Stented coronary artery: MI in 2014, s/p stent of right coronary artery  Bladder prolapse, female, acquired  Bladder tumor  CAD (coronary artery disease)  Neuropathy  High cholesterol  Hypertension  Diabetes  S/P cystoscopy  Status post cardiac catheterization  Ankle fracture: Rt      FAMILY HISTORY:  FH: stomach cancer (Sibling)      SOCIAL HISTORY  Smoking History: as above    REVIEW OF SYSTEMS:    CONSTITUTIONAL:  No fevers, chills, sweats    HEENT:  Eyes:  No diplopia or blurred vision. ENT:  No earache, sore throat or runny nose.    CARDIOVASCULAR:  No pressure, squeezing, tightness, or heaviness about the chest; no palpitations.    RESPIRATORY:  Per HPI    GASTROINTESTINAL:  No abdominal pain, nausea, vomiting or diarrhea.    GENITOURINARY:  No dysuria, frequency or urgency.    NEUROLOGIC:  No paresthesias, fasciculations, seizures or weakness.    PSYCHIATRIC:  No disorder of thought or mood.    Vital Signs Last 24 Hrs  T(C): 36.7 (03 Mar 2020 07:36), Max: 36.8 (02 Mar 2020 16:55)  T(F): 98.1 (03 Mar 2020 07:36), Max: 98.2 (02 Mar 2020 16:55)  HR: 54 (03 Mar 2020 09:51) (54 - 88)  BP: 139/75 (03 Mar 2020 07:36) (120/57 - 139/75)  BP(mean): 81 (02 Mar 2020 16:55) (81 - 81)  RR: 18 (03 Mar 2020 07:36) (18 - 36)  SpO2: 92% (03 Mar 2020 09:51) (88% - 97%)    PHYSICAL EXAMINATION:    GENERAL: The patient is a well-developed, well-nourished obese female in no apparent distress.     HEENT: Head is normocephalic and atraumatic. Extraocular muscles are intact. Mucous membranes are moist.     NECK: Supple.     LUNGS: Clear to auscultation without wheezing, rales, or rhonchi. Respirations unlabored    HEART: Regular rate and rhythm without murmur.    ABDOMEN: Soft, nontender, and nondistended.  No hepatosplenomegaly is noted.    EXTREMITIES: Without any cyanosis, clubbing, rash, lesions or edema.    NEUROLOGIC: Grossly intact.      LABS:                        9.9    3.88  )-----------( 329      ( 03 Mar 2020 08:30 )             34.8     03-03    138  |  104  |  12.0  ----------------------------<  92  3.4<L>   |  20.0<L>  |  0.26<L>    Ca    7.9<L>      03 Mar 2020 08:30  Phos  2.2     03-03  Mg     2.0     03-03      RADIOLOGY & ADDITIONAL STUDIES:     EXAM:  US DPLX LWR EXT VEINS COMPL BI                          PROCEDURE DATE:  02/29/2020          INTERPRETATION:  Duplex Ultrasound of the lower extremity deep venous system        CLINICAL INFORMATION:Shortness of breath. Pulmonary embolus, evaluate for deep venous thrombosis.    TECHNIQUE:  Duplex ultrasonography with color and spectral doppler of the bilateral lower extremity deep venous system was performed.    FINDINGS:    No previous examinations are available for review.    The rightlower extremity deep venous system demonstrated no abnormality.  The veins were patent with intact Doppler flow.  The flow varied with respiration and augmented with distal calf compression.  The veins were directly compressible by the ultrasound transducer.    The left lower extremity deep venous system demonstrated deep vein thrombosis within the popliteal vein and posterior tibial vein. In the LEFT common femoral vein, greater saphenous vein and femoral veins remain patent.      The veins evaluated included the common femoral vein, the inflow of the greater saphenous vein, the inflow of the deep femoral vein, the superficial femoral vein, the popliteal vein and posterior tibial veins.      IMPRESSION:/LEFT lower extremity popliteal vein and posterior tibial vein deep vein thrombosis.    Unremarkable ultrasound of the RIGHT lower extremity deep venous system.   Critical value  discussed with physician assistant Caraballo on 2/29/2020 at 9:48 AM with read back.  Hospital policies for critical values including read back   policy were followed.  The verbal communication of the critical value   supplements this written report.           JUNITO GONSALEZ M.D., ATTENDING RADIOLOGIST  This document has been electronically signed. Feb 292020  9:54AM           EXAM:  CT ANGIO CHEST (W)AW IC                          PROCEDURE DATE:  02/28/2020          INTERPRETATION:  CLINICAL INFORMATION: Shortness of breath, rule out PE.    COMPARISON: 7/2/2019    PROCEDURE:   CT Angiography of the Chest.  75 ml of Omnipaque 350 was injected intravenously. 25 ml were discarded.  Sagittal and coronal reformats were performed as well as 3D (MIP) reconstructions.      FINDINGS:    LUNGS AND AIRWAYS: Patent central airways.  Centrilobular emphysema. Scattered subsegmental atelectasis.    PLEURA: No pleural effusion.    MEDIASTINUM AND LANIE: 1.2 cm right paratracheal node, stable.    VESSELS: Acute segmental and subsegmental PE throughout the right lung. Acute subsegmental PE within the left upper lobe. Main pulmonary artery normal in caliber. Atherosclerotic changes of the aorta including prominent atheromatous plaque in the upper abdomen. Reflux of contrast into the IVC and hepatic veins suggestive of right heart failure.    HEART: Heart size is normal. No pericardial effusion. Flattening and bowing of the interventricular septum suggestive of right heart strain.    CHEST WALL AND LOWER NECK: Enlarged heterogeneous right thyroid lobe, unchanged.    VISUALIZED UPPER ABDOMEN: Cholelithiasis. Trace perihepatic ascites. Nodular thickening of the adrenal glands and a stable 1.4 cm left adrenal nodule.    BONES: Mild degenerative changes.     IMPRESSION:     Positive acute segmental and subsegmental PE throughout the right lung and within the left upper lobe. Findings suggestive of right heart strain.    Findings were discussed with Dr. Taylor on  2/28/2020 2:39 PM by Dr. Swenson with read back confirmation.        OMAIRA SWENSON M.D., ATTENDING RADIOLOGIST  This document has been electronically signed. Feb 28 2020  2:43PM        ECHO:    Summary:   1. Left ventricular ejection fraction, by visual estimation, is 60 to 65%.   2. Normal global left ventricular systolic function.   3. Normal left ventricular internal cavity size.   4. Right ventricular volume overload and right ventricular pressure overload.   5. Severely enlarged right ventricle.   6. Severely reduced RV systolic function.   7. Estimated pulmonary artery systolic pressure is 65.0 mmHg assuming a right atrial pressure of 15 mmHg, which is consistent with severe pulmonary hypertension.   8. Endocardial visualization was enhanced with intravenous echo contrast.    MD Rebeca Electronically signed on 2/29/2020 at 5:47:39 PM

## 2020-03-03 NOTE — CHART NOTE - NSCHARTNOTEFT_GEN_A_CORE
Called by RN to report 12 beats of V tach on monitor. Patient is resting comfortably, in NAD. Denies chest pain, palpitations, SOB, headaches, dizziness, n/v/d/c. Repeat vitals stable. Stat bmp, mag and phos ordered to monitor electrolytes. Continue to monitor.

## 2020-03-03 NOTE — PROGRESS NOTE ADULT - ASSESSMENT
85F with  DM, HTN, HLD, CAD s/p remote PCI, COPD on home O2, HFpEF, recurrent bladder CA s/p TURBT ( 01/2020) was sent over from presurgical testing to the ED for evaluation of SOB. Pt was scheduled for a cystoscopy today but was found to need increasing O2 requirements prompting the ED transfer. Pt give h/o worsening exertional dyspnea and productive  yellow cough on admission No fevers.  Pt was placed on venti mask @ 50% and her Sat was ~ 86%. CT chest w/ segmental and subsegmental PEs admitted to ICU   TPA offered by ICU  team , she refused , signed DNI / DNI , she does not any agressive heroic measures , family is supporting her on this decision     1- Bilateral PE with DVT   refused TPA , cont high flow oxygen and anticoagulation   change to eliquis today   cont oxygen BIPAP support as needed       2- Respiratory  failure acute on cronic , hypoxic   cont high flow oxygen , taper  as tolerate , slow improvement     3- COPD with exacerbation on home oxygen   cont steroid taper     4- Bladder  cancer   under care of Dr Sanders  will call to inform

## 2020-03-03 NOTE — PROGRESS NOTE ADULT - ASSESSMENT
85F with COPD, CAD, DM, admitted with acute hypoxic respiratory failure found to have segmental / subsegmental bilateral PE's, LLE DVT, COPD exacerbation, currently on high settings of high flow.     #1 Bilateral PE - anticoagulation. patient refuse TPA. remains high flow/bipap dependent  #2 Acute Hypoxic respiratory failure - remains dependent on high flow. also being treated for possible superimposed pneumonia and COPD exacerbation.   #3 Acute exacerbation of COPD - nebs/steroids. oxygen supplementation. wean off high flow/bipap as tolerated. Can try low dose opiates - even oxycodone 5mg PO to help ease work of breathing a bit.    #4 Encounter for palliative care - patient on a bit of a lower amount of high flow, will have to see if she can wean off, may be difficult given clot burden.

## 2020-03-04 NOTE — PROGRESS NOTE ADULT - ASSESSMENT
85F with COPD, CAD, DM, admitted with acute hypoxic respiratory failure found to have segmental / subsegmental bilateral PE's, LLE DVT, COPD exacerbation, currently on high settings of high flow.     #1 Bilateral PE - anticoagulation. patient refuse TPA. remains high flow/bipap dependent  #2 Acute Hypoxic respiratory failure - remains dependent on high flow. also being treated for possible superimposed pneumonia and COPD exacerbation.   #3 Acute exacerbation of COPD - nebs/steroids. oxygen supplementation. wean off high flow/bipap as tolerated. Can try low dose opiates - spoke to patient regarding trying some morphine and she is willing to try. Will start with bedtime dosing and readress tomorrow.   #4 Encounter for palliative care - spoke to patient and her daughter Sury at bedside reagrding my concerns of overall guarded prognosis given dependence on high flow especially considering pre-existing lung condition. I addressed that if we cannot get her off high flow, then we will be in a really bad position. I addressed that if we can wean off high flow, we will need to discuss getting alot of extra help at home. She shared with me that she just lost her son in January and her other son as well in the last 3 years, so she has been through alot of trauma. Overall guarded prognosis, she is definitely hospice appropriate but we need to see if we can wean high flow.

## 2020-03-04 NOTE — PROGRESS NOTE ADULT - SUBJECTIVE AND OBJECTIVE BOX
Internal Medicine Hospitalist Progress Note    follow up For DVT/PE and respiratory failure   pt is seen in am ,no distress   no overnight events still on high flow oxygen         Vital Signs Last 24 Hrs  T(C): 36.7 (04 Mar 2020 07:35), Max: 36.8 (03 Mar 2020 15:46)  T(F): 98 (04 Mar 2020 07:35), Max: 98.2 (03 Mar 2020 15:46)  HR: 57 (04 Mar 2020 08:43) (57 - 77)  BP: 147/72 (04 Mar 2020 07:35) (128/71 - 147/72)  BP(mean): --  RR: 17 (04 Mar 2020 11:46) (17 - 18)  SpO2: 95% (04 Mar 2020 11:46) (88% - 95%)    Constitutional: on high flow oxygen , no distress     Neck: supple , no JVD     Respiratory: CTA bilateral diminished BS     Cardiovascular: regular s1 /s2     Gastrointestinal: soft no tenderness , BS positive     Extremities: no pretibial edema                               9.9    3.88  )-----------( 329      ( 03 Mar 2020 08:30 )             34.8   03-03    138  |  104  |  12.0  ----------------------------<  92  3.4<L>   |  20.0<L>  |  0.26<L>    Ca    7.9<L>      03 Mar 2020 08:30  Phos  2.2     03-03  Mg     2.0     03-03            < from: CT Angio Chest w/ IV Cont (02.28.20 @ 14:10) >  IMPRESSION:     Positive acute segmental and subsegmental PE throughout the right lung and within the left upper lobe. Findings suggestive of right heart strain.      < end of copied text >    Radiology :    < from: TTE Echo Complete w/Doppler (02.28.20 @ 22:09) >     1. Technically difficult study.   2. Left ventricular ejection fraction, by visual estimation, is 55 to 60%.   3. Spectral Doppler shows impaired relaxation pattern of left ventricular myocardial filling (Grade I diastolic dysfunction).   4. The right ventricle is not well visualized. At limited views the right ventricle apears mildly enlarged and RV systolic fuction appears moderately reduced. Consider a FU study with definity if clinically indicated,.   5. Estimated pulmonary artery systolic pressure is 53.4 mmHg assuming a right atrial pressure of 15 mmHg, which is consistent with moderate pulmonary hypertension.   6. Results d/w ICU team at the time of the conlcusion of the study.    < end of copied text >

## 2020-03-04 NOTE — CHART NOTE - NSCHARTNOTEFT_GEN_A_CORE
Source: Patient [ ]  Family [ ]   other [x ] EMR and staff     Current Diet: Diet, Consistent Carbohydrate/No Snacks:   Dysphagia 1, Pureed, Thin Liquids ([id number]) (03-02-20 @ 08:35)    PO intake:  < 50% [ ]   50-75%  [ ]   %  [x ]  other : Per flow sheets     Source for PO intake [ ] Patient [ ] family [x ] chart [ ] staff [ ] other    Current Weight:   3/2: 79kg   2/28: 77kg    % Weight Change: 4.8# wt gain over 3 days, unsure of accuracy, will continue to monitor wt's for trends.     Pertinent Medications: MEDICATIONS  (STANDING):  apixaban 5 milliGRAM(s) Oral every 12 hours  aspirin  chewable 81 milliGRAM(s) Oral daily  atorvastatin 40 milliGRAM(s) Oral at bedtime  budesonide  80 MICROgram(s)/formoterol 4.5 MICROgram(s) Inhaler 2 Puff(s) Inhalation two times a day  chlorhexidine 4% Liquid 1 Application(s) Topical <User Schedule>  dextrose 5%. 1000 milliLiter(s) (50 mL/Hr) IV Continuous <Continuous>  dextrose 50% Injectable 12.5 Gram(s) IV Push once  dextrose 50% Injectable 25 Gram(s) IV Push once  dextrose 50% Injectable 25 Gram(s) IV Push once  furosemide    Tablet 40 milliGRAM(s) Oral daily  insulin lispro (HumaLOG) corrective regimen sliding scale   SubCutaneous Before meals and at bedtime  methylPREDNISolone sodium succinate Injectable 40 milliGRAM(s) IV Push every 8 hours  metoprolol succinate ER 25 milliGRAM(s) Oral daily  spironolactone 25 milliGRAM(s) Oral daily    MEDICATIONS  (PRN):  albuterol/ipratropium for Nebulization 3 milliLiter(s) Nebulizer every 6 hours PRN Shortness of Breath and/or Wheezing  dextrose 40% Gel 15 Gram(s) Oral once PRN Blood Glucose LESS THAN 70 milliGRAM(s)/deciliter  glucagon  Injectable 1 milliGRAM(s) IntraMuscular once PRN Glucose LESS THAN 70 milligrams/deciliter    Pertinent Labs: CBC Full  -  ( 03 Mar 2020 08:30 )  WBC Count : 3.88 K/uL  RBC Count : 4.61 M/uL  Hemoglobin : 9.9 g/dL  Hematocrit : 34.8 %  Platelet Count - Automated : 329 K/uL  Mean Cell Volume : 75.5 fl  Mean Cell Hemoglobin : 21.5 pg  Mean Cell Hemoglobin Concentration : 28.4 gm/dL  Auto Neutrophil # : 1.99 K/uL  Auto Lymphocyte # : 0.97 K/uL  Auto Monocyte # : 0.70 K/uL  Auto Eosinophil # : 0.16 K/uL  Auto Basophil # : 0.03 K/uL  Auto Neutrophil % : 51.3 %  Auto Lymphocyte % : 25.0 %  Auto Monocyte % : 18.0 %  Auto Eosinophil % : 4.1 %  Auto Basophil % : 0.8 %        Skin: no breakdown noted     Nutrition focused physical exam Previously conducted - found signs of malnutrition [ ]absent [ x]present    Subcutaneous fat loss: [ ] Orbital fat pads region, [ ]Buccal fat region, [ ]Triceps region,  [ ]Ribs region    Muscle wasting: [x ]Temples region, [x ]Clavicle region, [ ]Shoulder region, [ ]Scapula region, [ ]Interosseous region,  [ ]thigh region, [ ]Calf region    Estimated Needs:   [x ] no change since previous assessment  [ ] recalculated:     Current Nutrition Diagnosis:  Pt remains at high nutrition risk secondary to moderate-chronic protein calorie malnutrition related to inadequate protein-energy intake with increased needs in setting of recurrent bladder cancer, COPD and now presenting with PE's as evidenced by meeting less then 75% estimated energy requirements  greater than 1 month, 7% weight loss, and physical signs of muscle mass loss in temple and clavicle regions. Pt unarousable during visit, however per RN flow sheets, pt consuming % at meals with assistance.  Recommendations below:     Recommendations:   1. RX: MVI daily   2. Encourage/Assistance with meals   3. Glucerna shake TID   4. Check wt daily to monitor trends     Monitoring and Evaluation:   [ ] PO intake [x ] Tolerance to diet prescription [X] Weights  [X] Follow up per protocol [X] Labs:

## 2020-03-04 NOTE — PROGRESS NOTE ADULT - ASSESSMENT
Assess  L DVT and b/l PE  Severe COPD with severely reduced diffusion capacity but preserved flows and volumes  Echo with R strain and severe pulm HTN   Pt with likely chronic pulm HTN from long h/o smoking, COPD and hypoxia, which is likely now worse due to PE  Acute on chronic hypoxic respiratory failure    Rec:    On Eliquis  If desired, could consider hematology evaluation for hypercoagulable w/u  On Symbicort for h/o COPD  Drug nebs as needed  Current on HFO2 - wean as tolerates  Decrease steroids as able  Off Bipap  Weight loss  Pt is DNR - palliative care following  Little to add

## 2020-03-04 NOTE — PROGRESS NOTE ADULT - SUBJECTIVE AND OBJECTIVE BOX
OVERNIGHT EVENTS: on 100% high flow     Present Symptoms:     Dyspnea: 0 - 1   Nausea/Vomiting: No  Anxiety:  No  Depression: No  Fatigue: Yes   Loss of appetite: unable     Pain: none             Character-            Duration-            Effect-            Factors-            Frequency-            Location-            Severity-    Review of Systems: Reviewed                     Negative: no chest pain                      Positive: fatigue   All others negative    MEDICATIONS  (STANDING):  apixaban 5 milliGRAM(s) Oral every 12 hours  aspirin  chewable 81 milliGRAM(s) Oral daily  atorvastatin 40 milliGRAM(s) Oral at bedtime  budesonide  80 MICROgram(s)/formoterol 4.5 MICROgram(s) Inhaler 2 Puff(s) Inhalation two times a day  chlorhexidine 4% Liquid 1 Application(s) Topical <User Schedule>  dextrose 5%. 1000 milliLiter(s) (50 mL/Hr) IV Continuous <Continuous>  dextrose 50% Injectable 12.5 Gram(s) IV Push once  dextrose 50% Injectable 25 Gram(s) IV Push once  dextrose 50% Injectable 25 Gram(s) IV Push once  furosemide    Tablet 40 milliGRAM(s) Oral daily  insulin lispro (HumaLOG) corrective regimen sliding scale   SubCutaneous Before meals and at bedtime  methylPREDNISolone sodium succinate Injectable 40 milliGRAM(s) IV Push every 8 hours  metoprolol succinate ER 25 milliGRAM(s) Oral daily  spironolactone 25 milliGRAM(s) Oral daily    MEDICATIONS  (PRN):  albuterol/ipratropium for Nebulization 3 milliLiter(s) Nebulizer every 6 hours PRN Shortness of Breath and/or Wheezing  dextrose 40% Gel 15 Gram(s) Oral once PRN Blood Glucose LESS THAN 70 milliGRAM(s)/deciliter  glucagon  Injectable 1 milliGRAM(s) IntraMuscular once PRN Glucose LESS THAN 70 milligrams/deciliter    PHYSICAL EXAM:    Vital Signs Last 24 Hrs  T(C): 36.7 (04 Mar 2020 07:35), Max: 36.8 (03 Mar 2020 15:46)  T(F): 98 (04 Mar 2020 07:35), Max: 98.2 (03 Mar 2020 15:46)  HR: 57 (04 Mar 2020 08:43) (57 - 77)  BP: 147/72 (04 Mar 2020 07:35) (128/71 - 147/72)  BP(mean): --  RR: 17 (04 Mar 2020 11:46) (17 - 18)  SpO2: 95% (04 Mar 2020 11:46) (88% - 95%)    General: alert and oriented x 4     Karnofsky:  30-40 %    HEENT: normal      Lungs: tachypnea/labored breathing      CV: normal      GI: normal      : normal      MSK: bedbound/wheelchair bound    Skin: no rash    LABS:                      9.9    3.88  )-----------( 329      ( 03 Mar 2020 08:30 )             34.8     03-03    138  |  104  |  12.0  ----------------------------<  92  3.4<L>   |  20.0<L>  |  0.26<L>    Ca    7.9<L>      03 Mar 2020 08:30  Phos  2.2     03-03  Mg     2.0     03-03    I&O's Summary    03 Mar 2020 07:01  -  04 Mar 2020 07:00  --------------------------------------------------------  IN: 0 mL / OUT: 2800 mL / NET: -2800 mL    RADIOLOGY & ADDITIONAL STUDIES:    ADVANCE DIRECTIVES: DNR/I

## 2020-03-04 NOTE — PROGRESS NOTE ADULT - SUBJECTIVE AND OBJECTIVE BOX
[place] [facility]                                   [address] [place] [address]                                                      PHONE: [phone]                                                         FAX: [phone]  [email or web address]  ---------------------------------------------------------------------------------------------------------------------------------    Reason for Consult: PE     HPI:  TAMIKO KELLY is an 85y Female with history of COPD ex smoker ? home O2 HTN HLD HFpEF obese CAD/MI remote PCI bladder cancer DM neuropathy  who was sent over from presurgical testing to the ED for evaluation of SOB. Patient was scheduled for a cystoscopy but was found to need increasing O2 requirements prompting the ED transfer. Patient was placed on VM @ 50% and her Sat was ~ 86%. CT chest w/ segmental and subsegmental PEs.  Patient was evaluated and monitored in MICU.  Patient refused TPA and is now DNR/DNI.  Patient on high flow O2 therapy.  Denies any chest pain orthopnea or PND.      Refused TPA presently in [facility]  Palliative Hospice in the case      PAST MEDICAL & SURGICAL HISTORY:  Diabetes  Chronic obstructive pulmonary disease, unspecified COPD type  Stented coronary artery: MI in 2014, s/p stent of right coronary artery  Bladder prolapse, female, acquired  Bladder tumor  CAD (coronary artery disease)  Neuropathy  High cholesterol  Hypertension  Diabetes  S/P cystoscopy  Status post cardiac catheterization  Ankle fracture: Rt      No Known Allergies      MEDICATIONS  (STANDING):  apixaban 5 milliGRAM(s) Oral every 12 hours  aspirin  chewable 81 milliGRAM(s) Oral daily  atorvastatin 40 milliGRAM(s) Oral at bedtime  budesonide  80 MICROgram(s)/formoterol 4.5 MICROgram(s) Inhaler 2 Puff(s) Inhalation two times a day  cefTRIAXone   IVPB 1000 milliGRAM(s) IV Intermittent every 24 hours  chlorhexidine 4% Liquid 1 Application(s) Topical <User Schedule>  dextrose 5%. 1000 milliLiter(s) (50 mL/Hr) IV Continuous <Continuous>  dextrose 50% Injectable 12.5 Gram(s) IV Push once  dextrose 50% Injectable 25 Gram(s) IV Push once  dextrose 50% Injectable 25 Gram(s) IV Push once  furosemide    Tablet 40 milliGRAM(s) Oral daily  insulin lispro (HumaLOG) corrective regimen sliding scale   SubCutaneous Before meals and at bedtime  methylPREDNISolone sodium succinate Injectable 40 milliGRAM(s) IV Push every 8 hours  metoprolol succinate ER 25 milliGRAM(s) Oral daily  potassium phosphate IVPB 15 milliMole(s) IV Intermittent once  spironolactone 25 milliGRAM(s) Oral daily    MEDICATIONS  (PRN):  albuterol/ipratropium for Nebulization 3 milliLiter(s) Nebulizer every 6 hours PRN Shortness of Breath and/or Wheezing  dextrose 40% Gel 15 Gram(s) Oral once PRN Blood Glucose LESS THAN 70 milliGRAM(s)/deciliter  glucagon  Injectable 1 milliGRAM(s) IntraMuscular once PRN Glucose LESS THAN 70 milligrams/deciliter      Social History:  Cigarettes:        ex smoker             Alchohol:      none            Illicit Drug Abuse:  none    FH negative for CAD    ROS: Negative other than as mentioned in HPI.    Vital Signs Last 24 Hrs  T(C): 36.7 (03 Mar 2020 07:36), Max: 36.8 (02 Mar 2020 16:02)  T(F): 98.1 (03 Mar 2020 07:36), Max: 98.2 (02 Mar 2020 16:02)  HR: 54 (03 Mar 2020 09:51) (54 - 88)  BP: 139/75 (03 Mar 2020 07:36) (119/57 - 139/75)  BP(mean): 81 (02 Mar 2020 16:55) (81 - 84)  RR: 18 (03 Mar 2020 07:36) (18 - 37)  SpO2: 92% (03 Mar 2020 09:51) (88% - 97%)  ICU Vital Signs Last 24 Hrs  TAMIKO KELLY  I&O's Detail    02 Mar 2020 07:01  -  03 Mar 2020 07:00  --------------------------------------------------------  IN:    Oral Fluid: 450 mL    Solution: 250 mL    Solution: 50 mL  Total IN: 750 mL    OUT:    Voided: 950 mL  Total OUT: 950 mL    Total NET: -200 mL      03 Mar 2020 07:01  -  03 Mar 2020 14:28  --------------------------------------------------------  IN:  Total IN: 0 mL    OUT:    Voided: 1400 mL  Total OUT: 1400 mL    Total NET: -1400 mL        I&O's Summary    02 Mar 2020 07:01  -  03 Mar 2020 07:00  --------------------------------------------------------  IN: 750 mL / OUT: 950 mL / NET: -200 mL    03 Mar 2020 07:01  -  03 Mar 2020 14:28  --------------------------------------------------------  IN: 0 mL / OUT: 1400 mL / NET: -1400 mL      Drug Dosing Weight  TAMIKO KELLY      PHYSICAL EXAM:  General: Appears well developed, well nourished alert and cooperative.  HEENT: Head; normocephalic, atraumatic.  Eyes: Pupils reactive, cornea wnl.  Neck: Supple, no nodes adenopathy, no NVD or carotid bruit or thyromegaly.  CARDIOVASCULAR: Normal S1 and S2, 2/6 murmur, rub, gallop or lift.   LUNGS: No rales, rhonchi or wheeze. Normal breath sounds bilaterally.  ABDOMEN: Soft, nontender without mass or organomegaly. bowel sounds normoactive.  EXTREMITIES: No clubbing, cyanosis or edema. Distal pulses wnl.   SKIN: warm and dry with normal turgor.  NEURO: Alert/oriented x 3/normal motor exam. No pathologic reflexes.    PSYCH: normal affect.        LABS:                        9.9    3.88  )-----------( 329      ( 03 Mar 2020 08:30 )             34.8     03-03    138  |  104  |  12.0  ----------------------------<  92  3.4<L>   |  20.0<L>  |  0.26<L>    Ca    7.9<L>      03 Mar 2020 08:30  Phos  2.2     03-03  Mg     2.0     03-03      TAMIKO KELLY            RADIOLOGY & ADDITIONAL STUDIES:    INTERPRETATION OF TELEMETRY (personally reviewed):    ECG:  Diagnosis Line Normal sinus rhythm  Cannot rule out Anterior infarct , age undetermined  Prolonged QT    Confirmed by RADHA JARAMILLO ([id number]) on 3/1/2020 5:14:29 PM      ECHO:  Summary:   1. Technically difficult study.   2. Left ventricular ejection fraction, by visual estimation, is 55 to 60%.   3. Spectral Doppler shows impaired relaxation pattern of left ventricular myocardial filling (Grade I diastolic dysfunction).   4. The right ventricle is not well visualized. At limited views the right ventricle apears mildly enlarged and RV systolic fuction appears moderately reduced. Consider a FU study with definity if clinically indicated,.   5. Estimated pulmonary artery systolic pressure is 53.4 mmHg assuming a right atrial pressure of 15 mmHg, which is consistent with moderate pulmonary hypertension.   6. Results d/w ICU team at the time of the conlcusion of the study.    MD Rebeca Electronically signed on 2/29/2020 at 10:46:19 AM       CARDIAC CATHETERIZATION:  CORONARY VESSELS: The coronary circulation is right dominant.  RCA:   --  Proximal RCA: There was a 80 % stenosis. FFR showed index of  0.79  COMPLICATIONS: There were no complications. No complications occurred  during the cath lab visit. No complications occurred during the cath lab  visit.  SUMMARY:  CORONARY VESSELS: Proximal RCA: There was a 80 % stenosis. FFR showed index  of 0.79  RECOMMENDATIONS: ASA and Plavix  DIAGNOSTIC IMPRESSIONS: Severe RCA disease treated with PTCA/STENT  (ALLISON-Resolute) with good result  DIAGNOSTIC RECOMMENDATIONS: ASA and Plavix  INTERVENTIONAL IMPRESSIONS: Severe RCA disease treated with PTCA/STENT  (ALLISON-Resolute) with good result  INTERVENTIONAL RECOMMENDATIONS: ASA and Plavix  Prepared and signed by  Mickey Martines MD      IMPRESSION:     Positive acute segmental and subsegmental PE throughout the right lung and within the left upper lobe. Findings suggestive of right heart strain.    Findings were discussed with Dr. Taylor on  2/28/2020 2:39 PM by Dr. Swenson with read back confirmation.      Assessment and Plan:  In summary, TAMIKO KELLY is an 85y Female with past medical history significant for history of COPD ex smoker ? home O2 HTN HLD HFpEF obese CAD/MI remote PCI bladder cancer DM neuropathy  who was sent over from presurgical testing to the ED for evaluation of SOB. Patient was scheduled for a cystoscopy but was found to need increasing O2 requirements prompting the ED transfer. Patient was placed on VM @ 50% and her Sat was ~ 86%. CT chest w/ segmental and subsegmental PEs.  Patient was evaluated and monitored in MICU.  Patient refused TPA and is now DNR/DNI.  Patient on high flow O2 therapy.  Denies any chest pain orthopnea or PND.  TTE normal EF RV failure likely acute on chronic is setting of COPD PA ~ 53 mmHg see above     Plan    1. Continue with present  anticoagulation Therapy  2  Palliative Hospice care

## 2020-03-04 NOTE — PROGRESS NOTE ADULT - SUBJECTIVE AND OBJECTIVE BOX
PULMONARY Progress NOTE      TAMIKO KELLY  MRN-[id number]    Patient is a 85y old  Female who presents with a chief complaint of SOB (03 Mar 2020 14:28)      HISTORY OF PRESENT ILLNESS:    85F w/ PMHx DM, HTN, HLD, CAD s/p remote PCI, COPD on home O2, HFpEF, recurrent bladder CA s/p TURBT ( 01/2020) was sent over from presurgical testing to the ED for evaluation of SOB. Pt was scheduled for a cystoscopy today but was found to need increasing O2 requirements prompting the ED transfer. Pt give h/o worsening exertional dyspnea and pdt yellow cough. No fevers, CP, N/V/D/C or pain abdomen. Pt was placed on venti mask @ 50% and her Sat was ~ 86%. CT chest w/ segmental and subsegmental PEs. Pt admitted to ICU but refused aggressive therapy with thrombolytics. Palliative care now following. Pt remains on HFO2. She is a former smoker of 1 ppd x 60+ years but quit in 2016. She is followed with Dr. Brewer for mild COPD with an FEV1 of 0.93L (6%) and normal FVC of 1.5L (81%) with elevated lung volumes and severely reduced diffusion capacity. She is comfortable on HFO2.    MEDICATIONS  (STANDING):  apixaban 5 milliGRAM(s) Oral every 12 hours  aspirin  chewable 81 milliGRAM(s) Oral daily  atorvastatin 40 milliGRAM(s) Oral at bedtime  budesonide  80 MICROgram(s)/formoterol 4.5 MICROgram(s) Inhaler 2 Puff(s) Inhalation two times a day  chlorhexidine 4% Liquid 1 Application(s) Topical <User Schedule>  dextrose 5%. 1000 milliLiter(s) (50 mL/Hr) IV Continuous <Continuous>  dextrose 50% Injectable 12.5 Gram(s) IV Push once  dextrose 50% Injectable 25 Gram(s) IV Push once  dextrose 50% Injectable 25 Gram(s) IV Push once  furosemide    Tablet 40 milliGRAM(s) Oral daily  insulin lispro (HumaLOG) corrective regimen sliding scale   SubCutaneous Before meals and at bedtime  methylPREDNISolone sodium succinate Injectable 40 milliGRAM(s) IV Push every 8 hours  metoprolol succinate ER 25 milliGRAM(s) Oral daily  potassium phosphate IVPB 15 milliMole(s) IV Intermittent once  spironolactone 25 milliGRAM(s) Oral daily      MEDICATIONS  (PRN):  albuterol/ipratropium for Nebulization 3 milliLiter(s) Nebulizer every 6 hours PRN Shortness of Breath and/or Wheezing  dextrose 40% Gel 15 Gram(s) Oral once PRN Blood Glucose LESS THAN 70 milliGRAM(s)/deciliter  glucagon  Injectable 1 milliGRAM(s) IntraMuscular once PRN Glucose LESS THAN 70 milligrams/deciliter      Allergies    No Known Allergies    Intolerances        PAST MEDICAL & SURGICAL HISTORY:  Diabetes  Chronic obstructive pulmonary disease, unspecified COPD type  Stented coronary artery: MI in 2014, s/p stent of right coronary artery  Bladder prolapse, female, acquired  Bladder tumor  CAD (coronary artery disease)  Neuropathy  High cholesterol  Hypertension  Diabetes  S/P cystoscopy  Status post cardiac catheterization  Ankle fracture: Rt      FAMILY HISTORY:  FH: stomach cancer (Sibling)      SOCIAL HISTORY  Smoking History: as above    REVIEW OF SYSTEMS:    CONSTITUTIONAL:  No fevers, chills, sweats    HEENT:  Eyes:  No diplopia or blurred vision. ENT:  No earache, sore throat or runny nose.    CARDIOVASCULAR:  No pressure, squeezing, tightness, or heaviness about the chest; no palpitations.    RESPIRATORY:  Per HPI    GASTROINTESTINAL:  No abdominal pain, nausea, vomiting or diarrhea.    GENITOURINARY:  No dysuria, frequency or urgency.    NEUROLOGIC:  No paresthesias, fasciculations, seizures or weakness.    PSYCHIATRIC:  No disorder of thought or mood.    Vital Signs Last 24 Hrs  T(C): 36.7 (03 Mar 2020 07:36), Max: 36.8 (02 Mar 2020 16:55)  T(F): 98.1 (03 Mar 2020 07:36), Max: 98.2 (02 Mar 2020 16:55)  HR: 54 (03 Mar 2020 09:51) (54 - 88)  BP: 139/75 (03 Mar 2020 07:36) (120/57 - 139/75)  BP(mean): 81 (02 Mar 2020 16:55) (81 - 81)  RR: 18 (03 Mar 2020 07:36) (18 - 36)  SpO2: 92% (03 Mar 2020 09:51) (88% - 97%)    PHYSICAL EXAMINATION:    GENERAL: The patient is a well-developed, well-nourished obese female in no apparent distress.     HEENT: Head is normocephalic and atraumatic. Extraocular muscles are intact. Mucous membranes are moist.     NECK: Supple.     LUNGS: Clear to auscultation without wheezing, rales, or rhonchi. Respirations unlabored    HEART: Regular rate and rhythm without murmur.    ABDOMEN: Soft, nontender, and nondistended.  No hepatosplenomegaly is noted.    EXTREMITIES: Without any cyanosis, clubbing, rash, lesions or edema.    NEUROLOGIC: Grossly intact.      LABS:                        9.9    3.88  )-----------( 329      ( 03 Mar 2020 08:30 )             34.8     03-03    138  |  104  |  12.0  ----------------------------<  92  3.4<L>   |  20.0<L>  |  0.26<L>    Ca    7.9<L>      03 Mar 2020 08:30  Phos  2.2     03-03  Mg     2.0     03-03      RADIOLOGY & ADDITIONAL STUDIES:     EXAM:  US DPLX LWR EXT VEINS COMPL BI                          PROCEDURE DATE:  02/29/2020          INTERPRETATION:  Duplex Ultrasound of the lower extremity deep venous system        CLINICAL INFORMATION:Shortness of breath. Pulmonary embolus, evaluate for deep venous thrombosis.    TECHNIQUE:  Duplex ultrasonography with color and spectral doppler of the bilateral lower extremity deep venous system was performed.    FINDINGS:    No previous examinations are available for review.    The rightlower extremity deep venous system demonstrated no abnormality.  The veins were patent with intact Doppler flow.  The flow varied with respiration and augmented with distal calf compression.  The veins were directly compressible by the ultrasound transducer.    The left lower extremity deep venous system demonstrated deep vein thrombosis within the popliteal vein and posterior tibial vein. In the LEFT common femoral vein, greater saphenous vein and femoral veins remain patent.      The veins evaluated included the common femoral vein, the inflow of the greater saphenous vein, the inflow of the deep femoral vein, the superficial femoral vein, the popliteal vein and posterior tibial veins.      IMPRESSION:/LEFT lower extremity popliteal vein and posterior tibial vein deep vein thrombosis.    Unremarkable ultrasound of the RIGHT lower extremity deep venous system.   Critical value  discussed with physician assistant Caraballo on 2/29/2020 at 9:48 AM with read back.  Hospital policies for critical values including read back   policy were followed.  The verbal communication of the critical value   supplements this written report.           JUNITO GONSALEZ M.D., ATTENDING RADIOLOGIST  This document has been electronically signed. Feb 292020  9:54AM           EXAM:  CT ANGIO CHEST (W)AW IC                          PROCEDURE DATE:  02/28/2020          INTERPRETATION:  CLINICAL INFORMATION: Shortness of breath, rule out PE.    COMPARISON: 7/2/2019    PROCEDURE:   CT Angiography of the Chest.  75 ml of Omnipaque 350 was injected intravenously. 25 ml were discarded.  Sagittal and coronal reformats were performed as well as 3D (MIP) reconstructions.      FINDINGS:    LUNGS AND AIRWAYS: Patent central airways.  Centrilobular emphysema. Scattered subsegmental atelectasis.    PLEURA: No pleural effusion.    MEDIASTINUM AND LANIE: 1.2 cm right paratracheal node, stable.    VESSELS: Acute segmental and subsegmental PE throughout the right lung. Acute subsegmental PE within the left upper lobe. Main pulmonary artery normal in caliber. Atherosclerotic changes of the aorta including prominent atheromatous plaque in the upper abdomen. Reflux of contrast into the IVC and hepatic veins suggestive of right heart failure.    HEART: Heart size is normal. No pericardial effusion. Flattening and bowing of the interventricular septum suggestive of right heart strain.    CHEST WALL AND LOWER NECK: Enlarged heterogeneous right thyroid lobe, unchanged.    VISUALIZED UPPER ABDOMEN: Cholelithiasis. Trace perihepatic ascites. Nodular thickening of the adrenal glands and a stable 1.4 cm left adrenal nodule.    BONES: Mild degenerative changes.     IMPRESSION:     Positive acute segmental and subsegmental PE throughout the right lung and within the left upper lobe. Findings suggestive of right heart strain.    Findings were discussed with Dr. Taylor on  2/28/2020 2:39 PM by Dr. Swenson with read back confirmation.        OMAIRA SWENSON M.D., ATTENDING RADIOLOGIST  This document has been electronically signed. Feb 28 2020  2:43PM        ECHO:    Summary:   1. Left ventricular ejection fraction, by visual estimation, is 60 to 65%.   2. Normal global left ventricular systolic function.   3. Normal left ventricular internal cavity size.   4. Right ventricular volume overload and right ventricular pressure overload.   5. Severely enlarged right ventricle.   6. Severely reduced RV systolic function.   7. Estimated pulmonary artery systolic pressure is 65.0 mmHg assuming a right atrial pressure of 15 mmHg, which is consistent with severe pulmonary hypertension.   8. Endocardial visualization was enhanced with intravenous echo contrast.    MD Rebeca Electronically signed on 2/29/2020 at 5:47:39 PM

## 2020-03-04 NOTE — PROGRESS NOTE ADULT - ASSESSMENT
85F with  DM, HTN, HLD, CAD s/p remote PCI, COPD on home O2, HFpEF, recurrent bladder CA s/p TURBT ( 01/2020) was sent over from presurgical testing to the ED for evaluation of SOB. Pt was scheduled for a cystoscopy today but was found to need increasing O2 requirements prompting the ED transfer. Pt give h/o worsening exertional dyspnea and productive  yellow cough on admission No fevers.  Pt was placed on venti mask @ 50% and her Sat was ~ 86%. CT chest w/ segmental and subsegmental PEs admitted to ICU   TPA offered by ICU  team , she refused , signed DNI / DNI , she does not any agressive heroic measures , family is supporting her on this decision , remains on high flow slow improve,ent     1- Bilateral PE with DVT   refused TPA , cont high flow oxygen and anticoagulation   on eliquis   cont oxygen BIPAP support as needed       2- Respiratory  failure acute on cronic  hypoxia   min improvement   pulmonary input appreciated   not much to add     cont high flow oxygen , taper  as tolerate     3- COPD with exacerbation on home oxygen   cont steroid taper , neb therapy     4- Bladder  cancer h/o surgery   was under preop clearnace for another surgery by JOCELIN Sanders  d/w urology PA

## 2020-03-04 NOTE — CHART NOTE - NSCHARTNOTEFT_GEN_A_CORE
notified by family of patient's condition.  She  was at [facility] when this incideent occurred.    She had BCG given intravesically.  No intravenous chemotherapy.

## 2020-03-05 NOTE — PROGRESS NOTE ADULT - ASSESSMENT
Assess  L DVT and b/l PE  Severe COPD with severely reduced diffusion capacity but preserved flows and volumes  Echo with R strain and severe pulm HTN   Pt with likely chronic pulm HTN from long h/o smoking, COPD and hypoxia, which is likely now worse due to PE  Acute on chronic hypoxic respiratory failure  Home hospice appropriate if 02 weaned down     Rec:    On Eliquis  If desired, could consider hematology evaluation for hypercoagulable w/u  On Symbicort for h/o COPD  Drug nebs as needed  Current on HFO2 - wean as tolerates  Decrease steroids as able  Off Bipap  Weight loss as able  Pt is DNR - palliative care following  Little to add

## 2020-03-05 NOTE — PROGRESS NOTE ADULT - ASSESSMENT
85F with  DM, HTN, HLD, CAD s/p remote PCI, COPD on home O2, HFpEF, recurrent bladder CA s/p TURBT ( 01/2020) was sent over from presurgical testing to the ED for evaluation of SOB. Pt was scheduled for a cystoscopy today but was found to need increasing O2 requirements prompting the ED transfer. Pt give h/o worsening exertional dyspnea and productive  yellow cough on admission No fevers.  Pt was placed on venti mask @ 50% and her Sat was ~ 86%. CT chest w/ segmental and subsegmental PEs admitted to ICU   TPA offered by ICU  team , she refused , signed DNI / DNI , she does not any agressive heroic measures , family is supporting her on this decision , remains on high flow slow improvement     1- Bilateral PE with DVT   refused TPA on admission per ICU   cont high flow oxygen and anticoagulation   on eliquis   cont oxygen   overall prognosis poor    2- Respiratory  failure acute on cronic  hypoxia   cont current therapy , ox support     3- COPD with exacerbation on home oxygen   cont steroid taper , neb therapy     4- Bladder  cancer h/o surgery   was under preop clearance  for another surgery by JOCELIN Sanders  d/w urology PA , not candidate  for surgery at present

## 2020-03-05 NOTE — PROGRESS NOTE ADULT - ASSESSMENT
85F with COPD, CAD, DM, admitted with acute hypoxic respiratory failure found to have segmental / subsegmental bilateral PE's, LLE DVT, COPD exacerbation, currently on high settings of high flow.     #1 Bilateral PE - anticoagulation. patient refuse TPA. remains high flow/bipap dependent  #2 Acute Hypoxic respiratory failure - remains dependent on high flow. also being treated for possible superimposed pneumonia and COPD exacerbation.   #3 Acute exacerbation of COPD - nebs/steroids. oxygen supplementation. wean off high flow/bipap as tolerated. Can try low dose opiates - spoke to patient regarding trying some morphine and she is willing to try. patient seems pretty lethargic today, will discontinue morphine for now.   #4 Encounter for palliative care - attempted to speak to grandson Mat at bedside, he seems to not understand the severity of her condition. He left the room and then I spoke further with patient's daughter Sury, I expressed to her again my concerns that she will not be able to have a good recovery from this hospitalization. I did talk to her about home hospice as an option but again we still have to wean her off high flow. Daughter changed the subject quickly and we were unable to discuss further. Overall poor prognosis.

## 2020-03-05 NOTE — PROGRESS NOTE ADULT - SUBJECTIVE AND OBJECTIVE BOX
OVERNIGHT EVENTS: remains on high flow     Present Symptoms:     Dyspnea: 0-1   Nausea/Vomiting: No  Anxiety:  No  Depression: unable   Fatigue: Yes   Loss of appetite: unable     Pain: none currently             Character-            Duration-            Effect-            Factors-            Frequency-            Location-            Severity-    Review of Systems: Reviewed                   Unable to obtain due to poor mentation   All others negative    MEDICATIONS  (STANDING):  apixaban 5 milliGRAM(s) Oral every 12 hours  aspirin  chewable 81 milliGRAM(s) Oral daily  atorvastatin 40 milliGRAM(s) Oral at bedtime  budesonide  80 MICROgram(s)/formoterol 4.5 MICROgram(s) Inhaler 2 Puff(s) Inhalation two times a day  chlorhexidine 4% Liquid 1 Application(s) Topical <User Schedule>  dextrose 5%. 1000 milliLiter(s) (50 mL/Hr) IV Continuous <Continuous>  dextrose 50% Injectable 12.5 Gram(s) IV Push once  dextrose 50% Injectable 25 Gram(s) IV Push once  dextrose 50% Injectable 25 Gram(s) IV Push once  furosemide    Tablet 40 milliGRAM(s) Oral daily  insulin lispro (HumaLOG) corrective regimen sliding scale   SubCutaneous Before meals and at bedtime  methylPREDNISolone sodium succinate Injectable 40 milliGRAM(s) IV Push every 8 hours  metoprolol succinate ER 25 milliGRAM(s) Oral daily  morphine  - Injectable 2 milliGRAM(s) IV Push at bedtime  spironolactone 25 milliGRAM(s) Oral daily    MEDICATIONS  (PRN):  albuterol/ipratropium for Nebulization 3 milliLiter(s) Nebulizer every 6 hours PRN Shortness of Breath and/or Wheezing  dextrose 40% Gel 15 Gram(s) Oral once PRN Blood Glucose LESS THAN 70 milliGRAM(s)/deciliter  glucagon  Injectable 1 milliGRAM(s) IntraMuscular once PRN Glucose LESS THAN 70 milligrams/deciliter    PHYSICAL EXAM:    Vital Signs Last 24 Hrs  T(C): 36.7 (05 Mar 2020 08:12), Max: 36.7 (05 Mar 2020 08:12)  T(F): 98.1 (05 Mar 2020 08:12), Max: 98.1 (05 Mar 2020 08:12)  HR: 59 (05 Mar 2020 08:50) (59 - 62)  BP: 131/71 (05 Mar 2020 08:12) (122/61 - 131/71)  BP(mean): --  RR: 18 (05 Mar 2020 08:12) (18 - 18)  SpO2: 94% (05 Mar 2020 10:55) (92% - 95%)    General: lethargic. sleeping in chair     Karnofsky:  30%    HEENT: normal      Lungs: mild tachypnea/labored breathing     CV: normal      GI: normal      : normal      MSK: bedbound/wheelchair bound    Skin: no rash    LABS:    I&O's Summary    04 Mar 2020 07:01  -  05 Mar 2020 07:00  --------------------------------------------------------  IN: 0 mL / OUT: 1200 mL / NET: -1200 mL    05 Mar 2020 07:01  -  05 Mar 2020 15:50  --------------------------------------------------------  IN: 0 mL / OUT: 1200 mL / NET: -1200 mL    RADIOLOGY & ADDITIONAL STUDIES:    ADVANCE DIRECTIVES: DNR/I

## 2020-03-05 NOTE — PROGRESS NOTE ADULT - SUBJECTIVE AND OBJECTIVE BOX
PULMONARY Progress NOTE      TAMIKO KELLY  MRN-[id number]    Patient is a 85y old  Female who presents with a chief complaint of SOB (03 Mar 2020 14:28)      HISTORY OF PRESENT ILLNESS:    85F w/ PMHx DM, HTN, HLD, CAD s/p remote PCI, COPD on home O2, HFpEF, recurrent bladder CA s/p TURBT ( 01/2020) was sent over from presurgical testing to the ED for evaluation of SOB. Pt was scheduled for a cystoscopy today but was found to need increasing O2 requirements prompting the ED transfer. Pt give h/o worsening exertional dyspnea and pdt yellow cough. No fevers, CP, N/V/D/C or pain abdomen. Pt was placed on venti mask @ 50% and her Sat was ~ 86%. CT chest w/ segmental and subsegmental PEs. Pt admitted to ICU but refused aggressive therapy with thrombolytics. Palliative care now following. Pt remains on HFO2. She is a former smoker of 1 ppd x 60+ years but quit in 2016. She is followed with Dr. Brewer for mild COPD with an FEV1 of 0.93L (6%) and normal FVC of 1.5L (81%) with elevated lung volumes and severely reduced diffusion capacity. She is comfortable on HFO2.    Interval History    NAD  Still requiring significant 02  Palliative input appreciated    MEDICATIONS  (STANDING):  apixaban 5 milliGRAM(s) Oral every 12 hours  aspirin  chewable 81 milliGRAM(s) Oral daily  atorvastatin 40 milliGRAM(s) Oral at bedtime  budesonide  80 MICROgram(s)/formoterol 4.5 MICROgram(s) Inhaler 2 Puff(s) Inhalation two times a day  chlorhexidine 4% Liquid 1 Application(s) Topical <User Schedule>  dextrose 5%. 1000 milliLiter(s) (50 mL/Hr) IV Continuous <Continuous>  dextrose 50% Injectable 12.5 Gram(s) IV Push once  dextrose 50% Injectable 25 Gram(s) IV Push once  dextrose 50% Injectable 25 Gram(s) IV Push once  furosemide    Tablet 40 milliGRAM(s) Oral daily  insulin lispro (HumaLOG) corrective regimen sliding scale   SubCutaneous Before meals and at bedtime  methylPREDNISolone sodium succinate Injectable 40 milliGRAM(s) IV Push every 8 hours  metoprolol succinate ER 25 milliGRAM(s) Oral daily  potassium phosphate IVPB 15 milliMole(s) IV Intermittent once  spironolactone 25 milliGRAM(s) Oral daily      MEDICATIONS  (PRN):  albuterol/ipratropium for Nebulization 3 milliLiter(s) Nebulizer every 6 hours PRN Shortness of Breath and/or Wheezing  dextrose 40% Gel 15 Gram(s) Oral once PRN Blood Glucose LESS THAN 70 milliGRAM(s)/deciliter  glucagon  Injectable 1 milliGRAM(s) IntraMuscular once PRN Glucose LESS THAN 70 milligrams/deciliter      Allergies    No Known Allergies    Intolerances        PAST MEDICAL & SURGICAL HISTORY:  Diabetes  Chronic obstructive pulmonary disease, unspecified COPD type  Stented coronary artery: MI in 2014, s/p stent of right coronary artery  Bladder prolapse, female, acquired  Bladder tumor  CAD (coronary artery disease)  Neuropathy  High cholesterol  Hypertension  Diabetes  S/P cystoscopy  Status post cardiac catheterization  Ankle fracture: Rt      FAMILY HISTORY:  FH: stomach cancer (Sibling)      SOCIAL HISTORY  Smoking History: as above    REVIEW OF SYSTEMS:    CONSTITUTIONAL:  No fevers, chills, sweats    HEENT:  Eyes:  No diplopia or blurred vision. ENT:  No earache, sore throat or runny nose.    CARDIOVASCULAR:  No pressure, squeezing, tightness, or heaviness about the chest; no palpitations.    RESPIRATORY:  Per HPI    GASTROINTESTINAL:  No abdominal pain, nausea, vomiting or diarrhea.    GENITOURINARY:  No dysuria, frequency or urgency.    NEUROLOGIC:  No paresthesias, fasciculations, seizures or weakness.    PSYCHIATRIC:  No disorder of thought or mood.    Vital Signs Last 24 Hrs  T(C): 36.7 (03 Mar 2020 07:36), Max: 36.8 (02 Mar 2020 16:55)  T(F): 98.1 (03 Mar 2020 07:36), Max: 98.2 (02 Mar 2020 16:55)  HR: 54 (03 Mar 2020 09:51) (54 - 88)  BP: 139/75 (03 Mar 2020 07:36) (120/57 - 139/75)  BP(mean): 81 (02 Mar 2020 16:55) (81 - 81)  RR: 18 (03 Mar 2020 07:36) (18 - 36)  SpO2: 92% (03 Mar 2020 09:51) (88% - 97%)    PHYSICAL EXAMINATION:    GENERAL: The patient is a well-developed, well-nourished obese female in no apparent distress.     HEENT: Head is normocephalic and atraumatic. Extraocular muscles are intact. Mucous membranes are moist.     NECK: Supple.     LUNGS: Clear to auscultation without wheezing, rales, or rhonchi. Respirations unlabored    HEART: Regular rate and rhythm without murmur.    ABDOMEN: Soft, nontender, and nondistended.  No hepatosplenomegaly is noted.    EXTREMITIES: Without any cyanosis, clubbing, rash, lesions or edema.    NEUROLOGIC: Grossly intact.      LABS:                        9.9    3.88  )-----------( 329      ( 03 Mar 2020 08:30 )             34.8     03-03    138  |  104  |  12.0  ----------------------------<  92  3.4<L>   |  20.0<L>  |  0.26<L>    Ca    7.9<L>      03 Mar 2020 08:30  Phos  2.2     03-03  Mg     2.0     03-03      RADIOLOGY & ADDITIONAL STUDIES:     EXAM:  US DPLX LWR EXT VEINS COMPL BI                          PROCEDURE DATE:  02/29/2020          INTERPRETATION:  Duplex Ultrasound of the lower extremity deep venous system        CLINICAL INFORMATION:Shortness of breath. Pulmonary embolus, evaluate for deep venous thrombosis.    TECHNIQUE:  Duplex ultrasonography with color and spectral doppler of the bilateral lower extremity deep venous system was performed.    FINDINGS:    No previous examinations are available for review.    The rightlower extremity deep venous system demonstrated no abnormality.  The veins were patent with intact Doppler flow.  The flow varied with respiration and augmented with distal calf compression.  The veins were directly compressible by the ultrasound transducer.    The left lower extremity deep venous system demonstrated deep vein thrombosis within the popliteal vein and posterior tibial vein. In the LEFT common femoral vein, greater saphenous vein and femoral veins remain patent.      The veins evaluated included the common femoral vein, the inflow of the greater saphenous vein, the inflow of the deep femoral vein, the superficial femoral vein, the popliteal vein and posterior tibial veins.      IMPRESSION:/LEFT lower extremity popliteal vein and posterior tibial vein deep vein thrombosis.    Unremarkable ultrasound of the RIGHT lower extremity deep venous system.   Critical value  discussed with physician assistant Caraballo on 2/29/2020 at 9:48 AM with read back.  Hospital policies for critical values including read back   policy were followed.  The verbal communication of the critical value   supplements this written report.           JUNITO GONSALEZ M.D., ATTENDING RADIOLOGIST  This document has been electronically signed. Feb 292020  9:54AM           EXAM:  CT ANGIO CHEST (W)AW IC                          PROCEDURE DATE:  02/28/2020          INTERPRETATION:  CLINICAL INFORMATION: Shortness of breath, rule out PE.    COMPARISON: 7/2/2019    PROCEDURE:   CT Angiography of the Chest.  75 ml of Omnipaque 350 was injected intravenously. 25 ml were discarded.  Sagittal and coronal reformats were performed as well as 3D (MIP) reconstructions.      FINDINGS:    LUNGS AND AIRWAYS: Patent central airways.  Centrilobular emphysema. Scattered subsegmental atelectasis.    PLEURA: No pleural effusion.    MEDIASTINUM AND LANIE: 1.2 cm right paratracheal node, stable.    VESSELS: Acute segmental and subsegmental PE throughout the right lung. Acute subsegmental PE within the left upper lobe. Main pulmonary artery normal in caliber. Atherosclerotic changes of the aorta including prominent atheromatous plaque in the upper abdomen. Reflux of contrast into the IVC and hepatic veins suggestive of right heart failure.    HEART: Heart size is normal. No pericardial effusion. Flattening and bowing of the interventricular septum suggestive of right heart strain.    CHEST WALL AND LOWER NECK: Enlarged heterogeneous right thyroid lobe, unchanged.    VISUALIZED UPPER ABDOMEN: Cholelithiasis. Trace perihepatic ascites. Nodular thickening of the adrenal glands and a stable 1.4 cm left adrenal nodule.    BONES: Mild degenerative changes.     IMPRESSION:     Positive acute segmental and subsegmental PE throughout the right lung and within the left upper lobe. Findings suggestive of right heart strain.    Findings were discussed with Dr. Taylor on  2/28/2020 2:39 PM by Dr. Swenson with read back confirmation.        OMAIRA SWENSON M.D., ATTENDING RADIOLOGIST  This document has been electronically signed. Feb 28 2020  2:43PM        ECHO:    Summary:   1. Left ventricular ejection fraction, by visual estimation, is 60 to 65%.   2. Normal global left ventricular systolic function.   3. Normal left ventricular internal cavity size.   4. Right ventricular volume overload and right ventricular pressure overload.   5. Severely enlarged right ventricle.   6. Severely reduced RV systolic function.   7. Estimated pulmonary artery systolic pressure is 65.0 mmHg assuming a right atrial pressure of 15 mmHg, which is consistent with severe pulmonary hypertension.   8. Endocardial visualization was enhanced with intravenous echo contrast.    MD Rebeca Electronically signed on 2/29/2020 at 5:47:39 PM

## 2020-03-05 NOTE — PROGRESS NOTE ADULT - SUBJECTIVE AND OBJECTIVE BOX
Internal Medicine Hospitalist Progress Note    follow up For DVT/PE and respiratory failure   pt is seen in am ,family daughter and grandson are at the bedside   pt is feeling well lying in the bed , no SOB   on high flow oxygen 40% ,   all questions answered , explained to the grandson the current condition and plan , he is asking to get her out of bed and get ambulation , explained to him he is with severe hypoxia on high  flow ox and will be limited PT at present     Vital Signs Last 24 Hrs  T(C): 36.7 (05 Mar 2020 08:12), Max: 36.7 (05 Mar 2020 08:12)  T(F): 98.1 (05 Mar 2020 08:12), Max: 98.1 (05 Mar 2020 08:12)  HR: 59 (05 Mar 2020 08:50) (59 - 62)  BP: 131/71 (05 Mar 2020 08:12) (122/61 - 131/71)  BP(mean): --  RR: 18 (05 Mar 2020 08:12) (18 - 18)  SpO2: 94% (05 Mar 2020 10:55) (92% - 95%)    Constitutional: on high flow oxygen , no distress     Neck: supple , no JVD     Respiratory: CTA bilateral diminished BS     Cardiovascular: regular s1 /s2     Gastrointestinal: soft no tenderness , BS positive     Extremities: no pretibial edema                                Radiology :    < from: TTE Echo Complete w/Doppler (02.28.20 @ 22:09) >     1. Technically difficult study.   2. Left ventricular ejection fraction, by visual estimation, is 55 to 60%.   3. Spectral Doppler shows impaired relaxation pattern of left ventricular myocardial filling (Grade I diastolic dysfunction).   4. The right ventricle is not well visualized. At limited views the right ventricle apears mildly enlarged and RV systolic fuction appears moderately reduced. Consider a FU study with definity if clinically indicated,.   5. Estimated pulmonary artery systolic pressure is 53.4 mmHg assuming a right atrial pressure of 15 mmHg, which is consistent with moderate pulmonary hypertension.   6. Results d/w ICU team at the time of the conlcusion of the study.    < end of copied text >

## 2020-03-06 NOTE — PROGRESS NOTE ADULT - ASSESSMENT
acute on chronic hypoxemic respiratory failure, mild COPD, obesity  severe Pulmonary hypertension, disproportionate  to small clot burden on CTA, ? CTEPH component  and chronic remodelling component  OOB , wean 02 per protocol, incentive spirometry, nebs, taper medrol  prognosis guarded, remains DNR acute on chronic hypoxemic respiratory failure, mild COPD, obesity  severe Pulmonary hypertension, disproportionate  to small clot burden on CTA, ? CTEPH component  and chronic remodelling component  OOB , wean 02 per protocol, incentive spirometry, nebs, taper medrol,   ? shunt- bubble echo if no improvement in 02 requirement  prognosis guarded, remains DNR

## 2020-03-06 NOTE — PHYSICAL THERAPY INITIAL EVALUATION ADULT - ADDITIONAL COMMENTS
Pt lives with daughter who is able to assist with all needs per pt, and pt's grandson, in private two story home with 4STE and 1 HR. Pt lives on main floor, and mainly ambulates without an AD. Pt has a RW at home, but only uses it outdoors. Pt has home O2, uses it PRN

## 2020-03-06 NOTE — PROGRESS NOTE ADULT - ASSESSMENT
85F with  DM, HTN, HLD, CAD s/p remote PCI, COPD on home O2, HFpEF, recurrent bladder CA s/p TURBT ( 01/2020) was sent over from presurgical testing to the ED for evaluation of SOB. Pt was scheduled for a cystoscopy today but was found to need increasing O2 requirements prompting the ED transfer. Pt give h/o worsening exertional dyspnea and productive  yellow cough on admission No fevers.  Pt was placed on venti mask @ 50% and her Sat was ~ 86%. CT chest w/ segmental and subsegmental PEs admitted to ICU   TPA offered by ICU  team , she refused , signed DNI / DNI , she does not any agressive heroic measures , family is supporting her on this decision , remains on high flow oxygen , repeat ECHO with bubble ordered     1- Bilateral PE with DVT   refused TPA on admission per ICU    on eliquis   cont oxygen supportive care , still requires high flow   will get repeat ECHO bubble  study ordered     2- Respiratory  failure acute on cronic  hypoxia - multifactorial  PE/ COPD /  pulmonary hypertension / diastolic CHF   cont supportive medical therapy   pulmonary follow up appreciated     3- COPD with exacerbation on home oxygen   cont steroid taper , neb therapy     4- Diastolic CHF acute on cronic   cont lasix , aldactone  cardiology input noted   signed off     5- Bladder  cancer h/o surgery   was under preop clearance  for another surgery by JOCELIN Sanders  seen by JOCELIN : not candidate  for surgery at present   family and pt are also leaning  toward comfort     6- Hypokalemia   replaced   will get repeat lytes today     OOB , incentive spirometry   pt ambulate as tolerate

## 2020-03-06 NOTE — PHYSICAL THERAPY INITIAL EVALUATION ADULT - GAIT DISTANCE, PT EVAL
Pt sidestepped 5x forward/back at EOB, and took 4 steps forward/backward at EOB. Pt limited by hiflow O2 need

## 2020-03-06 NOTE — PROGRESS NOTE ADULT - SUBJECTIVE AND OBJECTIVE BOX
PULMONARY PROGRESS NOTE      TAMIKO KELLYVALENTIN-[id number]    Patient is a 85y old  Female who presents with a chief complaint of SOB (05 Mar 2020 15:49)      INTERVAL HPI/OVERNIGHT EVENTS:  No cp or sob  in chair on high flow  MEDICATIONS  (STANDING):  apixaban 5 milliGRAM(s) Oral every 12 hours  aspirin  chewable 81 milliGRAM(s) Oral daily  atorvastatin 40 milliGRAM(s) Oral at bedtime  budesonide  80 MICROgram(s)/formoterol 4.5 MICROgram(s) Inhaler 2 Puff(s) Inhalation two times a day  dextrose 5%. 1000 milliLiter(s) (50 mL/Hr) IV Continuous <Continuous>  dextrose 50% Injectable 12.5 Gram(s) IV Push once  dextrose 50% Injectable 25 Gram(s) IV Push once  dextrose 50% Injectable 25 Gram(s) IV Push once  furosemide    Tablet 40 milliGRAM(s) Oral daily  insulin lispro (HumaLOG) corrective regimen sliding scale   SubCutaneous Before meals and at bedtime  methylPREDNISolone sodium succinate Injectable 40 milliGRAM(s) IV Push two times a day  metoprolol succinate ER 25 milliGRAM(s) Oral daily  spironolactone 25 milliGRAM(s) Oral daily      MEDICATIONS  (PRN):  albuterol/ipratropium for Nebulization 3 milliLiter(s) Nebulizer every 6 hours PRN Shortness of Breath and/or Wheezing  dextrose 40% Gel 15 Gram(s) Oral once PRN Blood Glucose LESS THAN 70 milliGRAM(s)/deciliter  glucagon  Injectable 1 milliGRAM(s) IntraMuscular once PRN Glucose LESS THAN 70 milligrams/deciliter      Allergies    No Known Allergies    Intolerances        PAST MEDICAL & SURGICAL HISTORY:  Diabetes  Chronic obstructive pulmonary disease, unspecified COPD type  Stented coronary artery: MI in 2014, s/p stent of right coronary artery  Bladder prolapse, female, acquired  Bladder tumor  CAD (coronary artery disease)  Neuropathy  High cholesterol  Hypertension  Diabetes  S/P cystoscopy  Status post cardiac catheterization  Ankle fracture: Rt      SOCIAL HISTORY  Smoking History:       REVIEW OF SYSTEMS:    CONSTITUTIONAL:  No distress    HEENT:  Eyes:  No diplopia or blurred vision. ENT:  No earache, sore throat or runny nose.    CARDIOVASCULAR:  No pressure, squeezing, tightness, heaviness or aching about the chest; no palpitations.    RESPIRATORY:  see HPI    GASTROINTESTINAL:  No nausea, vomiting or diarrhea.    GENITOURINARY:  No dysuria, frequency or urgency.    NEUROLOGIC:  No paresthesias, fasciculations, seizures or weakness.    PSYCHIATRIC:  No disorder of thought or mood.    Vital Signs Last 24 Hrs  T(C): 36.8 (06 Mar 2020 08:25), Max: 36.8 (05 Mar 2020 15:06)  T(F): 98.2 (06 Mar 2020 08:25), Max: 98.2 (05 Mar 2020 15:06)  HR: 56 (06 Mar 2020 08:58) (56 - 70)  BP: 151/84 (06 Mar 2020 08:25) (114/64 - 151/84)  BP(mean): --  RR: 18 (06 Mar 2020 08:25) (18 - 18)  SpO2: 93% (06 Mar 2020 10:03) (91% - 94%)    PHYSICAL EXAMINATION:    GENERAL: The patient is awake and alert in no apparent distress.     HEENT: Head is normocephalic and atraumatic. Extraocular muscles are intact. Mucous membranes are moist.    NECK: Supple.    LUNGS: moderate air entry bilat without wheezing, rales or rhonchi; respirations unlabored    HEART: Regular rate and rhythm without murmur.    ABDOMEN: Soft, nontender, and nondistended.      EXTREMITIES: Without any cyanosis, clubbing, rash, lesions or edema.    NEUROLOGIC: Grossly intact.    LABS:                              MICROBIOLOGY:    RADIOLOGY & ADDITIONAL STUDIES:  CTA, echo duplex reviewed

## 2020-03-06 NOTE — PHYSICAL THERAPY INITIAL EVALUATION ADULT - PERTINENT HX OF CURRENT PROBLEM, REHAB EVAL
Pt is an 86yo female w/ PMHx DM, HTN, HLD, CAD s/p remote PCI, COPD on home O2, HFpEF, recurrent bladder CA s/p TURBT ( 01/2020) who was sent over from presurgical testing to the ED for evaluation of SOB.

## 2020-03-06 NOTE — PROGRESS NOTE ADULT - SUBJECTIVE AND OBJECTIVE BOX
Follow up for hypoxia , PE , COPD   remains on high flow oxygen   no new complaints, sitting in the chair, daughter is at the bedside         Vital Signs Last 24 Hrs  T(C): 36.8 (06 Mar 2020 08:25), Max: 36.8 (06 Mar 2020 08:25)  T(F): 98.2 (06 Mar 2020 08:25), Max: 98.2 (06 Mar 2020 08:25)  HR: 56 (06 Mar 2020 08:58) (56 - 70)  BP: 151/84 (06 Mar 2020 08:25) (114/64 - 151/84)  BP(mean): --  RR: 18 (06 Mar 2020 08:25) (18 - 18)  SpO2: 93% (06 Mar 2020 10:03) (91% - 94%)    Constitutional: on high flow oxygen , no distress     Neck: supple , no JVD     Respiratory: CTA bilateral diminished BS both lungs     Cardiovascular: regular s1 /s2     Gastrointestinal: soft no tenderness , BS positive     Extremities: no pretibial edema     Labs is pending                         1. Technically difficult study.   2. Left ventricular ejection fraction, by visual estimation, is 55 to 60%.   3. Spectral Doppler shows impaired relaxation pattern of left ventricular myocardial filling (Grade I diastolic dysfunction).   4. The right ventricle is not well visualized. At limited views the right ventricle apears mildly enlarged and RV systolic fuction appears moderately reduced. Consider a FU study with definity if clinically indicated,.   5. Estimated pulmonary artery systolic pressure is 53.4 mmHg assuming a right atrial pressure of 15 mmHg, which is consistent with moderate pulmonary hypertension.   6. Results d/w ICU team at the time of the conlcusion of the study.    < end of copied text >

## 2020-03-07 NOTE — PROGRESS NOTE ADULT - ASSESSMENT
86y/o F with DM, HTN, HLD, CAD s/p remote PCI, COPD on home O2, HFpEF, recurrent bladder CA s/p TURBT ( 01/2020) was sent over from presurgical testing to the ED for evaluation of SOB. Pt was scheduled for a cystoscopy but was found to need increasing O2 requirements prompting the ED transfer. Pt give h/o worsening exertional dyspnea and productive  yellow cough on admission No fevers.  Pt was placed on venti mask @ 50% and her Sat was ~ 86%. CT chest w/ segmental and subsegmental PEs admitted to ICU   TPA offered by ICU  team , she refused , signed DNI / DNI , she does not any agressive heroic measures , family is supporting her on this decision , remains on high flow oxygen    Bilateral PE with DVT   -refused TPA on admission per ICU    -on eliquis   -cont oxygen supportive care , still requires high flow   -repeat echo with limited information; presence of intact intra-atrial septum    Respiratory  failure acute on chronic hypoxia - multifactorial  -PE/ COPD /  pulmonary hypertension / diastolic CHF   -cont supportive medical therapy   -pulmonary follow up appreciated     COPD with exacerbation on home oxygen   -cont steroid taper , neb therapy     Diastolic CHF acute on chronic   -cont lasix , aldactone  -cardiology input noted   -signed off     Bladder cancer h/o surgery   -was undergoin preop clearance  for another surgery by JOCELIN Sanders  -seen by JOCELIN : not candidate  for surgery at present   -family and pt are also leaning  toward comfort     Hypokalemia   -replaced     OOB , incentive spirometry  wean O2 as tolerated   pt ambulate as tolerated

## 2020-03-07 NOTE — PROGRESS NOTE ADULT - SUBJECTIVE AND OBJECTIVE BOX
TAMIKO KELLY    [id number]    85y      Female    CC: SOB    INTERVAL HPI/OVERNIGHT EVENTS: Pt seen and examined in am. Seen on high flow. Denies CP, other complaints. Daughter at bedside.     REVIEW OF SYSTEMS:    CONSTITUTIONAL: No fever, weight loss, or fatigue  RESPIRATORY: No cough, wheezing, hemoptysis; No shortness of breath  CARDIOVASCULAR: No chest pain, palpitations  GASTROINTESTINAL: No abdominal or epigastric pain. No nausea, vomiting  NEUROLOGICAL: No headaches, memory loss, loss of strength.    Vital Signs Last 24 Hrs  T(C): 36.3 (07 Mar 2020 09:06), Max: 36.5 (06 Mar 2020 20:30)  T(F): 97.4 (07 Mar 2020 09:06), Max: 97.7 (06 Mar 2020 20:30)  HR: 64 (07 Mar 2020 09:06) (59 - 88)  BP: 111/56 (07 Mar 2020 09:06) (111/56 - 128/72)  BP(mean): --  RR: 18 (07 Mar 2020 09:06) (18 - 18)  SpO2: 94% (07 Mar 2020 17:05) (92% - 96%)    PHYSICAL EXAM:    GENERAL: NAD  HEENT: PERRL, +EOMI  NECK: soft, supple  CHEST/LUNG: decreased breath sounds at bases b/l; non-labored respirations on high flow  HEART: S1S2+, Regular rate and rhythm; No murmurs, rubs, or gallops  ABDOMEN: Soft, Nontender, Nondistended; Bowel sounds present  SKIN: warm, dry  NEURO: awake, alert; non-focal  PSYCH: normal affect    LABS:                        13.2   14.93 )-----------( 378      ( 07 Mar 2020 08:47 )             44.3     03-06    141  |  95<L>  |  30.0<H>  ----------------------------<  120<H>  4.8   |  35.0<H>  |  0.72    Ca    9.0      06 Mar 2020 19:23  Phos  3.5     03-06  Mg     2.0     03-06        MEDICATIONS  (STANDING):  apixaban 5 milliGRAM(s) Oral every 12 hours  aspirin  chewable 81 milliGRAM(s) Oral daily  atorvastatin 40 milliGRAM(s) Oral at bedtime  budesonide  80 MICROgram(s)/formoterol 4.5 MICROgram(s) Inhaler 2 Puff(s) Inhalation two times a day  dextrose 5%. 1000 milliLiter(s) (50 mL/Hr) IV Continuous <Continuous>  dextrose 50% Injectable 12.5 Gram(s) IV Push once  dextrose 50% Injectable 25 Gram(s) IV Push once  dextrose 50% Injectable 25 Gram(s) IV Push once  furosemide    Tablet 40 milliGRAM(s) Oral daily  insulin lispro (HumaLOG) corrective regimen sliding scale   SubCutaneous Before meals and at bedtime  metoprolol succinate ER 25 milliGRAM(s) Oral daily  predniSONE   Tablet 40 milliGRAM(s) Oral daily  spironolactone 25 milliGRAM(s) Oral daily    MEDICATIONS  (PRN):  albuterol/ipratropium for Nebulization 3 milliLiter(s) Nebulizer every 6 hours PRN Shortness of Breath and/or Wheezing  dextrose 40% Gel 15 Gram(s) Oral once PRN Blood Glucose LESS THAN 70 milliGRAM(s)/deciliter  glucagon  Injectable 1 milliGRAM(s) IntraMuscular once PRN Glucose LESS THAN 70 milligrams/deciliter      RADIOLOGY & ADDITIONAL TESTS:  < from: TTE Echo Limited or F/U (03.06.20 @ 18:09) >  Summary:   1. Technically good study. Limited information is available.   2. Limited echo for a bubble study.   3. Color flow doppler and intravenous injection of agitated saline demonstrates the presence of an intact intra atrial septum.    < end of copied text >

## 2020-03-07 NOTE — PROGRESS NOTE ADULT - SUBJECTIVE AND OBJECTIVE BOX
PULMONARY PROGRESS NOTE      TAMIKO KELLYVALENTIN-[id number]    Patient is a 85y old  Female who presents with a chief complaint of SOB (06 Mar 2020 15:55)      INTERVAL HPI/OVERNIGHT EVENTS:  lying in bed  100% high flow  no acute complaints  daughter at bedside  MEDICATIONS  (STANDING):  apixaban 5 milliGRAM(s) Oral every 12 hours  aspirin  chewable 81 milliGRAM(s) Oral daily  atorvastatin 40 milliGRAM(s) Oral at bedtime  budesonide  80 MICROgram(s)/formoterol 4.5 MICROgram(s) Inhaler 2 Puff(s) Inhalation two times a day  dextrose 5%. 1000 milliLiter(s) (50 mL/Hr) IV Continuous <Continuous>  dextrose 50% Injectable 12.5 Gram(s) IV Push once  dextrose 50% Injectable 25 Gram(s) IV Push once  dextrose 50% Injectable 25 Gram(s) IV Push once  furosemide    Tablet 40 milliGRAM(s) Oral daily  insulin lispro (HumaLOG) corrective regimen sliding scale   SubCutaneous Before meals and at bedtime  metoprolol succinate ER 25 milliGRAM(s) Oral daily  predniSONE   Tablet 40 milliGRAM(s) Oral daily  spironolactone 25 milliGRAM(s) Oral daily      MEDICATIONS  (PRN):  albuterol/ipratropium for Nebulization 3 milliLiter(s) Nebulizer every 6 hours PRN Shortness of Breath and/or Wheezing  dextrose 40% Gel 15 Gram(s) Oral once PRN Blood Glucose LESS THAN 70 milliGRAM(s)/deciliter  glucagon  Injectable 1 milliGRAM(s) IntraMuscular once PRN Glucose LESS THAN 70 milligrams/deciliter      Allergies    No Known Allergies    Intolerances        PAST MEDICAL & SURGICAL HISTORY:  Diabetes  Chronic obstructive pulmonary disease, unspecified COPD type  Stented coronary artery: MI in 2014, s/p stent of right coronary artery  Bladder prolapse, female, acquired  Bladder tumor  CAD (coronary artery disease)  Neuropathy  High cholesterol  Hypertension  Diabetes  S/P cystoscopy  Status post cardiac catheterization  Ankle fracture: Rt      SOCIAL HISTORY  Smoking History:       REVIEW OF SYSTEMS:    CONSTITUTIONAL:  No distress    HEENT:  Eyes:  No diplopia or blurred vision. ENT:  No earache, sore throat or runny nose.    CARDIOVASCULAR:  No pressure, squeezing, tightness, heaviness or aching about the chest; no palpitations.    RESPIRATORY:  see HPI    GASTROINTESTINAL:  No nausea, vomiting or diarrhea.    GENITOURINARY:  No dysuria, frequency or urgency.    NEUROLOGIC:  No paresthesias, fasciculations, seizures or weakness.    PSYCHIATRIC:  No disorder of thought or mood.    Vital Signs Last 24 Hrs  T(C): 36.3 (07 Mar 2020 09:06), Max: 36.7 (06 Mar 2020 16:03)  T(F): 97.4 (07 Mar 2020 09:06), Max: 98.1 (06 Mar 2020 16:03)  HR: 64 (07 Mar 2020 09:06) (59 - 88)  BP: 111/56 (07 Mar 2020 09:06) (111/56 - 128/72)  BP(mean): --  RR: 18 (07 Mar 2020 09:06) (18 - 18)  SpO2: 92% (07 Mar 2020 09:06) (90% - 96%)    PHYSICAL EXAMINATION:    GENERAL: The patient is awake and alert in no apparent distress.     HEENT: Head is normocephalic and atraumatic. Extraocular muscles are intact. Mucous membranes are moist.    NECK: Supple.    LUNGS: decreased BS bases without wheezing, rales or rhonchi; respirations unlabored    HEART: Regular rate and rhythm without murmur.    ABDOMEN: Soft, nontender, and nondistended.      EXTREMITIES: Without any cyanosis, clubbing, rash, lesions or edema.    NEUROLOGIC: Grossly intact.    LABS:                        13.2   14.93 )-----------( 378      ( 07 Mar 2020 08:47 )             44.3     03-06    141  |  95<L>  |  30.0<H>  ----------------------------<  120<H>  4.8   |  35.0<H>  |  0.72    Ca    9.0      06 Mar 2020 19:23  Phos  3.5     03-06  Mg     2.0     03-06                          MICROBIOLOGY:    RADIOLOGY & ADDITIONAL STUDIES:

## 2020-03-07 NOTE — PROGRESS NOTE ADULT - ASSESSMENT
acute on chronic hypoxemic respiratory failure, mild COPD, obesity  severe Pulmonary hypertension, disproportionate  to small clot burden on CTA, ? CTEPH component  and chronic remodelling component  OOB , wean 02 per protocol, incentive spirometry, nebs, pred taper, gentle diuresis   No shunt on contrast echo  prognosis guarded, remains DNR acute on chronic hypoxemic respiratory failure, mild COPD, obesity  severe Pulmonary hypertension, disproportionate  to small clot burden on CTA, ? CTEPH component  and chronic remodelling component  OOB , wean 02 per protocol, incentive spirometry, nebs, pred taper, gentle diuresis   No shunt on contrast echo  repeat CXR  prognosis guarded, remains DNR

## 2020-03-08 NOTE — PROGRESS NOTE ADULT - SUBJECTIVE AND OBJECTIVE BOX
TAMIKO KELLY    [id number]    85y      Female    CC: SOB    INTERVAL HPI/OVERNIGHT EVENTS: Pt seen and examined. No acute events overnight. Daughter at bedside. Denies CP, abd pain, n/v    REVIEW OF SYSTEMS:    CONSTITUTIONAL: No fever, weight loss, or fatigue  RESPIRATORY: No cough, wheezing, hemoptysis; No shortness of breath  CARDIOVASCULAR: No chest pain, palpitations  GASTROINTESTINAL: No abdominal or epigastric pain. No nausea, vomiting  NEUROLOGICAL: No headaches    Vital Signs Last 24 Hrs  T(C): 36.7 (08 Mar 2020 07:58), Max: 36.7 (07 Mar 2020 21:21)  T(F): 98 (08 Mar 2020 07:58), Max: 98 (07 Mar 2020 21:21)  HR: 59 (08 Mar 2020 08:19) (57 - 82)  BP: 132/66 (08 Mar 2020 07:58) (114/59 - 132/66)  BP(mean): --  RR: 18 (08 Mar 2020 07:58) (18 - 18)  SpO2: 91% (08 Mar 2020 11:27) (91% - 95%)    PHYSICAL EXAM:    GENERAL: NAD  HEENT: PERRL, +EOMI  NECK: soft, supple  CHEST/LUNG: decreased breath sounds at bases b/l; non-labored respirations on high flow  HEART: S1S2+, Regular rate and rhythm; No murmurs, rubs, or gallops  ABDOMEN: Soft, Nontender, Nondistended; Bowel sounds present  SKIN: warm, dry  NEURO: awake, alert; non-focal  PSYCH: normal affect    LABS:                        13.2   14.93 )-----------( 378      ( 07 Mar 2020 08:47 )             44.3     03-06    141  |  95<L>  |  30.0<H>  ----------------------------<  120<H>  4.8   |  35.0<H>  |  0.72    Ca    9.0      06 Mar 2020 19:23  Phos  3.5     03-06  Mg     2.0     03-06        MEDICATIONS  (STANDING):  apixaban 5 milliGRAM(s) Oral every 12 hours  aspirin  chewable 81 milliGRAM(s) Oral daily  atorvastatin 40 milliGRAM(s) Oral at bedtime  budesonide  80 MICROgram(s)/formoterol 4.5 MICROgram(s) Inhaler 2 Puff(s) Inhalation two times a day  dextrose 5%. 1000 milliLiter(s) (50 mL/Hr) IV Continuous <Continuous>  dextrose 50% Injectable 12.5 Gram(s) IV Push once  dextrose 50% Injectable 25 Gram(s) IV Push once  dextrose 50% Injectable 25 Gram(s) IV Push once  furosemide    Tablet 40 milliGRAM(s) Oral daily  insulin lispro (HumaLOG) corrective regimen sliding scale   SubCutaneous Before meals and at bedtime  metoprolol succinate ER 25 milliGRAM(s) Oral daily  spironolactone 25 milliGRAM(s) Oral daily    MEDICATIONS  (PRN):  albuterol/ipratropium for Nebulization 3 milliLiter(s) Nebulizer every 6 hours PRN Shortness of Breath and/or Wheezing  dextrose 40% Gel 15 Gram(s) Oral once PRN Blood Glucose LESS THAN 70 milliGRAM(s)/deciliter  glucagon  Injectable 1 milliGRAM(s) IntraMuscular once PRN Glucose LESS THAN 70 milligrams/deciliter      RADIOLOGY & ADDITIONAL TESTS:

## 2020-03-08 NOTE — PROGRESS NOTE ADULT - ASSESSMENT
86y/o F with DM, HTN, HLD, CAD s/p remote PCI, COPD on home O2, HFpEF, recurrent bladder CA s/p TURBT ( 01/2020) was sent over from presurgical testing to the ED for evaluation of SOB. Pt was scheduled for a cystoscopy but was found to need increasing O2 requirements prompting the ED transfer. Pt give h/o worsening exertional dyspnea and productive  yellow cough on admission No fevers.  Pt was placed on venti mask @ 50% and her Sat was ~ 86%. CT chest w/ segmental and subsegmental PEs admitted to ICU   TPA offered by ICU team, she refused, signed DNI/DNI, she does not want any aggressive heroic measures, family is supporting her on this decision, remains on high flow oxygen    Bilateral PE with DVT   -refused TPA on admission per ICU    -on eliquis   -repeat echo with limited information; presence of intact intra-atrial septum  -cont oxygen supportive care, still requires high flow; wean as tolerated    Respiratory failure acute on chronic hypoxia - multifactorial  -PE/ COPD / pulmonary hypertension / diastolic CHF   -cont supportive medical therapy   -pulmonary follow up appreciated     COPD with exacerbation on home oxygen   -cont steroid taper , neb therapy     Diastolic CHF acute on chronic   -cont lasix , aldactone  -cardiology input noted   -signed off     Bladder cancer h/o surgery   -was undergoin preop clearance  for another surgery by JOCELIN Sanders  -seen by JOCELIN : not candidate  for surgery at present   -family and pt are also leaning  toward comfort     Hypokalemia   -replaced     OOB , incentive spirometry  wean O2 as tolerated   pt ambulate as tolerated

## 2020-03-09 NOTE — PROGRESS NOTE ADULT - ASSESSMENT
86y/o F with DM, HTN, HLD, CAD s/p remote PCI, COPD on home O2, HFpEF, recurrent bladder CA s/p TURBT ( 01/2020) was sent over from presurgical testing to the ED for evaluation of SOB. Pt was scheduled for a cystoscopy but was found to need increasing O2 requirements prompting the ED transfer. Pt give h/o worsening exertional dyspnea and productive  yellow cough on admission No fevers.  Pt was placed on venti mask @ 50% and her Sat was ~ 86%. CT chest w/ segmental and subsegmental PEs admitted to ICU   TPA offered by ICU team, she refused, signed DNI/DNI, she does not want any aggressive heroic measures, family is supporting her on this decision, remains on high flow oxygen    Bilateral PE with DVT   -refused TPA on admission per ICU    -on eliquis   -repeat echo with limited information; presence of intact intra-atrial septum  -cont oxygen supportive care, still requires high flow; wean as tolerated    Respiratory failure acute on chronic hypoxia - multifactorial  -PE/ COPD / pulmonary hypertension / diastolic CHF   -cont supportive medical therapy   -pulmonary follow up appreciated     COPD with exacerbation on home oxygen   -cont steroid taper , neb therapy     Diastolic CHF acute on chronic   -cont lasix , aldactone  -cardiology input noted   -signed off     Bladder cancer h/o surgery   -was undergoin preop clearance  for another surgery by JOCELIN Sanders  -seen by JOCELIN : not candidate  for surgery at present   -family and pt are also leaning  toward comfort     Hypokalemia   resolved      OOB , incentive spirometry  wean O2 as tolerated   pt ambulate as tolerated 86y/o F with DM, HTN, HLD, CAD s/p remote PCI, COPD on home O2, HFpEF, recurrent bladder CA s/p TURBT ( 01/2020) was sent over from presurgical testing to the ED for evaluation of SOB. Pt was scheduled for a cystoscopy but was found to need increasing O2 requirements prompting the ED transfer. Pt give h/o worsening exertional dyspnea and productive  yellow cough on admission No fevers.  Pt was placed on venti mask @ 50% and her Sat was ~ 86%. CT chest w/ segmental and subsegmental PEs admitted to ICU   TPA offered by ICU team, she refused, signed DNI/DNI, she does not want any aggressive heroic measures, family is supporting her on this decision, remains on high flow oxygen    Bilateral PE with DVT   -refused TPA on admission per ICU    -on eliquis   -repeat echo with limited information; presence of intact intra-atrial septum  -cont oxygen supportive care, still requires high flow; wean as tolerated    Respiratory failure acute on chronic hypoxia - multifactorial  -PE/ COPD / pulmonary hypertension / diastolic CHF   -cont supportive medical therapy   -pulmonary follow up appreciated     COPD with exacerbation on home oxygen   -cont steroid taper , neb therapy     Diastolic CHF acute on chronic   -cont lasix , aldactone  -cardiology input noted   -signed off     Bladder cancer h/o surgery   -was undergoing preop clearance for another surgery by JOCELIN Sanders  -seen by JOCELIN : not candidate  for surgery at present   -family and pt are also leaning  toward comfort     Hypokalemia   resolved      OOB , incentive spirometry  wean O2 as tolerated   pt ambulate as tolerated

## 2020-03-09 NOTE — PROGRESS NOTE ADULT - SUBJECTIVE AND OBJECTIVE BOX
PULMONARY PROGRESS NOTE      TAMIKO KELLYVALENTIN-[id number]    Patient is a 85y old  Female who presents with a chief complaint of SOB (08 Mar 2020 14:32)      INTERVAL HPI/OVERNIGHT EVENTS:  Awake alert on 85% HFNCO  no cough or sputum  Family at bedside  Up in chair    MEDICATIONS  (STANDING):  apixaban 5 milliGRAM(s) Oral every 12 hours  aspirin  chewable 81 milliGRAM(s) Oral daily  atorvastatin 40 milliGRAM(s) Oral at bedtime  budesonide  80 MICROgram(s)/formoterol 4.5 MICROgram(s) Inhaler 2 Puff(s) Inhalation two times a day  dextrose 5%. 1000 milliLiter(s) (50 mL/Hr) IV Continuous <Continuous>  dextrose 50% Injectable 12.5 Gram(s) IV Push once  dextrose 50% Injectable 25 Gram(s) IV Push once  dextrose 50% Injectable 25 Gram(s) IV Push once  furosemide    Tablet 40 milliGRAM(s) Oral daily  insulin lispro (HumaLOG) corrective regimen sliding scale   SubCutaneous Before meals and at bedtime  metoprolol succinate ER 25 milliGRAM(s) Oral daily  predniSONE   Tablet 30 milliGRAM(s) Oral daily  spironolactone 25 milliGRAM(s) Oral daily      MEDICATIONS  (PRN):  albuterol/ipratropium for Nebulization 3 milliLiter(s) Nebulizer every 6 hours PRN Shortness of Breath and/or Wheezing  dextrose 40% Gel 15 Gram(s) Oral once PRN Blood Glucose LESS THAN 70 milliGRAM(s)/deciliter  glucagon  Injectable 1 milliGRAM(s) IntraMuscular once PRN Glucose LESS THAN 70 milligrams/deciliter  guaiFENesin  milliGRAM(s) Oral every 12 hours PRN congestion      Allergies    No Known Allergies    Intolerances        PAST MEDICAL & SURGICAL HISTORY:  Diabetes  Chronic obstructive pulmonary disease, unspecified COPD type  Stented coronary artery: MI in 2014, s/p stent of right coronary artery  Bladder prolapse, female, acquired  Bladder tumor  CAD (coronary artery disease)  Neuropathy  High cholesterol  Hypertension  Diabetes  S/P cystoscopy  Status post cardiac catheterization  Ankle fracture: Rt      SOCIAL HISTORY  Smoking History:       REVIEW OF SYSTEMS:    CONSTITUTIONAL:  No distress    HEENT:  Eyes:  No diplopia or blurred vision. ENT:  No earache, sore throat or runny nose.    CARDIOVASCULAR:  No pressure, squeezing, tightness, heaviness or aching about the chest; no palpitations.    RESPIRATORY:  above    GASTROINTESTINAL:  No nausea, vomiting or diarrhea.    GENITOURINARY:  No dysuria, frequency or urgency.    NEUROLOGIC:  No paresthesias, fasciculations, seizures or weakness.    Extremities: No cyanosis, clubbing or edema    PSYCHIATRIC:  No disorder of thought or mood.    Vital Signs Last 24 Hrs  T(C): 36.8 (09 Mar 2020 08:51), Max: 36.9 (08 Mar 2020 20:20)  T(F): 98.2 (09 Mar 2020 08:51), Max: 98.4 (08 Mar 2020 20:20)  HR: 54 (09 Mar 2020 09:00) (54 - 70)  BP: 115/60 (09 Mar 2020 08:51) (95/55 - 115/60)  BP(mean): --  RR: 18 (09 Mar 2020 08:51) (18 - 18)  SpO2: 91% (09 Mar 2020 09:00) (90% - 96%)    PHYSICAL EXAMINATION:    GENERAL: The patient is awake and alert in no apparent distress.     HEENT: Head is normocephalic and atraumatic. Extraocular muscles are intact. Mucous membranes are moist.    NECK: Supple.    LUNGS: Clear to auscultation without wheezing, rales or rhonchi; respirations unlabored    HEART: Regular rate and rhythm without murmur.    ABDOMEN: Soft, nontender, and nondistended.      EXTREMITIES: Without any cyanosis, clubbing, rash, lesions or edema.    NEUROLOGIC: Grossly intact.    LABS:                        13.0   13.29 )-----------( 351      ( 09 Mar 2020 05:56 )             43.5     03-09    144  |  99  |  21.0<H>  ----------------------------<  93  4.0   |  33.0<H>  |  0.49<L>    Ca    8.6      09 Mar 2020 05:56                          MICROBIOLOGY:    RADIOLOGY & ADDITIONAL STUDIES:  < from: Xray Chest 1 View- PORTABLE-Routine (03.07.20 @ 12:24) >     EXAM:  XR CHEST PORTABLE ROUTINE 1V                          PROCEDURE DATE:  03/07/2020          INTERPRETATION:  Portable chest radiograph        CLINICAL INFORMATION:   Hypoxemia. Post pulmonary embolism.    TECHNIQUE:  Portable  AP view of thechest was obtained.    COMPARISON: 2/28/2020 chest radiograph available for review.    FINDINGS:   The lungs  are clear.  No pleural abnormality is seen.    The  heart is enlarged in transverse diameter. No hilar mass. Trachea midline.   Visualized osseous structures are intact.        IMPRESSION:   Cardiomegaly..   No gross airspace consolidation or effusion.                      JUNITO GONSALEZ M.D., ATTENDING RADIOLOGIST  This document has been electronically signed. Mar  7 2020  2:12PM    < end of copied text >  < from: US Duplex Venous Lower Ext Complete, Bilateral (02.29.20 @ 09:44) >   EXAM:  US DPLX LWR EXT VEINS COMPL BI                          PROCEDURE DATE:  02/29/2020          INTERPRETATION:  Duplex Ultrasound of the lower extremity deep venous system        CLINICAL INFORMATION:Shortness of breath. Pulmonary embolus, evaluate for deep venous thrombosis.    TECHNIQUE:  Duplex ultrasonography with color and spectral doppler of the bilateral lower extremity deep venous system was performed.    FINDINGS:    No previous examinations are available for review.    The rightlower extremity deep venous system demonstrated no abnormality.  The veins were patent with intact Doppler flow.  The flow varied with respiration and augmented with distal calf compression.  The veins were directly compressible by the ultrasound transducer.    The left lower extremity deep venous system demonstrated deep vein thrombosis within the popliteal vein and posterior tibial vein. In the LEFT common femoral vein, greater saphenous vein and femoral veins remain patent.      The veins evaluated included the common femoral vein, the inflow of the greater saphenous vein, the inflow of the deep femoral vein, the superficial femoral vein, the popliteal vein and posterior tibial veins.      IMPRESSION:/LEFT lower extremity popliteal vein and posterior tibial vein deep vein thrombosis.    Unremarkable ultrasound of the RIGHT lower extremity deep venous system.   Critical value  discussed with physician assistant Caraballo on 2/29/2020 at 9:48 AM with read back.  Hospital policies for critical values including read back   policy were followed.  The verbal communication of the critical value   supplements this written report.                 JUNITO GONSALEZ M.D., ATTENDING RADIOLOGIST  This document has been electronically signed. Feb 292020  9:54AM    < end of copied text >  < from: CT Angio Chest w/ IV Cont (02.28.20 @ 14:10) >   EXAM:  CT ANGIO CHEST (W)AW IC                          PROCEDURE DATE:  02/28/2020          INTERPRETATION:  CLINICAL INFORMATION: Shortness of breath, rule out PE.    COMPARISON: 7/2/2019    PROCEDURE:   CT Angiography of the Chest.  75 ml of Omnipaque 350 was injected intravenously. 25 ml were discarded.  Sagittal and coronal reformats were performed as well as 3D (MIP) reconstructions.      FINDINGS:    LUNGS AND AIRWAYS: Patent central airways.  Centrilobular emphysema. Scattered subsegmental atelectasis.    PLEURA: No pleural effusion.    MEDIASTINUM AND LANIE: 1.2 cm right paratracheal node, stable.    VESSELS: Acute segmental and subsegmental PE throughout the right lung. Acute subsegmental PE within the left upper lobe. Main pulmonary artery normal in caliber. Atherosclerotic changes of the aorta including prominent atheromatous plaque in the upper abdomen. Reflux of contrast into the IVC and hepatic veins suggestive of right heart failure.    HEART: Heart size is normal. No pericardial effusion. Flattening and bowing of the interventricular septum suggestive of right heart strain.    CHEST WALL AND LOWER NECK: Enlarged heterogeneous right thyroid lobe, unchanged.    VISUALIZED UPPER ABDOMEN: Cholelithiasis. Trace perihepatic ascites. Nodular thickening of the adrenal glands and a stable 1.4 cm left adrenal nodule.    BONES: Mild degenerative changes.     IMPRESSION:     Positive acute segmental and subsegmental PE throughout the right lung and within the left upper lobe. Findings suggestive of right heart strain.    Findings were discussed with Dr. Taylor on  2/28/2020 2:39 PM by Dr. Swenson with read back confirmation.                  OMAIRA SWENSON M.D., ATTENDING RADIOLOGIST  This document has been electronically signed. Feb 28 2020  2:43PM    < end of copied text >

## 2020-03-09 NOTE — PROGRESS NOTE ADULT - SUBJECTIVE AND OBJECTIVE BOX
TAMIKO KELLY    [id number]    85y      Female    CC: SOB    INTERVAL HPI/OVERNIGHT EVENTS:       Pt seen and examined. No acute events overnight. Daughter at bedside. Denies CP, abd pain, n/v    REVIEW OF SYSTEMS:    CONSTITUTIONAL: No fever, weight loss, or fatigue  RESPIRATORY: No cough, wheezing, hemoptysis; No shortness of breath  CARDIOVASCULAR: No chest pain, palpitations  GASTROINTESTINAL: No abdominal or epigastric pain. No nausea, vomiting  NEUROLOGICAL: No headaches    Vital Sign  T(F): 98.2 (09 Mar 2020 08:51), Max: 98.4 (08 Mar 2020 20:20)  HR: 54 (09 Mar 2020 09:00) (54 - 70)  BP: 115/60 (09 Mar 2020 08:51) (95/55 - 115/60)  RR: 18 (09 Mar 2020 08:51) (18 - 18)  SpO2: 91% (09 Mar 2020 09:00) (90% - 96%)    PHYSICAL EXAM:    GENERAL: NAD  HEENT: PERRL, +EOMI  NECK: soft, supple  CHEST/LUNG: decreased breath sounds at bases b/l; non-labored respirations on high flow  HEART: S1S2+, Regular rate and rhythm; No murmurs, rubs, or gallops  ABDOMEN: Soft, Nontender, Nondistended; Bowel sounds present  SKIN: warm, dry  NEURO: awake, alert; non-focal  PSYCH: normal affect                 13.0   13.29  )----------(  351       ( 09 Mar 2020 05:56 )               43.5      144    |  99     |  21.0   ----------------------------<  93         ( 09 Mar 2020 05:56 )  4.0     |  33.0   |  0.49     Ca    8.6        ( 09 Mar 2020 05:56 )            CAPILLARY BLOOD GLUCOSE                MEDICATIONS  (STANDING):  apixaban 5 milliGRAM(s) Oral every 12 hours  aspirin  chewable 81 milliGRAM(s) Oral daily  atorvastatin 40 milliGRAM(s) Oral at bedtime  budesonide  80 MICROgram(s)/formoterol 4.5 MICROgram(s) Inhaler 2 Puff(s) Inhalation two times a day  dextrose 5%. 1000 milliLiter(s) (50 mL/Hr) IV Continuous <Continuous>  dextrose 50% Injectable 12.5 Gram(s) IV Push once  dextrose 50% Injectable 25 Gram(s) IV Push once  dextrose 50% Injectable 25 Gram(s) IV Push once  furosemide    Tablet 40 milliGRAM(s) Oral daily  insulin lispro (HumaLOG) corrective regimen sliding scale   SubCutaneous Before meals and at bedtime  metoprolol succinate ER 25 milliGRAM(s) Oral daily  predniSONE   Tablet 30 milliGRAM(s) Oral daily  spironolactone 25 milliGRAM(s) Oral daily    MEDICATIONS  (PRN):  albuterol/ipratropium for Nebulization 3 milliLiter(s) Nebulizer every 6 hours PRN Shortness of Breath and/or Wheezing  dextrose 40% Gel 15 Gram(s) Oral once PRN Blood Glucose LESS THAN 70 milliGRAM(s)/deciliter  glucagon  Injectable 1 milliGRAM(s) IntraMuscular once PRN Glucose LESS THAN 70 milligrams/deciliter  guaiFENesin  milliGRAM(s) Oral every 12 hours PRN congestion

## 2020-03-10 NOTE — PROGRESS NOTE ADULT - ASSESSMENT
85F with COPD, CAD, DM, admitted with acute hypoxic respiratory failure found to have segmental / subsegmental bilateral PE's, LLE DVT, COPD exacerbation, currently on high settings of high flow.     #1 Bilateral PE - anticoagulation. patient refused TPA. remains high flow/bipap dependent  #2 Acute Hypoxic respiratory failure - remains dependent on high flow. s/p treatment for possible pneumonia also. try oximask - sats in 85% range tolerable as long as patient is comfortable and in no respiratory distress.   #3 Acute exacerbation of COPD - nebs/steroids. oxygen supplementation. wean off high flow/bipap as tolerated. try oximask today, family states patient normally sats in the 85% range    #4 Encounter for palliative care - met with family Sury - daughter and grandson at bedside. Patient was also involved in conversation. I discussed with them her overall condition and that she is likely appropriate for home hospice services to try to minimize the back and forth to the hospital and focus more on comfort and quality of life. Patient and her family want to avoid the hospital and she and they want to keep her home. I discussed that given her lung disease, she likely has a prognosis of less than 6 months but of course we cannot be certain if it will be longer or shorter than that. I discussed with them that she must be off high flow in order to go home so we will trial her on oximask today and accept lower sats as long as she is comfortable.

## 2020-03-10 NOTE — PROGRESS NOTE ADULT - SUBJECTIVE AND OBJECTIVE BOX
OVERNIGHT EVENTS: remains on high flow.     Present Symptoms:     Dyspnea: 0  Nausea/Vomiting: No  Anxiety:  No  Depression: No  Fatigue: Yes   Loss of appetite: No    Pain: none currently             Character-            Duration-            Effect-            Factors-            Frequency-            Location-            Severity-    Review of Systems: Reviewed                     Negative: no chest pain                    All others negative    MEDICATIONS  (STANDING):  apixaban 5 milliGRAM(s) Oral every 12 hours  aspirin  chewable 81 milliGRAM(s) Oral daily  atorvastatin 40 milliGRAM(s) Oral at bedtime  budesonide  80 MICROgram(s)/formoterol 4.5 MICROgram(s) Inhaler 2 Puff(s) Inhalation two times a day  dextrose 5%. 1000 milliLiter(s) (50 mL/Hr) IV Continuous <Continuous>  dextrose 50% Injectable 12.5 Gram(s) IV Push once  dextrose 50% Injectable 25 Gram(s) IV Push once  dextrose 50% Injectable 25 Gram(s) IV Push once  furosemide    Tablet 40 milliGRAM(s) Oral daily  insulin lispro (HumaLOG) corrective regimen sliding scale   SubCutaneous Before meals and at bedtime  metoprolol succinate ER 25 milliGRAM(s) Oral daily  spironolactone 25 milliGRAM(s) Oral daily    MEDICATIONS  (PRN):  albuterol/ipratropium for Nebulization 3 milliLiter(s) Nebulizer every 6 hours PRN Shortness of Breath and/or Wheezing  dextrose 40% Gel 15 Gram(s) Oral once PRN Blood Glucose LESS THAN 70 milliGRAM(s)/deciliter  glucagon  Injectable 1 milliGRAM(s) IntraMuscular once PRN Glucose LESS THAN 70 milligrams/deciliter  guaiFENesin  milliGRAM(s) Oral every 12 hours PRN congestion    PHYSICAL EXAM:    Vital Signs Last 24 Hrs  T(C): 36.7 (10 Mar 2020 08:20), Max: 36.7 (09 Mar 2020 16:00)  T(F): 98 (10 Mar 2020 08:20), Max: 98.1 (09 Mar 2020 16:00)  HR: 68 (10 Mar 2020 12:35) (59 - 75)  BP: 92/49 (10 Mar 2020 12:35) (92/49 - 109/58)  BP(mean): --  RR: 18 (10 Mar 2020 12:35) (18 - 18)  SpO2: 91% (10 Mar 2020 12:35) (91% - 96%)    General: alert      Karnofsky:  30 %    HEENT: normal      Lungs: comfortable    CV: normal      GI: normal     : normal     MSK: bedbound/wheelchair bound    Skin: normal  pressure ulcers- Stage_____  no rash    LABS:                      13.4   14.66 )-----------( 352      ( 10 Mar 2020 09:23 )             45.1     03-10    141  |  95<L>  |  21.0<H>  ----------------------------<  131<H>  4.7   |  34.0<H>  |  0.57    Ca    8.9      10 Mar 2020 09:23    I&O's Summary    09 Mar 2020 07:01  -  10 Mar 2020 07:00  --------------------------------------------------------  IN: 0 mL / OUT: 1800 mL / NET: -1800 mL    RADIOLOGY & ADDITIONAL STUDIES:    ADVANCE DIRECTIVES:   DNR YES NO  Completed on:                     MOLST  YES NO   Completed on:  Living Will  YES NO   Completed on: OVERNIGHT EVENTS: remains on high flow.     Present Symptoms:     Dyspnea: 0  Nausea/Vomiting: No  Anxiety:  No  Depression: No  Fatigue: Yes   Loss of appetite: No    Pain: none currently             Character-            Duration-            Effect-            Factors-            Frequency-            Location-            Severity-    Review of Systems: Reviewed                     Negative: no chest pain                    All others negative    MEDICATIONS  (STANDING):  apixaban 5 milliGRAM(s) Oral every 12 hours  aspirin  chewable 81 milliGRAM(s) Oral daily  atorvastatin 40 milliGRAM(s) Oral at bedtime  budesonide  80 MICROgram(s)/formoterol 4.5 MICROgram(s) Inhaler 2 Puff(s) Inhalation two times a day  dextrose 5%. 1000 milliLiter(s) (50 mL/Hr) IV Continuous <Continuous>  dextrose 50% Injectable 12.5 Gram(s) IV Push once  dextrose 50% Injectable 25 Gram(s) IV Push once  dextrose 50% Injectable 25 Gram(s) IV Push once  furosemide    Tablet 40 milliGRAM(s) Oral daily  insulin lispro (HumaLOG) corrective regimen sliding scale   SubCutaneous Before meals and at bedtime  metoprolol succinate ER 25 milliGRAM(s) Oral daily  spironolactone 25 milliGRAM(s) Oral daily    MEDICATIONS  (PRN):  albuterol/ipratropium for Nebulization 3 milliLiter(s) Nebulizer every 6 hours PRN Shortness of Breath and/or Wheezing  dextrose 40% Gel 15 Gram(s) Oral once PRN Blood Glucose LESS THAN 70 milliGRAM(s)/deciliter  glucagon  Injectable 1 milliGRAM(s) IntraMuscular once PRN Glucose LESS THAN 70 milligrams/deciliter  guaiFENesin  milliGRAM(s) Oral every 12 hours PRN congestion    PHYSICAL EXAM:    Vital Signs Last 24 Hrs  T(C): 36.7 (10 Mar 2020 08:20), Max: 36.7 (09 Mar 2020 16:00)  T(F): 98 (10 Mar 2020 08:20), Max: 98.1 (09 Mar 2020 16:00)  HR: 68 (10 Mar 2020 12:35) (59 - 75)  BP: 92/49 (10 Mar 2020 12:35) (92/49 - 109/58)  BP(mean): --  RR: 18 (10 Mar 2020 12:35) (18 - 18)  SpO2: 91% (10 Mar 2020 12:35) (91% - 96%)    General: alert      Karnofsky:  30 %    HEENT: normal      Lungs: comfortable    CV: normal      GI: normal     : normal     MSK: weakness     Skin: no rash    LABS:                      13.4   14.66 )-----------( 352      ( 10 Mar 2020 09:23 )             45.1     03-10    141  |  95<L>  |  21.0<H>  ----------------------------<  131<H>  4.7   |  34.0<H>  |  0.57    Ca    8.9      10 Mar 2020 09:23    I&O's Summary    09 Mar 2020 07:01  -  10 Mar 2020 07:00  --------------------------------------------------------  IN: 0 mL / OUT: 1800 mL / NET: -1800 mL    RADIOLOGY & ADDITIONAL STUDIES:    ADVANCE DIRECTIVES: DNR/I

## 2020-03-10 NOTE — PROGRESS NOTE ADULT - ASSESSMENT
86y/o F with DM, HTN, HLD, CAD s/p remote PCI, COPD on home O2, HFpEF, recurrent bladder CA s/p TURBT ( 01/2020) was sent over from presurgical testing to the ED for evaluation of SOB. Pt was scheduled for a cystoscopy but was found to need increasing O2 requirements prompting the ED transfer. Pt give h/o worsening exertional dyspnea and productive  yellow cough on admission No fevers.  Pt was placed on venti mask @ 50% and her Sat was ~ 86%. CT chest w/ segmental and subsegmental PEs admitted to ICU   TPA offered by ICU team, she refused, signed DNI/DNI, she does not want any aggressive heroic measures, family is supporting her on this decision    Bilateral PE with DVT   -refused TPA on admission per ICU    -on eliquis   -repeat echo with limited information; presence of intact intra-atrial septum  -cont oxygen supportive care, still requires high flow; wean as tolerated    Respiratory failure acute on chronic hypoxia - multifactorial  -PE/ COPD / pulmonary hypertension / diastolic CHF   -cont supportive medical therapy   -pulmonary follow up appreciated     COPD with exacerbation on home oxygen   -cont steroid taper , neb therapy     Diastolic CHF acute on chronic   -cont lasix , aldactone  -cardiology input noted   -signed off     Bladder cancer h/o surgery   -was undergoing preop clearance for another surgery by JOCELIN Sanders  -seen by JOCELIN : not candidate  for surgery at present   -family and pt are also leaning  toward comfort     Hypokalemia   resolved      OOB , incentive spirometry  wean O2 as tolerated   pt ambulate as tolerated 84y/o F with DM, HTN, HLD, CAD s/p remote PCI, COPD on home O2, HFpEF, recurrent bladder CA s/p TURBT ( 01/2020) was sent over from presurgical testing to the ED for evaluation of SOB. Pt was scheduled for a cystoscopy but was found to need increasing O2 requirements prompting the ED transfer. Pt give h/o worsening exertional dyspnea and productive  yellow cough on admission No fevers.  Pt was placed on venti mask @ 50% and her Sat was ~ 86%. CT chest w/ segmental and subsegmental PEs admitted to ICU   TPA offered by ICU team, she refused, signed DNI/DNI, she does not want any aggressive heroic measures, family is supporting her on this decision    Bilateral PE with DVT   -refused TPA on admission per ICU    -on eliquis   -repeat echo with limited information; presence of intact intra-atrial septum  -cont oxygen supportive care, still requires high flow; wean as tolerated    Respiratory failure acute on chronic hypoxia - multifactorial  -PE/ COPD / pulmonary hypertension / diastolic CHF   -cont supportive medical therapy   -pulmonary follow up appreciated     COPD with exacerbation on home oxygen   -cont steroid taper , neb therapy     Diastolic CHF acute on chronic   -cont lasix , aldactone  -cardiology input noted   -signed off     Bladder cancer h/o surgery   -was undergoing preop clearance for another surgery by JOCELIN Sanders  -seen by JOCELIN : not candidate  for surgery at present   -family and pt are also leaning  toward comfort     Hypokalemia   resolved      OOB , incentive spirometry  wean O2 as tolerated --trial of oximask   pt ambulate as tolerated  -d/w palliative; hospice meeting scheduled tomorrow 3/11

## 2020-03-10 NOTE — PROGRESS NOTE ADULT - SUBJECTIVE AND OBJECTIVE BOX
TAMIKO KELLY    [id number]    85y      Female    CC: SOB    INTERVAL HPI/OVERNIGHT EVENTS: Pt seen and examined. Grandson at bedside. Both report wanting to go home.     REVIEW OF SYSTEMS:    CONSTITUTIONAL: No fever, weight loss, or fatigue  RESPIRATORY: No cough, wheezing, hemoptysis; No shortness of breath  CARDIOVASCULAR: No chest pain, palpitations  GASTROINTESTINAL: No abdominal or epigastric pain. No nausea, vomiting  NEUROLOGICAL: No headaches, memory loss, loss of strength.    Vital Signs Last 24 Hrs  T(C): 36.7 (10 Mar 2020 08:20), Max: 36.7 (09 Mar 2020 16:00)  T(F): 98 (10 Mar 2020 08:20), Max: 98.1 (09 Mar 2020 16:00)  HR: 68 (10 Mar 2020 12:35) (59 - 75)  BP: 92/49 (10 Mar 2020 12:35) (92/49 - 109/58)  BP(mean): --  RR: 18 (10 Mar 2020 12:35) (18 - 18)  SpO2: 91% (10 Mar 2020 12:35) (91% - 96%)    PHYSICAL EXAM:    GENERAL: NAD  HEENT: PERRL, +EOMI  NECK: soft, supple  CHEST/LUNG: Clear to auscultation bilaterally; No wheezing  HEART: S1S2+, Regular rate and rhythm; No murmurs, rubs, or gallops  ABDOMEN: Soft, Nontender, Nondistended; Bowel sounds present  SKIN: No rashes or lesions  NEURO: AAOX3, no focal deficits, no motor or sensory loss  PSYCH: normal mood    LABS:                        13.4   14.66 )-----------( 352      ( 10 Mar 2020 09:23 )             45.1     03-10    141  |  95<L>  |  21.0<H>  ----------------------------<  131<H>  4.7   |  34.0<H>  |  0.57    Ca    8.9      10 Mar 2020 09:23      MEDICATIONS  (STANDING):  apixaban 5 milliGRAM(s) Oral every 12 hours  aspirin  chewable 81 milliGRAM(s) Oral daily  atorvastatin 40 milliGRAM(s) Oral at bedtime  budesonide  80 MICROgram(s)/formoterol 4.5 MICROgram(s) Inhaler 2 Puff(s) Inhalation two times a day  dextrose 5%. 1000 milliLiter(s) (50 mL/Hr) IV Continuous <Continuous>  dextrose 50% Injectable 12.5 Gram(s) IV Push once  dextrose 50% Injectable 25 Gram(s) IV Push once  dextrose 50% Injectable 25 Gram(s) IV Push once  furosemide    Tablet 40 milliGRAM(s) Oral daily  insulin lispro (HumaLOG) corrective regimen sliding scale   SubCutaneous Before meals and at bedtime  metoprolol succinate ER 25 milliGRAM(s) Oral daily  spironolactone 25 milliGRAM(s) Oral daily    MEDICATIONS  (PRN):  albuterol/ipratropium for Nebulization 3 milliLiter(s) Nebulizer every 6 hours PRN Shortness of Breath and/or Wheezing  dextrose 40% Gel 15 Gram(s) Oral once PRN Blood Glucose LESS THAN 70 milliGRAM(s)/deciliter  glucagon  Injectable 1 milliGRAM(s) IntraMuscular once PRN Glucose LESS THAN 70 milligrams/deciliter  guaiFENesin  milliGRAM(s) Oral every 12 hours PRN congestion      RADIOLOGY & ADDITIONAL TESTS: TAMIKO KELLY    [id number]    85y      Female    CC: SOB    INTERVAL HPI/OVERNIGHT EVENTS: Pt seen and examined. Grandson at bedside. Both report wanting to go home. Pt seen on high flow. Denies CP, abd pain, N/V, diarrhea.     REVIEW OF SYSTEMS:    CONSTITUTIONAL: No fever, weight loss  RESPIRATORY: No cough, wheezing, hemoptysis;   CARDIOVASCULAR: No chest pain, palpitations  GASTROINTESTINAL: No abdominal or epigastric pain. No nausea, vomiting  NEUROLOGICAL: No headaches    Vital Signs Last 24 Hrs  T(C): 36.7 (10 Mar 2020 08:20), Max: 36.7 (09 Mar 2020 16:00)  T(F): 98 (10 Mar 2020 08:20), Max: 98.1 (09 Mar 2020 16:00)  HR: 68 (10 Mar 2020 12:35) (59 - 75)  BP: 92/49 (10 Mar 2020 12:35) (92/49 - 109/58)  BP(mean): --  RR: 18 (10 Mar 2020 12:35) (18 - 18)  SpO2: 91% (10 Mar 2020 12:35) (91% - 96%)    PHYSICAL EXAM:    GENERAL: NAD  HEENT: PERRL, +EOMI  NECK: soft, supple  CHEST/LUNG: Clear to auscultation bilaterally; non-labored respirations on high flow  HEART: S1S2+, Regular rate and rhythm; No murmurs, rubs, or gallops  ABDOMEN: Soft, Nontender, Nondistended; Bowel sounds present  SKIN: No rashes or lesions  NEURO: awake, alert, non-focal  PSYCH: normal affect    LABS:                        13.4   14.66 )-----------( 352      ( 10 Mar 2020 09:23 )             45.1     03-10    141  |  95<L>  |  21.0<H>  ----------------------------<  131<H>  4.7   |  34.0<H>  |  0.57    Ca    8.9      10 Mar 2020 09:23      MEDICATIONS  (STANDING):  apixaban 5 milliGRAM(s) Oral every 12 hours  aspirin  chewable 81 milliGRAM(s) Oral daily  atorvastatin 40 milliGRAM(s) Oral at bedtime  budesonide  80 MICROgram(s)/formoterol 4.5 MICROgram(s) Inhaler 2 Puff(s) Inhalation two times a day  dextrose 5%. 1000 milliLiter(s) (50 mL/Hr) IV Continuous <Continuous>  dextrose 50% Injectable 12.5 Gram(s) IV Push once  dextrose 50% Injectable 25 Gram(s) IV Push once  dextrose 50% Injectable 25 Gram(s) IV Push once  furosemide    Tablet 40 milliGRAM(s) Oral daily  insulin lispro (HumaLOG) corrective regimen sliding scale   SubCutaneous Before meals and at bedtime  metoprolol succinate ER 25 milliGRAM(s) Oral daily  spironolactone 25 milliGRAM(s) Oral daily    MEDICATIONS  (PRN):  albuterol/ipratropium for Nebulization 3 milliLiter(s) Nebulizer every 6 hours PRN Shortness of Breath and/or Wheezing  dextrose 40% Gel 15 Gram(s) Oral once PRN Blood Glucose LESS THAN 70 milliGRAM(s)/deciliter  glucagon  Injectable 1 milliGRAM(s) IntraMuscular once PRN Glucose LESS THAN 70 milligrams/deciliter  guaiFENesin  milliGRAM(s) Oral every 12 hours PRN congestion      RADIOLOGY & ADDITIONAL TESTS:

## 2020-03-10 NOTE — PROGRESS NOTE ADULT - SUBJECTIVE AND OBJECTIVE BOX
PULMONARY PROGRESS NOTE      TAMIKO KELLYVALENTIN-[id number]    Patient is a 85y old  Female who presents with a chief complaint of SOB (10 Mar 2020 12:43)  Patient is wellknown to me with chronic hypoxemia presumed related to COPD  She has been resistant to using oxygen very much at home as well as more intensive workup  Also dealing with hematuria which has been recurrent  acute on chronic hypoxemic respiratory failure, mild COPD, obesity  severe Pulmonary hypertension, disproportionate  to small clot burden on CTA, ? CTEPH component  and chronic remodelling component  OOB , wean 02 per protocol, incentive spirometry, nebs, pred taper, gentle diuresis   No shunt on contrast echo  INTERVAL HPI/OVERNIGHT EVENTS:    MEDICATIONS  (STANDING):  apixaban 5 milliGRAM(s) Oral every 12 hours  aspirin  chewable 81 milliGRAM(s) Oral daily  atorvastatin 40 milliGRAM(s) Oral at bedtime  budesonide  80 MICROgram(s)/formoterol 4.5 MICROgram(s) Inhaler 2 Puff(s) Inhalation two times a day  dextrose 5%. 1000 milliLiter(s) (50 mL/Hr) IV Continuous <Continuous>  dextrose 50% Injectable 12.5 Gram(s) IV Push once  dextrose 50% Injectable 25 Gram(s) IV Push once  dextrose 50% Injectable 25 Gram(s) IV Push once  furosemide    Tablet 40 milliGRAM(s) Oral daily  insulin lispro (HumaLOG) corrective regimen sliding scale   SubCutaneous Before meals and at bedtime  metoprolol succinate ER 25 milliGRAM(s) Oral daily  spironolactone 25 milliGRAM(s) Oral daily      MEDICATIONS  (PRN):  albuterol/ipratropium for Nebulization 3 milliLiter(s) Nebulizer every 6 hours PRN Shortness of Breath and/or Wheezing  dextrose 40% Gel 15 Gram(s) Oral once PRN Blood Glucose LESS THAN 70 milliGRAM(s)/deciliter  glucagon  Injectable 1 milliGRAM(s) IntraMuscular once PRN Glucose LESS THAN 70 milligrams/deciliter  guaiFENesin  milliGRAM(s) Oral every 12 hours PRN congestion      Allergies    No Known Allergies    Intolerances        PAST MEDICAL & SURGICAL HISTORY:  Diabetes  Chronic obstructive pulmonary disease, unspecified COPD type  Stented coronary artery: MI in 2014, s/p stent of right coronary artery  Bladder prolapse, female, acquired  Bladder tumor  CAD (coronary artery disease)  Neuropathy  High cholesterol  Hypertension  Diabetes  S/P cystoscopy  Status post cardiac catheterization  Ankle fracture: Rt      SOCIAL HISTORY  Smoking History:       REVIEW OF SYSTEMS:    CONSTITUTIONAL:  No distress    HEENT:  Eyes:  No diplopia or blurred vision. ENT:  No earache, sore throat or runny nose.    CARDIOVASCULAR:  No pressure, squeezing, tightness, heaviness or aching about the chest; no palpitations.    RESPIRATORY:  No cough, shortness of breath, PND or orthopnea. Mild SOBOE    GASTROINTESTINAL:  No nausea, vomiting or diarrhea.    GENITOURINARY:  No dysuria, frequency or urgency.    MUSCULOSKELETAL:  No joint pain    SKIN:  No new lesions.    NEUROLOGIC:  No paresthesias, fasciculations, seizures or weakness.    PSYCHIATRIC:  No disorder of thought or mood.    ENDOCRINE:  No heat or cold intolerance, polyuria or polydipsia.    HEMATOLOGICAL:  No easy bruising or bleeding.     Vital Signs Last 24 Hrs  T(C): 36.7 (10 Mar 2020 08:20), Max: 36.7 (09 Mar 2020 16:00)  T(F): 98 (10 Mar 2020 08:20), Max: 98.1 (09 Mar 2020 16:00)  HR: 68 (10 Mar 2020 12:35) (59 - 75)  BP: 92/49 (10 Mar 2020 12:35) (92/49 - 109/58)  BP(mean): --  RR: 18 (10 Mar 2020 12:35) (18 - 18)  SpO2: 91% (10 Mar 2020 12:35) (91% - 96%)    PHYSICAL EXAMINATION:    GENERAL: The patient is awake and alert in no apparent distress.     HEENT: Head is normocephalic and atraumatic. Extraocular muscles are intact. Mucous membranes are moist.    NECK: Supple.    LUNGS: Clear to auscultation without wheezing, rales or rhonchi; respirations unlabored    HEART: Regular rate and rhythm without murmur.    ABDOMEN: Soft, nontender, and nondistended.      EXTREMITIES: Without any cyanosis, clubbing, rash, lesions or edema.    NEUROLOGIC: Grossly intact.    SKIN: No ulceration or induration present.      LABS:                        13.4   14.66 )-----------( 352      ( 10 Mar 2020 09:23 )             45.1     03-10    141  |  95<L>  |  21.0<H>  ----------------------------<  131<H>  4.7   |  34.0<H>  |  0.57    Ca    8.9      10 Mar 2020 09:23                          MICROBIOLOGY:    RADIOLOGY & ADDITIONAL STUDIES:

## 2020-03-10 NOTE — PROGRESS NOTE ADULT - ATTENDING COMMENTS
Thank you for the opportunity to assist with the care of this patient.   [facility] Consult Service [phone].
time spent reviewing labs, notes, orders, radiographs
time spent reviewing labs, notes, orders, radiographs
Thank you for the opportunity to assist with the care of this patient.   [facility] Consult [facility] [phone].
COUNSELING:  Face to face meeting to discuss Advanced Care Planning - Time Spent 20 Minutes.  See goals of care note.      Thank you for the opportunity to assist with the care of this patient.   [facility] Consult [facility] [phone].
I have personally seen, examined and participated in the care of this patient. I have reviewed all pertinent clinical information, including history, physical exam, plan and agree with the above.   on high flow 70%, cont weaning as tolerated. requesting upgraded diet, will start mech soft, ordered swallow eval. cont PT. seen OOB to chair

## 2020-03-10 NOTE — PROGRESS NOTE ADULT - ASSESSMENT
85F w/ PMHx DM, HTN, HLD, CAD s/p remote PCI, COPD on home O2, HFpEF, recurrent bladder CA s/p TURBT ( 01/2020) was sent over from presurgical testing to the ED for evaluation of SOB. Pt was scheduled for a cystoscopy today but was found to need increasing O2 requirements prompting the ED transfer. Pt give h/o worsening exertional dyspnea and pdt yellow cough. No fevers, CP, N/V/D/C or pain abdomen. Pt was placed on venti mask @ 50% and her Sat was ~ 86%. CT chest w/ segmental and subsegmental PEs. Pt admitted to ICU but refused aggressive therapy with thrombolytics. Palliative care now following. Pt remains on HFO2. She is a former smoker of 1 ppd x 60+ years but quit in 2016. She is followed by me with an FEV1 of 0.93L (6%) and normal FVC of 1.5L (81%) with elevated lung volumes and severely reduced diffusion capacity.    Found to have bilateral PE with severe pulmonary HT and RV dilatation    Tolerating less oxygen at this time    L DVT and b/l PE  Severe COPD with severely reduced diffusion capacity but preserved flows and volumes  Echo with R strain and severe pulm HTN   Pt with likely chronic pulm HTN from long h/o smoking, COPD and hypoxia, which is likely now worse due to PE  Acute on chronic hypoxic respiratory failure  Home hospice appropriate if 02 weaned down     On Eliquis    Discussed with family

## 2020-03-11 NOTE — PROGRESS NOTE ADULT - PROBLEM SELECTOR PLAN 5
Met with family - daughter Sury and grandson Met with family - daughter Sury and grandson  See [facility] note  In summary  1. Patient/family agreeable to wean off Oxygen to a level whereupon she can go home. Currently at 15L of O2 with Oximask.  Agreeable to Roxanol PRN if becomes dyspneic.  D  2. D/C   3. Twice daily fingersticks.

## 2020-03-11 NOTE — SWALLOW BEDSIDE ASSESSMENT ADULT - SLP PERTINENT HISTORY OF CURRENT PROBLEM
Per Md note: "84y/o F with DM, HTN, HLD, CAD s/p remote PCI, COPD on home O2, HFpEF, recurrent bladder CA s/p TURBT ( 01/2020) was sent over from presurgical testing to the ED for evaluation of SOB. Pt was scheduled for a cystoscopy but was found to need increasing O2 requirements prompting the ED transfer. Pt give h/o worsening exertional dyspnea and productive  yellow cough on admission No fevers.  Pt was placed on venti mask @ 50% and her Sat was ~ 86%. CT chest w/ segmental and subsegmental PEs admitted to ICU TPA offered by ICU team, she refused, signed DNI/DNI, she does not want any aggressive heroic measures, family is supporting her on this decision"

## 2020-03-11 NOTE — SWALLOW BEDSIDE ASSESSMENT ADULT - SWALLOW EVAL: RECOMMENDED FEEDING/EATING TECHNIQUES
allow for swallow between intakes/maintain upright posture during/after eating for 30 mins/crush medication (when feasible)/position upright (90 degrees)/small sips/bites/no straws/oral hygiene

## 2020-03-11 NOTE — PROGRESS NOTE ADULT - ASSESSMENT
85F with COPD, CAD, DM, admitted with acute hypoxic respiratory failure found to have segmental / subsegmental bilateral PE's, LLE DVT, COPD exacerbation, currently on high settings of high flow

## 2020-03-11 NOTE — PROGRESS NOTE ADULT - SUBJECTIVE AND OBJECTIVE BOX
CC:  follow up GOC  INTERVAL HPI/OVERNIGHT EVENTS:    PRESENT SYMPTOMS: SOURCE:  Patient/Family/Team    PAIN SCALE:  0 = none  1 = mild   2 = moderate  3 = severe    Pain: 1    Dyspnea:  [ ] YES [ x] NO  Anxiety:  [ ] YES [ x] NO  Fatigue: [ x] YES [ ] NO  Nausea: [ ] YES [ x] NO  Loss of Appetite: [ x] YES [ ] NO  Other symptoms: __________    MEDICATIONS  (STANDING):  apixaban 5 milliGRAM(s) Oral every 12 hours  aspirin  chewable 81 milliGRAM(s) Oral daily  atorvastatin 40 milliGRAM(s) Oral at bedtime  budesonide  80 MICROgram(s)/formoterol 4.5 MICROgram(s) Inhaler 2 Puff(s) Inhalation two times a day  dextrose 5%. 1000 milliLiter(s) (50 mL/Hr) IV Continuous <Continuous>  dextrose 50% Injectable 12.5 Gram(s) IV Push once  dextrose 50% Injectable 25 Gram(s) IV Push once  dextrose 50% Injectable 25 Gram(s) IV Push once  furosemide    Tablet 40 milliGRAM(s) Oral daily  insulin lispro (HumaLOG) corrective regimen sliding scale   SubCutaneous Before meals and at bedtime  metoprolol succinate ER 25 milliGRAM(s) Oral daily  predniSONE   Tablet 20 milliGRAM(s) Oral daily  spironolactone 25 milliGRAM(s) Oral daily    MEDICATIONS  (PRN):  albuterol/ipratropium for Nebulization 3 milliLiter(s) Nebulizer every 6 hours PRN Shortness of Breath and/or Wheezing  dextrose 40% Gel 15 Gram(s) Oral once PRN Blood Glucose LESS THAN 70 milliGRAM(s)/deciliter  glucagon  Injectable 1 milliGRAM(s) IntraMuscular once PRN Glucose LESS THAN 70 milligrams/deciliter  guaiFENesin  milliGRAM(s) Oral every 12 hours PRN congestion  morphine Concentrate 2.5 milliGRAM(s) Oral every 4 hours PRN Dyspnea      Allergies    No Known Allergies    Intolerances    Karnofsky Performance Score/Palliative Performance Status Version 2:   40  %    Vital Signs Last 24 Hrs  T(C): 36.8 (11 Mar 2020 08:31), Max: 36.8 (11 Mar 2020 08:31)  T(F): 98.2 (11 Mar 2020 08:31), Max: 98.2 (11 Mar 2020 08:31)  HR: 66 (11 Mar 2020 09:18) (66 - 71)  BP: 112/68 (11 Mar 2020 08:31) (92/49 - 112/68)  BP(mean): --  RR: 18 (11 Mar 2020 08:31) (18 - 18)  SpO2: 92% (11 Mar 2020 09:18) (90% - 93%)    PHYSICAL EXAM: on Oximask 15L    General: alert NAD    HEENT: [x ] normal  [ ] dry mouth  [ ] ET tube/trach    Lungs: [ x] comfortable [ ] tachypnea/labored breathing  [ ] excessive secretions    CV: [x ] normal  [ ] tachycardia    GI: [x ] normal  [ ] distended  [ ] tender  [ ] no BS               [ ] PEG/NG/OG tube    : [ ]x normal  [ ] incontinent  [ ] oliguria/anuria  [ ] ceron    MSK: [ ] normal  [x] weakness  [ ] edema             [ ] ambulatory  [x ] bedbound/wheelchair bound    Skin: [ ] normal  [ ] pressure ulcers- Stage_____  [x ] no rash    LABS:                        13.4   14.66 )-----------( 352      ( 10 Mar 2020 09:23 )             45.1     03-10    141  |  95<L>  |  21.0<H>  ----------------------------<  131<H>  4.7   |  34.0<H>  |  0.57    Ca    8.9      10 Mar 2020 09:23          I&O's Summary    10 Mar 2020 07:01  -  11 Mar 2020 07:00  --------------------------------------------------------  IN: 0 mL / OUT: 800 mL / NET: -800 mL        Thank you for the opportunity to assist with the care of this patient.   Normal Palliative Medicine Consult Service [phone].

## 2020-03-11 NOTE — PROGRESS NOTE ADULT - ASSESSMENT
86y/o F with DM, HTN, HLD, CAD s/p remote PCI, COPD on home O2, HFpEF, recurrent bladder CA s/p TURBT ( 01/2020) was sent over from presurgical testing to the ED for evaluation of SOB. Pt was scheduled for a cystoscopy but was found to need increasing O2 requirements prompting the ED transfer. Pt give h/o worsening exertional dyspnea and productive  yellow cough on admission No fevers.  Pt was placed on venti mask @ 50% and her Sat was ~ 86%. CT chest w/ segmental and subsegmental PEs admitted to ICU   TPA offered by ICU team, she refused, signed DNI/DNI, she does not want any aggressive heroic measures, family is supporting her on this decision    Bilateral PE with DVT   -refused TPA on admission per ICU    -on eliquis   -repeat echo with limited information; presence of intact intra-atrial septum  -cont oxygen supportive care    Respiratory failure acute on chronic hypoxia - multifactorial  -PE/ COPD / pulmonary hypertension / diastolic CHF   -cont supportive medical therapy   -pulmonary follow up appreciated     COPD with exacerbation on home oxygen   -cont steroid taper , neb therapy     Diastolic CHF acute on chronic   -cont lasix , aldactone  -cardiology input noted   -signed off     Bladder cancer h/o surgery   -was undergoing preop clearance for another surgery by JOCELIN Sanders  -seen by JOCELIN : not candidate  for surgery at present   -family and pt are also leaning  toward comfort     Hypokalemia   resolved      d/w palliative care, pt, pt's grandson. plan for trial of Oximask today, PRN roxanol if becomes dyspneic. If tolerating, will d/c home with hospice tomorrow.

## 2020-03-11 NOTE — PROGRESS NOTE ADULT - SUBJECTIVE AND OBJECTIVE BOX
TAMIKO KELLY    [id number]    85y      Female    CC: SOB    INTERVAL HPI/OVERNIGHT EVENTS: Pt seen and examined. No acute events overnight.     REVIEW OF SYSTEMS:    CONSTITUTIONAL: No fever, weight loss, or fatigue  RESPIRATORY: No cough, wheezing, hemoptysis; No shortness of breath  CARDIOVASCULAR: No chest pain, palpitations  GASTROINTESTINAL: No abdominal or epigastric pain. No nausea, vomiting  NEUROLOGICAL: No headaches, memory loss, loss of strength.    Vital Signs Last 24 Hrs  T(C): 36.8 (11 Mar 2020 08:31), Max: 36.8 (11 Mar 2020 08:31)  T(F): 98.2 (11 Mar 2020 08:31), Max: 98.2 (11 Mar 2020 08:31)  HR: 66 (11 Mar 2020 09:18) (66 - 71)  BP: 112/68 (11 Mar 2020 08:31) (95/55 - 112/68)  BP(mean): --  RR: 17 (11 Mar 2020 12:00) (17 - 18)  SpO2: 90% (11 Mar 2020 12:00) (90% - 93%)    PHYSICAL EXAM:    GENERAL: NAD  HEENT: PERRL, +EOMI  NECK: soft, supple  CHEST/LUNG: Clear to auscultation bilaterally; No wheezing  HEART: S1S2+, Regular rate and rhythm; No murmurs, rubs, or gallops  ABDOMEN: Soft, Nontender, Nondistended; Bowel sounds present  SKIN: No rashes or lesions  NEURO: AAOX3, no focal deficits, no motor or sensory loss  PSYCH: normal mood    LABS:                        13.4   14.66 )-----------( 352      ( 10 Mar 2020 09:23 )             45.1     03-10    141  |  95<L>  |  21.0<H>  ----------------------------<  131<H>  4.7   |  34.0<H>  |  0.57    Ca    8.9      10 Mar 2020 09:23              MEDICATIONS  (STANDING):  apixaban 5 milliGRAM(s) Oral every 12 hours  aspirin  chewable 81 milliGRAM(s) Oral daily  atorvastatin 40 milliGRAM(s) Oral at bedtime  budesonide  80 MICROgram(s)/formoterol 4.5 MICROgram(s) Inhaler 2 Puff(s) Inhalation two times a day  dextrose 5%. 1000 milliLiter(s) (50 mL/Hr) IV Continuous <Continuous>  dextrose 50% Injectable 12.5 Gram(s) IV Push once  dextrose 50% Injectable 25 Gram(s) IV Push once  dextrose 50% Injectable 25 Gram(s) IV Push once  furosemide    Tablet 40 milliGRAM(s) Oral daily  insulin lispro (HumaLOG) corrective regimen sliding scale   SubCutaneous Before meals and at bedtime  metoprolol succinate ER 25 milliGRAM(s) Oral daily  predniSONE   Tablet 20 milliGRAM(s) Oral daily  spironolactone 25 milliGRAM(s) Oral daily    MEDICATIONS  (PRN):  albuterol/ipratropium for Nebulization 3 milliLiter(s) Nebulizer every 6 hours PRN Shortness of Breath and/or Wheezing  dextrose 40% Gel 15 Gram(s) Oral once PRN Blood Glucose LESS THAN 70 milliGRAM(s)/deciliter  glucagon  Injectable 1 milliGRAM(s) IntraMuscular once PRN Glucose LESS THAN 70 milligrams/deciliter  guaiFENesin  milliGRAM(s) Oral every 12 hours PRN congestion  morphine Concentrate 2.5 milliGRAM(s) Oral every 4 hours PRN Dyspnea      RADIOLOGY & ADDITIONAL TESTS: TAMIKO KELLY    [id number]    85y      Female    CC: SOB    INTERVAL HPI/OVERNIGHT EVENTS: Pt seen and examined. No acute events overnight. Pt seen OOB to chair. Grandson at bedside    REVIEW OF SYSTEMS:    CONSTITUTIONAL: No fever, weight loss, or fatigue  RESPIRATORY: No cough, wheezing, hemoptysis; No shortness of breath  CARDIOVASCULAR: No chest pain, palpitations  GASTROINTESTINAL: No abdominal or epigastric pain. No nausea, vomiting  NEUROLOGICAL: No headaches, memory loss, loss of strength.    Vital Signs Last 24 Hrs  T(C): 36.8 (11 Mar 2020 08:31), Max: 36.8 (11 Mar 2020 08:31)  T(F): 98.2 (11 Mar 2020 08:31), Max: 98.2 (11 Mar 2020 08:31)  HR: 66 (11 Mar 2020 09:18) (66 - 71)  BP: 112/68 (11 Mar 2020 08:31) (95/55 - 112/68)  BP(mean): --  RR: 17 (11 Mar 2020 12:00) (17 - 18)  SpO2: 90% (11 Mar 2020 12:00) (90% - 93%)    PHYSICAL EXAM:    GENERAL: NAD  HEENT: PERRL, +EOMI  NECK: soft, supple  CHEST/LUNG: Clear to auscultation bilaterally; non-labored respirations on oxymask  HEART: S1S2+, Regular rate and rhythm; No murmurs, rubs, or gallops  ABDOMEN: Soft, Nontender, Nondistended; Bowel sounds present  SKIN: No rashes or lesions  NEURO: awake, alert, non-focal  PSYCH: normal affect    LABS:                        13.4   14.66 )-----------( 352      ( 10 Mar 2020 09:23 )             45.1     03-10    141  |  95<L>  |  21.0<H>  ----------------------------<  131<H>  4.7   |  34.0<H>  |  0.57    Ca    8.9      10 Mar 2020 09:23              MEDICATIONS  (STANDING):  apixaban 5 milliGRAM(s) Oral every 12 hours  aspirin  chewable 81 milliGRAM(s) Oral daily  atorvastatin 40 milliGRAM(s) Oral at bedtime  budesonide  80 MICROgram(s)/formoterol 4.5 MICROgram(s) Inhaler 2 Puff(s) Inhalation two times a day  dextrose 5%. 1000 milliLiter(s) (50 mL/Hr) IV Continuous <Continuous>  dextrose 50% Injectable 12.5 Gram(s) IV Push once  dextrose 50% Injectable 25 Gram(s) IV Push once  dextrose 50% Injectable 25 Gram(s) IV Push once  furosemide    Tablet 40 milliGRAM(s) Oral daily  insulin lispro (HumaLOG) corrective regimen sliding scale   SubCutaneous Before meals and at bedtime  metoprolol succinate ER 25 milliGRAM(s) Oral daily  predniSONE   Tablet 20 milliGRAM(s) Oral daily  spironolactone 25 milliGRAM(s) Oral daily    MEDICATIONS  (PRN):  albuterol/ipratropium for Nebulization 3 milliLiter(s) Nebulizer every 6 hours PRN Shortness of Breath and/or Wheezing  dextrose 40% Gel 15 Gram(s) Oral once PRN Blood Glucose LESS THAN 70 milliGRAM(s)/deciliter  glucagon  Injectable 1 milliGRAM(s) IntraMuscular once PRN Glucose LESS THAN 70 milligrams/deciliter  guaiFENesin  milliGRAM(s) Oral every 12 hours PRN congestion  morphine Concentrate 2.5 milliGRAM(s) Oral every 4 hours PRN Dyspnea      RADIOLOGY & ADDITIONAL TESTS:

## 2020-03-11 NOTE — CHART NOTE - NSCHARTNOTEFT_GEN_A_CORE
Source: Patient [ x]  Family [ ]   other [x ]EMR  Family present    Current Diet: Consistent CHO, mech soft with Glucerna shake TID    Patient reports [ ] nausea  [ ] vomiting [ ] diarrhea [ ] constipation  [ ]chewing problems [ ] swallowing issues  [ ] other: no issues    PO intake:  < 50% [ ]   50-75%  [ ]   %  [x ]  other :    Source for PO intake [x ] Patient [ ] family [ ] chart [ ] staff [ ] other    Enteral /Parenteral Nutrition:     Current Weight: 3/2: 79kg, 3/5 84.5kg  2/28: 77kg  no edema noted  question accuracy,  will continue to monitor wt's for trends.           Pertinent Medications: MEDICATIONS  (STANDING):  apixaban 5 milliGRAM(s) Oral every 12 hours  aspirin  chewable 81 milliGRAM(s) Oral daily  atorvastatin 40 milliGRAM(s) Oral at bedtime  budesonide  80 MICROgram(s)/formoterol 4.5 MICROgram(s) Inhaler 2 Puff(s) Inhalation two times a day  dextrose 5%. 1000 milliLiter(s) (50 mL/Hr) IV Continuous <Continuous>  dextrose 50% Injectable 12.5 Gram(s) IV Push once  dextrose 50% Injectable 25 Gram(s) IV Push once  dextrose 50% Injectable 25 Gram(s) IV Push once  furosemide    Tablet 40 milliGRAM(s) Oral daily  insulin lispro (HumaLOG) corrective regimen sliding scale   SubCutaneous Before meals and at bedtime  metoprolol succinate ER 25 milliGRAM(s) Oral daily  predniSONE   Tablet 20 milliGRAM(s) Oral daily  spironolactone 25 milliGRAM(s) Oral daily    MEDICATIONS  (PRN):  albuterol/ipratropium for Nebulization 3 milliLiter(s) Nebulizer every 6 hours PRN Shortness of Breath and/or Wheezing  dextrose 40% Gel 15 Gram(s) Oral once PRN Blood Glucose LESS THAN 70 milliGRAM(s)/deciliter  glucagon  Injectable 1 milliGRAM(s) IntraMuscular once PRN Glucose LESS THAN 70 milligrams/deciliter  guaiFENesin  milliGRAM(s) Oral every 12 hours PRN congestion    Pertinent Labs: CBC Full  -  ( 10 Mar 2020 09:23 )  WBC Count : 14.66 K/uL  RBC Count : 5.26 M/uL  Hemoglobin : 13.4 g/dL  Hematocrit : 45.1 %  Platelet Count - Automated : 352 K/uL  Mean Cell Volume : 85.7 fl  Mean Cell Hemoglobin : 25.5 pg  Mean Cell Hemoglobin Concentration : 29.7 gm/dL  Auto Neutrophil # : x  Auto Lymphocyte # : x  Auto Monocyte # : x  Auto Eosinophil # : x  Auto Basophil # : x  Auto Neutrophil % : x  Auto Lymphocyte % : x  Auto Monocyte % : x  Auto Eosinophil % : x  Auto Basophil % : x      03-10 Na141 mmol/L Glu 131 mg/dL<H> K+ 4.7 mmol/L Cr  0.57 mg/dL BUN 21.0 mg/dL<H> Phos n/a   Alb n/a   PAB n/a           Skin:     Nutrition focused physical exam conducted - found signs of malnutrition [ ]absent [x ]present    Subcutaneous fat loss: [ ] Orbital fat pads region, [ ]Buccal fat region, [ ]Triceps region,  [ ]Ribs region    Muscle wasting: [x ]Temples region, [x ]Clavicle region, [ ]Shoulder region, [ ]Scapula region, [ ]Interosseous region,  [ ]thigh region, [ ]Calf region    Estimated Needs:   [x ] no change since previous assessment  [ ] recalculated:     Current Nutrition Diagnosis: Pt remains at high nutrition risk secondary to moderate-chronic protein calorie malnutrition related to inadequate protein-energy intake with increased needs in setting of recurrent bladder cancer, COPD and now presenting with PE's as evidenced by meeting less then 75% estimated energy requirements  greater than 1 month, 7% weight loss, and physical signs of muscle mass loss in temple and clavicle regions.  Pt consuming % at meals with assistance, taking Glucerna fair to good. Last bowel movement 3/8 as per flow sheets.    Recommendations:   1. RX: MVI daily   2. Encourage/Assistance with meals   3. Continue Glucerna shake TID   4. Check wt daily to monitor trends         Monitoring and Evaluation:   [ x] PO intake [x] Tolerance to diet prescription [X] Weights  [X] Follow up per protocol [X] Labs:

## 2020-03-11 NOTE — SWALLOW BEDSIDE ASSESSMENT ADULT - SWALLOW EVAL: DIAGNOSIS
Mild oral dysphagia marked by prolonged but functional mastication and delayed oral transit time for solids. Pharyngeal stage appears WFL with no overt s/s aspiration at bedside

## 2020-03-11 NOTE — SWALLOW BEDSIDE ASSESSMENT ADULT - ORAL PREPARATORY PHASE
slow oral prep; pt reports she is a "slow eater" reduced, slow  mastication, however functional Within functional limits

## 2020-03-11 NOTE — SWALLOW BEDSIDE ASSESSMENT ADULT - ASR SWALLOW ASPIRATION MONITOR
position upright (90Y)/oral hygiene/fever/pneumonia/throat clearing/cough/gurgly voice/upper respiratory infection/change of breathing pattern

## 2020-03-12 NOTE — GOALS OF CARE CONVERSATION - ADVANCED CARE PLANNING - NS PRO AD PATIENT TYPE ON CHART
Do Not Resuscitate (DNR)/Medical Orders for Life-Sustaining Treatment (MOLST)
Medical Orders for Life-Sustaining Treatment (MOLST)/Do Not Resuscitate (DNR)
Do Not Resuscitate (DNR)/Medical Orders for Life-Sustaining Treatment (MOLST)
Medical Orders for Life-Sustaining Treatment (MOLST)/Do Not Resuscitate (DNR)

## 2020-03-12 NOTE — DISCHARGE NOTE NURSING/CASE MANAGEMENT/SOCIAL WORK - PATIENT PORTAL LINK FT
You can access the FollowMyHealth Patient Portal offered by [facility] by registering at the following website: [email or web address]. By joining [facility]’s FollowMyHealth portal, you will also be able to view your health information using other applications (apps) compatible with our system.

## 2020-03-12 NOTE — DISCHARGE NOTE NURSING/CASE MANAGEMENT/SOCIAL WORK - NSDCPEEMAIL_GEN_ALL_CORE
[facility] for Tobacco Control email [email or web address]

## 2020-03-12 NOTE — PROGRESS NOTE ADULT - PROBLEM SELECTOR PLAN 1
On Oximask - placed on 10L overnight.  Patient non compliant with Oxygen- was on room air yesterday without issue  Can likely tolerate nasal canula

## 2020-03-12 NOTE — DISCHARGE NOTE PROVIDER - NSDCMRMEDTOKEN_GEN_ALL_CORE_FT
Advair Diskus 250 mcg-50 mcg inhalation powder: 1 puff(s) inhaled 2 times a day  albuterol 90 mcg/inh inhalation aerosol: 2 puff(s) inhaled 4 times a day, As Needed  aspirin 81 mg oral tablet, chewable: 1 tab(s) orally once a day  cephalexin 250 mg oral capsule: 1 cap(s) orally 3 times a day   famotidine 20 mg oral tablet: 1 tab(s) orally 2 times a day, As Needed  furosemide 20 mg oral tablet: 1 tab(s) orally once a day  ipratropium-albuterol 0.5 mg-2.5 mg/3 mLinhalation solution: 3 milliliter(s) inhaled every 6 hours  Lipitor: 40 milligram(s) orally once a day (at bedtime)  losartan 25 mg oral tablet: 1 tab(s) orally once a day  metFORMIN 500 mg oral tablet: 1 tab(s) orally 2 times a day  metoprolol succinate 25 mg oral tablet, extended release: 1 tab(s) orally once a day  Spiriva 18 mcg inhalation capsule: 1 cap(s) inhaled once a day  spironolactone 25 mg oral tablet: 1 tab(s) orally once a day  Vitamin D3 2000 intl units oral tablet: 1 tab(s) orally once a day Advair Diskus 250 mcg-50 mcg inhalation powder: 1 puff(s) inhaled 2 times a day  albuterol 90 mcg/inh inhalation aerosol: 2 puff(s) inhaled 4 times a day, As Needed  apixaban 5 mg oral tablet: 1 tab(s) orally every 12 hours  aspirin 81 mg oral tablet, chewable: 1 tab(s) orally once a day  atorvastatin 40 mg oral tablet: 1 tab(s) orally once a day (at bedtime)  famotidine 20 mg oral tablet: 1 tab(s) orally 2 times a day, As Needed  furosemide 40 mg oral tablet: 1 tab(s) orally once a day  guaiFENesin 600 mg oral tablet, extended release: 1 tab(s) orally every 12 hours, As needed, congestion  ipratropium-albuterol 0.5 mg-2.5 mg/3 mLinhalation solution: 3 milliliter(s) inhaled every 6 hours  metoprolol succinate 25 mg oral tablet, extended release: 1 tab(s) orally once a day  morphine 20 mg/mL oral concentrate: 2.5 milligram(s) orally every 4 hours, As Needed -Dyspnea MDD:15mg  predniSONE 10 mg oral tablet: 1 tab(s) orally once a day x2 days   Spiriva 18 mcg inhalation capsule: 1 cap(s) inhaled once a day  spironolactone 25 mg oral tablet: 1 tab(s) orally once a day  Vitamin D3 2000 intl units oral tablet: 1 tab(s) orally once a day

## 2020-03-12 NOTE — PROGRESS NOTE ADULT - PROBLEM SELECTOR PLAN 4
Patient does not want fingersticks.   Discussed with family - agreeable to twice daily fingersticks
No significant infiltrates on CTA chest, however, will empirically treat with ceftriaxone and azithromycin given severe COPD, productive cough, elevated WBC. Blood cultures pending. Add sputum culture and procalcitonin. Deescalate abx as able.
Patient does not want fingersticks.   Discussed with family - agreeable to twice daily fingersticks.

## 2020-03-12 NOTE — PROGRESS NOTE ADULT - SUBJECTIVE AND OBJECTIVE BOX
CC: follow up symptoms  INTERVAL HPI/OVERNIGHT EVENTS:  on Oximask - 10L   PRESENT SYMPTOMS: SOURCE:  Patient/Family/Team    PAIN SCALE:  0 = none  1 = mild   2 = moderate  3 = severe    Pain: denies    Dyspnea:  [ ] YES [ x] NO  Anxiety:  [ ] YES [ x] NO  Fatigue: [ x] YES [ ] NO  Nausea: [ ] YES [ x] NO  Loss of Appetite: [ x] YES [ ] NO  Other symptoms: __________    MEDICATIONS  (STANDING):  apixaban 5 milliGRAM(s) Oral every 12 hours  aspirin  chewable 81 milliGRAM(s) Oral daily  atorvastatin 40 milliGRAM(s) Oral at bedtime  budesonide  80 MICROgram(s)/formoterol 4.5 MICROgram(s) Inhaler 2 Puff(s) Inhalation two times a day  dextrose 5%. 1000 milliLiter(s) (50 mL/Hr) IV Continuous <Continuous>  dextrose 50% Injectable 12.5 Gram(s) IV Push once  dextrose 50% Injectable 25 Gram(s) IV Push once  dextrose 50% Injectable 25 Gram(s) IV Push once  furosemide    Tablet 40 milliGRAM(s) Oral daily  insulin lispro (HumaLOG) corrective regimen sliding scale   SubCutaneous Before meals and at bedtime  metoprolol succinate ER 25 milliGRAM(s) Oral daily  spironolactone 25 milliGRAM(s) Oral daily    MEDICATIONS  (PRN):  albuterol/ipratropium for Nebulization 3 milliLiter(s) Nebulizer every 6 hours PRN Shortness of Breath and/or Wheezing  dextrose 40% Gel 15 Gram(s) Oral once PRN Blood Glucose LESS THAN 70 milliGRAM(s)/deciliter  glucagon  Injectable 1 milliGRAM(s) IntraMuscular once PRN Glucose LESS THAN 70 milligrams/deciliter  guaiFENesin  milliGRAM(s) Oral every 12 hours PRN congestion  morphine Concentrate 2.5 milliGRAM(s) Oral every 4 hours PRN Dyspnea      Allergies    No Known Allergies    Intolerances    Karnofsky Performance Score/Palliative Performance Status Version 2:  30  %    Vital Signs Last 24 Hrs  T(C): 36.6 (12 Mar 2020 07:51), Max: 36.8 (11 Mar 2020 17:30)  T(F): 97.8 (12 Mar 2020 07:51), Max: 98.3 (11 Mar 2020 21:04)  HR: 59 (12 Mar 2020 09:01) (59 - 86)  BP: 110/58 (12 Mar 2020 07:51) (103/58 - 121/61)  BP(mean): --  RR: 19 (12 Mar 2020 07:51) (17 - 19)  SpO2: 92% (12 Mar 2020 09:01) (89% - 93%)    PHYSICAL EXAM:    General: sleeping, arousable, verbal - denied discomfort  HEENT: [ x] normal  [ ] dry mouth  [ ] ET tube/trach    Lungs: [ x] comfortable [ ] tachypnea/labored breathing  [ ] excessive secretions    CV: [ x] normal  [ ] tachycardia    GI: [x ] normal  [ ] distended  [ ] tender  [ ] no BS               [ ] PEG/NG/OG tube    : [ x] normal  [ ] incontinent  [ ] oliguria/anuria  [ ] ceron    MSK: [ ] normal  [ x] weakness  [ ] edema             [ ] ambulatory  [ ] bedbound/wheelchair bound    Skin: [ ] normal  [ ] pressure ulcers- Stage_____  [ x] no rash    LABS:              I&O's Summary    11 Mar 2020 07:01  -  12 Mar 2020 07:00  --------------------------------------------------------  IN: 0 mL / OUT: 850 mL / NET: -850 mL        Thank you for the opportunity to assist with the care of this patient.   [employer] [facility] Consult Service [phone].

## 2020-03-12 NOTE — DISCHARGE NOTE PROVIDER - NSDCFUSCHEDAPPT_GEN_ALL_CORE_FT
TAMIKO KELLY ; 03/13/2020 ; [facility]  TAMIKO KELLY ; 03/23/2020 ; [facility] [address] [place]  TAMIKO KELLY ; 04/06/2020 ; [facility] Card[facility]  TAMIKO KELLY ; 03/13/2020 ; [facility]  TAMIKO KELLY ; 03/23/2020 ; [facility] [address] [place]  TAMIKO KELLY ; 04/06/2020 ; NPP Cardio 9 [facility]  TAMIKO KELLY ; 03/13/2020 ; [facility]  TAMIKO KELLY ; 03/23/2020 ; [facility] Urology [address] [place]  TAMIKO KELLY ; 04/06/2020 ; NPP Cardio 9 [facility]  TAMIKO KELLY ; 03/13/2020 ; [facility]  TAMIKO KELLY ; 03/23/2020 ; N[facility] Urology [address] [place]  TAMIKO KELLY ; 04/06/2020 ; N[facility] Cardio 9 [facility]

## 2020-03-12 NOTE — GOALS OF CARE CONVERSATION - ADVANCED CARE PLANNING - NS PRO AD PATIENT TYPE
Do Not Resuscitate (DNR)/Medical Orders for Life-Sustaining Treatment (MOLST)
Medical Orders for Life-Sustaining Treatment (MOLST)/Do Not Resuscitate (DNR)
Do Not Resuscitate (DNR)/Medical Orders for Life-Sustaining Treatment (MOLST)
Do Not Resuscitate (DNR)/Medical Orders for Life-Sustaining Treatment (MOLST)

## 2020-03-12 NOTE — DISCHARGE NOTE PROVIDER - CARE PROVIDER_API CALL
Cristhian Stanford)  Critical Care Medicine; Pulmonary Disease; Sleep Medicine  [address] [place] [address]  Phone: [phone]  Fax: [phone]  Follow Up Time:

## 2020-03-12 NOTE — DISCHARGE NOTE NURSING/CASE MANAGEMENT/SOCIAL WORK - NSDCPEWEB_GEN_ALL_CORE
[place] website --- [email or web address] for [facility] website --- [email or web address]

## 2020-03-12 NOTE — DISCHARGE NOTE PROVIDER - HOSPITAL COURSE
85F w/ PMHx DM, HTN, HLD, CAD s/p remote PCI, COPD on home O2, HFpEF, recurrent bladder CA s/p TURBT ( 01/2020) was sent over from presurgical testing to the ED for evaluation of SOB. Pt was scheduled for a cystoscopy today but was found to need increasing O2 requirements prompting the ED transfer. Pt give h/o worsening exertional dyspnea and pdt yellow cough. No fevers, CP, N/V/D/C or pain abdomen. Pt was placed on venti mask @ 50% and her Sat was ~ 86%. CT chest w/ segmental and subsegmental PEs, admitted to ICU. TPA offered by ICU team, pt refused, signed DNR/DNI.     Bilateral PE with DVT     -refused TPA on admission per ICU      -on eliquis     -repeat echo with limited information; presence of intact intra-atrial septum    -cont oxygen supportive care        Respiratory failure acute on chronic hypoxia - multifactorial    -PE/ COPD / pulmonary hypertension / diastolic CHF     -cont supportive medical therapy     -pulmonary follow up appreciated         COPD with exacerbation on home oxygen     -cont steroid taper , neb therapy         Diastolic CHF acute on chronic     -cont lasix , aldactone    -cardiology input noted     -signed off         Bladder cancer h/o surgery     -was undergoing preop clearance for another surgery by JOCELIN Sanders    -seen by  : not candidate  for surgery at present     -family and pt are also leaning  toward comfort         Hypokalemia     resolved          d/w palliative care, pt, pt's grandson. tolerating Oximask today, PRN roxanol if becomes dyspneic. D/c home with hospice.         Vital Signs Last 24 Hrs    T(C): 36.6 (12 Mar 2020 07:51), Max: 36.8 (11 Mar 2020 17:30)    T(F): 97.8 (12 Mar 2020 07:51), Max: 98.3 (11 Mar 2020 21:04)    HR: 59 (12 Mar 2020 09:01) (59 - 86)    BP: 110/58 (12 Mar 2020 07:51) (103/58 - 121/61)    BP(mean): --    RR: 19 (12 Mar 2020 07:51) (17 - 19)    SpO2: 92% (12 Mar 2020 09:01) (89% - 93%)         GENERAL: NAD    HEENT: PERRL, +EOMI    NECK: soft, supple    CHEST/LUNG: Clear to auscultation bilaterally; non-labored respirations on oxymask    HEART: S1S2+, Regular rate and rhythm; No murmurs, rubs, or gallops    ABDOMEN: Soft, Nontender, Nondistended; Bowel sounds present    SKIN: No rashes or lesions    NEURO: awake, alert, non-focal        36 minutes spent on discharge 85F w/ PMHx DM, HTN, HLD, CAD s/p remote PCI, COPD on home O2, HFpEF, recurrent bladder CA s/p TURBT ( 01/2020) was sent over from presurgical testing to the ED for evaluation of SOB. Pt was scheduled for a cystoscopy today but was found to need increasing O2 requirements prompting the ED transfer. Pt give h/o worsening exertional dyspnea and pdt yellow cough. No fevers, CP, N/V/D/C or pain abdomen. Pt was placed on venti mask @ 50% and her Sat was ~ 86%. CT chest w/ segmental and subsegmental PEs, admitted to ICU. TPA offered by ICU team, pt refused, signed DNR/DNI.         Bilateral PE with DVT     -refused TPA on admission per ICU      -on eliquis     -repeat echo with limited information; presence of intact intra-atrial septum    -cont oxygen supportive care        Respiratory failure acute on chronic hypoxia - multifactorial    -PE/ COPD / pulmonary hypertension / diastolic CHF     -cont supportive medical therapy     -pulmonary follow up appreciated         COPD with exacerbation on home oxygen     -cont steroid taper , neb therapy         Diastolic CHF acute on chronic     -cont lasix , aldactone    -cardiology input noted     -signed off         Bladder cancer h/o surgery     -was undergoing preop clearance for another surgery by JOCELIN Sanders    -seen by  : not candidate  for surgery at present     -family and pt are also leaning  toward comfort         Hypokalemia     resolved          d/w palliative care, pt, pt's grandson. tolerating Oximask , PRN roxanol if becomes dyspneic. D/c home with hospice.         Vital Signs Last 24 Hrs    T(C): 36.6 (12 Mar 2020 07:51), Max: 36.8 (11 Mar 2020 17:30)    T(F): 97.8 (12 Mar 2020 07:51), Max: 98.3 (11 Mar 2020 21:04)    HR: 59 (12 Mar 2020 09:01) (59 - 86)    BP: 110/58 (12 Mar 2020 07:51) (103/58 - 121/61)    BP(mean): --    RR: 19 (12 Mar 2020 07:51) (17 - 19)    SpO2: 92% (12 Mar 2020 09:01) (89% - 93%)         GENERAL: NAD    HEENT: PERRL, +EOMI    NECK: soft, supple    CHEST/LUNG: Clear to auscultation bilaterally; non-labored respirations on oxymask    HEART: S1S2+, Regular rate and rhythm; No murmurs, rubs, or gallops    ABDOMEN: Soft, Nontender, Nondistended; Bowel sounds present    SKIN: No rashes or lesions    NEURO: awake, alert, non-focal        36 minutes spent on discharge 85F w/ PMHx DM, HTN, HLD, CAD s/p remote PCI, COPD on home O2, HFpEF, recurrent bladder CA s/p TURBT ( 01/2020) was sent over from presurgical testing to the ED for evaluation of SOB. Pt was scheduled for a cystoscopy today but was found to need increasing O2 requirements prompting the ED transfer. Pt give h/o worsening exertional dyspnea and pdt yellow cough. No fevers, CP, N/V/D/C or pain abdomen. Pt was placed on venti mask @ 50% and her Sat was ~ 86%. CT chest w/ segmental and subsegmental PEs, admitted to ICU. TPA offered by ICU team, pt refused, signed DNR/DNI.         Bilateral PE with DVT     -refused TPA on admission per ICU      -on eliquis     -repeat echo with limited information; presence of intact intra-atrial septum    -cont oxygen supportive care        Respiratory failure acute on chronic hypoxia - multifactorial    -PE/ COPD / pulmonary hypertension / diastolic CHF     -cont supportive medical therapy     -pulmonary follow up appreciated         COPD with exacerbation on home oxygen     -cont steroid taper , neb therapy         Diastolic CHF acute on chronic     -cont lasix , aldactone    -cardiology input noted     -signed off         Bladder cancer h/o surgery     -was undergoing preop clearance for another surgery by JOCELIN Sanders    -seen by  : not candidate  for surgery at present     -family and pt are also leaning  toward comfort         Hypokalemia     resolved          d/w palliative care, pt, pt's grandson. tolerating Oximask , PRN roxanol if becomes dyspneic. D/c home with hospice.         Vital Signs Last 24 Hrs    T(C): 36.6 (12 Mar 2020 07:51), Max: 36.8 (11 Mar 2020 17:30)    T(F): 97.8 (12 Mar 2020 07:51), Max: 98.3 (11 Mar 2020 21:04)    HR: 59 (12 Mar 2020 09:01) (59 - 86)    BP: 110/58 (12 Mar 2020 07:51) (103/58 - 121/61)    BP(mean): --    RR: 19 (12 Mar 2020 07:51) (17 - 19)    SpO2: 92% (12 Mar 2020 09:01) (89% - 93%)         GENERAL: NAD    HEENT: PERRL, +EOMI    NECK: soft, supple    CHEST/LUNG: Clear to auscultation bilaterally; non-labored respirations    HEART: S1S2+, Regular rate and rhythm; No murmurs, rubs, or gallops    ABDOMEN: Soft, Nontender, Nondistended; Bowel sounds present    SKIN: No rashes or lesions    NEURO: awake, alert, non-focal        36 minutes spent on discharge

## 2020-03-12 NOTE — GOALS OF CARE CONVERSATION - ADVANCED CARE PLANNING - CONVERSATION DETAILS
[facility] RN met with patient, patient's daughter, Sury, and patient's grandson at patient bedside. Received patient OOB to chair, having just finished lunch. Patient alert and oriented x 4, intermittently using oxy mask and then taking it off for several minutes at a time.     Writer explained all aspects of home hospice services and answered all questions. Emotional support provided.     DME to be delivered Thursday morning, 3/12: hospital bed with air mattress. As per patient, she has working oxygen concentrator and back up tanks already in place at home.     Will continue to follow re: discharge planning.     Leyda Tellez RN  [phone]
Writer/[facility] RN telephoned patient's daughter, Sury Mason, who confirmed delivery of hospital bed and air mattress to patient's home. Writer collaborated with discharge planner and ordered ambulance home for patient at 3pm today. Telephoned Dr. Bush and advised her of same. Will continue to follow.    Leyda Tellez RN  [phone]
Patient very much wants to go home.  Family states they have been discussed hospice services.  Patient currently on 15L O2 for which cannot be supported at home. Daughter states that patient is on 2L O2 at home and uses it PRN. They report she is  comfortable at 85% pulse Ox at home.   Discussed weaning down O2 to level that can be supported home ( max for an O2 concentrator is 10L). Discussed palliative approach whereby  d/c  and focusing on her symptom with the aid  of Morphine. Family agreeable and hoping patient can go home soon.  Informed patient does not like fingersticks. Agreeable to twice daily Accu-Cheks.
met with family Sury - daughter and grandson at bedside. Patient was also involved in conversation. I discussed with them her overall condition and that she is likely appropriate for home hospice services to try to minimize the back and forth to the hospital and focus more on comfort and quality of life. Patient and her family want to avoid the hospital and she and they want to keep her home. I discussed that given her lung disease, she likely has a prognosis of less than 6 months but of course we cannot be certain if it will be longer or shorter than that. I discussed with them that she must be off high flow in order to go home so we will trial her on oximask today and accept lower sats as long as she is comfortable.

## 2020-03-12 NOTE — PROGRESS NOTE ADULT - PROBLEM SELECTOR PLAN 2
Started on a heparin infusion. Has evidence of right heart strain on CTA chest, however, this may be secondary to chronic pulmonary hypertension given severe COPD. Formal TTE pending. Can likely transition to DOAC once stabilized and able to tolerate PO intake. LE dopplers ordered.
cont O2 support  Family states pulse Ox usually at 85%. Home O2 at 2L PRN.
cont O2 support  Family states pulse Ox usually at 85%. Home O2 at 2L PRN

## 2020-03-12 NOTE — PROGRESS NOTE ADULT - REASON FOR ADMISSION
SOB

## 2020-03-12 NOTE — GOALS OF CARE CONVERSATION - ADVANCED CARE PLANNING - CONVERSATION/DISCUSSION
Hospice Referral
Hospice Referral
Diagnosis/Prognosis/Treatment Options
Hospice Referral/Diagnosis/Treatment Options/Prognosis

## 2020-03-12 NOTE — PROGRESS NOTE ADULT - PROBLEM SELECTOR PLAN 5
Patient non compliant with Oxygen- was on room air yesterday without issue.  Placed on 10L overnight  Can likely tolerate nasal canula  Plan for home hospice

## 2020-03-12 NOTE — DISCHARGE NOTE PROVIDER - NSDCCPCAREPLAN_GEN_ALL_CORE_FT
PRINCIPAL DISCHARGE DIAGNOSIS  Diagnosis: Pulmonary embolism  Assessment and Plan of Treatment: PRINCIPAL DISCHARGE DIAGNOSIS  Diagnosis: Pulmonary embolism  Assessment and Plan of Treatment: on eliquis

## 2020-04-08 PROBLEM — R53.83 FATIGUE: Status: ACTIVE | Noted: 2018-09-17

## 2020-04-08 PROBLEM — I25.10 CAD (CORONARY ARTERY DISEASE): Status: ACTIVE | Noted: 2018-09-17

## 2020-04-08 NOTE — HISTORY OF PRESENT ILLNESS
[FreeTextEntry1] : Pt is an 86 y/o F who was seen today via telehealth.  She has PMH CAD NSTEMI 12/2014 s/p ALLISON to RCA while hospitalized at [facility] for ankle injury, normal LV function, DM, former smoker, COPD.  Diagnosed with bladder cancer s/p BCG treatments.  8/23/2019 had bladder biopsy which was found to be cancer.  Her daughter and granddaughter express concern regarding her medications and if she is taking her medications correctly.  Recently she was told that her statin medication may not be necessary or dose too high (currently on atorvastatin 40mg).  She has been tolerating this medication well - denies any SE.  \par Daughter tells me that her scheduled bladder procedure was cancelled because her oxygen saturation was too low.  They were told that she has an overall poor prognosis.  Overall pt feeling fatigued\par \par This visit was provided via telehealth using real-time 2-way audiovisual technology.  The patient, Marisela Calabrese, was located at home, at the time of visit.\par The provider, Madie Lion DO, was located at the clinic in [place], [place] at the time of visit.  The patient, Marisela Calabrese, pt's daughter, granddaughter and Physician, Madie Lion participated in the telehealth encounter.  \par Verbal consent was given on 04/08/2020 by harry Wyatt.  \par \par TTE 09/2018 shows normal LV function min MR/TR\par Nuclear stress test 10/2018 normal myocardial perfusion\par \par PMH: CAD NSTEMI s/p ALLISON to RCA 12/2014, HLD, HTN, DM, COPD, obesity\par Smoking status: quit 2014\par Current exercise: none\par Family hx: both parents with MI - unknown age\par Previous cardiac testing: stress test 10 yrs ago "normal"\par Previous hospitalizations: ankle surgery 2014

## 2020-04-08 NOTE — DISCUSSION/SUMMARY
[FreeTextEntry1] : Pt is an 84 y/o F with PMH CAD s/p NSTEMI 12/2014 ALLISON to RCA, normal LV function, HTN, HLD, DM, COPD, obesity, bladder cancer \par CAD s/p PCI: c/w ASA, lipitor 40mg and metoprolol for prior NSTEMI\par repeat labs\par DM: follows with PCP. Advised lifestyle modifications\par The described plan was discussed with the pt and family members.  All questions and concerns were addressed to the best of my knowledge. \par \par TTE 9/2018 shows normal LV function with min MR/TR\par Nuclear stress test 10/2018 shows normal myocardial perfusion\par

## 2020-05-06 NOTE — HISTORY OF PRESENT ILLNESS
[TextBox_4] : The patient is an 85-year-old lady who has been here before\par She has severe COPD on inhalers and she is oxygen requiring\par Unfortunately she rarely is compliant with oxygen mother in the office or at home\par \par She has known NSTEMI in 2014 and she has been stented. Recent cardiology followup by Dr. Lion\par \par The patient also has bladder cancer that has been followed by Dr. Sanders\par She was recently being evaluated for another cystoscopy when she required hospital admission for increased dyspnea\par \par It was determined that at that time however that she had developed thromboemboli and she was given full anticoagulation and she is currently on Eliquis\par \par She was evaluated by palliative care and then was accepted into hospice at the time of discharge\par \par She has been doing fairly well at home being seen by hospice nurse once a week\par \par She continues to take her Advair and Spiriva\par She does not appear to be utilized during nebulization at all regularly\par She is supposed to use nasal oxygen at 3 L but rarely uses it at home and certainly did not come to the office with her oxygen

## 2020-05-06 NOTE — ASSESSMENT
[FreeTextEntry1] : The patient is a very pleasant but very willful 85-year-old lady with advanced cardiac and pulmonary disease and she has bladder cancer. She recently was diagnosed to have pulmonary thromboembolism.\par \par She has been under hospice care since discharge from the hospital and is doing relatively well\par I am asking her to continue utilizing her Advair and Spiriva\par She should use p.r.n. albuterol for the nebulizer or MDI\par She should be on continuous oxygen at 3 L\par We spent considerable time in the office discussing her need for continuous oxygen\par She wanted a portable unit but I do not believe that this is required at this time of shelter in place\par \par I have asked her to utilize her oxygen as much as possible and to limit her trips from her house\par \par She will continue to be followed by hospice\par \par I would continue her full anticoagulation for at least 6 months\par It does not appear that there cystoscopy will be able to be done anytime soon\par She currently says that she is exhibiting no evidence of hematuria\par \par I have asked her to return here in 3 months time for an interim reevaluation

## 2020-11-09 PROBLEM — I50.32 CHRONIC DIASTOLIC HEART FAILURE: Status: ACTIVE | Noted: 2019-07-09

## 2020-11-09 PROBLEM — I50.30 DIASTOLIC CONGESTIVE HEART FAILURE: Status: RESOLVED | Noted: 2019-07-10 | Resolved: 2020-01-01

## 2020-11-09 PROBLEM — Z86.711 HISTORY OF PULMONARY EMBOLISM: Status: RESOLVED | Noted: 2020-01-01 | Resolved: 2020-01-01

## 2020-11-09 PROBLEM — I50.30 DIASTOLIC CONGESTIVE HEART FAILURE: Status: RESOLVED | Noted: 2020-01-01 | Resolved: 2020-01-01

## 2020-11-09 PROBLEM — Z23 ENCOUNTER FOR IMMUNIZATION: Status: ACTIVE | Noted: 2020-01-01

## 2020-11-09 NOTE — COUNSELING
[Overweight - ( BMI 25.1 - 29.9 )] : overweight -  ( BMI 25.1 - 29.9 ) [Sodium restriction 2gm recommended] : sodium restriction 2 gm recommended [Continue diet as tolerated] : continue diet as tolerated based on goals of care [Non - Smoker] : non-smoker [Use assistive device to avoid falls] : use assistive device to avoid falls [Remove clutter and unsafe carpeting to avoid falls] : remove clutter and unsafe carpeting to avoid falls [] : diabetic screening [Decrease hospital use] : decrease hospital use [Minimize unnecessary interventions] : minimize unnecessary interventions [Comfort Care] : comfort care [Likely to achieve goals/desired outcomes] : likely to achieve goals/desired outcomes [Completed DNR] : completed DNR [Completed Medical Orders for Life-Sustaining Treatment] : completed medical orders for life-sustaining treatment [DNR] : Code Status: DNR [Limited] : Treatment Guidelines: Limited [DNI] : Intubation: DNI [[facility] Date: _____] [facility]/last verification date: [unfilled] [Established patient, extensive review of history, medical and functional status, risk factors and patient education] : Established patient, extensive review of history, medical and functional status, risk factors and patient education and counseling provided for subsequent annual wellness visit

## 2020-11-09 NOTE — PHYSICAL EXAM
[No Acute Distress] : no acute distress [Well Nourished] : well nourished [Normal Sclera/Conjunctiva] : normal sclera/conjunctiva [Normal Outer Ear/Nose] : the ears and nose were normal in appearance [No JVD] : no jugular venous distention [Supple] : the neck was supple [No Respiratory Distress] : no respiratory distress [No Accessory Muscle Use] : no accessory muscle use [Normal Rate] : heart rate was normal  [Regular Rhythm] : with a regular rhythm [Pedal Pulses Present] : the pedal pulses are present [Normal Bowel Sounds] : normal bowel sounds [Soft] : abdomen soft [No CVA Tenderness] : no ~M costovertebral angle tenderness [Normal Gait] : normal gait [No Joint Swelling] : no joint swelling seen [No Clubbing, Cyanosis] : no clubbing  or cyanosis of the fingernails [No Rash] : no rash [No Skin Lesions] : no skin lesions [Cranial Nerves Intact] : cranial nerves 2-12 were intact [No Motor Deficits] : the motor exam was normal [Oriented x3] : oriented to person, place, and time [Acne] : no acne [de-identified] : Vitals taken by EMT using their equipment; [de-identified] : +2 edema  [de-identified] : flat affect

## 2020-11-09 NOTE — HISTORY OF PRESENT ILLNESS
[Patient] : patient [Family Member] : family member [FreeTextEntry1] : Pulmonary HTN  [FreeTextEntry2] : TAMIKO KELLY is being seen for a visit provided via [employer] [facility] via real-time audio visual technology.\par TAMIKO KELLY was located at their home, [address] [place] [address]\[place] [address], at the time of the visit.\par The [facility] clinician, TEE OWUSU, was located remotely at their home in [place] at the time of the visit.\par The patient, TAMIKO KELLY, and the [facility] clinician, TEE OWUSU, participated in the telehealth encounter.\par Other participants included the MTT, who was in the home with the patient, performed vitals monitoring and physical exam, and facilitated the video visit with TEE OWUSU. The assessment and plan was made on the basis of the information provided by the MTT.\par \par PMH:: DM, HTN, HLD, CAD (s/p PCI), COPD on home O2, HFpEF, recurrent bladder cancer s/p TURBT (01/2020). \par \par Patient being seen today for routine follow-up of chronic conditions.  Patient enrolled into [facility] two days ago via telehealth.  Visit today to assess patient in person. \par \par Patient hospitalized in March - for acute COPD exacerbation/worsening PARTIDA.  She was discharged from hospital to Hospice. \par \par At today's visit, patient seen sitting on couch - she appears comfortable and in no distress.  Patient offers no c/o, accompanied by daughter. \par \par COPD -- patient is tachypneic, not wearing O2 when EMT first arrived, O2 level of 79% -- which did improve with use of oxygen. \par CHF - on 40mg Lasix.  BLE present -- 2+.  daughter states patient enjoys her chips.

## 2020-11-09 NOTE — CHRONIC CARE ASSESSMENT
[Patient Non-adherent to care plan] : patient non-adherent to care plan [Resistant to using DME in place] : resistant to using DME in place [Can not Exercise (Disability)] : Exercise: The patient can not exercise due to disability [None] : The patient does not exercise [Diabetic Diet] : diabetic [Low Salt Diet] : low salt [General Adherence] : and is generally adherent [High In Salt] : high in salt [PPS Score: ____] : Palliative Performance Scale (PPS) Score: [unfilled]

## 2020-11-09 NOTE — REVIEW OF SYSTEMS
[Lower Ext Edema] : lower extremity edema [Negative] : Heme/Lymph [Chest Pain] : no chest pain [Palpitations] : no palpitations [Leg Claudication] : no leg claudication [Orthopnea] : no orthopnea [Paroxysmal Nocturnal Dyspnea] : no paroxysmal nocturnal dyspnea [FreeTextEntry6] : increased respiratory rate

## 2020-11-09 NOTE — HEALTH RISK ASSESSMENT
[HRA Reviewed] : Health risk assessment reviewed [Independent] : feeding [Some assistance needed] : managing medications [Full assistance needed] : managing finances [No falls in past year] : Patient reported no falls in the past year [No] : The patient does not have visual impairment

## 2020-11-09 NOTE — REASON FOR VISIT
[Subsequent Annual Medicare Wellness Visit] : a subsequent annual Medicare wellness visit [Family Member] : family member [Pre-Visit Preparation] : pre-visit preparation was done [Intercurrent Specialty/Sub-specialty Visits] : the patient has no intercurrent specialty/sub-specialty visits [FreeTextEntry2] : chart review

## 2020-11-17 PROBLEM — I50.9 CHF (CONGESTIVE HEART FAILURE): Status: ACTIVE | Noted: 2019-08-18

## 2020-11-17 PROBLEM — E11.9 DIABETES: Status: ACTIVE | Noted: 2018-10-04

## 2020-11-17 PROBLEM — I26.99 PULMONARY EMBOLI: Status: ACTIVE | Noted: 2020-01-01

## 2020-11-17 PROBLEM — Z71.89 ACP (ADVANCE CARE PLANNING): Status: ACTIVE | Noted: 2020-01-01

## 2020-11-17 PROBLEM — C67.9 BLADDER CANCER: Status: ACTIVE | Noted: 2018-11-13

## 2020-11-17 PROBLEM — I27.20 PULMONARY HTN: Status: ACTIVE | Noted: 2020-01-01

## 2020-11-17 PROBLEM — J44.9 COPD (CHRONIC OBSTRUCTIVE PULMONARY DISEASE): Status: ACTIVE | Noted: 2018-09-14

## 2020-11-17 PROBLEM — I70.0 AORTIC ATHEROSCLEROSIS: Status: ACTIVE | Noted: 2020-01-01

## 2020-11-17 NOTE — CHRONIC CARE ASSESSMENT
[Patient Non-adherent to care plan] : patient non-adherent to care plan [Resistant to using DME in place] : resistant to using DME in place [Can not Exercise (Disability)] : Exercise: The patient can not exercise due to disability [None] : The patient does not exercise [Low Salt Diet] : low salt [Poor Adherence] : but does not adhere to the diet [PPS Score: ____] : Palliative Performance Scale (PPS) Score: [unfilled]

## 2020-11-17 NOTE — COUNSELING
[Overweight - ( BMI 25.1 - 29.9 )] : overweight -  ( BMI 25.1 - 29.9 ) [DASH diet recommended] : DASH diet recommended [Non - Smoker] : non-smoker [Use assistive device to avoid falls] : use assistive device to avoid falls [Remove clutter and unsafe carpeting to avoid falls] : remove clutter and unsafe carpeting to avoid falls [] : diabetic screening [Decrease hospital use] : decrease hospital use [Minimize unnecessary interventions] : minimize unnecessary interventions [Maintain functional ability] : maintain functional ability [Completed DNR] : completed DNR [Completed Medical Orders for Life-Sustaining Treatment] : completed medical orders for life-sustaining treatment [DNR] : Code Status: DNR [Limited] : Treatment Guidelines: Limited [DNI] : Intubation: DNI [[facility] Date: _____] : [facility]last verification [facility]: [unfilled] [ - New patient with 2 or more chronic conditions; CCM discussed and patient-centered care plan established] : New patient with 2 or more chronic conditions; CCM discussed and patient-centered care plan established

## 2020-11-17 NOTE — PHYSICAL EXAM
[No Acute Distress] : no acute distress [Well Nourished] : well nourished [Normal Sclera/Conjunctiva] : normal sclera/conjunctiva [Normal Outer Ear/Nose] : the ears and nose were normal in appearance [Normal Oropharynx] : the oropharynx was normal [Supple] : the neck was supple [No Respiratory Distress] : no respiratory distress [No Accessory Muscle Use] : no accessory muscle use [Soft] : abdomen soft [Not Distended] : not distended [No Spinal Tenderness] : no spinal tenderness [No Rash] : no rash [No Skin Lesions] : no skin lesions [Cranial Nerves Intact] : cranial nerves 2-12 were intact [Oriented x3] : oriented to person, place, and time [Normal Mood] : the mood was normal [Acne] : no acne [de-identified] : limited virtual exam  [de-identified] : ambulates with walker

## 2020-11-17 NOTE — HEALTH RISK ASSESSMENT
[HRA Reviewed] : Health risk assessment reviewed [Independent] : feeding [Full assistance needed] : managing finances [No falls in past year] : Patient reported no falls in the past year [No] : The patient does not have visual impairment

## 2020-11-17 NOTE — HISTORY OF PRESENT ILLNESS
[Patient] : patient [Family Member] : family member [FreeTextEntry1] : Pulmonary HTN  [FreeTextEntry2] : TAMIKO KELLY is being seen for a visit provided via telehealth real-time audio visual technology.\par TAMIKO KELLY was located at their home, [address] [place] [address]\[place] [address], at the time of the visit.\par The [facility] clinician, TEE OWUSU, was located remotely at their home in [place] at the time of the visit. \par The patient, TAMIKO KELLY, and the [facility] clinician, TEE OWUSU, participated in the telehealth encounter.\par Other participants included: patient's daughter, Sury\par TAMIKO KELLY (Jul 14 1934) or his/her representative consents to the use of telehealth. All questions related to telehealth answered.\par \par PMH:: DM, HTN, HLD, CAD (s/p PCI), COPD on home O2, HFpEF, recurrent bladder cancer s/p TURBT (01/2020). \par \par Patient being seen today to enroll into [facility], patient referred to [facility] from Hospice. \par \par Patient hospitalized in March - for acute COPD exacerbation/worsening PARTIDA.  She was discharged from hospital to Hospice. \par \par At today's visit, patient seen sitting on couch - she appears comfortable and in no distress.  Patient offers no c/o, accompanied by daughter. \par \par Denies pain, offers no complaints. \par COPD - oxygen as needed. denies cough/wheeze. \par CHF - denies SOB, does have LE edema. \par Appetite: on and off. When she doesn't eat well, the daughter will give her ensure\par Ambulation: walks with walker, no recent falls. \par Sleep - "good"

## 2020-11-17 NOTE — CURRENT MEDS
[Medication and Allergies Reconciled] : medication and allergies reconciled [High Risk Medications Reviewed and Reconciled (Beers Criteria)] : high risk medications reviewed and reconciled [de-identified] : unsure of adherence; medications managed by patient's daughter

## 2020-11-17 NOTE — REASON FOR VISIT
[Initial Eval - Existing Diagnosis] : an initial evaluation of an existing diagnosis [Family Member] : family member [Pre-Visit Preparation] : pre-visit preparation was done [Intercurrent Specialty/Sub-specialty Visits] : the patient has no intercurrent specialty/sub-specialty visits [FreeTextEntry2] : chart review

## 2020-11-19 PROBLEM — D50.9 IRON DEFICIENCY ANEMIA: Status: ACTIVE | Noted: 2020-01-01

## 2020-12-16 PROBLEM — Z87.440 HISTORY OF URINARY TRACT INFECTION: Status: RESOLVED | Noted: 2018-09-12 | Resolved: 2020-01-01

## 2021-01-01 ENCOUNTER — TRANSCRIPTION ENCOUNTER (OUTPATIENT)
Age: 86
End: 2021-01-01

## 2021-01-01 ENCOUNTER — RX RENEWAL (OUTPATIENT)
Age: 86
End: 2021-01-01

## 2021-01-01 ENCOUNTER — INPATIENT (INPATIENT)
Facility: HOSPITAL | Age: 86
LOS: 0 days | DRG: 682 | End: 2021-03-04
Attending: STUDENT IN AN ORGANIZED HEALTH CARE EDUCATION/TRAINING PROGRAM | Admitting: STUDENT IN AN ORGANIZED HEALTH CARE EDUCATION/TRAINING PROGRAM
Payer: MEDICARE

## 2021-01-01 VITALS
DIASTOLIC BLOOD PRESSURE: 57 MMHG | RESPIRATION RATE: 22 BRPM | HEART RATE: 81 BPM | TEMPERATURE: 95 F | WEIGHT: 139.99 LBS | OXYGEN SATURATION: 83 % | HEIGHT: 62 IN | SYSTOLIC BLOOD PRESSURE: 117 MMHG

## 2021-01-01 VITALS
DIASTOLIC BLOOD PRESSURE: 58 MMHG | TEMPERATURE: 98 F | RESPIRATION RATE: 22 BRPM | SYSTOLIC BLOOD PRESSURE: 101 MMHG | HEART RATE: 89 BPM | OXYGEN SATURATION: 95 %

## 2021-01-01 DIAGNOSIS — S82.899A OTHER FRACTURE OF UNSPECIFIED LOWER LEG, INITIAL ENCOUNTER FOR CLOSED FRACTURE: Chronic | ICD-10-CM

## 2021-01-01 DIAGNOSIS — Z98.89 OTHER SPECIFIED POSTPROCEDURAL STATES: Chronic | ICD-10-CM

## 2021-01-01 DIAGNOSIS — Z98.890 OTHER SPECIFIED POSTPROCEDURAL STATES: Chronic | ICD-10-CM

## 2021-01-01 DIAGNOSIS — E87.5 HYPERKALEMIA: ICD-10-CM

## 2021-01-01 LAB
ACANTHOCYTES BLD QL SMEAR: SLIGHT — SIGNIFICANT CHANGE UP
ALBUMIN SERPL ELPH-MCNC: 3.1 G/DL — LOW (ref 3.3–5.2)
ALP SERPL-CCNC: 788 U/L — HIGH (ref 40–120)
ALT FLD-CCNC: 77 U/L — HIGH
ANION GAP SERPL CALC-SCNC: 26 MMOL/L — HIGH (ref 5–17)
ANISOCYTOSIS BLD QL: SIGNIFICANT CHANGE UP
APTT BLD: 32.2 SEC — SIGNIFICANT CHANGE UP (ref 27.5–35.5)
AST SERPL-CCNC: 106 U/L — HIGH
BASE EXCESS BLDV CALC-SCNC: -20.1 MMOL/L — LOW (ref -2–2)
BASOPHILS # BLD AUTO: 0.01 K/UL — SIGNIFICANT CHANGE UP (ref 0–0.2)
BASOPHILS NFR BLD AUTO: 0.2 % — SIGNIFICANT CHANGE UP (ref 0–2)
BILIRUB SERPL-MCNC: 1.6 MG/DL — SIGNIFICANT CHANGE UP (ref 0.4–2)
BUN SERPL-MCNC: 155 MG/DL — HIGH (ref 8–20)
CA-I SERPL-SCNC: 1.19 MMOL/L — SIGNIFICANT CHANGE UP (ref 1.15–1.33)
CALCIUM SERPL-MCNC: 9.2 MG/DL — SIGNIFICANT CHANGE UP (ref 8.6–10.2)
CHLORIDE BLDV-SCNC: 115 MMOL/L — HIGH (ref 98–107)
CHLORIDE SERPL-SCNC: 108 MMOL/L — HIGH (ref 98–107)
CO2 SERPL-SCNC: 10 MMOL/L — CRITICAL LOW (ref 22–29)
CREAT SERPL-MCNC: 16.21 MG/DL — HIGH (ref 0.5–1.3)
ELLIPTOCYTES BLD QL SMEAR: SLIGHT — SIGNIFICANT CHANGE UP
EOSINOPHIL # BLD AUTO: 0.01 K/UL — SIGNIFICANT CHANGE UP (ref 0–0.5)
EOSINOPHIL NFR BLD AUTO: 0.2 % — SIGNIFICANT CHANGE UP (ref 0–6)
GAS PNL BLDV: 146 MMOL/L — HIGH (ref 135–145)
GAS PNL BLDV: SIGNIFICANT CHANGE UP
GAS PNL BLDV: SIGNIFICANT CHANGE UP
GIANT PLATELETS BLD QL SMEAR: PRESENT — SIGNIFICANT CHANGE UP
GLUCOSE BLDV-MCNC: 175 MG/DL — HIGH (ref 70–99)
GLUCOSE SERPL-MCNC: 172 MG/DL — HIGH (ref 70–99)
GRAM STN FLD: SIGNIFICANT CHANGE UP
HCO3 BLDV-SCNC: 10 MMOL/L — LOW (ref 20–26)
HCT VFR BLD CALC: 33.5 % — LOW (ref 34.5–45)
HCT VFR BLDA CALC: 30 — LOW (ref 39–50)
HGB BLD CALC-MCNC: 9.8 G/DL — LOW (ref 11.5–15.5)
HGB BLD-MCNC: 9.5 G/DL — LOW (ref 11.5–15.5)
INR BLD: 1.41 RATIO — HIGH (ref 0.88–1.16)
LACTATE BLDV-MCNC: 5.8 MMOL/L — CRITICAL HIGH (ref 0.5–2)
LYMPHOCYTES # BLD AUTO: 0.16 K/UL — LOW (ref 1–3.3)
LYMPHOCYTES # BLD AUTO: 3.5 % — LOW (ref 13–44)
MCHC RBC-ENTMCNC: 21.4 PG — LOW (ref 27–34)
MCHC RBC-ENTMCNC: 28.4 GM/DL — LOW (ref 32–36)
MCV RBC AUTO: 75.6 FL — LOW (ref 80–100)
METHOD TYPE: SIGNIFICANT CHANGE UP
MICROCYTES BLD QL: SIGNIFICANT CHANGE UP
MONOCYTES # BLD AUTO: 0.19 K/UL — SIGNIFICANT CHANGE UP (ref 0–0.9)
MONOCYTES NFR BLD AUTO: 4.1 % — SIGNIFICANT CHANGE UP (ref 2–14)
MSSA DNA SPEC QL NAA+PROBE: SIGNIFICANT CHANGE UP
NEUTROPHILS # BLD AUTO: 4.08 K/UL — SIGNIFICANT CHANGE UP (ref 1.8–7.4)
NEUTROPHILS NFR BLD AUTO: 88.1 % — HIGH (ref 43–77)
ORGANISM # SPEC MICROSCOPIC CNT: SIGNIFICANT CHANGE UP
OTHER CELLS CSF MANUAL: 13 ML/DL — LOW (ref 18–22)
OVALOCYTES BLD QL SMEAR: SLIGHT — SIGNIFICANT CHANGE UP
PCO2 BLDV: 46 MMHG — SIGNIFICANT CHANGE UP (ref 35–50)
PH BLDV: 6.98 — CRITICAL LOW (ref 7.32–7.43)
PLAT MORPH BLD: NORMAL — SIGNIFICANT CHANGE UP
PLATELET # BLD AUTO: 229 K/UL — SIGNIFICANT CHANGE UP (ref 150–400)
PO2 BLDV: 125 MMHG — HIGH (ref 25–45)
POIKILOCYTOSIS BLD QL AUTO: SIGNIFICANT CHANGE UP
POLYCHROMASIA BLD QL SMEAR: SLIGHT — SIGNIFICANT CHANGE UP
POTASSIUM BLDV-SCNC: 8.4 MMOL/L — CRITICAL HIGH (ref 3.4–4.5)
POTASSIUM SERPL-MCNC: 8 MMOL/L — CRITICAL HIGH (ref 3.5–5.3)
POTASSIUM SERPL-SCNC: 8 MMOL/L — CRITICAL HIGH (ref 3.5–5.3)
PROT SERPL-MCNC: 7.5 G/DL — SIGNIFICANT CHANGE UP (ref 6.6–8.7)
PROTHROM AB SERPL-ACNC: 16.1 SEC — HIGH (ref 10.6–13.6)
RBC # BLD: 4.43 M/UL — SIGNIFICANT CHANGE UP (ref 3.8–5.2)
RBC # FLD: 34.1 % — HIGH (ref 10.3–14.5)
RBC BLD AUTO: ABNORMAL
SAO2 % BLDV: 96 % — SIGNIFICANT CHANGE UP
SARS-COV-2 RNA SPEC QL NAA+PROBE: SIGNIFICANT CHANGE UP
SCHISTOCYTES BLD QL AUTO: SLIGHT — SIGNIFICANT CHANGE UP
SODIUM SERPL-SCNC: 144 MMOL/L — SIGNIFICANT CHANGE UP (ref 135–145)
SPECIMEN SOURCE: SIGNIFICANT CHANGE UP
TROPONIN T SERPL-MCNC: 0.07 NG/ML — HIGH (ref 0–0.06)
WBC # BLD: 13.1 K/UL — HIGH (ref 3.8–10.5)
WBC # FLD AUTO: 13.1 K/UL — HIGH (ref 3.8–10.5)

## 2021-01-01 PROCEDURE — 93010 ELECTROCARDIOGRAM REPORT: CPT | Mod: 76

## 2021-01-01 PROCEDURE — 99223 1ST HOSP IP/OBS HIGH 75: CPT

## 2021-01-01 PROCEDURE — 71045 X-RAY EXAM CHEST 1 VIEW: CPT | Mod: 26

## 2021-01-01 PROCEDURE — 70450 CT HEAD/BRAIN W/O DYE: CPT | Mod: 26

## 2021-01-01 PROCEDURE — 99291 CRITICAL CARE FIRST HOUR: CPT

## 2021-01-01 RX ORDER — METFORMIN HYDROCHLORIDE 500 MG/1
500 TABLET, COATED ORAL
Qty: 180 | Refills: 0 | Status: ACTIVE | COMMUNITY
Start: 2019-05-20 | End: 1900-01-01

## 2021-01-01 RX ORDER — CALCIUM GLUCONATE 100 MG/ML
1 VIAL (ML) INTRAVENOUS ONCE
Refills: 0 | Status: COMPLETED | OUTPATIENT
Start: 2021-01-01 | End: 2021-01-01

## 2021-01-01 RX ORDER — MORPHINE SULFATE 50 MG/1
2 CAPSULE, EXTENDED RELEASE ORAL EVERY 4 HOURS
Refills: 0 | Status: DISCONTINUED | OUTPATIENT
Start: 2021-01-01 | End: 2021-01-01

## 2021-01-01 RX ORDER — DEXTROSE 50 % IN WATER 50 %
50 SYRINGE (ML) INTRAVENOUS ONCE
Refills: 0 | Status: COMPLETED | OUTPATIENT
Start: 2021-01-01 | End: 2021-01-01

## 2021-01-01 RX ORDER — METOPROLOL SUCCINATE 25 MG/1
25 TABLET, EXTENDED RELEASE ORAL
Qty: 90 | Refills: 0 | Status: ACTIVE | COMMUNITY
Start: 2018-09-17 | End: 1900-01-01

## 2021-01-01 RX ORDER — APIXABAN 5 MG/1
5 TABLET, FILM COATED ORAL
Qty: 60 | Refills: 3 | Status: ACTIVE | COMMUNITY
Start: 2020-01-01 | End: 1900-01-01

## 2021-01-01 RX ORDER — INSULIN HUMAN 100 [IU]/ML
5 INJECTION, SOLUTION SUBCUTANEOUS ONCE
Refills: 0 | Status: COMPLETED | OUTPATIENT
Start: 2021-01-01 | End: 2021-01-01

## 2021-01-01 RX ORDER — FUROSEMIDE 40 MG/1
40 TABLET ORAL DAILY
Qty: 30 | Refills: 0 | Status: ACTIVE | COMMUNITY
Start: 2019-07-09 | End: 1900-01-01

## 2021-01-01 RX ORDER — SODIUM CHLORIDE 9 MG/ML
1000 INJECTION INTRAMUSCULAR; INTRAVENOUS; SUBCUTANEOUS ONCE
Refills: 0 | Status: COMPLETED | OUTPATIENT
Start: 2021-01-01 | End: 2021-01-01

## 2021-01-01 RX ADMIN — Medication 50 MILLILITER(S): at 14:31

## 2021-01-01 RX ADMIN — SODIUM CHLORIDE 1000 MILLILITER(S): 9 INJECTION INTRAMUSCULAR; INTRAVENOUS; SUBCUTANEOUS at 14:32

## 2021-01-01 RX ADMIN — SODIUM CHLORIDE 1000 MILLILITER(S): 9 INJECTION INTRAMUSCULAR; INTRAVENOUS; SUBCUTANEOUS at 15:30

## 2021-01-01 RX ADMIN — MORPHINE SULFATE 2 MILLIGRAM(S): 50 CAPSULE, EXTENDED RELEASE ORAL at 22:37

## 2021-01-01 RX ADMIN — Medication 1 GRAM(S): at 15:00

## 2021-01-01 RX ADMIN — INSULIN HUMAN 5 UNIT(S): 100 INJECTION, SOLUTION SUBCUTANEOUS at 14:32

## 2021-01-01 RX ADMIN — Medication 1 MILLIGRAM(S): at 00:36

## 2021-01-01 RX ADMIN — Medication 100 GRAM(S): at 14:32

## 2021-03-03 NOTE — H&P ADULT - NSICDXPASTSURGICALHX_GEN_ALL_CORE_FT
PAST SURGICAL HISTORY:  Ankle fracture Rt    S/P cystoscopy     Status post cardiac catheterization

## 2021-03-03 NOTE — H&P ADULT - NSHPLABSRESULTS_GEN_ALL_CORE
CBC Full  -  ( 03 Mar 2021 13:31 )  WBC Count : 13.10 K/uL  RBC Count : 4.43 M/uL  Hemoglobin : 9.5 g/dL  Hematocrit : 33.5 %  Platelet Count - Automated : 229 K/uL  Mean Cell Volume : 75.6 fl  Mean Cell Hemoglobin : 21.4 pg  Mean Cell Hemoglobin Concentration : 28.4 gm/dL  Auto Neutrophil # : 4.08 K/uL  Auto Lymphocyte # : 0.16 K/uL  Auto Monocyte # : 0.19 K/uL  Auto Eosinophil # : 0.01 K/uL  Auto Basophil # : 0.01 K/uL  Auto Neutrophil % : 88.1 %  Auto Lymphocyte % : 3.5 %  Auto Monocyte % : 4.1 %  Auto Eosinophil % : 0.2 %  Auto Basophil % : 0.2 %      03-03    144  |  108<H>  |  155.0<H>  ----------------------------<  172<H>  8.0<HH>   |  10.0<LL>  |  16.21<H>    Ca    9.2      03 Mar 2021 13:31    TPro  7.5  /  Alb  3.1<L>  /  TBili  1.6  /  DBili  x   /  AST  106<H>  /  ALT  77<H>  /  AlkPhos  788<H>  03-03

## 2021-03-03 NOTE — ED ADULT TRIAGE NOTE - CHIEF COMPLAINT QUOTE
pt arrive by ambulance with assisted ventilations via ambu bag, as per EMS pt with cancer history recently taken off hospice. today family called due to shallow breathing and unresponsive. pt arrives with MOLST stating DNR/DNI. MD Millard to bedside for eval

## 2021-03-03 NOTE — ED ADULT NURSE NOTE - OBJECTIVE STATEMENT
Pt arrives with DNR/DNI paperwork from home in respiratory distress. As per EMS, pt uses home 02 and lives at 85-90%, today pt unable to maintain sat higher than 75%. Pt noted to improve to 100% on NRB at 100% 02 but is still agonally breathing from diaphragm. Pt is responsive to painful stimuli but unable to speak when prompted. MD Millard at the bedside and pt on CM and , STAT chest XRAY called for.

## 2021-03-03 NOTE — ED PROVIDER NOTE - CLINICAL SUMMARY MEDICAL DECISION MAKING FREE TEXT BOX
Pt. with bladder CA and hypoxia. Pt. with bladder CA and hypoxia. Will check labs/EKG/CXR. Will discuss Goals of care with the daughter.

## 2021-03-03 NOTE — H&P ADULT - NSHPPHYSICALEXAM_GEN_ALL_CORE
General: fragile AA lady, laying in bed, on NR with agonal breathing, responds only to deep stimulus, open her eyes.   Head: temporal wasting   Cardio: tachycardiac  Pulm: labored, agonal breathing  GI; abdomen is soft   Extr: no edema   Neuro: only opens her eyes

## 2021-03-03 NOTE — ED PROVIDER NOTE - PROGRESS NOTE DETAILS
Spoke to patient's daughter Elba Mason. She has supported her mother's wishes to not Intubate and to not resuscitate. Pt. is a DNR/DNI.  Daughter is fine with us drawing blood and getting imaging and giving IV fluids and or antibiotics if needed. Spoke to Dr. Crawley. Based upon pt's current DNR/DNI status, dialysis will be offered.

## 2021-03-03 NOTE — H&P ADULT - ASSESSMENT
85 yo found unresponsive     Pt with :     Acute kidney failure- serum Cr on admission 16  Metabolic acidosis   Hyperkalemia; potassium 8  Respiratory failure - on NR saturating in 80th   Hx of Bladder CA   COPD          Admit to medicine   Pt is actively dying .  Comfort care only   Palliative consult called   Started Morphine 2 mg IV q4 for dyspnea and Ativan 1 mg PRN for agitation   Family notified, spoke to daughter Sury

## 2021-03-03 NOTE — ED PROVIDER NOTE - OBJECTIVE STATEMENT
Pt. brought in by EMS after she was noted to be unresponsive by the family this morning. Pt. has hx of Bladder CAncer. Pt. is not any treatment. Pt. has been declining as per daughter for more than 1 week with decrease PO intake. Pt. was in hospice but is currently not in hospice anymore. Pt. was found to be hypoxic(62% on NC) by EMS. Pt. did not respond to 2mg of IN narcan. Pt. brought in by EMS being bagged. Pt. placed on 100% NRB with improvement of her oxygenation. Daughter had fed the patient and had washed her this morning prior to patient's worsening condition. Pt. did not admit to any chest pain or headache or abdominal pain. Pt. had no pain but only told her daughter that she was "tired".

## 2021-03-03 NOTE — H&P ADULT - HISTORY OF PRESENT ILLNESS
87 yo female, from home, lives with daughter, walks with walker, prior was on hospice, with PMHs CAD with stents, COPD on supplemental O2, CHF,  Bladder CA, CKD , DM brought by EMS after she was found unresponsive by family members. In ED patient found to be on NR, with agonal breathing and responding on deep stimulus.   She had elevated potassium of 8 and serum Cr of 16.   Spoke to patients daughter Sury - Discussed pts current clinical state and lab findings. Sury agreed with hospice and comfort care

## 2021-03-03 NOTE — ED PROVIDER NOTE - PHYSICAL EXAMINATION
Pt. unresponsive  Pt. opens her eyes to her voice  Pt. responds to tactile stimuli  Pt. withdraws to painful stimuli  Pt. is tachypneic with shallow breathing  +Ronchi b/l   +2 b/l pitting edema.

## 2021-03-04 NOTE — DISCHARGE NOTE FOR THE EXPIRED PATIENT - NS PRELIMINARY CAUSE OF DEATH_RESTRICTED
Brain Death/Cardiopulmonary Arrest/Multi-organ Dysfunction Syndrome Brain Death/Cardiopulmonary Arrest/Multi-organ Dysfunction Syndrome/Renal Failure/Respiratory Failure

## 2021-03-04 NOTE — DISCHARGE NOTE FOR THE EXPIRED PATIENT - SECONDARY DIAGNOSIS.
Acute respiratory failure Diabetes Chronic obstructive pulmonary disease, unspecified COPD type Hyperkalemia

## 2021-03-04 NOTE — DISCHARGE NOTE FOR THE EXPIRED PATIENT - HOSPITAL COURSE
85 yo female, from home, lives with daughter, walks with walker, prior was on hospice, with PMHs CAD with stents, COPD on supplemental O2, CHF,  Bladder CA, CKD , DM brought by EMS after she was found unresponsive by family members. In ED patient found to be on NR, with agonal breathing and responding on deep stimulus. Patient noted with acute kidney failure, metabolic acidosis Hyperkalemia; potassium 8Respiratory failure - on NR saturating in 80th. Patient was admit to medicine, and noted to be actively dying .    After discussion with family, patient placed comfort care only. Palliative consult called. At 0133, 3/04/21, patient found unresponsive,  with no spontaneous breathing, absent carotid pulse, no spontaneous heart sound. PA called, examined patient and patient was noted with absent carotid pulse, no spontaneous respiration, pupil  unresponsive to light,  no response to noxious stimulation, and absent corneal reflex. Patient was pronounced  at 0145. Family was notified of patient's expiration via Telephone contact with daughter,  Sury Mason.

## 2021-03-05 LAB
GRAM STN FLD: SIGNIFICANT CHANGE UP
GRAM STN FLD: SIGNIFICANT CHANGE UP
SPECIMEN SOURCE: SIGNIFICANT CHANGE UP

## 2021-03-06 LAB
-  AMPICILLIN/SULBACTAM: SIGNIFICANT CHANGE UP
-  CEFAZOLIN: SIGNIFICANT CHANGE UP
-  CLINDAMYCIN: SIGNIFICANT CHANGE UP
-  ERYTHROMYCIN: SIGNIFICANT CHANGE UP
-  GENTAMICIN: SIGNIFICANT CHANGE UP
-  OXACILLIN: SIGNIFICANT CHANGE UP
-  PENICILLIN: SIGNIFICANT CHANGE UP
-  RIFAMPIN: SIGNIFICANT CHANGE UP
-  TETRACYCLINE: SIGNIFICANT CHANGE UP
-  TRIMETHOPRIM/SULFAMETHOXAZOLE: SIGNIFICANT CHANGE UP
-  VANCOMYCIN: SIGNIFICANT CHANGE UP
CULTURE RESULTS: SIGNIFICANT CHANGE UP
CULTURE RESULTS: SIGNIFICANT CHANGE UP
METHOD TYPE: SIGNIFICANT CHANGE UP
ORGANISM # SPEC MICROSCOPIC CNT: SIGNIFICANT CHANGE UP
SPECIMEN SOURCE: SIGNIFICANT CHANGE UP
SPECIMEN SOURCE: SIGNIFICANT CHANGE UP

## 2021-03-16 PROCEDURE — 87150 DNA/RNA AMPLIFIED PROBE: CPT

## 2021-03-16 PROCEDURE — 83880 ASSAY OF NATRIURETIC PEPTIDE: CPT

## 2021-03-16 PROCEDURE — 85610 PROTHROMBIN TIME: CPT

## 2021-03-16 PROCEDURE — U0003: CPT

## 2021-03-16 PROCEDURE — 87040 BLOOD CULTURE FOR BACTERIA: CPT

## 2021-03-16 PROCEDURE — 85025 COMPLETE CBC W/AUTO DIFF WBC: CPT

## 2021-03-16 PROCEDURE — 84484 ASSAY OF TROPONIN QUANT: CPT

## 2021-03-16 PROCEDURE — 70450 CT HEAD/BRAIN W/O DYE: CPT

## 2021-03-16 PROCEDURE — 87186 SC STD MICRODIL/AGAR DIL: CPT

## 2021-03-16 PROCEDURE — 85730 THROMBOPLASTIN TIME PARTIAL: CPT

## 2021-03-16 PROCEDURE — 83605 ASSAY OF LACTIC ACID: CPT

## 2021-03-16 PROCEDURE — 82947 ASSAY GLUCOSE BLOOD QUANT: CPT

## 2021-03-16 PROCEDURE — 99291 CRITICAL CARE FIRST HOUR: CPT | Mod: 25

## 2021-03-16 PROCEDURE — 71045 X-RAY EXAM CHEST 1 VIEW: CPT

## 2021-03-16 PROCEDURE — 82435 ASSAY OF BLOOD CHLORIDE: CPT

## 2021-03-16 PROCEDURE — 36415 COLL VENOUS BLD VENIPUNCTURE: CPT

## 2021-03-16 PROCEDURE — 80053 COMPREHEN METABOLIC PANEL: CPT

## 2021-03-16 PROCEDURE — U0005: CPT

## 2021-03-16 PROCEDURE — 93005 ELECTROCARDIOGRAM TRACING: CPT

## 2021-03-16 PROCEDURE — 84132 ASSAY OF SERUM POTASSIUM: CPT

## 2021-03-16 PROCEDURE — 82330 ASSAY OF CALCIUM: CPT

## 2021-03-16 PROCEDURE — 84295 ASSAY OF SERUM SODIUM: CPT

## 2021-03-16 PROCEDURE — 85014 HEMATOCRIT: CPT

## 2021-03-16 PROCEDURE — 85018 HEMOGLOBIN: CPT

## 2021-03-16 PROCEDURE — 87077 CULTURE AEROBIC IDENTIFY: CPT

## 2021-03-16 PROCEDURE — 96375 TX/PRO/DX INJ NEW DRUG ADDON: CPT

## 2021-03-16 PROCEDURE — 96365 THER/PROPH/DIAG IV INF INIT: CPT

## 2021-03-16 PROCEDURE — 82803 BLOOD GASES ANY COMBINATION: CPT

## 2022-06-27 NOTE — H&P PST ADULT - NEGATIVE PSYCHIATRIC SYMPTOMS
[No Acute Distress] : no acute distress [Well Nourished] : well nourished [Well Developed] : well developed [Well-Appearing] : well-appearing [PERRL] : pupils equal round and reactive to light [EOMI] : extraocular movements intact [Normal Outer Ear/Nose] : the outer ears and nose were normal in appearance [No JVD] : no jugular venous distention [No Respiratory Distress] : no respiratory distress  [No Accessory Muscle Use] : no accessory muscle use [Clear to Auscultation] : lungs were clear to auscultation bilaterally [Normal Rate] : normal rate  [Regular Rhythm] : with a regular rhythm [Normal S1, S2] : normal S1 and S2 [No Carotid Bruits] : no carotid bruits [No Edema] : there was no peripheral edema [No Palpable Aorta] : no palpable aorta [No Extremity Clubbing/Cyanosis] : no extremity clubbing/cyanosis [Declined Breast Exam] : declined breast exam  [Soft] : abdomen soft [Non Tender] : non-tender [Non-distended] : non-distended [No Masses] : no abdominal mass palpated [No HSM] : no HSM [Normal Bowel Sounds] : normal bowel sounds [Normal Posterior Cervical Nodes] : no posterior cervical lymphadenopathy [Normal Anterior Cervical Nodes] : no anterior cervical lymphadenopathy [No CVA Tenderness] : no CVA  tenderness [Grossly Normal Strength/Tone] : grossly normal strength/tone [No Rash] : no rash [Coordination Grossly Intact] : coordination grossly intact [No Focal Deficits] : no focal deficits [Normal Gait] : normal gait [Normal Affect] : the affect was normal [Normal Mood] : the mood was normal [Normal Insight/Judgement] : insight and judgment were intact no suicidal ideation

## 2022-07-18 NOTE — ED PROVIDER NOTE - NSTIMEPROVIDERCAREINITIATE_GEN_ER
02-Jul-2019 12:23 Eucrisa Counseling: Patient may experience a mild burning sensation during topical application. Eucrisa is not approved in children less than 2 years of age.

## 2022-09-02 NOTE — ED PROVIDER NOTE - NSTIMEPROVIDERCAREINITIATE_GEN_ER
[FreeTextEntry1] : bruise of toe nail: \par discussed supportive care\par no evidence of infection or skin lesions\par f/u with podiatry\par tylenol for pain if needed\par \par htn: counselled patient to repeat bp with cardiologist tomorrow\par if remains elevated may need to restart Norvasc 5mg\par \par strongly advised that she stop cutting carvedilol 12.5mg tabs and throw away-  advised to only use 6.25mg tabs to simpliy regimen and to minimize errors.\par \par medication list provied to patient today to give to Cardiology tomorrow
28-Feb-2020 10:47

## 2022-12-06 NOTE — PROGRESS NOTE ADULT - SUBJECTIVE AND OBJECTIVE BOX
Hospital Day #    POD # 1 s/p cysto TURBT    IV:  1/2 NS + 20 KCL @ 60cc/hr    I&O's Summary    27 Oct 2018 07:01  -  28 Oct 2018 07:00  --------------------------------------------------------  IN: 4020 mL / OUT: 7400 mL / NET: -3380 mL    28 Oct 2018 07:01  -  28 Oct 2018 11:32  --------------------------------------------------------  IN: 0 mL / OUT: 550 mL / NET: -550 mL    diet:  DASH/ TLC tolerating PO well       Patient: afebrile VSS awake and alert relative at bedside , patient feels well minimal bladder spasms, denies and nausea, vomiting,.    T(C): 36.8 (10-28-18 @ 08:00), Max: 36.8 (10-27-18 @ 17:14)  HR: 71 (10-28-18 @ 09:33) (67 - 982)  BP: 117/66 (10-28-18 @ 08:00) (106/57 - 117/66)  RR: 18 (10-28-18 @ 08:00) (18 - 18)  SpO2: 96% (10-28-18 @ 09:33) (92% - 96%)  Wt(kg): --    chest: good air exchange, denies SOB or chest pain   Abdomen: soft benign   output: CBI off yesterday; ceron catheter in place functioning 550cc last shift clear , yellow urine appreciated in bag at present   Extremities:                          10.4   14.6  )-----------( 298      ( 28 Oct 2018 07:31 )             33.2               xrays:    PAST MEDICAL & SURGICAL HISTORY:  Stented coronary artery: Mi in 2014, s/p stent of right coronary artery  Bladder prolapse, female, acquired  Bladder tumor  CAD (coronary artery disease)  Neuropathy  High cholesterol  Hypertension  Diabetes  Status post cardiac catheterization  Ankle fracture: Rt          Impression: stable POD # 1 , tolerated procedure well, feeling much better ceron draining clear urine. pathology pending final report.  Discussed with Surgeon present situation and continued care.   Plan: discontinue ceron catheter and follow, continue general care and management   with medicine. Purse String (Simple) Text: Given the location of the defect and the characteristics of the surrounding skin a purse string closure was deemed most appropriate.  Undermining was performed circumferentially around the surgical defect.  A purse string suture was then placed and tightened.

## 2025-02-26 NOTE — REASON FOR VISIT
[Follow-Up - Clinic] : a clinic follow-up of [Coronary Artery Disease] : coronary artery disease [Dyspnea] : dyspnea [Hyperlipidemia] : hyperlipidemia [Hypertension] : hypertension Performing Laboratory: [id number] Billing Type: Third-Party Bill Bill For Surgical Tray: no Lab Facility: [id number] Expected Date Of Service: 02/26/2025